# Patient Record
Sex: MALE | Race: WHITE | NOT HISPANIC OR LATINO | Employment: OTHER | URBAN - METROPOLITAN AREA
[De-identification: names, ages, dates, MRNs, and addresses within clinical notes are randomized per-mention and may not be internally consistent; named-entity substitution may affect disease eponyms.]

---

## 2017-01-15 RX ORDER — SPIRONOLACTONE 25 MG/1
25 TABLET ORAL DAILY
Qty: 30 TABLET | Refills: 11 | Status: SHIPPED | OUTPATIENT
Start: 2017-01-15 | End: 2018-03-13 | Stop reason: HOSPADM

## 2017-01-15 RX ORDER — OMEPRAZOLE 40 MG/1
40 CAPSULE, DELAYED RELEASE ORAL 2 TIMES DAILY
Qty: 60 CAPSULE | Refills: 11 | Status: SHIPPED | OUTPATIENT
Start: 2017-01-15

## 2017-02-07 DIAGNOSIS — M25.551 RIGHT HIP PAIN: Primary | ICD-10-CM

## 2017-02-07 NOTE — PROGRESS NOTES
Patient called the office today noting a fall yesterday. He is complaining of right hip pain. Patient to have right hip x-ray at [facility].

## 2017-02-08 ENCOUNTER — APPOINTMENT (OUTPATIENT)
Dept: CT IMAGING | Facility: HOSPITAL | Age: 77
End: 2017-02-08

## 2017-02-08 ENCOUNTER — APPOINTMENT (OUTPATIENT)
Dept: GENERAL RADIOLOGY | Facility: HOSPITAL | Age: 77
End: 2017-02-08

## 2017-02-08 ENCOUNTER — HOSPITAL ENCOUNTER (OUTPATIENT)
Facility: HOSPITAL | Age: 77
Setting detail: OBSERVATION
Discharge: HOME | End: 2017-02-10
Attending: EMERGENCY MEDICINE | Admitting: FAMILY MEDICINE
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
## 2017-02-08 DIAGNOSIS — Z86.79 HISTORY OF HYPERTENSION: ICD-10-CM

## 2017-02-08 DIAGNOSIS — Z86.79 HISTORY OF CORONARY ARTERY DISEASE: ICD-10-CM

## 2017-02-08 DIAGNOSIS — Z78.9 IMPAIRED MOBILITY AND ADLS: ICD-10-CM

## 2017-02-08 DIAGNOSIS — R06.09 DYSPNEA ON EXERTION: ICD-10-CM

## 2017-02-08 DIAGNOSIS — Z74.09 IMPAIRED MOBILITY AND ADLS: ICD-10-CM
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
## 2017-02-08 DIAGNOSIS — Z95.1 HISTORY OF CORONARY ARTERY BYPASS SURGERY: ICD-10-CM

## 2017-02-08 DIAGNOSIS — R11.2 NON-INTRACTABLE VOMITING WITH NAUSEA, UNSPECIFIED VOMITING TYPE: ICD-10-CM

## 2017-02-08 PROBLEM — N18.30 CKD (CHRONIC KIDNEY DISEASE), STAGE III (HCC): Status: ACTIVE | Noted: 2017-02-08

## 2017-02-08 PROBLEM — D72.829 LEUKOCYTOSIS: Status: ACTIVE | Noted: 2017-02-08

## 2017-02-08 LAB
ALBUMIN SERPL-MCNC: 3.9 G/DL (ref 3.2–4.8)
ALBUMIN/GLOB SERPL: 1.6 G/DL (ref 1.5–2.5)
ALP SERPL-CCNC: 73 U/L (ref 25–100)
ALT SERPL W P-5'-P-CCNC: 15 U/L (ref 7–40)
ANION GAP SERPL CALCULATED.3IONS-SCNC: 3 MMOL/L (ref 3–11)
AST SERPL-CCNC: 23 U/L (ref 0–33)
BASOPHILS # BLD AUTO: 0.02 10*3/MM3 (ref 0–0.2)
BASOPHILS NFR BLD AUTO: 0.2 % (ref 0–1)
BILIRUB SERPL-MCNC: 0.6 MG/DL (ref 0.3–1.2)
BILIRUB UR QL STRIP: NEGATIVE
BNP SERPL-MCNC: 331 PG/ML (ref 0–100)
BUN BLD-MCNC: 19 MG/DL (ref 9–23)
BUN/CREAT SERPL: 13.6 (ref 7–25)
CALCIUM SPEC-SCNC: 9.6 MG/DL (ref 8.7–10.4)
CHLORIDE SERPL-SCNC: 103 MMOL/L (ref 99–109)
CLARITY UR: CLEAR
CO2 SERPL-SCNC: 27 MMOL/L (ref 20–31)
COLOR UR: YELLOW
CREAT BLD-MCNC: 1.4 MG/DL (ref 0.6–1.3)
D DIMER PPP FEU-MCNC: 1.6 MG/L (FEU) (ref 0–0.5)
DEPRECATED RDW RBC AUTO: 51.5 FL (ref 37–54)
EOSINOPHIL # BLD AUTO: 0.03 10*3/MM3 (ref 0.1–0.3)
EOSINOPHIL NFR BLD AUTO: 0.2 % (ref 0–3)
ERYTHROCYTE [DISTWIDTH] IN BLOOD BY AUTOMATED COUNT: 15.4 % (ref 11.3–14.5)
GFR SERPL CREATININE-BSD FRML MDRD: 49 ML/MIN/1.73
GLOBULIN UR ELPH-MCNC: 2.5 GM/DL
GLUCOSE BLD-MCNC: 110 MG/DL (ref 70–100)
GLUCOSE UR STRIP-MCNC: NEGATIVE MG/DL
HCT VFR BLD AUTO: 38.6 % (ref 38.9–50.9)
HGB BLD-MCNC: 12.5 G/DL (ref 13.1–17.5)
HGB UR QL STRIP.AUTO: NEGATIVE
HOLD SPECIMEN: NORMAL
HOLD SPECIMEN: NORMAL
IMM GRANULOCYTES # BLD: 0.04 10*3/MM3 (ref 0–0.03)
IMM GRANULOCYTES NFR BLD: 0.3 % (ref 0–0.6)
KETONES UR QL STRIP: NEGATIVE
LEUKOCYTE ESTERASE UR QL STRIP.AUTO: NEGATIVE
LYMPHOCYTES # BLD AUTO: 1.58 10*3/MM3 (ref 0.6–4.8)
LYMPHOCYTES NFR BLD AUTO: 12.5 % (ref 24–44)
MAGNESIUM SERPL-MCNC: 2 MG/DL (ref 1.3–2.7)
MCH RBC QN AUTO: 29.6 PG (ref 27–31)
MCHC RBC AUTO-ENTMCNC: 32.4 G/DL (ref 32–36)
MCV RBC AUTO: 91.3 FL (ref 80–99)
MONOCYTES # BLD AUTO: 1.62 10*3/MM3 (ref 0–1)
MONOCYTES NFR BLD AUTO: 12.8 % (ref 0–12)
NEUTROPHILS # BLD AUTO: 9.35 10*3/MM3 (ref 1.5–8.3)
NEUTROPHILS NFR BLD AUTO: 74 % (ref 41–71)
NITRITE UR QL STRIP: NEGATIVE
PH UR STRIP.AUTO: <=5 [PH] (ref 5–8)
PLATELET # BLD AUTO: 111 10*3/MM3 (ref 150–450)
PMV BLD AUTO: 11.6 FL (ref 6–12)
POTASSIUM BLD-SCNC: 5 MMOL/L (ref 3.5–5.5)
PROT SERPL-MCNC: 6.4 G/DL (ref 5.7–8.2)
PROT UR QL STRIP: NEGATIVE
RBC # BLD AUTO: 4.23 10*6/MM3 (ref 4.2–5.76)
SODIUM BLD-SCNC: 133 MMOL/L (ref 132–146)
SP GR UR STRIP: 1.01 (ref 1–1.03)
TROPONIN I SERPL-MCNC: 0.02 NG/ML (ref 0–0.07)
TROPONIN I SERPL-MCNC: 0.03 NG/ML (ref 0–0.07)
UROBILINOGEN UR QL STRIP: NORMAL
WBC NRBC COR # BLD: 12.64 10*3/MM3 (ref 3.5–10.8)
WHOLE BLOOD HOLD SPECIMEN: NORMAL
WHOLE BLOOD HOLD SPECIMEN: NORMAL

## 2017-02-08 PROCEDURE — G0378 HOSPITAL OBSERVATION PER HR: HCPCS

## 2017-02-08 PROCEDURE — 84484 ASSAY OF TROPONIN QUANT: CPT

## 2017-02-08 PROCEDURE — 99220 PR INITIAL OBSERVATION CARE/DAY 70 MINUTES: CPT | Performed by: INTERNAL MEDICINE

## 2017-02-08 PROCEDURE — 36415 COLL VENOUS BLD VENIPUNCTURE: CPT

## 2017-02-08 PROCEDURE — 96361 HYDRATE IV INFUSION ADD-ON: CPT

## 2017-02-08 PROCEDURE — 71010 HC CHEST PA OR AP: CPT

## 2017-02-08 PROCEDURE — 80053 COMPREHEN METABOLIC PANEL: CPT | Performed by: EMERGENCY MEDICINE

## 2017-02-08 PROCEDURE — 83880 ASSAY OF NATRIURETIC PEPTIDE: CPT | Performed by: EMERGENCY MEDICINE

## 2017-02-08 PROCEDURE — 81003 URINALYSIS AUTO W/O SCOPE: CPT | Performed by: EMERGENCY MEDICINE

## 2017-02-08 PROCEDURE — 96360 HYDRATION IV INFUSION INIT: CPT

## 2017-02-08 PROCEDURE — 93005 ELECTROCARDIOGRAM TRACING: CPT

## 2017-02-08 PROCEDURE — 83735 ASSAY OF MAGNESIUM: CPT | Performed by: EMERGENCY MEDICINE

## 2017-02-08 PROCEDURE — 63710000001 PREDNISONE PER 5 MG: Performed by: INTERNAL MEDICINE

## 2017-02-08 PROCEDURE — 99285 EMERGENCY DEPT VISIT HI MDM: CPT

## 2017-02-08 PROCEDURE — 93005 ELECTROCARDIOGRAM TRACING: CPT | Performed by: EMERGENCY MEDICINE

## 2017-02-08 PROCEDURE — 85025 COMPLETE CBC W/AUTO DIFF WBC: CPT | Performed by: EMERGENCY MEDICINE

## 2017-02-08 PROCEDURE — 85379 FIBRIN DEGRADATION QUANT: CPT | Performed by: INTERNAL MEDICINE

## 2017-02-08 PROCEDURE — 72192 CT PELVIS W/O DYE: CPT

## 2017-02-08 PROCEDURE — 70450 CT HEAD/BRAIN W/O DYE: CPT

## 2017-02-08 PROCEDURE — 71275 CT ANGIOGRAPHY CHEST: CPT

## 2017-02-08 PROCEDURE — 0 IOPAMIDOL PER 1 ML: Performed by: INTERNAL MEDICINE

## 2017-02-08 PROCEDURE — 72125 CT NECK SPINE W/O DYE: CPT

## 2017-02-08 RX ORDER — LORAZEPAM 1 MG/1
1 TABLET ORAL EVERY 8 HOURS PRN
Status: DISCONTINUED | OUTPATIENT
Start: 2017-02-08 | End: 2017-02-10 | Stop reason: HOSPADM

## 2017-02-08 RX ORDER — PREDNISONE 20 MG/1
40 TABLET ORAL
Status: DISCONTINUED | OUTPATIENT
Start: 2017-02-08 | End: 2017-02-09

## 2017-02-08 RX ORDER — ONDANSETRON 2 MG/ML
4 INJECTION INTRAMUSCULAR; INTRAVENOUS EVERY 6 HOURS PRN
Status: DISCONTINUED | OUTPATIENT
Start: 2017-02-08 | End: 2017-02-10 | Stop reason: HOSPADM

## 2017-02-08 RX ORDER — ASPIRIN 81 MG/1
81 TABLET, CHEWABLE ORAL DAILY
Status: DISCONTINUED | OUTPATIENT
Start: 2017-02-08 | End: 2017-02-10 | Stop reason: HOSPADM

## 2017-02-08 RX ORDER — PANTOPRAZOLE SODIUM 40 MG/1
40 TABLET, DELAYED RELEASE ORAL
Status: DISCONTINUED | OUTPATIENT
Start: 2017-02-09 | End: 2017-02-10 | Stop reason: HOSPADM

## 2017-02-08 RX ORDER — SENNA AND DOCUSATE SODIUM 50; 8.6 MG/1; MG/1
2 TABLET, FILM COATED ORAL 2 TIMES DAILY PRN
Status: DISCONTINUED | OUTPATIENT
Start: 2017-02-08 | End: 2017-02-10 | Stop reason: HOSPADM

## 2017-02-08 RX ORDER — ACETAMINOPHEN 500 MG
500 TABLET ORAL EVERY 6 HOURS PRN
Status: DISCONTINUED | OUTPATIENT
Start: 2017-02-08 | End: 2017-02-10 | Stop reason: HOSPADM

## 2017-02-08 RX ORDER — SODIUM CHLORIDE 9 MG/ML
100 INJECTION, SOLUTION INTRAVENOUS CONTINUOUS
Status: DISCONTINUED | OUTPATIENT
Start: 2017-02-08 | End: 2017-02-09

## 2017-02-08 RX ORDER — SOTALOL HYDROCHLORIDE 80 MG/1
120 TABLET ORAL EVERY 12 HOURS SCHEDULED
Status: DISCONTINUED | OUTPATIENT
Start: 2017-02-08 | End: 2017-02-10 | Stop reason: HOSPADM

## 2017-02-08 RX ORDER — POTASSIUM CHLORIDE 1.5 G/1.77G
40 POWDER, FOR SOLUTION ORAL AS NEEDED
Status: DISCONTINUED | OUTPATIENT
Start: 2017-02-08 | End: 2017-02-10 | Stop reason: HOSPADM

## 2017-02-08 RX ORDER — TRAMADOL HYDROCHLORIDE 50 MG/1
50 TABLET ORAL EVERY 12 HOURS PRN
Status: DISCONTINUED | OUTPATIENT
Start: 2017-02-08 | End: 2017-02-10 | Stop reason: HOSPADM

## 2017-02-08 RX ORDER — SODIUM CHLORIDE 0.9 % (FLUSH) 0.9 %
10 SYRINGE (ML) INJECTION AS NEEDED
Status: DISCONTINUED | OUTPATIENT
Start: 2017-02-08 | End: 2017-02-10 | Stop reason: HOSPADM

## 2017-02-08 RX ORDER — POTASSIUM CHLORIDE 750 MG/1
40 CAPSULE, EXTENDED RELEASE ORAL AS NEEDED
Status: DISCONTINUED | OUTPATIENT
Start: 2017-02-08 | End: 2017-02-10 | Stop reason: HOSPADM

## 2017-02-08 RX ORDER — MECLIZINE HCL 12.5 MG/1
12.5 TABLET ORAL EVERY 8 HOURS PRN
Status: DISCONTINUED | OUTPATIENT
Start: 2017-02-08 | End: 2017-02-10 | Stop reason: HOSPADM

## 2017-02-08 RX ORDER — AMITRIPTYLINE HYDROCHLORIDE 10 MG/1
10 TABLET, FILM COATED ORAL NIGHTLY PRN
Status: DISCONTINUED | OUTPATIENT
Start: 2017-02-08 | End: 2017-02-10 | Stop reason: HOSPADM

## 2017-02-08 RX ORDER — SODIUM CHLORIDE 0.9 % (FLUSH) 0.9 %
1-10 SYRINGE (ML) INJECTION AS NEEDED
Status: DISCONTINUED | OUTPATIENT
Start: 2017-02-08 | End: 2017-02-10 | Stop reason: HOSPADM

## 2017-02-08 RX ORDER — GABAPENTIN 300 MG/1
600 CAPSULE ORAL EVERY 12 HOURS SCHEDULED
Status: DISCONTINUED | OUTPATIENT
Start: 2017-02-08 | End: 2017-02-10 | Stop reason: HOSPADM

## 2017-02-08 RX ADMIN — ASPIRIN 81 MG 81 MG: 81 TABLET ORAL at 21:50

## 2017-02-08 RX ADMIN — IOPAMIDOL 50 ML: 755 INJECTION, SOLUTION INTRAVENOUS at 22:12

## 2017-02-08 RX ADMIN — PREDNISONE 40 MG: 20 TABLET ORAL at 21:50

## 2017-02-08 RX ADMIN — SODIUM CHLORIDE 100 ML/HR: 9 INJECTION, SOLUTION INTRAVENOUS at 21:51

## 2017-02-08 NOTE — ED PROVIDER NOTES
Subjective   HPI Comments: Alexander Adan is a 76 y.o. Male who presents to the ED following a fall 4 days ago. He reports he is unable to recall the mechanism of his fall, except that he felt dizzy prior to the incident. Since the fall he has been experiencing bilateral hip pain radiating into his lower extremities, left shoulder pain, left thumb pain, dyspnea on exertion, and vomiting of food content. He denies any other complaints at this time. The patient has a history of stents, CABG, CHF, pacemaker.       Patient is a 76 y.o. male presenting with fall.   History provided by:  Patient, EMS personnel and relative  Fall   Mechanism of injury: fall    Injury location:  Shoulder/arm, leg and finger  Shoulder/arm injury location:  L shoulder  Finger injury location:  L thumb  Leg injury location:  L hip, R hip, L leg and R leg  Incident location:  Home  Time since incident:  4 days  Arrived directly from scene: no    Fall:     Fall occurred:  Unable to specify    Impact surface:  Unable to specify    Point of impact:  Unable to specify    Entrapped after fall: no    Protective equipment: none    Suspicion of alcohol use: no    Suspicion of drug use: no    Tetanus status:  Unknown  Prior to arrival data:     Bystander interventions:  None    Patient ambulatory at scene: yes      Blood loss:  None    Responsiveness at scene:  Alert    Orientation at scene:  Person, place, situation and time    Amnesic to event: yes    Associated symptoms: difficulty breathing (with exertion ) and vomiting    Associated symptoms: no abdominal pain, no back pain, no chest pain, no headaches and no neck pain  Loss of consciousness: unknown.    Risk factors: CABG, CHF and pacemaker        Review of Systems   Constitutional: Negative for chills and fever.   HENT: Negative for congestion, rhinorrhea and sore throat.    Respiratory: Positive for shortness of breath (dyspnea on exertion). Negative for cough.    Cardiovascular: Negative for  chest pain.   Gastrointestinal: Positive for vomiting. Negative for abdominal pain and diarrhea.   Musculoskeletal: Negative for back pain and neck pain.        Left shoulder pain. Left thumb pain.  Bilateral hip pain radiating into his lower extremities.    Skin: Negative for wound.   Neurological: Positive for dizziness. Negative for weakness and headaches. Loss of consciousness: unknown.   All other systems reviewed and are negative.      Past Medical History   Diagnosis Date   • Anemia    • Anxiety    • Chronic kidney disease      STAGE II (mild)   • Hyperlipidemia    • Hypertension    • Stroke      LACUNAR. 2013   • Subdural hematoma      Status post hemorrhagic evacuation with [place] holes 4/3/2015 per Dr. Gilson Brooke.       Allergies   Allergen Reactions   • Altace [Ramipril]    • Cephalexin      unknown   • Penicillins Hives   • Procaine    • Rivaroxaban      Hx of head bleed   • Simvastatin        Past Surgical History   Procedure Laterality Date   • Coronary artery bypass graft     • Thromboendarterectomy       CAROTID, LEFT   • Cholecystectomy     • Cardiac pacemaker placement       PERMANENT   • Carotid stent Left    • [place] hole for subdural hematoma         Family History   Problem Relation Age of Onset   • Cancer Mother    • Diabetes Mother    • Heart failure Mother    • Hypertension Mother    • Stroke Mother    • Diabetes Father    • Heart failure Father    • Diabetes Sister    • Heart failure Sister    • Cancer Brother    • Diabetes Brother    • Heart disease Other    • Hypertension Other    • Other Other             Social History     Social History   • Marital status:      Spouse name: N/A   • Number of children: N/A   • Years of education: N/A     Occupational History   • retired      Social History Main Topics   • Smoking status: Current Every Day Smoker   • Smokeless tobacco: Never Used   • Alcohol use No   • Drug use: No   • Sexual activity: Defer     Other Topics Concern    • None     Social History Narrative    Pt consumes caffeine, pt lives in his own home with wife.          Objective   Physical Exam   Constitutional: He appears well-developed and well-nourished. No distress.   HENT:   Head: Normocephalic and atraumatic.   Eyes: Conjunctivae are normal. Pupils are equal, round, and reactive to light.   Subconjunctival hemorrage medial aspect of left eye.   Neck: Normal range of motion. Neck supple.   Cardiovascular: Normal rate, regular rhythm, normal heart sounds and intact distal pulses.    Pulmonary/Chest: Effort normal and breath sounds normal. No respiratory distress.   Abdominal: Soft. Bowel sounds are normal. There is no tenderness.   Musculoskeletal: Normal range of motion.   Tenderness on left lateral thigh. No bony tenderness or deformity.   Neurological: He is alert.   Baseline mental status with dementia.   Skin: Skin is warm and dry.   Psychiatric: He has a normal mood and affect. His behavior is normal.   Nursing note and vitals reviewed.      Procedures         ED Course  ED Course   Value Comment By Time   BNP: (!) 331.0 (Reviewed) James Diallo MD 02/08 1731   Creatinine: (!) 1.40 (Reviewed) James Diallo MD 02/08 1731    No emergent findings on her evaluation here in the emergency department.  Case was discussed with Dr. Judge who agrees with the plan for admission.  This point there is no clear etiology for the patient's symptoms or reason for his fall.  I am concerned the patient's dyspnea on exertion.  The patient cannot take a contrasted scan of his chest this time secondary to his chronic kidney disease.  We will admit the patient for further evaluation. James Diallo MD 02/08 1249     Recent Results (from the past 24 hour(s))   Comprehensive Metabolic Panel    Collection Time: 02/08/17  3:28 PM   Result Value Ref Range    Glucose 110 (H) 70 - 100 mg/dL    BUN 19 9 - 23 mg/dL    Creatinine 1.40 (H) 0.60 - 1.30 mg/dL    Sodium 133 132  - 146 mmol/L    Potassium 5.0 3.5 - 5.5 mmol/L    Chloride 103 99 - 109 mmol/L    CO2 27.0 20.0 - 31.0 mmol/L    Calcium 9.6 8.7 - 10.4 mg/dL    Total Protein 6.4 5.7 - 8.2 g/dL    Albumin 3.90 3.20 - 4.80 g/dL    ALT (SGPT) 15 7 - 40 U/L    AST (SGOT) 23 0 - 33 U/L    Alkaline Phosphatase 73 25 - 100 U/L    Total Bilirubin 0.6 0.3 - 1.2 mg/dL    eGFR Non African Amer 49 (L) >60 mL/min/1.73    Globulin 2.5 gm/dL    A/G Ratio 1.6 1.5 - 2.5 g/dL    BUN/Creatinine Ratio 13.6 7.0 - 25.0    Anion Gap 3.0 3.0 - 11.0 mmol/L   Magnesium    Collection Time: 02/08/17  3:28 PM   Result Value Ref Range    Magnesium 2.0 1.3 - 2.7 mg/dL   Light Blue Top    Collection Time: 02/08/17  3:28 PM   Result Value Ref Range    Extra Tube hold for add-on    Green Top (Gel)    Collection Time: 02/08/17  3:28 PM   Result Value Ref Range    Extra Tube Hold for add-ons.    Lavender Top    Collection Time: 02/08/17  3:28 PM   Result Value Ref Range    Extra Tube hold for add-on    Gold Top - SST    Collection Time: 02/08/17  3:28 PM   Result Value Ref Range    Extra Tube Hold for add-ons.    CBC Auto Differential    Collection Time: 02/08/17  3:28 PM   Result Value Ref Range    WBC 12.64 (H) 3.50 - 10.80 10*3/mm3    RBC 4.23 4.20 - 5.76 10*6/mm3    Hemoglobin 12.5 (L) 13.1 - 17.5 g/dL    Hematocrit 38.6 (L) 38.9 - 50.9 %    MCV 91.3 80.0 - 99.0 fL    MCH 29.6 27.0 - 31.0 pg    MCHC 32.4 32.0 - 36.0 g/dL    RDW 15.4 (H) 11.3 - 14.5 %    RDW-SD 51.5 37.0 - 54.0 fl    MPV 11.6 6.0 - 12.0 fL    Platelets 111 (L) 150 - 450 10*3/mm3    Neutrophil % 74.0 (H) 41.0 - 71.0 %    Lymphocyte % 12.5 (L) 24.0 - 44.0 %    Monocyte % 12.8 (H) 0.0 - 12.0 %    Eosinophil % 0.2 0.0 - 3.0 %    Basophil % 0.2 0.0 - 1.0 %    Immature Grans % 0.3 0.0 - 0.6 %    Neutrophils, Absolute 9.35 (H) 1.50 - 8.30 10*3/mm3    Lymphocytes, Absolute 1.58 0.60 - 4.80 10*3/mm3    Monocytes, Absolute 1.62 (H) 0.00 - 1.00 10*3/mm3    Eosinophils, Absolute 0.03 (L) 0.10 - 0.30 10*3/mm3     Basophils, Absolute 0.02 0.00 - 0.20 10*3/mm3    Immature Grans, Absolute 0.04 (H) 0.00 - 0.03 10*3/mm3   BNP    Collection Time: 02/08/17  3:28 PM   Result Value Ref Range    .0 (H) 0.0 - 100.0 pg/mL   D-dimer, Quantitative    Collection Time: 02/08/17  3:28 PM   Result Value Ref Range    D-Dimer, Quantitative 1.60 (H) 0.00 - 0.50 mg/L (FEU)   POC Troponin, Rapid    Collection Time: 02/08/17  3:33 PM   Result Value Ref Range    Troponin I 0.02 0.00 - 0.07 ng/mL   POC Troponin, Rapid    Collection Time: 02/08/17  5:46 PM   Result Value Ref Range    Troponin I 0.03 0.00 - 0.07 ng/mL   Urinalysis With / Culture If Indicated    Collection Time: 02/08/17  6:19 PM   Result Value Ref Range    Color, UA Yellow Yellow, Straw    Appearance, UA Clear Clear    pH, UA <=5.0 5.0 - 8.0    Specific Gravity, UA 1.012 1.001 - 1.030    Glucose, UA Negative Negative    Ketones, UA Negative Negative    Bilirubin, UA Negative Negative    Blood, UA Negative Negative    Protein, UA Negative Negative    Leuk Esterase, UA Negative Negative    Nitrite, UA Negative Negative    Urobilinogen, UA 1.0 E.U./dL 0.2 - 1.0 E.U./dL     Note: In addition to lab results from this visit, the labs listed above may include labs taken at another facility or during a different encounter within the last 24 hours. Please correlate lab times with ED admission and discharge times for further clarification of the services performed during this visit.    CT Pelvis Without Contrast   Preliminary Result   No evidence of acute trauma of the pelvis with hips, or soft   tissue pelvis. No acute abnormality is identified.       DICTATED:     02/08/2017   EDITED:         02/08/2017          XR Chest 1 View   ED Interpretation   No acute findings on 1 view portable chest.  No focal   infiltrates, effusions, or evidence of CHF.  Cardiopulmonary   silhouette is unremarkable.      CT Cervical Spine Without Contrast   Preliminary Result   Cervical spine degenerative  disc disease with mild   progression since 2013. No evidence of acute cervical spine trauma.       DICTATED:     02/08/2017   EDITED:         02/08/2017          CT Head Without Contrast   Preliminary Result   Stable head CT scan with previous craniotomy. No evidence of   acute trauma or other acute intracranial disease.       DICTATED:     02/08/2017   EDITED:         02/08/2017          CT Angiogram Chest With & Without Contrast    (Results Pending)     Vitals:    02/08/17 1904 02/08/17 1930 02/08/17 2057 02/08/17 2100   BP: (!) 104/37 112/42 104/48 113/52   BP Location: Left arm      Patient Position: Lying      Pulse: 64 64     Resp: 16      Temp:       TempSrc:       SpO2: 96% 95%  93%   Weight:       Height:         Medications   sodium chloride 0.9 % flush 10 mL (not administered)   acetaminophen (TYLENOL) tablet 500 mg (not administered)   albuterol (PROVENTIL) nebulizer solution 0.5% 2.5 mg/0.5mL (not administered)   amitriptyline (ELAVIL) tablet 10 mg (not administered)   aspirin chewable tablet 81 mg (81 mg Oral Given 2/8/17 2150)   gabapentin (NEURONTIN) capsule 600 mg (not administered)   LORazepam (ATIVAN) tablet 1 mg (not administered)   meclizine (ANTIVERT) tablet 12.5 mg (not administered)   pantoprazole (PROTONIX) EC tablet 40 mg (not administered)   sotalol (BETAPACE) tablet 120 mg (not administered)   traMADol (ULTRAM) tablet 50 mg (not administered)   sodium chloride 0.9 % flush 1-10 mL (not administered)   enoxaparin (LOVENOX) syringe 40 mg (not administered)   sodium chloride 0.9 % infusion (100 mL/hr Intravenous New Bag 2/8/17 2151)   sennosides-docusate sodium (SENOKOT-S) 8.6-50 MG tablet 2 tablet (not administered)   ondansetron (ZOFRAN) injection 4 mg (not administered)   potassium chloride (MICRO-K) CR capsule 40 mEq (not administered)   potassium chloride (KLOR-CON) packet 40 mEq (not administered)   predniSONE (DELTASONE) tablet 40 mg (40 mg Oral Given 2/8/17 2150)   iopamidol  (ISOVUE-370) 76 % injection 100 mL (50 mL Intravenous Given 2/8/17 1582)     ECG/EMG Results (last 24 hours)     Procedure Component Value Units Date/Time    ECG 12 Lead [[id number]] Collected:  02/08/17 1531     Updated:  02/08/17 1742    Narrative:       Test Reason : Weak/Dizzy/AMS protocol  Blood Pressure : **/** mmHG  Vent. Rate : 066 BPM     Atrial Rate : 066 BPM     P-R Int : 122 ms          QRS Dur : 098 ms      QT Int : 456 ms       P-R-T Axes : 000 -28 -05 degrees     QTc Int : 478 ms    Electronic atrial pacemaker  Inferior infarct (cited on or before 07-JUN-2016)  Abnormal ECG  When compared with ECG of 02-NOV-2016 12:09,  Nonspecific T wave abnormality, worse in Inferior leads  Confirmed by VLAD HERNANDEZ MD ([id number]) on 2/8/2017 5:41:49 PM    Referred By: MD ER           Confirmed By:VLAD HERNANDEZ MD    ECG 12 Lead [[id number]] Collected:  02/08/17 1739     Updated:  02/08/17 1742                          MDM  Number of Diagnoses or Management Options  Dyspnea on exertion:   Fall at home, initial encounter:   History of coronary artery bypass surgery:   History of coronary artery disease:   History of hypertension:   Non-intractable vomiting with nausea, unspecified vomiting type:      Amount and/or Complexity of Data Reviewed  Clinical lab tests: reviewed  Tests in the radiology section of CPT®: reviewed  Decide to obtain previous medical records or to obtain history from someone other than the patient: yes  Obtain history from someone other than the patient: yes  Review and summarize past medical records: yes  Discuss the patient with other providers: yes  Independent visualization of images, tracings, or specimens: yes        Final diagnoses:   Fall at home, initial encounter   Dyspnea on exertion   History of coronary artery disease   History of hypertension   History of coronary artery bypass surgery   Non-intractable vomiting with nausea, unspecified vomiting type       Documentation assistance provided  by juan m Balderrama.  Information recorded by the steffanyibe was done at my direction and has been verified and validated by me.     Linda Balderrama  02/08/17 1654       Linda Balderrama  02/08/17 1806       Linda Balderrama  02/08/17 2015       James Diallo MD  02/08/17 6418

## 2017-02-08 NOTE — ED NOTES
"When pt head was lowered for ortho vs, pt states \"set me up, everything is getting blurry and it's making me dizzy\" head elevated to approx 45 degrees     Luz Marina Alba RN  02/08/17 7689    "

## 2017-02-09 ENCOUNTER — APPOINTMENT (OUTPATIENT)
Dept: CARDIOLOGY | Facility: HOSPITAL | Age: 77
End: 2017-02-09
Attending: INTERNAL MEDICINE

## 2017-02-09 PROBLEM — E86.0 DEHYDRATION: Status: ACTIVE | Noted: 2017-02-09

## 2017-02-09 PROBLEM — R55 VASOVAGAL SYNCOPE: Status: ACTIVE | Noted: 2017-02-09

## 2017-02-09 PROBLEM — J40 BRONCHITIS: Status: ACTIVE | Noted: 2017-02-09

## 2017-02-09 PROBLEM — I95.9 HYPOTENSION: Status: ACTIVE | Noted: 2017-02-09

## 2017-02-09 LAB
ANION GAP SERPL CALCULATED.3IONS-SCNC: 2 MMOL/L (ref 3–11)
BH CV ECHO MEAS - AO MAX PG (FULL): 2.2 MMHG
BH CV ECHO MEAS - AO MAX PG: 3.9 MMHG
BH CV ECHO MEAS - AO MEAN PG (FULL): 1.6 MMHG
BH CV ECHO MEAS - AO MEAN PG: 2.5 MMHG
BH CV ECHO MEAS - AO ROOT AREA (BSA CORRECTED): 1.8
BH CV ECHO MEAS - AO ROOT AREA: 8.1 CM^2
BH CV ECHO MEAS - AO ROOT DIAM: 3.2 CM
BH CV ECHO MEAS - AO V2 MAX: 98.2 CM/SEC
BH CV ECHO MEAS - AO V2 MEAN: 75.3 CM/SEC
BH CV ECHO MEAS - AO V2 VTI: 27.3 CM
BH CV ECHO MEAS - AVA(I,A): 2.7 CM^2
BH CV ECHO MEAS - AVA(I,D): 2.7 CM^2
BH CV ECHO MEAS - AVA(V,A): 2.8 CM^2
BH CV ECHO MEAS - AVA(V,D): 2.8 CM^2
BH CV ECHO MEAS - BSA(HAYCOCK): 1.8 M^2
BH CV ECHO MEAS - BSA: 1.8 M^2
BH CV ECHO MEAS - BZI_BMI: 26.9 KILOGRAMS/M^2
BH CV ECHO MEAS - BZI_METRIC_HEIGHT: 162.6 CM
BH CV ECHO MEAS - BZI_METRIC_WEIGHT: 71.2 KG
BH CV ECHO MEAS - CONTRAST EF 4CH: 44.7 ML/M^2
BH CV ECHO MEAS - EDV(CUBED): 164.7 ML
BH CV ECHO MEAS - EDV(MOD-SP4): 123 ML
BH CV ECHO MEAS - EDV(TEICH): 146.3 ML
BH CV ECHO MEAS - EF(CUBED): 69.1 %
BH CV ECHO MEAS - EF(TEICH): 60.1 %
BH CV ECHO MEAS - ESV(CUBED): 50.9 ML
BH CV ECHO MEAS - ESV(MOD-SP4): 68 ML
BH CV ECHO MEAS - ESV(TEICH): 58.4 ML
BH CV ECHO MEAS - FS: 32.4 %
BH CV ECHO MEAS - IVS/LVPW: 1.1
BH CV ECHO MEAS - IVSD: 1.4 CM
BH CV ECHO MEAS - LA DIMENSION: 5.4 CM
BH CV ECHO MEAS - LA/AO: 1.7
BH CV ECHO MEAS - LV DIASTOLIC VOL/BSA (35-75): 69.7 ML/M^2
BH CV ECHO MEAS - LV MASS(C)D: 320.8 GRAMS
BH CV ECHO MEAS - LV MASS(C)DI: 181.8 GRAMS/M^2
BH CV ECHO MEAS - LV MAX PG: 1.6 MMHG
BH CV ECHO MEAS - LV MEAN PG: 0.84 MMHG
BH CV ECHO MEAS - LV SYSTOLIC VOL/BSA (12-30): 38.5 ML/M^2
BH CV ECHO MEAS - LV V1 MAX: 63.7 CM/SEC
BH CV ECHO MEAS - LV V1 MEAN: 42.9 CM/SEC
BH CV ECHO MEAS - LV V1 VTI: 17.4 CM
BH CV ECHO MEAS - LVIDD: 5.5 CM
BH CV ECHO MEAS - LVIDS: 3.7 CM
BH CV ECHO MEAS - LVLD AP4: 8.2 CM
BH CV ECHO MEAS - LVLS AP4: 7.4 CM
BH CV ECHO MEAS - LVOT AREA (M): 4.2 CM^2
BH CV ECHO MEAS - LVOT AREA: 4.2 CM^2
BH CV ECHO MEAS - LVOT DIAM: 2.3 CM
BH CV ECHO MEAS - LVPWD: 1.3 CM
BH CV ECHO MEAS - MV A MAX VEL: 67.1 CM/SEC
BH CV ECHO MEAS - MV E MAX VEL: 106.6 CM/SEC
BH CV ECHO MEAS - MV E/A: 1.6
BH CV ECHO MEAS - PA ACC SLOPE: 717.9 CM/SEC^2
BH CV ECHO MEAS - PA ACC TIME: 0.12 SEC
BH CV ECHO MEAS - PA MAX PG: 2.9 MMHG
BH CV ECHO MEAS - PA PR(ACCEL): 26.7 MMHG
BH CV ECHO MEAS - PA V2 MAX: 85.5 CM/SEC
BH CV ECHO MEAS - PI END-D VEL: 150.7 CM/SEC
BH CV ECHO MEAS - RAP SYSTOLE: 3 MMHG
BH CV ECHO MEAS - RVSP: 33.2 MMHG
BH CV ECHO MEAS - SI(AO): 125.6 ML/M^2
BH CV ECHO MEAS - SI(CUBED): 64.5 ML/M^2
BH CV ECHO MEAS - SI(LVOT): 41.8 ML/M^2
BH CV ECHO MEAS - SI(MOD-SP4): 31.2 ML/M^2
BH CV ECHO MEAS - SI(TEICH): 49.8 ML/M^2
BH CV ECHO MEAS - SV(AO): 221.7 ML
BH CV ECHO MEAS - SV(CUBED): 113.8 ML
BH CV ECHO MEAS - SV(LVOT): 73.8 ML
BH CV ECHO MEAS - SV(MOD-SP4): 55 ML
BH CV ECHO MEAS - SV(TEICH): 87.9 ML
BH CV ECHO MEAS - TR MAX VEL: 274.3 CM/SEC
BUN BLD-MCNC: 18 MG/DL (ref 9–23)
BUN/CREAT SERPL: 16.4 (ref 7–25)
CALCIUM SPEC-SCNC: 9.1 MG/DL (ref 8.7–10.4)
CHLORIDE SERPL-SCNC: 108 MMOL/L (ref 99–109)
CO2 SERPL-SCNC: 24 MMOL/L (ref 20–31)
CREAT BLD-MCNC: 1.1 MG/DL (ref 0.6–1.3)
DEPRECATED RDW RBC AUTO: 49.9 FL (ref 37–54)
ERYTHROCYTE [DISTWIDTH] IN BLOOD BY AUTOMATED COUNT: 15.2 % (ref 11.3–14.5)
GFR SERPL CREATININE-BSD FRML MDRD: 65 ML/MIN/1.73
GLUCOSE BLD-MCNC: 128 MG/DL (ref 70–100)
HCT VFR BLD AUTO: 34 % (ref 38.9–50.9)
HGB BLD-MCNC: 11.2 G/DL (ref 13.1–17.5)
LV EF 2D ECHO EST: 50 %
MCH RBC QN AUTO: 29.6 PG (ref 27–31)
MCHC RBC AUTO-ENTMCNC: 32.9 G/DL (ref 32–36)
MCV RBC AUTO: 89.9 FL (ref 80–99)
PLATELET # BLD AUTO: 88 10*3/MM3 (ref 150–450)
PMV BLD AUTO: 11.7 FL (ref 6–12)
POTASSIUM BLD-SCNC: 4.3 MMOL/L (ref 3.5–5.5)
RBC # BLD AUTO: 3.78 10*6/MM3 (ref 4.2–5.76)
SODIUM BLD-SCNC: 134 MMOL/L (ref 132–146)
WBC NRBC COR # BLD: 8.55 10*3/MM3 (ref 3.5–10.8)

## 2017-02-09 PROCEDURE — G0378 HOSPITAL OBSERVATION PER HR: HCPCS

## 2017-02-09 PROCEDURE — 25010000002 ONDANSETRON PER 1 MG: Performed by: INTERNAL MEDICINE

## 2017-02-09 PROCEDURE — 99226 PR SBSQ OBSERVATION CARE/DAY 35 MINUTES: CPT | Performed by: FAMILY MEDICINE

## 2017-02-09 PROCEDURE — 85027 COMPLETE CBC AUTOMATED: CPT | Performed by: INTERNAL MEDICINE

## 2017-02-09 PROCEDURE — 63710000001 PREDNISONE PER 5 MG: Performed by: INTERNAL MEDICINE

## 2017-02-09 PROCEDURE — 96374 THER/PROPH/DIAG INJ IV PUSH: CPT

## 2017-02-09 PROCEDURE — 96372 THER/PROPH/DIAG INJ SC/IM: CPT

## 2017-02-09 PROCEDURE — 80048 BASIC METABOLIC PNL TOTAL CA: CPT | Performed by: INTERNAL MEDICINE

## 2017-02-09 PROCEDURE — 93306 TTE W/DOPPLER COMPLETE: CPT

## 2017-02-09 PROCEDURE — 25010000002 ENOXAPARIN PER 10 MG: Performed by: INTERNAL MEDICINE

## 2017-02-09 PROCEDURE — 96361 HYDRATE IV INFUSION ADD-ON: CPT

## 2017-02-09 PROCEDURE — 93306 TTE W/DOPPLER COMPLETE: CPT | Performed by: INTERNAL MEDICINE

## 2017-02-09 RX ORDER — DOXYCYCLINE HYCLATE 100 MG/1
100 CAPSULE ORAL EVERY 12 HOURS SCHEDULED
Status: DISCONTINUED | OUTPATIENT
Start: 2017-02-09 | End: 2017-02-10 | Stop reason: HOSPADM

## 2017-02-09 RX ORDER — HYDROCODONE BITARTRATE AND ACETAMINOPHEN 5; 325 MG/1; MG/1
1 TABLET ORAL EVERY 6 HOURS PRN
Status: DISCONTINUED | OUTPATIENT
Start: 2017-02-09 | End: 2017-02-10 | Stop reason: HOSPADM

## 2017-02-09 RX ADMIN — SOTALOL HYDROCHLORIDE 120 MG: 80 TABLET ORAL at 09:29

## 2017-02-09 RX ADMIN — GABAPENTIN 600 MG: 300 CAPSULE ORAL at 21:06

## 2017-02-09 RX ADMIN — DOXYCYCLINE HYCLATE 100 MG: 100 CAPSULE, GELATIN COATED ORAL at 21:06

## 2017-02-09 RX ADMIN — SODIUM CHLORIDE 100 ML/HR: 9 INJECTION, SOLUTION INTRAVENOUS at 09:28

## 2017-02-09 RX ADMIN — GABAPENTIN 600 MG: 300 CAPSULE ORAL at 00:33

## 2017-02-09 RX ADMIN — TRAMADOL HYDROCHLORIDE 50 MG: 50 TABLET, COATED ORAL at 13:31

## 2017-02-09 RX ADMIN — DOXYCYCLINE HYCLATE 100 MG: 100 CAPSULE, GELATIN COATED ORAL at 15:29

## 2017-02-09 RX ADMIN — SOTALOL HYDROCHLORIDE 120 MG: 80 TABLET ORAL at 00:33

## 2017-02-09 RX ADMIN — PREDNISONE 40 MG: 20 TABLET ORAL at 09:28

## 2017-02-09 RX ADMIN — ONDANSETRON 4 MG: 2 INJECTION INTRAMUSCULAR; INTRAVENOUS at 16:32

## 2017-02-09 RX ADMIN — ASPIRIN 81 MG 81 MG: 81 TABLET ORAL at 09:28

## 2017-02-09 RX ADMIN — PANTOPRAZOLE SODIUM 40 MG: 40 TABLET, DELAYED RELEASE ORAL at 05:24

## 2017-02-09 RX ADMIN — SOTALOL HYDROCHLORIDE 120 MG: 80 TABLET ORAL at 21:05

## 2017-02-09 RX ADMIN — ENOXAPARIN SODIUM 40 MG: 40 INJECTION SUBCUTANEOUS at 09:27

## 2017-02-09 RX ADMIN — GABAPENTIN 600 MG: 300 CAPSULE ORAL at 09:28

## 2017-02-09 NOTE — PROGRESS NOTES
"Adult Nutrition  Assessment/PES    Patient Name:  Alexander Adan  YOB: 1940  MRN: [id number]  Admit Date:  2/8/2017    Assessment Date:  2/9/2017        Reason for Assessment       02/09/17 1152    Reason for Assessment    Reason For Assessment/Visit identified at risk by screening criteria    Identified At Risk By Screening Criteria dysphagia or difficulty swallowing    Time Spent (min) 20    Diagnosis Diagnosis    Cardiac CHF;Hypercholesterolemia;HTN;PAF;PPM;CABG    Hematological Anemia   History of    Neurological CVA   History of CVA ('13), subdural hematoma ('15)    Renal CKD   Stage III    Substance Use Tobacco    Other diagnosis Admitting diagnosis of fall              Nutrition/Diet History       02/09/17 1159    Nutrition/Diet History    Reported/Observed By Patient;Family    Appetite Good    Food Habit/Preferences Uses Supplements    Other Pt reports eating about 50% or more of breakfast tray. Uses ensure supplements at home, would like ensure in hospital. Pt reports tolerating regular diet, is not having any issues swallowing.            Anthropometrics       02/09/17 1200    Anthropometrics    Height 162.6 cm (64\")    Weight 71.2 kg (156 lb 15.5 oz)    Ideal Body Weight (IBW)    Ideal Body Weight (IBW), Male (kg) 59.72    % Ideal Body Weight 119.48    Body Mass Index (BMI)    BMI (kg/m2) 27            Labs/Tests/Procedures/Meds       02/09/17 1200    Labs/Tests/Procedures/Meds    Labs/Tests Review Reviewed                Nutrition Prescription Ordered       02/09/17 1200    Nutrition Prescription PO    Current PO Diet Regular    Common Modifiers Consistent Carbohydrate;Cardiac            Evaluation of Received Nutrient/Fluid Intake       02/09/17 1200    PO Evaluation    Number of Days PO Intake Evaluated Insufficient Data   no recorded intake              Problem/Interventions:        Problem 1       02/09/17 1201    Nutrition Diagnoses Problem 1    Problem 1 Inadequate " Intake/Infusion    Inadequate Intake Type Oral    Etiology (related to) Medical Diagnosis   clinical condition    Signs/Symptoms (evidenced by) Report of Mnimal PO Intake   Patient reported 50% intake of breakfast                     Intervention Goal       02/09/17 1203    Intervention Goal    General Nutrition support treatment    PO Increase intake            Nutrition Intervention       02/09/17 1203    Nutrition Intervention    RD/Tech Action Advise alternate selection;Menu provided;Encourage intake;Supplement provided;Recommend/ordered;Follow Tx progress;Care plan reviewd    Recommended/Ordered Supplement            Nutrition Prescription       02/09/17 1203    Nutrition Prescription PO    PO Prescription Begin/change supplement    Supplement Ensure HP   Vanilla    Supplement Frequency Daily            Education/Evaluation       02/09/17 1203    Monitor/Evaluation    Monitor Per protocol;PO intake;Supplement intake        Comments:      Electronically signed by:  Elisa Malone  02/09/17 12:08 PM

## 2017-02-09 NOTE — H&P
[facility] MEDICINE HISTORY AND PHYSICAL    PCP: Harrison Smart MD    Chief Complaint: fall    Subjective     History of Present Illness   76-year-old man presenting from home after unwitnessed fall.  The patient says he doesn't recall what led up to his fall though she does note that he felt slightly dizzy prior to the incident.  He denies chest pain, palpitations, hemoptysis.  What brought the patient to the ED was persistent hip pain, left shoulder pain, and left hand pain.  He also notes that he has been dyspneic on exertion since onset of syncope.  Patient currently feels better in the ED after receiving some pain medication.    Review of Systems   Otherwise complete ROS is negative except as mentioned in the HPI.    Past Medical History:   Past Medical History   Diagnosis Date   • Anemia    • Anxiety    • Chronic kidney disease      STAGE II (mild)   • Hyperlipidemia    • Hypertension    • Stroke      LACUNAR. SEPT 2013   • Subdural hematoma      Status post hemorrhagic evacuation with [place] holes 4/3/2015 per Dr. Gilson Brooke.       Past Surgical History:  Past Surgical History   Procedure Laterality Date   • Coronary artery bypass graft     • Thromboendarterectomy       CAROTID, LEFT   • Cholecystectomy     • Cardiac pacemaker placement       PERMANENT   • Carotid stent Left    • [place] hole for subdural hematoma         Family History: family history includes Cancer in his brother and mother; Diabetes in his brother, father, mother, and sister; Heart disease in his other; Heart failure in his father, mother, and sister; Hypertension in his mother and other; Other in his other; Stroke in his mother.     Social History:  reports that he has been smoking.  He has never used smokeless tobacco. He reports that he does not drink alcohol or use illicit drugs.    Medications:    (Not in a hospital admission)  Allergies   Allergen Reactions   • Altace [Ramipril]    • Cephalexin      unknown   • Penicillins  "Hives   • Procaine    • Rivaroxaban      Hx of head bleed   • Simvastatin        Objective    Physical Exam:   Vital Signs:   Visit Vitals   • BP (!) 104/37 (BP Location: Left arm, Patient Position: Lying)   • Pulse 64   • Temp 98.6 °F (37 °C) (Oral)   • Resp 16   • Ht 64\" (162.6 cm)   • Wt 148 lb (67.1 kg)   • SpO2 96%   • BMI 25.4 kg/m2     Physical Exam  Gen-no acute distress  HEENT-atraumatic, normocephalic, PERRLA, EOMI, moist mucous membranes present  CV-RRR, S1 S2 normal, no m/r/g  Resp-normal WOB, diffuse wheezes throughout  Abd-soft, NT, ND, +BS; no rebound or guarding  Ext-no edema or clubbing  Skin-warm, dry, no rashes or lesions noted  Neuro-A&Ox3, CN II-XII grossly intact; equal strength in upper and lower extremities; no focal deficits  Psych-appropriate mood and behavior    Results Reviewed:  I have personally reviewed current lab, radiology, and data and agree.    Results from last 7 days  Lab Units 02/08/17  1528   WBC 10*3/mm3 12.64*   HEMOGLOBIN g/dL 12.5*   PLATELETS 10*3/mm3 111*       Results from last 7 days  Lab Units 02/08/17  1528   SODIUM mmol/L 133   POTASSIUM mmol/L 5.0   TOTAL CO2 mmol/L 27.0   CREATININE mg/dL 1.40*   GLUCOSE mg/dL 110*   CALCIUM mg/dL 9.6     CT head personally reviewed without acute changes. Agree with interpretation.    CXR personally reviewed with right lower lobe opacity which appears to be chronic. Interpretation pending.      Assessment/Plan   Assessment & Plan  Principal Problem:    Fall at home  Active Problems:    Atrial fibrillation    Tobacco use    CKD (chronic kidney disease), stage III    Leukocytosis    Unwitnessed fall  --Unclear if this is vasovagal syncope as patient did have prodrome prior fall. Patient has hx of orthostasis and vasovagal syncope.  --Check echo  --Doesn't appear to need cardiology consult at this time, but device interrogation in am might be useful.  --If all above workup negative patient may benefit from trial of midodrine or " florinef.   --PT/OT    HAIR, likely COPD exacerbation.  --No obvious cause on exam or imaging.  --D-dimer elevated, due to sudden onset of symptoms will check CTA chest.  --Start prednisone 40mg daily x 5 days  --Echo as above.    Leukocytosis  --Likely reactive due to fall. No s/s of infection.  --Monitor.    CKD III  --Appears at baseline. Monitor.    I discussed the patients findings and my recommendations with: Patient and daughter.    Zulay Goel II, DO   02/08/17   7:10 PM

## 2017-02-09 NOTE — PROGRESS NOTES
Discharge Planning Assessment  [facility]     Patient Name: Alexander Adan  MRN: [id number]  Today's Date: 2/9/2017    Admit Date: 2/8/2017          Discharge Needs Assessment       02/09/17 1501    Living Environment    Lives With spouse    Living Arrangements house    Provides Primary Care For no one    Quality Of Family Relationships supportive    Able to Return to Prior Living Arrangements yes    Living Arrangement Comments CM spoke with pt in room with permission in regards to discharge planning. Pt resides in [facility] with spouse, In[place]. Pt's goal is to return home with spouse when medically ready for discharge.     Discharge Needs Assessment    Readmission Within The Last 30 Days no previous admission in last 30 days    Outpatient/Agency/Support Group Needs homecare agency (specify level of care);other (see comments)   [facility] in [facility]( pt has uses in the past and request them again if  needed.  [facility]    Anticipated Changes Related to Illness other (see comments)   HH for PT    Equipment Currently Used at Home cane, straight;walker, rolling;oxygen    Equipment Needed After Discharge cane, straight;walker, rolling;oxygen    Discharge Facility/Level Of Care Needs home with home health    Transportation Available car    Discharge Disposition still a patient    Discharge Contact Information if Applicable Inés Adan(spouse):  [phone]    Discharge Planning Comments Pt has Human Medicare Replacement insurance and denies recent changes in insurance. Pt reports copays are affordable and uses  [facility] in [place] off [facility]. Pt goal is to return home with spouse and request  [facility] if needed as he has used them in the past.  Spouse will provide transportation home when medically ready for discharge.            Discharge Plan       02/09/17 1509    Case Management/Social Work Plan    Plan discharge plan    Patient/Family In Agreement With Plan yes     Additional Comments Order to arrange  for PT when medically ready for discharge. Referral made to [facility] in [facility] per pt request. CM spoke with  Oliva at [facility]([phone]) and referral made. Clinical information including order, demographics, and H&P faxed to [facility] at  [phone]. CM will notify [facility] when pt medically ready for discharge and will fax discharge summary. Pt is aware of discharge plan and is agreeable.  CM will cont to follow        Discharge Placement     No information found                Demographic Summary       02/09/17 1500    Primary Care Physician Information    Name Berry            Functional Status       02/09/17 1501    Functional Status Prior    Ambulation 3-->assistive equipment and person    Transferring 0-->independent    Toileting 0-->independent    Bathing 0-->independent    Dressing 0-->independent    Eating 0-->independent    Communication 0-->understands/communicates without difficulty    Swallowing 0-->swallows foods/liquids without difficulty            Psychosocial     None            Abuse/Neglect     None            Legal     None            Substance Abuse     None            Patient Forms     None          Radha Lawler, RN

## 2017-02-09 NOTE — PLAN OF CARE
Problem: Fall Risk (Adult)  Goal: Identify Related Risk Factors and Signs and Symptoms  Outcome: Ongoing (interventions implemented as appropriate)    02/09/17 0507   Fall Risk   Fall Risk: Related Risk Factors age-related changes;fatigue/slow reaction;gait/mobility problems;history of falls;environment unfamiliar   Fall Risk: Signs and Symptoms presence of risk factors       Goal: Absence of Falls  Outcome: Ongoing (interventions implemented as appropriate)    02/09/17 1853   Fall Risk (Adult)   Absence of Falls making progress toward outcome         Problem: Patient Care Overview (Adult)  Goal: Plan of Care Review  Outcome: Ongoing (interventions implemented as appropriate)    02/09/17 1853   Coping/Psychosocial Response Interventions   Plan Of Care Reviewed With patient;spouse   Patient Care Overview   Progress no change   Outcome Evaluation   Outcome Summary/Follow up Plan Pt c/o around 1300 and received PRN. c/o nausea around 1630 and received PRN. Echo was performed. VSS.        Goal: Adult Individualization and Mutuality  Outcome: Ongoing (interventions implemented as appropriate)    02/09/17 0507   Individualization   Patient Specific Preferences Pt would like to keep his pants on    Patient Specific Goals Pain control   Patient Specific Interventions Cane at bedside, splint Left thumb        Goal: Discharge Needs Assessment  Outcome: Ongoing (interventions implemented as appropriate)    02/09/17 1501   Current Health   Outpatient/Agency/Support Group Needs homecare agency (specify level of care);other (see comments)  ([facility] in [place]( pt has uses in the past and request them again if needed. [facility])   Anticipated Changes Related to Illness other (see comments)  (HH for PT)   Discharge Needs Assessment   Readmission Within The Last 30 Days no previous admission in last 30 days   Equipment Needed After Discharge cane, straight;walker, rolling;oxygen   Discharge Facility/Level Of Care  Needs home with home health   Discharge Disposition still a patient   Living Environment   Transportation Available car   Self-Care   Equipment Currently Used at Home cane, straight;walker, rolling;oxygen

## 2017-02-09 NOTE — DISCHARGE PLACEMENT REQUEST
"Alexander Adan (76 y.o. Male)     To [facility]   From [facility]() [phone]          [address] [address]  Phone: [phone]  Fax:         Patient:     Alexander Adan MRN: [id number]   [address] [place] [id number] : 1940  SSN:    Phone: [phone] Sex: M      INSURANCE PAYOR PLAN GROUP # SUBSCRIBER ID   Primary: [employer] MEDICARE REPL [id number]   Admitting Diagnosis: Fall at home, initial encounter [W19.XXXA, Y92.099]  Order Date: 2017               Inpatient Consult to Case Management     (Order ID: [id number])   Diagnosis:       Priority: Routine Expected Date:   Expiration Date:        Interval:  Count:    Reason for Consult? please set up for HH PT at d/c,     Specimen Type:   Specimen Source:   Specimen Taken Date:   Specimen Taken Time:         Electronically Signed by: Usha Owens MD 2017 12:47 PM                     Electronically signed by: Usha Owens MD (NPI: [id number])             Date of Birth Social Security Number Address Home Phone MRN    1940  [address] [place] [id number] [phone] [id number]    Buddhism Marital Status          Catholic        Admission Date Admission Type Admitting Provider Attending Provider Department, Room/Bed    17 Emergency Usha Owens MD Hall, Holly, MD [address], S[address]/1    Discharge Date Discharge Disposition Discharge Destination                      Attending Provider: Usha Owens MD     Allergies:  Altace [Ramipril], Cephalexin, Penicillins, Procaine, Rivaroxaban, Simvastatin    Isolation:  None   Infection:  None   Code Status:  FULL    Ht:  64\" (162.6 cm)   Wt:  156 lb 15.5 oz (71.2 kg)    Admission Cmt:  None   Principal Problem:  Fall at home [W19.XXXA,Y92.099]                 Active Insurance as of 2017     Primary Coverage     Payor Plan Insurance Group " Employer/Plan Group    HUMANA MEDICARE REPL HU[employer]NA MEDICARE REPL [id number]     Payor Plan Address Payor Plan Phone Number Effective From Effective To    [address] [address] [phone] 1/1/2014     [place] [address]       Subscriber Name Subscriber Birth Date Member ID       DEBRA SAMUEL 1940 [id number]                 Emergency Contacts      (Rel.) Home Phone Work Phone Mobile Phone    Inés Samuel (Spouse) [phone] -- --               History & Physical      Zulay M Manasa SHERWOOD, DO at 2/8/2017  7:05 PM                [facility] MEDICINE HISTORY AND PHYSICAL    PCP: Harrison Smart MD    Chief Complaint: fall    Subjective     History of Present Illness   76-year-old man presenting from home after unwitnessed fall.  The patient says he doesn't recall what led up to his fall though she does note that he felt slightly dizzy prior to the incident.  He denies chest pain, palpitations, hemoptysis.  What brought the patient to the ED was persistent hip pain, left shoulder pain, and left hand pain.  He also notes that he has been dyspneic on exertion since onset of syncope.  Patient currently feels better in the ED after receiving some pain medication.    Review of Systems   Otherwise complete ROS is negative except as mentioned in the HPI.    Past Medical History:   Past Medical History   Diagnosis Date   • Anemia    • Anxiety    • Chronic kidney disease      STAGE II (mild)   • Hyperlipidemia    • Hypertension    • Stroke      LACUNAR. SEPT 2013   • Subdural hematoma      Status post hemorrhagic evacuation with [place] holes 4/3/2015 per Dr. Gilson Brooke.       Past Surgical History:  Past Surgical History   Procedure Laterality Date   • Coronary artery bypass graft     • Thromboendarterectomy       CAROTID, LEFT   • Cholecystectomy     • Cardiac pacemaker placement       PERMANENT   • Carotid stent Left    • [place] hole for subdural hematoma         Family History: family history includes  "Cancer in his brother and mother; Diabetes in his brother, father, mother, and sister; Heart disease in his other; Heart failure in his father, mother, and sister; Hypertension in his mother and other; Other in his other; Stroke in his mother.     Social History:  reports that he has been smoking.  He has never used smokeless tobacco. He reports that he does not drink alcohol or use illicit drugs.    Medications:    (Not in a hospital admission)  Allergies   Allergen Reactions   • Altace [Ramipril]    • Cephalexin      unknown   • Penicillins Hives   • Procaine    • Rivaroxaban      Hx of head bleed   • Simvastatin        Objective    Physical Exam:   Vital Signs:   Visit Vitals   • BP (!) 104/37 (BP Location: Left arm, Patient Position: Lying)   • Pulse 64   • Temp 98.6 °F (37 °C) (Oral)   • Resp 16   • Ht 64\" (162.6 cm)   • Wt 148 lb (67.1 kg)   • SpO2 96%   • BMI 25.4 kg/m2     Physical Exam  Gen-no acute distress  HEENT-atraumatic, normocephalic, PERRLA, EOMI, moist mucous membranes present  CV-RRR, S1 S2 normal, no m/r/g  Resp-normal WOB, diffuse wheezes throughout  Abd-soft, NT, ND, +BS; no rebound or guarding  Ext-no edema or clubbing  Skin-warm, dry, no rashes or lesions noted  Neuro-A&Ox3, CN II-XII grossly intact; equal strength in upper and lower extremities; no focal deficits  Psych-appropriate mood and behavior    Results Reviewed:  I have personally reviewed current lab, radiology, and data and agree.    Results from last 7 days  Lab Units 02/08/17  1528   WBC 10*3/mm3 12.64*   HEMOGLOBIN g/dL 12.5*   PLATELETS 10*3/mm3 111*       Results from last 7 days  Lab Units 02/08/17  1528   SODIUM mmol/L 133   POTASSIUM mmol/L 5.0   TOTAL CO2 mmol/L 27.0   CREATININE mg/dL 1.40*   GLUCOSE mg/dL 110*   CALCIUM mg/dL 9.6     CT head personally reviewed without acute changes. Agree with interpretation.    CXR personally reviewed with right lower lobe opacity which appears to be chronic. Interpretation pending.    "   Assessment/Plan   Assessment & Plan  Principal Problem:    Fall at home  Active Problems:    Atrial fibrillation    Tobacco use    CKD (chronic kidney disease), stage III    Leukocytosis    Unwitnessed fall  --Unclear if this is vasovagal syncope as patient did have prodrome prior fall. Patient has hx of orthostasis and vasovagal syncope.  --Check echo  --Doesn't appear to need cardiology consult at this time, but device interrogation in am might be useful.  --If all above workup negative patient may benefit from trial of midodrine or florinef.   --PT/OT    HAIR, likely COPD exacerbation.  --No obvious cause on exam or imaging.  --D-dimer elevated, due to sudden onset of symptoms will check CTA chest.  --Start prednisone 40mg daily x 5 days  --Echo as above.    Leukocytosis  --Likely reactive due to fall. No s/s of infection.  --Monitor.    CKD III  --Appears at baseline. Monitor.    I discussed the patients findings and my recommendations with: Patient and daughter.    Zulay Goel II, DO   02/08/17   7:10 PM               Electronically signed by Zulay Goel II, DO at 2/8/2017  9:52 PM        Physician Progress Notes (last 24 hours) (Notes from 2/8/2017  2:58 PM through 2/9/2017  2:58 PM)     No notes of this type exist for this encounter.

## 2017-02-09 NOTE — PLAN OF CARE
Problem: Fall Risk (Adult)  Goal: Identify Related Risk Factors and Signs and Symptoms  Outcome: Ongoing (interventions implemented as appropriate)    02/09/17 0507   Fall Risk   Fall Risk: Related Risk Factors age-related changes;fatigue/slow reaction;gait/mobility problems;history of falls;environment unfamiliar   Fall Risk: Signs and Symptoms presence of risk factors       Goal: Absence of Falls  Outcome: Ongoing (interventions implemented as appropriate)    02/09/17 0507   Fall Risk (Adult)   Absence of Falls making progress toward outcome         Problem: Patient Care Overview (Adult)  Goal: Plan of Care Review  Outcome: Ongoing (interventions implemented as appropriate)    02/09/17 0507   Coping/Psychosocial Response Interventions   Plan Of Care Reviewed With patient   Patient Care Overview   Progress no change   Outcome Evaluation   Outcome Summary/Follow up Plan Pt admitted d/t falls. Pt has not c/o pain since he has been up to floor. Pt slept well through the night. Splited L thumb. VSS.        Goal: Adult Individualization and Mutuality  Outcome: Ongoing (interventions implemented as appropriate)    02/09/17 0507   Individualization   Patient Specific Preferences Pt would like to keep his pants on    Patient Specific Goals Pain control   Patient Specific Interventions Cane at bedside, splint Left thumb    Mutuality/Individual Preferences   What Anxieties, Fears or Concerns Do You Have About Your Health or Care? None at this time   What Questions Do You Have About Your Health or Care? None at this time   What Information Would Help Us Give You More Personalized Care? None at this time

## 2017-02-10 VITALS
HEART RATE: 61 BPM | TEMPERATURE: 97.7 F | OXYGEN SATURATION: 94 % | WEIGHT: 156.97 LBS | SYSTOLIC BLOOD PRESSURE: 100 MMHG | RESPIRATION RATE: 20 BRPM | DIASTOLIC BLOOD PRESSURE: 62 MMHG | BODY MASS INDEX: 26.8 KG/M2 | HEIGHT: 64 IN

## 2017-02-10 PROCEDURE — G8989 SELF CARE D/C STATUS: HCPCS

## 2017-02-10 PROCEDURE — G8987 SELF CARE CURRENT STATUS: HCPCS

## 2017-02-10 PROCEDURE — 99217 PR OBSERVATION CARE DISCHARGE MANAGEMENT: CPT | Performed by: NURSE PRACTITIONER

## 2017-02-10 PROCEDURE — 96372 THER/PROPH/DIAG INJ SC/IM: CPT

## 2017-02-10 PROCEDURE — G0378 HOSPITAL OBSERVATION PER HR: HCPCS

## 2017-02-10 PROCEDURE — 97165 OT EVAL LOW COMPLEX 30 MIN: CPT

## 2017-02-10 PROCEDURE — 63710000001 PREDNISONE PER 5 MG: Performed by: FAMILY MEDICINE

## 2017-02-10 PROCEDURE — G8988 SELF CARE GOAL STATUS: HCPCS

## 2017-02-10 PROCEDURE — 25010000002 ENOXAPARIN PER 10 MG: Performed by: INTERNAL MEDICINE

## 2017-02-10 RX ORDER — GABAPENTIN 300 MG/1
600 CAPSULE ORAL EVERY 12 HOURS SCHEDULED
Start: 2017-02-10 | End: 2017-03-08 | Stop reason: DRUGHIGH

## 2017-02-10 RX ORDER — DOXYCYCLINE HYCLATE 100 MG/1
100 CAPSULE ORAL EVERY 12 HOURS SCHEDULED
Qty: 11 CAPSULE | Refills: 0 | Status: SHIPPED | OUTPATIENT
Start: 2017-02-10 | End: 2017-02-16

## 2017-02-10 RX ORDER — AMITRIPTYLINE HYDROCHLORIDE 10 MG/1
10 TABLET, FILM COATED ORAL NIGHTLY PRN
Start: 2017-02-10 | End: 2018-03-13 | Stop reason: HOSPADM

## 2017-02-10 RX ORDER — PREDNISONE 10 MG/1
30 TABLET ORAL
Qty: 7 TABLET | Refills: 0 | Status: SHIPPED | OUTPATIENT
Start: 2017-02-10 | End: 2017-03-08

## 2017-02-10 RX ADMIN — ASPIRIN 81 MG 81 MG: 81 TABLET ORAL at 08:33

## 2017-02-10 RX ADMIN — ENOXAPARIN SODIUM 40 MG: 40 INJECTION SUBCUTANEOUS at 08:32

## 2017-02-10 RX ADMIN — GABAPENTIN 600 MG: 300 CAPSULE ORAL at 08:33

## 2017-02-10 RX ADMIN — SOTALOL HYDROCHLORIDE 120 MG: 80 TABLET ORAL at 08:32

## 2017-02-10 RX ADMIN — HYDROCODONE BITARTRATE AND ACETAMINOPHEN 1 TABLET: 5; 325 TABLET ORAL at 04:30

## 2017-02-10 RX ADMIN — PANTOPRAZOLE SODIUM 40 MG: 40 TABLET, DELAYED RELEASE ORAL at 05:19

## 2017-02-10 RX ADMIN — PREDNISONE 30 MG: 10 TABLET ORAL at 08:33

## 2017-02-10 RX ADMIN — DOXYCYCLINE HYCLATE 100 MG: 100 CAPSULE, GELATIN COATED ORAL at 08:33

## 2017-02-10 RX ADMIN — HYDROCODONE BITARTRATE AND ACETAMINOPHEN 1 TABLET: 5; 325 TABLET ORAL at 09:14

## 2017-02-10 NOTE — DISCHARGE PLACEMENT REQUEST
"Alexander Samuel (76 y.o. Male)     To [facility]    From [facility]()  : [phone]    Date of Birth Social Security Number Address Home Phone MRN    [id number]   [place] [facility] [id number] [phone] [id number]    Scientologist Marital Status          Scientologist        Admission Date Admission Type Admitting Provider Attending Provider Department, Room/Bed    2/8/17 Emergency Usha Owens MD Hall, Holly, MD [facility] 5G, S571/1    Discharge Date Discharge Disposition Discharge Destination         Home or Self Care             Attending Provider: Usha Owens MD     Allergies:  Altace [Ramipril], Cephalexin, Penicillins, Procaine, Rivaroxaban, Simvastatin    Isolation:  None   Infection:  None   Code Status:  FULL    Ht:  64\" (162.6 cm)   Wt:  156 lb 15.5 oz (71.2 kg)    Admission Cmt:  None   Principal Problem:  Fall at home [W19.XXXA,Y92.099]                 Active Insurance as of 2/8/2017     Primary Coverage     Payor Plan Insurance Group Employer/Plan Group    HUMANA MEDICARE REPL HUMANA MEDICARE REPL [id number]     Payor Plan Address Payor Plan Phone Number Effective From Effective To    [address] [address] [phone] 1/1/2014     [place] [address]       Subscriber Name Subscriber Birth Date Member ID       ALEXANDER SAMUEL 1940 [id number]                 Emergency Contacts      (Rel.) Home Phone Work Phone Mobile Phone    Inés Samuel (Spouse) [phone] -- --               Discharge Summary      DENI Akers at 2/10/2017  8:25 AM              [facility] Medicine Services  DISCHARGE SUMMARY       Date of Admission: 2/8/2017  Date of Discharge:  2/10/2017  Primary Care Physician: Harrison Smart MD    Discharge Diagnoses:  Active Hospital Problems (** Indicates Principal Problem)    Diagnosis Date Noted   • **Fall at home [W19.XXXA, Y92.099] 02/08/2017   • Bronchitis [J40] 02/09/2017   • Vasovagal syncope " "[R55] 02/09/2017   • Dehydration [E86.0] 02/09/2017   • Hypotension due to dehydration [I95.9] 02/09/2017   • CKD (chronic kidney disease), stage III [N18.3] 02/08/2017   • Leukocytosis [D72.829] 02/08/2017   • Tobacco use [Z72.0] 08/04/2016   • Cognitive impairment, mild, so stated [G31.84] 08/04/2016   • Atrial fibrillation [I48.91] 06/20/2016   • Chronic obstructive pulmonary disease [J44.9] 06/20/2016   • Chronic diastolic heart failure [I50.32] 06/20/2016   • Chronic coronary artery disease [I25.10] 06/20/2016      Resolved Hospital Problems    Diagnosis Date Noted Date Resolved   No resolved problems to display.       Presenting Problem/History of Present Illness  Fall at home, initial encounter [W19.XXXA, Y92.099]     Chief Complaint on Day of Discharge:   Hospital follow up  History of Present Illness on Day of Discharge:   No dizziness  \"I feel better than I felt yesterday\"  Tolerating po    Hospital Course  Patient is a 76 y.o. male presented to ED after a fall 4d prior after what sounds like a syncopal episode with reports of dizziness prior to fall.  Presented for eval due to complaints of continued hip/shoulder pain and dyspnea since syncopal episode 4d ago.  XR imaging was negative, but he was felt to be having a COPD exacerbation and he was hypotensive.  He was hydrated and  started on doxy for bronchitis as well as a steroid taper.  His BPs improved and he began to feel much better.  He is felt to be stable and ready for dc on 2.10.17.  His diuretics were initially held on admission, aldactone will be restarted at dc but pt should follow up with PCP in 1 week for BP recheck and potentially restart bumex at that time if BP stable.           Procedures Performed  None    Consults:   Consults     No orders found from 1/10/2017 to 2/9/2017.          Pertinent Test Results:  Component      Latest Ref Rng 2/8/2017 2/9/2017   WBC      3.50 - 10.80 10*3/mm3 12.64 (H) 8.55   RBC      4.20 - 5.76 10*6/mm3 4.23 " 3.78 (L)   Hemoglobin      13.1 - 17.5 g/dL 12.5 (L) 11.2 (L)   Hematocrit      38.9 - 50.9 % 38.6 (L) 34.0 (L)   MCV      80.0 - 99.0 fL 91.3 89.9   MCH      27.0 - 31.0 pg 29.6 29.6   MCHC      32.0 - 36.0 g/dL 32.4 32.9   RDW      11.3 - 14.5 % 15.4 (H) 15.2 (H)   RDW-SD      37.0 - 54.0 fl 51.5 49.9   MPV      6.0 - 12.0 fL 11.6 11.7   Platelets      150 - 450 10*3/mm3 111 (L) 88 (L)   Neutrophil %      41.0 - 71.0 % 74.0 (H)    Lymphocyte %      24.0 - 44.0 % 12.5 (L)    Monocyte %      0.0 - 12.0 % 12.8 (H)    Eosinophil %      0.0 - 3.0 % 0.2    Basophil %      0.0 - 1.0 % 0.2    Immature Grans %      0.0 - 0.6 % 0.3    Neutrophils, Absolute      1.50 - 8.30 10*3/mm3 9.35 (H)    Lymphocytes, Absolute      0.60 - 4.80 10*3/mm3 1.58    Monocytes, Absolute      0.00 - 1.00 10*3/mm3 1.62 (H)    Eosinophils, Absolute      0.10 - 0.30 10*3/mm3 0.03 (L)    Basophils, Absolute      0.00 - 0.20 10*3/mm3 0.02    Immature Grans, Absolute      0.00 - 0.03 10*3/mm3 0.04 (H)    Glucose      70 - 100 mg/dL 110 (H) 128 (H)   BUN      9 - 23 mg/dL 19 18   Creatinine      0.60 - 1.30 mg/dL 1.40 (H) 1.10   Sodium      132 - 146 mmol/L 133 134   Potassium      3.5 - 5.5 mmol/L 5.0 4.3   Chloride      99 - 109 mmol/L 103 108   CO2      20.0 - 31.0 mmol/L 27.0 24.0   Calcium      8.7 - 10.4 mg/dL 9.6 9.1   Total Protein      5.7 - 8.2 g/dL 6.4    Albumin      3.20 - 4.80 g/dL 3.90    ALT (SGPT)      7 - 40 U/L 15    AST (SGOT)      0 - 33 U/L 23    Alkaline Phosphatase      25 - 100 U/L 73    Total Bilirubin      0.3 - 1.2 mg/dL 0.6    eGFR Non African Amer      >60 mL/min/1.73 49 (L) 65   Globulin      gm/dL 2.5    A/G Ratio      1.5 - 2.5 g/dL 1.6    BUN/Creatinine Ratio      7.0 - 25.0 13.6 16.4   Anion Gap      3.0 - 11.0 mmol/L 3.0 2.0 (L)     Component      Latest Ref Rng 2/8/2017   Color, UA      Yellow, Straw Yellow   Appearance, UA      Clear Clear   pH, UA      5.0 - 8.0 <=5.0   Specific Gravity, UA      1.001 - 1.030  "1.012   Glucose, UA      Negative Negative   Ketones, UA      Negative Negative   Bilirubin, UA      Negative Negative   Blood, UA      Negative Negative   Protein, UA      Negative Negative   Leuk Esterase, UA      Negative Negative   Nitrite, UA      Negative Negative   Urobilinogen, UA      0.2 - 1.0 E.U./dL 1.0 E.U./dL   Magnesium      1.3 - 2.7 mg/dL 2.0   BNP      0.0 - 100.0 pg/mL 331.0 (H)   D-dimer, Quant      0.00 - 0.50 mg/L (FEU) 1.60 (H)   CT Cspine:  Cervical spine degenerative disc disease with mild  progression since 2013. No evidence of acute cervical spine trauma.  CT head:  Stable head CT scan with previous craniotomy. No evidence of  acute trauma or other acute intracranial disease.  CT pelvis:  No evidence of acute trauma of the pelvis with hips, or soft  tissue pelvis. No other acute abnormality is identified.  CTA chest:  1. No pulmonary embolism.   2. Mild bilateral upper and lower lobe bronchial wall thickening, compatible   with reactive or disease or bronchitis.    3. Incidental/non-acute findings are described above.    Condition on Discharge:  stable    Physical Exam on Discharge:  Visit Vitals   • /62 (Patient Position: Lying)   • Pulse 61   • Temp 97.7 °F (36.5 °C) (Oral)   • Resp 20   • Ht 64\" (162.6 cm)   • Wt 156 lb 15.5 oz (71.2 kg)   • SpO2 94%   • BMI 26.94 kg/m2     Physical Exam   Constitutional: He is oriented to person, place, and time. He appears well-developed and well-nourished.   HENT:   Head: Normocephalic.   Eyes:   Left conjunctival hemorrhage   Neck: Neck supple.   Cardiovascular: Normal rate.  Exam reveals no gallop and no friction rub.    No murmur heard.  Pulmonary/Chest: Effort normal. No respiratory distress. He has wheezes.   Scattered expiratory wheezing   Abdominal: Soft. Bowel sounds are normal. He exhibits no distension.   Musculoskeletal: He exhibits no edema.   Neurological: He is alert and oriented to person, place, and time.   Equal strength upper " and lower extremities, 5/5   Skin: Skin is warm and dry.   Psychiatric: He has a normal mood and affect.   Vitals reviewed.        Discharge Disposition  Home or Self Care    Discharge Medications   Alexander Adan CARLENE   Home Medication Instructions BRIAN:[id number]    Printed on:02/10/17 0901   Medication Information                      acetaminophen (TYLENOL) 650 MG 8 hr tablet  Take 650 mg by mouth Every 4 (Four) Hours As Needed for mild pain (1-3).             albuterol (PROAIR HFA) 108 (90 BASE) MCG/ACT inhaler  Inhale 1-2 puffs Every 4 (Four) Hours As Needed.             albuterol (PROVENTIL) (2.5 MG/3ML) 0.083% nebulizer solution  Take 1 dose by nebulization See Admin Instructions.             amitriptyline (ELAVIL) 10 MG tablet  Take 1 tablet by mouth At Night As Needed for sleep. FOR LEGS/SLEEP             aspirin (ASPIRIN LOW DOSE) 81 MG tablet  Take 1 tablet by mouth daily.             carbonyl iron (FEOSOL) 45 MG tablet tablet  Take 325 tablets by mouth Daily.             dicyclomine (BENTYL) 20 MG tablet  TAKE ONE TABLET BY MOUTH EVERY 6 HOURS AS NEEDED ABDOMINAL  PAIN             doxycycline (VIBRAMYCIN) 100 MG capsule  Take 1 capsule by mouth Every 12 (Twelve) Hours for 6 days. Indications: Upper Respiratory Tract Infection             gabapentin (NEURONTIN) 300 MG capsule  Take 2 capsules by mouth Every 12 (Twelve) Hours.             ipratropium-albuterol (DUO-NEB) 0.5-2.5 mg/mL nebulizer  Take 1 dose by nebulization every 4 (four) hours as needed. FOR DYSPNEA/WHEEZING             LORazepam (ATIVAN) 2 MG tablet  Take 1 mg by mouth Every 8 (Eight) Hours As Needed.             meclizine (ANTIVERT) 25 MG tablet  Take 12.5 mg by mouth Every 8 (Eight) Hours As Needed.             omeprazole (priLOSEC) 40 MG capsule  Take 1 capsule by mouth 2 (Two) Times a Day.             predniSONE (DELTASONE) 10 MG tablet  Take 3 tablets by mouth Daily With Breakfast. Starting 2.11.17, 3 tabs daily x1d, then 2 tabs  daily x2d then stop             Sotalol HCl, AF, 120 MG tablet  Take 120 mg by mouth Every 12 (Twelve) Hours.             spironolactone (ALDACTONE) 25 MG tablet  Take 1 tablet by mouth Daily.             sucralfate (CARAFATE) 1 G tablet  Take 1 g by mouth 2 (Two) Times a Day.             traMADol (ULTRAM) 50 MG tablet  Take 1 tablet by mouth every 12 (twelve) hours as needed.                 Discharge Diet:   Diet Instructions     Diet: Consistent Carbohydrate, Cardiac; Thin Liquids, No Restrictions       Discharge Diet:   Consistent Carbohydrate  Cardiac      Fluid Consistency:  Thin Liquids, No Restrictions                 Discharge Care Plan / Instructions:    Activity at Discharge:   Activity Instructions     Activity as Tolerated                     Follow-up Appointments  No future appointments.  Referrals and Follow-ups to Schedule     Follow-Up    As directed    PCP 1 week                 Test Results Pending at Discharge       DENI Akers 02/10/17 9:01 AM    Time: Discharge 35 min    Please note that portions of this note may have been completed with a voice recognition program. Efforts were made to edit the dictations, but occasionally words are mistranscribed.       Electronically signed by DENI Akers at 2/10/2017  9:20 AM

## 2017-02-10 NOTE — DISCHARGE SUMMARY
"    [facility] Medicine Services  DISCHARGE SUMMARY       Date of Admission: 2/8/2017  Date of Discharge:  2/10/2017  Primary Care Physician: Harrison Smart MD    Discharge Diagnoses:  Active Hospital Problems (** Indicates Principal Problem)    Diagnosis Date Noted   • **Fall at home [W19.XXXA, Y92.099] 02/08/2017   • Bronchitis [J40] 02/09/2017   • Vasovagal syncope [R55] 02/09/2017   • Dehydration [E86.0] 02/09/2017   • Hypotension due to dehydration [I95.9] 02/09/2017   • CKD (chronic kidney disease), stage III [N18.3] 02/08/2017   • Leukocytosis [D72.829] 02/08/2017   • Tobacco use [Z72.0] 08/04/2016   • Cognitive impairment, mild, so stated [G31.84] 08/04/2016   • Atrial fibrillation [I48.91] 06/20/2016   • Chronic obstructive pulmonary disease [J44.9] 06/20/2016   • Chronic diastolic heart failure [I50.32] 06/20/2016   • Chronic coronary artery disease [I25.10] 06/20/2016      Resolved Hospital Problems    Diagnosis Date Noted Date Resolved   No resolved problems to display.       Presenting Problem/History of Present Illness  Fall at home, initial encounter [W19.XXXA, Y92.099]     Chief Complaint on Day of Discharge:   Hospital follow up  History of Present Illness on Day of Discharge:   No dizziness  \"I feel better than I felt yesterday\"  Tolerating po    Hospital Course  Patient is a 76 y.o. male presented to ED after a fall 4d prior after what sounds like a syncopal episode with reports of dizziness prior to fall.  Presented for eval due to complaints of continued hip/shoulder pain and dyspnea since syncopal episode 4d ago.  XR imaging was negative, but he was felt to be having a COPD exacerbation and he was hypotensive.  He was hydrated and  started on doxy for bronchitis as well as a steroid taper.  His BPs improved and he began to feel much better.  He is felt to be stable and ready for dc on 2.10.17.  His diuretics were initially held on admission, aldactone will be " restarted at dc but pt should follow up with PCP in 1 week for BP recheck and potentially restart bumex at that time if BP stable.           Procedures Performed  None    Consults:   Consults     No orders found from 1/10/2017 to 2/9/2017.          Pertinent Test Results:  Component      Latest Ref Rng 2/8/2017 2/9/2017   WBC      3.50 - 10.80 10*3/mm3 12.64 (H) 8.55   RBC      4.20 - 5.76 10*6/mm3 4.23 3.78 (L)   Hemoglobin      13.1 - 17.5 g/dL 12.5 (L) 11.2 (L)   Hematocrit      38.9 - 50.9 % 38.6 (L) 34.0 (L)   MCV      80.0 - 99.0 fL 91.3 89.9   MCH      27.0 - 31.0 pg 29.6 29.6   MCHC      32.0 - 36.0 g/dL 32.4 32.9   RDW      11.3 - 14.5 % 15.4 (H) 15.2 (H)   RDW-SD      37.0 - 54.0 fl 51.5 49.9   MPV      6.0 - 12.0 fL 11.6 11.7   Platelets      150 - 450 10*3/mm3 111 (L) 88 (L)   Neutrophil %      41.0 - 71.0 % 74.0 (H)    Lymphocyte %      24.0 - 44.0 % 12.5 (L)    Monocyte %      0.0 - 12.0 % 12.8 (H)    Eosinophil %      0.0 - 3.0 % 0.2    Basophil %      0.0 - 1.0 % 0.2    Immature Grans %      0.0 - 0.6 % 0.3    Neutrophils, Absolute      1.50 - 8.30 10*3/mm3 9.35 (H)    Lymphocytes, Absolute      0.60 - 4.80 10*3/mm3 1.58    Monocytes, Absolute      0.00 - 1.00 10*3/mm3 1.62 (H)    Eosinophils, Absolute      0.10 - 0.30 10*3/mm3 0.03 (L)    Basophils, Absolute      0.00 - 0.20 10*3/mm3 0.02    Immature Grans, Absolute      0.00 - 0.03 10*3/mm3 0.04 (H)    Glucose      70 - 100 mg/dL 110 (H) 128 (H)   BUN      9 - 23 mg/dL 19 18   Creatinine      0.60 - 1.30 mg/dL 1.40 (H) 1.10   Sodium      132 - 146 mmol/L 133 134   Potassium      3.5 - 5.5 mmol/L 5.0 4.3   Chloride      99 - 109 mmol/L 103 108   CO2      20.0 - 31.0 mmol/L 27.0 24.0   Calcium      8.7 - 10.4 mg/dL 9.6 9.1   Total Protein      5.7 - 8.2 g/dL 6.4    Albumin      3.20 - 4.80 g/dL 3.90    ALT (SGPT)      7 - 40 U/L 15    AST (SGOT)      0 - 33 U/L 23    Alkaline Phosphatase      25 - 100 U/L 73    Total Bilirubin      0.3 - 1.2 mg/dL  "0.6    eGFR Non African Amer      >60 mL/min/1.73 49 (L) 65   Globulin      gm/dL 2.5    A/G Ratio      1.5 - 2.5 g/dL 1.6    BUN/Creatinine Ratio      7.0 - 25.0 13.6 16.4   Anion Gap      3.0 - 11.0 mmol/L 3.0 2.0 (L)     Component      Latest Ref Rng 2/8/2017   Color, UA      Yellow, Straw Yellow   Appearance, UA      Clear Clear   pH, UA      5.0 - 8.0 <=5.0   Specific Gravity, UA      1.001 - 1.030 1.012   Glucose, UA      Negative Negative   Ketones, UA      Negative Negative   Bilirubin, UA      Negative Negative   Blood, UA      Negative Negative   Protein, UA      Negative Negative   Leuk Esterase, UA      Negative Negative   Nitrite, UA      Negative Negative   Urobilinogen, UA      0.2 - 1.0 E.U./dL 1.0 E.U./dL   Magnesium      1.3 - 2.7 mg/dL 2.0   BNP      0.0 - 100.0 pg/mL 331.0 (H)   D-dimer, Quant      0.00 - 0.50 mg/L (FEU) 1.60 (H)   CT Cspine:  Cervical spine degenerative disc disease with mild  progression since 2013. No evidence of acute cervical spine trauma.  CT head:  Stable head CT scan with previous craniotomy. No evidence of  acute trauma or other acute intracranial disease.  CT pelvis:  No evidence of acute trauma of the pelvis with hips, or soft  tissue pelvis. No other acute abnormality is identified.  CTA chest:  1. No pulmonary embolism.   2. Mild bilateral upper and lower lobe bronchial wall thickening, compatible   with reactive or disease or bronchitis.    3. Incidental/non-acute findings are described above.    Condition on Discharge:  stable    Physical Exam on Discharge:  Visit Vitals   • /62 (Patient Position: Lying)   • Pulse 61   • Temp 97.7 °F (36.5 °C) (Oral)   • Resp 20   • Ht 64\" (162.6 cm)   • Wt 156 lb 15.5 oz (71.2 kg)   • SpO2 94%   • BMI 26.94 kg/m2     Physical Exam   Constitutional: He is oriented to person, place, and time. He appears well-developed and well-nourished.   HENT:   Head: Normocephalic.   Eyes:   Left conjunctival hemorrhage   Neck: Neck supple. "   Cardiovascular: Normal rate.  Exam reveals no gallop and no friction rub.    No murmur heard.  Pulmonary/Chest: Effort normal. No respiratory distress. He has wheezes.   Scattered expiratory wheezing   Abdominal: Soft. Bowel sounds are normal. He exhibits no distension.   Musculoskeletal: He exhibits no edema.   Neurological: He is alert and oriented to person, place, and time.   Equal strength upper and lower extremities, 5/5   Skin: Skin is warm and dry.   Psychiatric: He has a normal mood and affect.   Vitals reviewed.        Discharge Disposition  Home or Self Care    Discharge Medications   Alexander Adan   Home Medication Instructions BRIAN:[id number]    Printed on:02/10/17 0901   Medication Information                      acetaminophen (TYLENOL) 650 MG 8 hr tablet  Take 650 mg by mouth Every 4 (Four) Hours As Needed for mild pain (1-3).             albuterol (PROAIR HFA) 108 (90 BASE) MCG/ACT inhaler  Inhale 1-2 puffs Every 4 (Four) Hours As Needed.             albuterol (PROVENTIL) (2.5 MG/3ML) 0.083% nebulizer solution  Take 1 dose by nebulization See Admin Instructions.             amitriptyline (ELAVIL) 10 MG tablet  Take 1 tablet by mouth At Night As Needed for sleep. FOR LEGS/SLEEP             aspirin (ASPIRIN LOW DOSE) 81 MG tablet  Take 1 tablet by mouth daily.             carbonyl iron (FEOSOL) 45 MG tablet tablet  Take 325 tablets by mouth Daily.             dicyclomine (BENTYL) 20 MG tablet  TAKE ONE TABLET BY MOUTH EVERY 6 HOURS AS NEEDED ABDOMINAL  PAIN             doxycycline (VIBRAMYCIN) 100 MG capsule  Take 1 capsule by mouth Every 12 (Twelve) Hours for 6 days. Indications: Upper Respiratory Tract Infection             gabapentin (NEURONTIN) 300 MG capsule  Take 2 capsules by mouth Every 12 (Twelve) Hours.             ipratropium-albuterol (DUO-NEB) 0.5-2.5 mg/mL nebulizer  Take 1 dose by nebulization every 4 (four) hours as needed. FOR DYSPNEA/WHEEZING             LORazepam (ATIVAN) 2  MG tablet  Take 1 mg by mouth Every 8 (Eight) Hours As Needed.             meclizine (ANTIVERT) 25 MG tablet  Take 12.5 mg by mouth Every 8 (Eight) Hours As Needed.             omeprazole (priLOSEC) 40 MG capsule  Take 1 capsule by mouth 2 (Two) Times a Day.             predniSONE (DELTASONE) 10 MG tablet  Take 3 tablets by mouth Daily With Breakfast. Starting 2.11.17, 3 tabs daily x1d, then 2 tabs daily x2d then stop             Sotalol HCl, AF, 120 MG tablet  Take 120 mg by mouth Every 12 (Twelve) Hours.             spironolactone (ALDACTONE) 25 MG tablet  Take 1 tablet by mouth Daily.             sucralfate (CARAFATE) 1 G tablet  Take 1 g by mouth 2 (Two) Times a Day.             traMADol (ULTRAM) 50 MG tablet  Take 1 tablet by mouth every 12 (twelve) hours as needed.                 Discharge Diet:   Diet Instructions     Diet: Consistent Carbohydrate, Cardiac; Thin Liquids, No Restrictions       Discharge Diet:   Consistent Carbohydrate  Cardiac      Fluid Consistency:  Thin Liquids, No Restrictions                 Discharge Care Plan / Instructions:    Activity at Discharge:   Activity Instructions     Activity as Tolerated                     Follow-up Appointments  No future appointments.  Referrals and Follow-ups to Schedule     Follow-Up    As directed    PCP 1 week                 Test Results Pending at Discharge       DENI Akers 02/10/17 9:01 AM    Time: Discharge 35 min    Please note that portions of this note may have been completed with a voice recognition program. Efforts were made to edit the dictations, but occasionally words are mistranscribed.

## 2017-02-10 NOTE — PROGRESS NOTES
Continued Stay Note  [facility]     Patient Name: Alexander Adan  MRN: [id number]  Today's Date: 2/10/2017    Admit Date: 2/8/2017          Discharge Plan       02/10/17 1234    Case Management/Social Work Plan    Plan discharge plan    Patient/Family In Agreement With Plan yes    Additional Comments Pt medically ready for discharge. Discharge summary faxed to [facility] at  [phone].  Spoke with Lila at [facility] at ([phone]) and aware pt medically ready for discharge today. [facility] will be in contact with pt and see pt over weekend. Pt is aware [facility] will contact him for HH visit. Family will provide transportation home.               Discharge Codes     None        Expected Discharge Date and Time     Expected Discharge Date Expected Discharge Time    Feb 10, 2017             Radha Lawler RN

## 2017-02-10 NOTE — THERAPY DISCHARGE NOTE
Acute Care - Occupational Therapy Initial Eval/Discharge  [facility]     Patient Name: Alexander Adan  : 1940  MRN: [id number]  Today's Date: 2/10/2017  Onset of Illness/Injury or Date of Surgery Date: 17  Date of Referral to OT: 17  Referring Physician: Manasa SHERWOOD DO      Admit Date: 2017       ICD-10-CM ICD-9-CM   1. Fall at home, initial encounter W19.XXXA E888.9    Y92.099 E849.0   2. Dyspnea on exertion R06.09 786.09   3. History of coronary artery disease Z86.79 V12.59   4. History of hypertension Z86.79 V12.59   5. History of coronary artery bypass surgery Z95.1 V45.81   6. Non-intractable vomiting with nausea, unspecified vomiting type R11.2 787.01   7. Impaired mobility and ADLs Z74.09 799.89     Patient Active Problem List   Diagnosis   • Atrial fibrillation   • Chronic coronary artery disease   • Chronic diastolic heart failure   • Chronic obstructive pulmonary disease   • Cognitive impairment, mild, so stated   • Tobacco use   • Fall at home   • CKD (chronic kidney disease), stage III   • Leukocytosis   • Bronchitis   • Vasovagal syncope   • Dehydration   • Hypotension due to dehydration     Past Medical History   Diagnosis Date   • Anemia    • Anxiety    • Chronic kidney disease      STAGE II (mild)   • Hyperlipidemia    • Hypertension    • Stroke      LACUNAR. 2013   • Subdural hematoma      Status post hemorrhagic evacuation with [place] holes 4/3/2015 per Dr. Gilson Brooke.     Past Surgical History   Procedure Laterality Date   • Coronary artery bypass graft     • Thromboendarterectomy       CAROTID, LEFT   • Cholecystectomy     • Cardiac pacemaker placement       PERMANENT   • Carotid stent Left    • [place] hole for subdural hematoma            OT ASSESSMENT FLOWSHEET (last 72 hours)      OT Evaluation       02/10/17 0931 02/10/17 0748 17 1501 17 2319       Rehab Evaluation    Document Type  evaluation;discharge summary  -MICHELLE       Subjective Information   no complaints;agree to therapy  -MICHELLE       Patient Effort, Rehab Treatment  excellent  -MICHELLE       Patient Effort, Rehab Treatment Comment  pt very pleasant  -MICHELLE       Symptoms Noted During/After Treatment  increased pain  -MICHELLE       Symptoms Noted Comment  pt c/o pain in knee with ambulation  -MICHELLE       General Information    Patient Profile Review  yes  -MICHELLE       Onset of Illness/Injury or Date of Surgery Date  02/08/17  -MICHELLE       Referring Physician  Manasa SHERWOOD, DO  -MICHELLE       General Observations  pt supine in bed w/ wife at bedside  -MICHELLE       Pertinent History Of Current Problem  Pt is a pleasent 76 YOM who presented to the ED after an unwitnessed fall. Pt does not recall what led up to the fall but notes experiencing some diziness prior. Pt presented w/ persisten hip pain, left shoulder pain, and left hand pain. He states he has been dyspneic on exertion since onset of syncope.  -MICHELLE       Precautions/Limitations  fall precautions  -MICHELLE       Prior Level of Function  all household mobility;community mobility;independent:;ADL's;min assist:;home management;cooking;driving;shopping  -MICHELLE       Equipment Currently Used at Home  cane, straight;shower chair  -MICHELLE cane, straight;walker, rolling;oxygen  -CS cane, straight  -RA     Plans/Goals Discussed With  patient;agreed upon  -MICHELLE       Risks Reviewed  patient:;LOB;dizziness;increased discomfort;change in vital signs  -MICHELLE       Benefits Reviewed  patient:;improve function;increase independence;increase strength;increase balance;increase knowledge;decrease risk of DVT;decrease pain  -MICHELLE       Barriers to Rehab  none identified  -MICHELLE       Living Environment    Lives With  spouse  -MICHELLE spouse  -CS spouse  -RA     Living Arrangements  house   pt states it is a modular home  -MICHELLE house  -CS house  -RA     Home Accessibility  tub/shower is not walk in;stairs to enter home  -MICHELLE  stairs to enter home;stairs (2 railings present);tub/shower is not walk in  -RA     Number of Stairs to  Enter Home  6  -MICHELLE       Stair Railings at Home  outside, present at both sides  -MICHELLE  outside, present at both sides  -RA     Type of Financial/Environmental Concern  none  -MICHELLE  none  -RA     Transportation Available  car  -MICHELLE car  -CS family or friend will provide  -RA     Clinical Impression    Date of Referral to OT  02/08/17  -MICHELLE       OT Diagnosis  Decreased independece with ADLS and mobility   -MICHELLE       Impairments Found (describe specific impairments)  aerobic capacity/endurance  -MICHELLE       Patient/Family Goals Statement  Pt would like to return to [facility]  -       Criteria for Skilled Therapeutic Interventions Met  no;no problems identified which require skilled intervention  -MICHELLE       Rehab Potential  good, to achieve stated therapy goals  -MICHELLE       Therapy Frequency  daily  -MICHELLE       Anticipated Discharge Disposition home with assist;home with home health  -MICHELLE home with home health;home with assist  -MICHELLE       Functional Level Prior    Ambulation   3-->assistive equipment and person  -CS 3-->assistive equipment and person  -RA     Transferring   0-->independent  -CS 0-->independent  -RA     Toileting   0-->independent  -CS 0-->independent  -RA     Bathing   0-->independent  -CS 0-->independent  -RA     Dressing   0-->independent  -CS 2-->assistive person  -RA     Eating   0-->independent  -CS 0-->independent  -RA     Communication   0-->understands/communicates without difficulty  -CS 0-->understands/communicates without difficulty  -RA     Swallowing   0-->swallows foods/liquids without difficulty  -CS 2-->difficulty swallowing liquids/foods  -RA     Prior Functional Level Comment    transferred self at home   transferred and toiletedc self at home  -RA     Vital Signs    Pre Systolic BP Rehab  100  -MICHELLE       Pre Treatment Diastolic BP  62  -MICHELLE       Intra Systolic BP Rehab  98  -MICHELLE       Intra Treatment Diastolic BP  70  -MICHELLE       Post Systolic BP Rehab  107  -MICHELLE       Post Treatment Diastolic BP  70  -MICHELLE        Pretreatment Heart Rate (beats/min)  65  -MICHELLE       Intratreatment Heart Rate (beats/min)  79  -MICHELLE       Posttreatment Heart Rate (beats/min)  84  -MICHELLE       Pre Patient Position  Supine  -MICHELLE       Intra Patient Position  Standing  -MICHELLE       Post Patient Position  Supine  -MICHELLE       Pain Assessment    Pain Assessment  0-10  -MICHELLE       Pain Score  5   pt not in any distress  -MICHELLE       Post Pain Score  0   pt reported that with movement the pain was much better  -MICHELLE       Pain Type  Acute pain  -MICHELLE       Pain Location  Knee  -MICHELLE       Pain Intervention(s)  Repositioned;Ambulation/increased activity  -MICHELLE       Response to Interventions  Pt reported that pain was much better after movement   -MICHELLE       Cognitive Assessment/Intervention    Current Cognitive/Communication Assessment  functional  -MICHELLE       Orientation Status  oriented x 4  -MICHELLE       Follows Commands/Answers Questions  100% of the time;able to follow single-step instructions  -MICHELLE       Personal Safety  mild impairment;decreased awareness, need for safety  -       Personal Safety Interventions  fall prevention program maintained;gait belt;nonskid shoes/slippers when out of bed  -MICHELLE       ROM (Range of Motion)    General ROM  no range of motion deficits identified  -MICHELLE       MMT (Manual Muscle Testing)    General MMT Assessment  no strength deficits identified  -MICHELLE       Bed Mobility, Assessment/Treatment    Bed Mobility, Scoot/Bridge, [place]  supervision required  -MICHELLE       Bed Mob, Supine to Sit, [place]  supervision required  -MICHELLE       Bed Mob, Sit to Supine, [place]  supervision required  -MICHELLE       Transfer Assessment/Treatment    Transfers, Sit-Stand [place]  supervision required  -MICHELLE       Transfers, Stand-Sit [place]  supervision required  -MICHELLE       Toilet Transfer, [place]  supervision required  -MICHELLE       Toilet Transfer, Assistive Device  --   used grab bar simulating sink us at home  -MICHELLE       Transfer, Safety Issues   --   vital signs may drop  -MICHELLE       Functional Mobility    Functional Mobility- Ind. Level  supervision required  -MICHELLE       Functional Mobility-Distance (Feet)  120  -MICHELLE       Functional Mobility- Comment  monitord closely during ambulation pt reports hx of dizziness before falls.   Pt.'s gait less steady at end due increase pain in standing.  -MICHELLE       Lower Body Bathing Assessment/Training    LB Bathing Assess/Train, Comment  pt reports prior to fall he was completing LB bathing independetly   -MICHELLE       Lower Body Dressing Assessment/Training    LB Dressing Assess/Train, Clothing Type  doffing:;slipper socks  -MICHELLE       LB Dressing Assess/Train, Position  edge of bed  -MICHELLE       LB Dressing Assess/Train, [place]  supervision required  -MICHELLE       LB Dressing Assess/Train, Comment  Simulated indep/S for other dressing  -MICHELLE       Motor Skills/Interventions    Additional Documentation  Balance Skills Training (Group)  -MICHELLE       Balance Skills Training    Sitting-Level of Assistance  Independent  -MICHELLE       Standing-Level of Assistance  Close supervision  -MICHELLE       Positioning and Restraints    Pre-Treatment Position  in bed  -MICHELLE       Post Treatment Position  bed  -MICHELLE       In Bed  supine;call light within reach;encouraged to call for assist;with family/caregiver  -MICHELLE         User Key  (r) = Recorded By, (t) = Taken By, (c) = Cosigned By    Initials Name Effective Dates    MICHELLE Kandy Villalta, OT 06/22/15 -     CS Radha Lawler, RN 03/14/16 -     ISABEL Massey, RN 11/29/16 -           Occupational Therapy Education     Title: PT OT SLP Therapies (Done)     Topic: Occupational Therapy (Done)     Point: ADL training (Done)    Description: Instruct learner(s) on proper safety adaptation and remediation techniques during self care or transfers.   Instruct in proper use of assistive devices.    Learning Progress Summary    Learner Readiness Method Response Comment Documented by Status   Patient Acceptance E VU Pt  educated on safe transfers and safety precautions with use of rollator and cane.  02/10/17 0844 Done               Point: Precautions (Done)    Description: Instruct learner(s) on prescribed precautions during self-care and functional transfers.    Learning Progress Summary    Learner Readiness Method Response Comment Documented by Status   Patient Acceptance E VU Pt educated on safe transfers and safety precautions with use of rollator and cane.  02/10/17 0844 Done                      User Key     Initials Effective Dates Name Provider Type Discipline     06/22/15 -  Kandy Villalta OT Occupational Therapist OT                OT Recommendation and Plan  Anticipated Discharge Disposition: home with assist, home with home health  Therapy Frequency: daily  Plan of Care Review  Plan Of Care Reviewed With: patient  Progress: improving  Outcome Summary/Follow up Plan: Pt. participated in OT evaluation.  Pt. given Supervision for safety, but was able to demonstrate safe transfers and abilty to dress self.  Educated on fall prevention in home and pacing self.  No further skilled OT services needed.  Recommend HHPT due to gait decreases as fatigues.           OT Goals       02/10/17 0927          Patient Education OT LTG    Patient Education OT LTG, Date Established 02/10/17  -      Patient Education OT LTG, Time to Achieve 1 day  -      Patient Education OT LTG, Education Type home safety;energy conservation  -      Patient Education OT LTG, Education Understanding verbalizes understanding  -      Patient Education OT LTG, Additional Goal home safety for fall prevention, pacing self to help prevent falls.  -      Patient Education OT LTG Outcome goal met  -        User Key  (r) = Recorded By, (t) = Taken By, (c) = Cosigned By    Initials Name Provider Type    MICHELLE Villalta OT Occupational Therapist                Outcome Measures       02/10/17 0748          How much help from another is currently  needed...    Putting on and taking off regular lower body clothing? 4  -MICHELLE      Bathing (including washing, rinsing, and drying) 3  -MICHELLE      Toileting (which includes using toilet bed pan or urinal) 4  -MICHELLE      Putting on and taking off regular upper body clothing 4  -MICHELLE      Taking care of personal grooming (such as brushing teeth) 4  -MICHELLE      Eating meals 4  -MICHELLE      Score 23  -MICHELLE      Functional Assessment    Outcome Measure Options AM-PAC 6 Clicks Daily Activity (OT)  -MICHELLE        User Key  (r) = Recorded By, (t) = Taken By, (c) = Cosigned By    Initials Name Provider Type    MICHELLE Villalta OT Occupational Therapist          Time Calculation:         Time Calculation- OT       02/10/17 0932          Time Calculation- OT    OT Start Time 0748  -MICHELLE      OT Received On 02/10/17  -MICHELLE        User Key  (r) = Recorded By, (t) = Taken By, (c) = Cosigned By    Initials Name Provider Type    MICHELLE Villalta OT Occupational Therapist          Therapy Charges for Today     Code Description Service Date Service Provider Modifiers Qty    81327025321 HC OT SELFCARE CURRENT 2/10/2017 Kandy Villalta OT GO, CI 1    30680554585 HC OT SELFCARE PROJECTED 2/10/2017 Kandy Villalta OT GO, CI 1    43609289054 HC OT SELFCARE DISCHARGE 2/10/2017 Kandy Villalta OT GO, CI 1    10041998105 HC OT EVAL LOW COMPLEXITY 4 2/10/2017 Kandy Villalta OT GO 1    [id number]  OT THER SUPP EA 15 MIN 2/10/2017 Kandy Villalta OT GO 1          OT G-codes  OT Professional Judgement Used?: Yes  Score: 23  Functional Limitation: Self care  Self Care Current Status (): At least 1 percent but less than 20 percent impaired, limited or restricted  Self Care Goal Status (): At least 1 percent but less than 20 percent impaired, limited or restricted  Self Care Discharge Status (): At least 1 percent but less than 20 percent impaired, limited or restricted    OT Discharge Summary  Anticipated Discharge Disposition: home with assist, home  with home health  Reason for Discharge: All goals achieved  Outcomes Achieved: Able to achieve all goals within established timeline  Discharge Destination: Home with assist, Home with home health    Kandy Villalta, OT  2/10/2017

## 2017-02-10 NOTE — PLAN OF CARE
Problem: Patient Care Overview (Adult)  Goal: Plan of Care Review  Outcome: Ongoing (interventions implemented as appropriate)    02/10/17 0927   Coping/Psychosocial Response Interventions   Plan Of Care Reviewed With patient   Patient Care Overview   Progress improving   Outcome Evaluation   Outcome Summary/Follow up Plan Pt. participated in OT evaluation. Pt. given Supervision for safety, but was able to demonstrate safe transfers and abilty to dress self. Educated on fall prevention in home and pacing self. No further skilled OT services needed. Recommend HHPT due to gait decreases as fatigues.         Problem: Inpatient Occupational Therapy  Goal: Patient Education Goal LTG- OT  Outcome: Outcome(s) achieved Date Met:  02/10/17    02/10/17 0927   Patient Education OT LTG   Patient Education OT LTG, Date Established 02/10/17   Patient Education OT LTG, Time to Achieve 1 day   Patient Education OT LTG, Education Type home safety;energy conservation   Patient Education OT LTG, Education Understanding verbalizes understanding   Patient Education OT LTG, Additional Goal home safety for fall prevention, pacing self to help prevent falls.   Patient Education OT LTG Outcome goal met

## 2017-02-10 NOTE — PLAN OF CARE
Problem: Fall Risk (Adult)  Goal: Identify Related Risk Factors and Signs and Symptoms  Outcome: Ongoing (interventions implemented as appropriate)    02/09/17 6414   Fall Risk   Fall Risk: Related Risk Factors age-related changes;fatigue/slow reaction;gait/mobility problems;history of falls;environment unfamiliar   Fall Risk: Signs and Symptoms presence of risk factors       Goal: Absence of Falls  Outcome: Ongoing (interventions implemented as appropriate)

## 2017-02-10 NOTE — PLAN OF CARE
Problem: Fall Risk (Adult)  Goal: Absence of Falls  Outcome: Ongoing (interventions implemented as appropriate)    02/10/17 1042   Fall Risk (Adult)   Absence of Falls achieves outcome

## 2017-02-10 NOTE — PROGRESS NOTES
"    [facility] Medicine Services  INPATIENT PROGRESS NOTE    Date of Admission: 2/8/2017  Length of Stay: 0  Primary Care Physician: Harrison Smart MD    Subjective     Chief Complaint:   S/p fall    HPI:  Today pt feels \"great\", he really wants to go home.  BP's much improved after IVF's, pt admits has not been eating or drinking much past few days , \"I was just puny feeling\".  Per wife has been more \"winded at times\" and does endorse recent harsh cough consistent with likely bronchitis.     Review Of Systems:   Review of Systems   Constitutional: Positive for fatigue.   Respiratory: Positive for cough. Negative for shortness of breath and wheezing.    Cardiovascular: Negative for chest pain and palpitations.   All other systems reviewed and are negative.        Objective      Temp:  [98.2 °F (36.8 °C)-98.9 °F (37.2 °C)] 98.8 °F (37.1 °C)  Heart Rate:  [61-68] 65  Resp:  [16-18] 18  BP: ()/(48-62) 99/54  Physical Exam  Temp:  [98.2 °F (36.8 °C)-98.9 °F (37.2 °C)] 98.8 °F (37.1 °C)  Heart Rate:  [61-68] 65  Resp:  [16-18] 18  BP: ()/(48-62) 99/54  Constitutional: no acute distress, awake, alert  HEENT: left conjunctival hemorrahge  Respiratory: Clear to auscultation bilaterally, nonlabored respirations, harsh cough at times  Cardiovascular: RRR, no murmurs, rubs, or gallops, palpable pedal pulses bilaterally  Gastrointestinal: Positive bowel sounds, soft, nontender, nondistended  Musculoskeletal: No bilateral ankle edema  Psychiatric: oriented x 3, appropriate affect, cooperative  Neurologic: Strength symmetric in all extremities       Results Review:    I have reviewed the labs, radiology results and diagnostic studies.      Results from last 7 days  Lab Units 02/09/17  0727   WBC 10*3/mm3 8.55   HEMOGLOBIN g/dL 11.2*   PLATELETS 10*3/mm3 88*       Results from last 7 days  Lab Units 02/09/17  0727   SODIUM mmol/L 134   POTASSIUM mmol/L 4.3   TOTAL CO2 mmol/L 24.0 "   CREATININE mg/dL 1.10   GLUCOSE mg/dL 128*       Radiology Data:  reviewed    I have reviewed the medications.    Assessment/Plan     Assessment/Problem List  Principal Problem:    Fall at home  Active Problems:    Atrial fibrillation    Chronic coronary artery disease    Chronic diastolic heart failure    Chronic obstructive pulmonary disease    Cognitive impairment, mild, so stated    Tobacco use    CKD (chronic kidney disease), stage III    Leukocytosis    Bronchitis    Vasovagal syncope    Dehydration    Hypotension due to dehydration           Plan    - BP's better, will d/c IVF's and encouraged pt to keep self hydrated.  If BP's still look good in am and pt feels as well as today can go home.  Likley event was dehydration related vasovagal hypotension.  - will treat bronchitis as has sx's and airwau changed suggestive of bronchitis seen on CTA.  Doxy started, treat through 2/15/17.  - will have  PT set by CM for d/c since has hx of vasovagal suncope, may need home safety assessment etc     DVT prophylaxis: Lovenox  Discharge Planning: I expect patient to be discharged home tomorrow.    Usha Owens MD   02/09/17   10:08 PM    Please note that portions of this note may have been completed with a voice recognition program. Efforts were made to edit the dictations, but occasionally words are mistranscribed.

## 2017-02-17 ENCOUNTER — HOSPITAL ENCOUNTER (OUTPATIENT)
Dept: GENERAL RADIOLOGY | Facility: HOSPITAL | Age: 77
Discharge: HOME | End: 2017-02-17
Admitting: NURSE PRACTITIONER

## 2017-02-17 ENCOUNTER — TRANSCRIBE ORDERS (OUTPATIENT)
Dept: GENERAL RADIOLOGY | Facility: HOSPITAL | Age: 77
End: 2017-02-17

## 2017-02-17 DIAGNOSIS — M25.561 RIGHT ANTERIOR KNEE PAIN: ICD-10-CM

## 2017-02-17 DIAGNOSIS — M25.511 RIGHT SHOULDER PAIN, UNSPECIFIED CHRONICITY: ICD-10-CM

## 2017-02-17 DIAGNOSIS — M25.511 RIGHT SHOULDER PAIN, UNSPECIFIED CHRONICITY: Primary | ICD-10-CM

## 2017-02-17 PROCEDURE — 73562 X-RAY EXAM OF KNEE 3: CPT

## 2017-02-17 PROCEDURE — 73030 X-RAY EXAM OF SHOULDER: CPT

## 2017-03-08 ENCOUNTER — OFFICE VISIT (OUTPATIENT)
Dept: CARDIOLOGY | Facility: HOSPITAL | Age: 77
End: 2017-03-08

## 2017-03-08 VITALS
OXYGEN SATURATION: 98 % | SYSTOLIC BLOOD PRESSURE: 137 MMHG | RESPIRATION RATE: 20 BRPM | HEIGHT: 64 IN | TEMPERATURE: 97.6 F | WEIGHT: 144 LBS | DIASTOLIC BLOOD PRESSURE: 63 MMHG | HEART RATE: 65 BPM | BODY MASS INDEX: 24.59 KG/M2

## 2017-03-08 DIAGNOSIS — I48.0 PAROXYSMAL ATRIAL FIBRILLATION (HCC): ICD-10-CM

## 2017-03-08 DIAGNOSIS — N18.30 CKD (CHRONIC KIDNEY DISEASE), STAGE III (HCC): ICD-10-CM

## 2017-03-08 DIAGNOSIS — I50.32 CHRONIC DIASTOLIC HEART FAILURE (HCC): Primary | ICD-10-CM

## 2017-03-08 DIAGNOSIS — J44.9 CHRONIC OBSTRUCTIVE PULMONARY DISEASE, UNSPECIFIED COPD TYPE (HCC): ICD-10-CM

## 2017-03-08 PROCEDURE — 99214 OFFICE O/P EST MOD 30 MIN: CPT | Performed by: NURSE PRACTITIONER

## 2017-03-08 RX ORDER — SULFAMETHOXAZOLE AND TRIMETHOPRIM 800; 160 MG/1; MG/1
1 TABLET ORAL 2 TIMES DAILY
COMMUNITY
Start: 2017-03-03 | End: 2017-03-24

## 2017-03-08 RX ORDER — PREDNISONE 10 MG/1
TABLET ORAL
COMMUNITY
Start: 2017-03-03 | End: 2017-03-24

## 2017-03-08 RX ORDER — GABAPENTIN 600 MG/1
600 TABLET ORAL 3 TIMES DAILY
Status: ON HOLD | COMMUNITY
End: 2018-03-13

## 2017-03-08 RX ORDER — BUMETANIDE 1 MG/1
1 TABLET ORAL 3 TIMES WEEKLY
COMMUNITY
Start: 2017-02-18 | End: 2018-03-13 | Stop reason: HOSPADM

## 2017-03-08 NOTE — PROGRESS NOTES
Encounter Date:03/08/2017      Patient ID: Alexander Adan is a 76 y.o. male.        Subjective:     Chief Complaint: Follow-up (s/p Hospitalization for a fall at home on 2/8/17)     History of Present Illness Patient presents to office today after 3 month hiatus for ongoing evaluation of his paroxysmal atrial fibrillation and chronic diastolic heart failure. Patient was hospitalized at [facility] 2/8/2017 to 2/10/2017 with vasovagal syncope, dehydration, bronchitis, COPD exacerbation and status post fall.  Patient notes he is doing better and is becoming stronger each day.  He notes that he participated in physical therapy at home for brief time but is continuing exercises and walking daily.  Patient notes no recent falls.He does note fatigue, cough and intermittent pedal edema.  He notes no pedal edema in the last few days.  He does deny chest pain, chest pressure, palpitations, tachycardia, presyncope, syncope, orthopnea, PND, abdominal fullness, early satiety, claudication.  Patient Active Problem List   Diagnosis   • Atrial fibrillation   • Chronic coronary artery disease   • Chronic diastolic heart failure   • Chronic obstructive pulmonary disease   • Cognitive impairment, mild, so stated   • Tobacco use   • Fall at home   • CKD (chronic kidney disease), stage III   • Leukocytosis   • Bronchitis   • Vasovagal syncope   • Dehydration   • Hypotension due to dehydration       Past Surgical History   Procedure Laterality Date   • Coronary artery bypass graft     • Thromboendarterectomy       CAROTID, LEFT   • Cholecystectomy     • Cardiac pacemaker placement       PERMANENT   • Carotid stent Left    • Flushing hole for subdural hematoma         Allergies   Allergen Reactions   • Altace [Ramipril]    • Cephalexin      unknown   • Penicillins Hives   • Procaine    • Rivaroxaban      Hx of head bleed   • Simvastatin          Current Outpatient Prescriptions:   •  acetaminophen (TYLENOL) 650 MG 8 hr  tablet, Take 650 mg by mouth Every 4 (Four) Hours As Needed for mild pain (1-3)., Disp: , Rfl:   •  albuterol (PROAIR HFA) 108 (90 BASE) MCG/ACT inhaler, Inhale 1-2 puffs Every 4 (Four) Hours As Needed., Disp: , Rfl:   •  albuterol (PROVENTIL) (2.5 MG/3ML) 0.083% nebulizer solution, Take 1 dose by nebulization See Admin Instructions., Disp: , Rfl:   •  amitriptyline (ELAVIL) 10 MG tablet, Take 1 tablet by mouth At Night As Needed for sleep. FOR LEGS/SLEEP, Disp: , Rfl:   •  aspirin (ASPIRIN LOW DOSE) 81 MG tablet, Take 1 tablet by mouth daily., Disp: , Rfl:   •  bumetanide (BUMEX) 1 MG tablet, 1mg on Sun, Tues, Thur and 0.5mg the rest of the week, Disp: , Rfl:   •  carbonyl iron (FEOSOL) 45 MG tablet tablet, Take 325 tablets by mouth Daily., Disp: , Rfl:   •  dicyclomine (BENTYL) 20 MG tablet, TAKE ONE TABLET BY MOUTH EVERY 6 HOURS AS NEEDED ABDOMINAL  PAIN, Disp: 120 tablet, Rfl: 5  •  gabapentin (NEURONTIN) 600 MG tablet, Take 600 mg by mouth 3 (Three) Times a Day., Disp: , Rfl:   •  ipratropium-albuterol (DUO-NEB) 0.5-2.5 mg/mL nebulizer, Take 1 dose by nebulization every 4 (four) hours as needed. FOR DYSPNEA/WHEEZING, Disp: , Rfl:   •  LORazepam (ATIVAN) 2 MG tablet, Take 1 mg by mouth Every 8 (Eight) Hours As Needed., Disp: , Rfl:   •  meclizine (ANTIVERT) 25 MG tablet, Take 12.5 mg by mouth Every 8 (Eight) Hours As Needed., Disp: , Rfl:   •  omeprazole (priLOSEC) 40 MG capsule, Take 1 capsule by mouth 2 (Two) Times a Day., Disp: 60 capsule, Rfl: 11  •  PredniSONE (DELTASONE) 10 MG (48) tablet pack, Taper as directed, Disp: , Rfl:   •  Sotalol HCl, AF, 120 MG tablet, Take 120 mg by mouth Every 12 (Twelve) Hours., Disp: , Rfl:   •  spironolactone (ALDACTONE) 25 MG tablet, Take 1 tablet by mouth Daily., Disp: 30 tablet, Rfl: 11  •  sucralfate (CARAFATE) 1 G tablet, Take 1 g by mouth 2 (Two) Times a Day., Disp: , Rfl:   •  sulfamethoxazole-trimethoprim (BACTRIM DS,SEPTRA DS) 800-160 MG per tablet, Take 1 tablet by  mouth 2 (Two) Times a Day., Disp: , Rfl:   •  traMADol (ULTRAM) 50 MG tablet, Take 1 tablet by mouth every 12 (twelve) hours as needed., Disp: , Rfl:     The following portions of the chart were reviewed and updated as appropriate: Allergies, current medications, past family history, social history, past medical history.     Review of Systems   Constitution: Positive for malaise/fatigue. Negative for chills, decreased appetite, diaphoresis, fever, weakness, night sweats, weight gain and weight loss.   HENT: Negative for congestion, headaches, hearing loss, hoarse voice and nosebleeds.    Eyes: Negative for blurred vision, visual disturbance and visual halos.   Cardiovascular: Positive for leg swelling. Negative for chest pain, claudication, cyanosis, dyspnea on exertion, irregular heartbeat, near-syncope, orthopnea, palpitations, paroxysmal nocturnal dyspnea and syncope.   Respiratory: Positive for cough, sputum production and wheezing. Negative for hemoptysis, shortness of breath, sleep disturbances due to breathing and snoring.    Hematologic/Lymphatic: Negative for bleeding problem. Bruises/bleeds easily.   Skin: Negative for dry skin, itching and rash.   Musculoskeletal: Positive for falls. Negative for arthritis, joint pain, joint swelling and myalgias.   Gastrointestinal: Negative for bloating, abdominal pain, constipation, diarrhea, flatus, heartburn, hematemesis, hematochezia, melena, nausea and vomiting.   Genitourinary: Negative for dysuria, frequency, hematuria, nocturia and urgency.   Neurological: Negative for excessive daytime sleepiness, dizziness, light-headedness and loss of balance.   Psychiatric/Behavioral: Positive for depression. The patient has insomnia. The patient is not nervous/anxious.            Objective:     Vitals:    03/08/17 1131 03/08/17 1133   BP: 111/57 137/63   BP Location: Right arm Left arm   Patient Position: Sitting Sitting   Cuff Size: Adult    Pulse: 65    Resp: 20    Temp:  "97.6 °F (36.4 °C)    TempSrc: Temporal Artery     SpO2: 98%    Weight: 144 lb (65.3 kg)    Height: 64\" (162.6 cm)          Physical Exam   Constitutional: He is oriented to person, place, and time. He appears well-developed and well-nourished. He is active and cooperative. No distress.   HENT:   Head: Normocephalic and atraumatic.   Mouth/Throat: Oropharynx is clear and moist.   Eyes: Conjunctivae and EOM are normal. Pupils are equal, round, and reactive to light.   Neck: Normal range of motion. Neck supple. No JVD present. No tracheal deviation present. No thyromegaly present.   Cardiovascular: Normal rate, regular rhythm, normal heart sounds and intact distal pulses.    Pulmonary/Chest: Effort normal and breath sounds normal.   Abdominal: Soft. Bowel sounds are normal. He exhibits no distension. There is no tenderness.   Musculoskeletal: Normal range of motion.   Ambulates with a cane   Neurological: He is alert and oriented to person, place, and time.   Skin: Skin is warm, dry and intact.   Psychiatric: He has a normal mood and affect. His behavior is normal.   Nursing note and vitals reviewed.           Assessment and Plan:         1. Chronic diastolic heart failure  euvolemic  Heart failure education today including signs and symptoms, the role of the heart failure center, daily weights, low sodium diet (less than 1500 mg per day), and daily physical activity. Reviewed HF Zones with patient and family.  Patient to continue current medications as previously ordered.     2. Paroxysmal atrial fibrillation  Maintaining NSR  No anticoagulation due to GI bleed/ Subdural hematoma    3. CKD (chronic kidney disease), stage III  Stable, will continue to monitor    4. Chronic obstructive pulmonary disease, unspecified COPD type  Without exacerbation    To continue oxygen and nebs as previously ordered    It has been a pleasure to participate in the care of this patient.  Patient was instructed to call the [facility] with any questions, concerns, or worsening symptoms.      * Please note that portions of this note were completed with a voice recognition program. Efforts were made to edit the dictation but occasionally words are transcribed.

## 2017-03-14 RX ORDER — DOXYCYCLINE HYCLATE 100 MG
100 TABLET ORAL 2 TIMES DAILY
Qty: 20 TABLET | Refills: 0 | Status: SHIPPED | OUTPATIENT
Start: 2017-03-14 | End: 2017-03-24

## 2017-03-14 RX ORDER — DEXTROMETHORPHAN HYDROBROMIDE AND PROMETHAZINE HYDROCHLORIDE 15; 6.25 MG/5ML; MG/5ML
5 SYRUP ORAL 4 TIMES DAILY PRN
Qty: 240 ML | Refills: 1 | Status: SHIPPED | OUTPATIENT
Start: 2017-03-14 | End: 2017-03-24

## 2017-03-24 ENCOUNTER — OFFICE VISIT (OUTPATIENT)
Dept: CARDIOLOGY | Facility: HOSPITAL | Age: 77
End: 2017-03-24

## 2017-03-24 VITALS
WEIGHT: 143 LBS | TEMPERATURE: 97 F | HEIGHT: 64 IN | OXYGEN SATURATION: 98 % | SYSTOLIC BLOOD PRESSURE: 111 MMHG | DIASTOLIC BLOOD PRESSURE: 64 MMHG | HEART RATE: 64 BPM | BODY MASS INDEX: 24.41 KG/M2 | RESPIRATION RATE: 16 BRPM

## 2017-03-24 DIAGNOSIS — I50.32 CHRONIC DIASTOLIC HEART FAILURE (HCC): Primary | ICD-10-CM

## 2017-03-24 DIAGNOSIS — I95.9 HYPOTENSION, UNSPECIFIED HYPOTENSION TYPE: ICD-10-CM

## 2017-03-24 DIAGNOSIS — R53.83 OTHER FATIGUE: ICD-10-CM

## 2017-03-24 DIAGNOSIS — N18.30 CHRONIC KIDNEY DISEASE, STAGE 3 (MODERATE): ICD-10-CM

## 2017-03-24 LAB
ANION GAP SERPL CALCULATED.3IONS-SCNC: 4 MMOL/L (ref 3–11)
BNP SERPL-MCNC: 217 PG/ML (ref 0–100)
BUN BLD-MCNC: 24 MG/DL (ref 9–23)
BUN/CREAT SERPL: 18.5 (ref 7–25)
CALCIUM SPEC-SCNC: 9.2 MG/DL (ref 8.7–10.4)
CHLORIDE SERPL-SCNC: 102 MMOL/L (ref 99–109)
CO2 SERPL-SCNC: 27 MMOL/L (ref 20–31)
CREAT BLD-MCNC: 1.3 MG/DL (ref 0.6–1.3)
DEPRECATED RDW RBC AUTO: 55.9 FL (ref 37–54)
ERYTHROCYTE [DISTWIDTH] IN BLOOD BY AUTOMATED COUNT: 16.7 % (ref 11.3–14.5)
GFR SERPL CREATININE-BSD FRML MDRD: 54 ML/MIN/1.73
GLUCOSE BLD-MCNC: 86 MG/DL (ref 70–100)
HCT VFR BLD AUTO: 43.5 % (ref 38.9–50.9)
HGB BLD-MCNC: 14.4 G/DL (ref 13.1–17.5)
MCH RBC QN AUTO: 30.2 PG (ref 27–31)
MCHC RBC AUTO-ENTMCNC: 33.1 G/DL (ref 32–36)
MCV RBC AUTO: 91.2 FL (ref 80–99)
PLATELET # BLD AUTO: 88 10*3/MM3 (ref 150–450)
PMV BLD AUTO: 10.4 FL (ref 6–12)
POTASSIUM BLD-SCNC: 4.8 MMOL/L (ref 3.5–5.5)
RBC # BLD AUTO: 4.77 10*6/MM3 (ref 4.2–5.76)
SODIUM BLD-SCNC: 133 MMOL/L (ref 132–146)
WBC NRBC COR # BLD: 11.4 10*3/MM3 (ref 3.5–10.8)

## 2017-03-24 PROCEDURE — 85027 COMPLETE CBC AUTOMATED: CPT | Performed by: NURSE PRACTITIONER

## 2017-03-24 PROCEDURE — 83880 ASSAY OF NATRIURETIC PEPTIDE: CPT | Performed by: NURSE PRACTITIONER

## 2017-03-24 PROCEDURE — 99214 OFFICE O/P EST MOD 30 MIN: CPT | Performed by: NURSE PRACTITIONER

## 2017-03-24 PROCEDURE — 80048 BASIC METABOLIC PNL TOTAL CA: CPT | Performed by: NURSE PRACTITIONER

## 2017-04-03 ENCOUNTER — APPOINTMENT (OUTPATIENT)
Dept: CT IMAGING | Facility: HOSPITAL | Age: 77
End: 2017-04-03

## 2017-04-03 ENCOUNTER — APPOINTMENT (OUTPATIENT)
Dept: GENERAL RADIOLOGY | Facility: HOSPITAL | Age: 77
End: 2017-04-03

## 2017-04-03 ENCOUNTER — HOSPITAL ENCOUNTER (EMERGENCY)
Facility: HOSPITAL | Age: 77
Discharge: HOME | End: 2017-04-03
Attending: EMERGENCY MEDICINE | Admitting: EMERGENCY MEDICINE

## 2017-04-03 VITALS
DIASTOLIC BLOOD PRESSURE: 50 MMHG | OXYGEN SATURATION: 94 % | HEIGHT: 69 IN | TEMPERATURE: 98.5 F | WEIGHT: 138 LBS | BODY MASS INDEX: 20.44 KG/M2 | RESPIRATION RATE: 18 BRPM | SYSTOLIC BLOOD PRESSURE: 102 MMHG | HEART RATE: 64 BPM

## 2017-04-03 DIAGNOSIS — S13.9XXA CERVICAL SPRAIN, INITIAL ENCOUNTER: ICD-10-CM

## 2017-04-03 DIAGNOSIS — S33.5XXA LUMBAR SPRAIN, INITIAL ENCOUNTER: ICD-10-CM

## 2017-04-03 DIAGNOSIS — R55 VASOVAGAL SYNCOPE: ICD-10-CM

## 2017-04-03 DIAGNOSIS — S20.219A CHEST WALL CONTUSION, UNSPECIFIED LATERALITY, INITIAL ENCOUNTER: ICD-10-CM

## 2017-04-03 DIAGNOSIS — S60.222A CONTUSION OF LEFT HAND, INITIAL ENCOUNTER: ICD-10-CM
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
## 2017-04-03 DIAGNOSIS — S50.312A ELBOW ABRASION, LEFT, INITIAL ENCOUNTER: ICD-10-CM

## 2017-04-03 DIAGNOSIS — S00.03XA CONTUSION OF SCALP, INITIAL ENCOUNTER: ICD-10-CM

## 2017-04-03 LAB
ALBUMIN SERPL-MCNC: 3.6 G/DL (ref 3.2–4.8)
ALBUMIN/GLOB SERPL: 1.4 G/DL (ref 1.5–2.5)
ALP SERPL-CCNC: 75 U/L (ref 25–100)
ALT SERPL W P-5'-P-CCNC: 11 U/L (ref 7–40)
ANION GAP SERPL CALCULATED.3IONS-SCNC: 8 MMOL/L (ref 3–11)
AST SERPL-CCNC: 19 U/L (ref 0–33)
BASOPHILS # BLD AUTO: 0.02 10*3/MM3 (ref 0–0.2)
BASOPHILS NFR BLD AUTO: 0.3 % (ref 0–1)
BILIRUB SERPL-MCNC: 0.2 MG/DL (ref 0.3–1.2)
BILIRUB UR QL STRIP: NEGATIVE
BUN BLD-MCNC: 23 MG/DL (ref 9–23)
BUN/CREAT SERPL: 14.4 (ref 7–25)
CALCIUM SPEC-SCNC: 9.5 MG/DL (ref 8.7–10.4)
CHLORIDE SERPL-SCNC: 98 MMOL/L (ref 99–109)
CLARITY UR: CLEAR
CO2 SERPL-SCNC: 29 MMOL/L (ref 20–31)
COLOR UR: YELLOW
CREAT BLD-MCNC: 1.6 MG/DL (ref 0.6–1.3)
DEPRECATED RDW RBC AUTO: 57.8 FL (ref 37–54)
EOSINOPHIL # BLD AUTO: 0.2 10*3/MM3 (ref 0.1–0.3)
EOSINOPHIL NFR BLD AUTO: 2.8 % (ref 0–3)
ERYTHROCYTE [DISTWIDTH] IN BLOOD BY AUTOMATED COUNT: 17 % (ref 11.3–14.5)
GFR SERPL CREATININE-BSD FRML MDRD: 42 ML/MIN/1.73
GLOBULIN UR ELPH-MCNC: 2.6 GM/DL
GLUCOSE BLD-MCNC: 89 MG/DL (ref 70–100)
GLUCOSE UR STRIP-MCNC: NEGATIVE MG/DL
HCT VFR BLD AUTO: 41.5 % (ref 38.9–50.9)
HGB BLD-MCNC: 13.3 G/DL (ref 13.1–17.5)
HGB UR QL STRIP.AUTO: NEGATIVE
IMM GRANULOCYTES # BLD: 0.14 10*3/MM3 (ref 0–0.03)
IMM GRANULOCYTES NFR BLD: 1.9 % (ref 0–0.6)
KETONES UR QL STRIP: NEGATIVE
LEUKOCYTE ESTERASE UR QL STRIP.AUTO: NEGATIVE
LYMPHOCYTES # BLD AUTO: 1.86 10*3/MM3 (ref 0.6–4.8)
LYMPHOCYTES NFR BLD AUTO: 25.6 % (ref 24–44)
MCH RBC QN AUTO: 29.9 PG (ref 27–31)
MCHC RBC AUTO-ENTMCNC: 32 G/DL (ref 32–36)
MCV RBC AUTO: 93.3 FL (ref 80–99)
MONOCYTES # BLD AUTO: 0.74 10*3/MM3 (ref 0–1)
MONOCYTES NFR BLD AUTO: 10.2 % (ref 0–12)
NEUTROPHILS # BLD AUTO: 4.31 10*3/MM3 (ref 1.5–8.3)
NEUTROPHILS NFR BLD AUTO: 59.2 % (ref 41–71)
NITRITE UR QL STRIP: NEGATIVE
PH UR STRIP.AUTO: 5.5 [PH] (ref 5–8)
PLATELET # BLD AUTO: 126 10*3/MM3 (ref 150–450)
PMV BLD AUTO: 11.3 FL (ref 6–12)
POTASSIUM BLD-SCNC: 4.4 MMOL/L (ref 3.5–5.5)
PROT SERPL-MCNC: 6.2 G/DL (ref 5.7–8.2)
PROT UR QL STRIP: NEGATIVE
RBC # BLD AUTO: 4.45 10*6/MM3 (ref 4.2–5.76)
SODIUM BLD-SCNC: 135 MMOL/L (ref 132–146)
SP GR UR STRIP: 1.01 (ref 1–1.03)
UROBILINOGEN UR QL STRIP: NORMAL
WBC NRBC COR # BLD: 7.27 10*3/MM3 (ref 3.5–10.8)

## 2017-04-03 PROCEDURE — 81003 URINALYSIS AUTO W/O SCOPE: CPT | Performed by: EMERGENCY MEDICINE

## 2017-04-03 PROCEDURE — 25010000002 HYDROMORPHONE PER 4 MG: Performed by: EMERGENCY MEDICINE

## 2017-04-03 PROCEDURE — 93005 ELECTROCARDIOGRAM TRACING: CPT | Performed by: EMERGENCY MEDICINE

## 2017-04-03 PROCEDURE — 73130 X-RAY EXAM OF HAND: CPT

## 2017-04-03 PROCEDURE — 96374 THER/PROPH/DIAG INJ IV PUSH: CPT

## 2017-04-03 PROCEDURE — 72125 CT NECK SPINE W/O DYE: CPT

## 2017-04-03 PROCEDURE — 72131 CT LUMBAR SPINE W/O DYE: CPT

## 2017-04-03 PROCEDURE — 96375 TX/PRO/DX INJ NEW DRUG ADDON: CPT

## 2017-04-03 PROCEDURE — 99284 EMERGENCY DEPT VISIT MOD MDM: CPT

## 2017-04-03 PROCEDURE — 80053 COMPREHEN METABOLIC PANEL: CPT | Performed by: EMERGENCY MEDICINE

## 2017-04-03 PROCEDURE — 85025 COMPLETE CBC W/AUTO DIFF WBC: CPT | Performed by: EMERGENCY MEDICINE

## 2017-04-03 PROCEDURE — 70450 CT HEAD/BRAIN W/O DYE: CPT

## 2017-04-03 PROCEDURE — 25010000002 ONDANSETRON PER 1 MG: Performed by: EMERGENCY MEDICINE

## 2017-04-03 PROCEDURE — 96361 HYDRATE IV INFUSION ADD-ON: CPT

## 2017-04-03 PROCEDURE — 71250 CT THORAX DX C-: CPT

## 2017-04-03 RX ORDER — HYDROCODONE BITARTRATE AND ACETAMINOPHEN 5; 325 MG/1; MG/1
1 TABLET ORAL EVERY 6 HOURS PRN
Qty: 10 TABLET | Refills: 0 | Status: ON HOLD | OUTPATIENT
Start: 2017-04-03 | End: 2018-03-13

## 2017-04-03 RX ORDER — SODIUM CHLORIDE 9 MG/ML
125 INJECTION, SOLUTION INTRAVENOUS CONTINUOUS
Status: DISCONTINUED | OUTPATIENT
Start: 2017-04-03 | End: 2017-04-03 | Stop reason: HOSPADM

## 2017-04-03 RX ORDER — ONDANSETRON 2 MG/ML
4 INJECTION INTRAMUSCULAR; INTRAVENOUS ONCE
Status: COMPLETED | OUTPATIENT
Start: 2017-04-03 | End: 2017-04-03

## 2017-04-03 RX ORDER — HYDROMORPHONE HYDROCHLORIDE 1 MG/ML
0.25 INJECTION, SOLUTION INTRAMUSCULAR; INTRAVENOUS; SUBCUTANEOUS ONCE
Status: COMPLETED | OUTPATIENT
Start: 2017-04-03 | End: 2017-04-03

## 2017-04-03 RX ADMIN — HYDROMORPHONE HYDROCHLORIDE 0.25 MG: 1 INJECTION, SOLUTION INTRAMUSCULAR; INTRAVENOUS; SUBCUTANEOUS at 16:45

## 2017-04-03 RX ADMIN — ONDANSETRON 4 MG: 2 INJECTION INTRAMUSCULAR; INTRAVENOUS at 16:46

## 2017-04-03 RX ADMIN — SODIUM CHLORIDE 125 ML/HR: 9 INJECTION, SOLUTION INTRAVENOUS at 18:24

## 2017-04-03 RX ADMIN — SODIUM CHLORIDE 1000 ML: 9 INJECTION, SOLUTION INTRAVENOUS at 16:45

## 2017-04-03 NOTE — DISCHARGE INSTRUCTIONS
Consider wearing compression stockings to help with fall prevention.    Consider getting a ramp to help with your steps.    Try a few Lortab for pain unrelieved by Tylenol instead of tramadol.        Information regarding Risks and Benefits When using Opioids and Other Controlled Substances to include Storage and Disposal of Medications    When considering the use of opioids and other controlled substances for the control of pain, anxiety, or for other medical purposes, you need to know of not only the benefits of these drugs but also of potential risks in using these drugs. These drugs, as well as more drugs, have beneficial uses; which is why their use is being considered in your   care, but they have risks involved in their use, too.    Opioids:  Opioids such as hydrocodone, oxycodone, hydromorphone, and codeine are pain relieving drugs, some more potent than others. They are most useful for moderate to more severe painful conditions. Risks include sedation, loss of coordination, decreased concentration, and decreased breathing with possibility of loss of consciousness or even death, especially if used in doses higher than prescribed. Improper usage can lead to addiction, tolerance, or overdose. In addition, many of these drugs are combined with acetaminophen (Tylenol) which can damage or destroy our liver when used excessively.  Alternatives to opioids are useful for mild to moderate pain and include ibuprofen (Motrin), naproxen (Aleve), aspirin, and acetaminophen (Tylenol). As with other drugs, these medications should be used according to directions on the label or from your doctor, as overuse can cause harm.    Benzodiazepines:  This group of drugs include: alprazolam (Xanax), diazepam (Valium), lorazepam (Ativan), and clonazepam (Klonopin). These drugs are used to control anxiety symptoms including anxiety and panic attacks. Risks using these drugs include: sedation, loss of coordination, decreased  ability to concentrate, effects on memory, and decreased breathing with possibility of loss of consciousness or even death. Improper and prolonged usage can lead to addiction. An alternative without addiction potential is hydroxyzine (Vistrail).    Other Controlled Substance:  This group includes Soma, Tramadol, stimulant drugs such as Ritalin, and others. Stimulant drugs are not medications that are prescribed by ER doctors. Soma and Tramadol have sedative and addictive affects similar to opioids with the same dangers mentioned with them.    Overdose:  If you or someone else are concerned that overdose has occurred, call 911 for transportation to the nearest hospital.    Storage and Disposal:  All medications need to be kept out of the reach of children or adults who cannot manage their own medicines. In addition, controlled substances can be targeted by criminals so extra precautions need to be taken to keep them in a safe, secure place. Any unused medications should be disposed of by flushing them down the toilet in the home setting or contact your local pharmacy.

## 2017-04-03 NOTE — ED PROVIDER NOTES
Subjective   HPI Comments: Mr. Alexander Adan is a 76 y.o. male who presents to the ED after a fall at 1500 today. Family notes that when trying to go up the stairs today, he lost his balance, passed out, and hit his head on the concrete and wooden stairs. Pt complains of head pain, shoulder pain, and neck pain, and an abrasion on his left hand. Family denies any recent illness, abdominal pain, N/V/D, urinary sx, or any other acute sx at this time.     Family deny any change in baseline weakness from prior CVA, or memory issues.      Patient is a 76 y.o. male presenting with fall.   History provided by:  Patient and relative  Fall   Mechanism of injury: fall    Injury location:  Head/neck  Head/neck injury location:  Head  Incident location:  [place]  Time since incident: Onset 1500 today.  Arrived directly from scene: yes    Fall:     Fall occurred:  Down stairs    Impact surface:  [place] (wood)    Point of impact:  Head    Entrapped after fall: no    Suspicion of alcohol use: no    Suspicion of drug use: no    Tetanus status:  Unknown  Current pain details:     Pain quality:  Unable to specify    Pain Severity:  Moderate    Pain timing:  Constant  Associated symptoms: headaches, loss of consciousness and neck pain    Associated symptoms: no abdominal pain, no back pain, no chest pain, no nausea and no vomiting        Review of Systems   Constitutional: Negative.    Cardiovascular: Negative for chest pain.   Gastrointestinal: Negative for abdominal pain, nausea and vomiting.   Genitourinary: Negative for dysuria.   Musculoskeletal: Positive for arthralgias and neck pain. Negative for back pain.   Skin: Positive for wound.   Neurological: Positive for loss of consciousness and headaches.   All other systems reviewed and are negative.      Past Medical History:   Diagnosis Date   • Anemia    • Anxiety    • Chronic kidney disease     STAGE II (mild)   • Hyperlipidemia    • Hypertension    • Stroke     LACUNAR. SEPT  2013   • Subdural hematoma     Status post hemorrhagic evacuation with india holes 4/3/2015 per Dr. Gilson Brooke.   4:09 PM  He is followed by the heart failure clinic and last seen on 3/24 for fatigue and weakness, and Bumex was held as he was not fluid overloaded. He was hydrated because they thought he was a little over diuresied. He was seen there on the 8th. Admitted here on 2/8-10/2017 for fall.   Hospital Progress note from 2/17:  Patient is a 76 y.o. male presented to ED after a fall 4d prior after what sounds like a syncopal episode with reports of dizziness prior to fall. Presented for eval due to complaints of continued hip/shoulder pain and dyspnea since syncopal episode 4d ago. XR imaging was negative, but he was felt to be having a COPD exacerbation and he was hypotensive. He was hydrated and started on doxy for bronchitis as well as a steroid taper. His BPs improved and he began to feel much better. He is felt to be stable and ready for dc on 2.10.17. His diuretics were initially held on admission, aldactone will be restarted at dc but pt should follow up with PCP in 1 week for BP recheck and potentially restart bumex at that time if BP stable.      He has had multiple syncopal episodes, and has not been able to drive since 2013 after a CVA and chronic left sided weakness. He has a subdural hematoma on April 2014 after a fall. He used a walker starting on November.     2/8/17 Echo interpretation  Left Ventricle  Left ventricular function is normal. Estimated EF appears to be in the range of 46 - 50%. Estimated EF = 50%. The left ventricular cavity is mildly dilated. Left ventricular wall thickness is consistent with mild concentric hypertrophy. Akinetic inferior base.   Right Ventricle  Normal cavity size, wall thickness, systolic function and septal motion noted. Electronic lead present in the ventricle.   Left Atrium  Left atrial cavity size is mildly dilated.   Right Atrium  Normal right atrial  size noted. An electronic lead is present in the right atrium.   Aortic Valve  The aortic valve is grossly normal in structure. The valve appears trileaflet. Trace aortic valve regurgitation is present.   Mitral Valve  The mitral valve is normal in structure. Moderate mitral valve regurgitation is present.   Tricuspid Valve  The tricuspid valve is normal. No tricuspid valve stenosis is present. Mild to moderate tricuspid valve regurgitation is present. Estimated right ventricular systolic pressure from tricuspid regurgitation is normal (<35 mmHg).   Pulmonic Valve  The pulmonic valve is structurally normal. There is mild pulmonic valve regurgitation present.   Greater Vessels  No dilation of the aortic root is present. No dilation of the sinuses of Valsalva is present. There is moderate (grade 4) plaque in the proximal aorta present.   Pericardium  The pericardium is normal. There is no evidence of pericardial effusion.     -EIR    Allergies   Allergen Reactions   • Altace [Ramipril]    • Cephalexin      unknown   • Penicillins Hives   • Procaine    • Rivaroxaban      Hx of head bleed   • Simvastatin        Past Surgical History:   Procedure Laterality Date   • SHERRILL HOLE FOR SUBDURAL HEMATOMA     • CARDIAC PACEMAKER PLACEMENT      PERMANENT   • CAROTID STENT Left    • CHOLECYSTECTOMY     • CORONARY ARTERY BYPASS GRAFT     • THROMBOENDARTERECTOMY      CAROTID, LEFT       Family History   Problem Relation Age of Onset   • Cancer Mother    • Diabetes Mother    • Heart failure Mother    • Hypertension Mother    • Stroke Mother    • Diabetes Father    • Heart failure Father    • Diabetes Sister    • Heart failure Sister    • Cancer Brother    • Diabetes Brother    • Heart disease Other    • Hypertension Other    • Other Other             Social History     Social History   • Marital status:      Spouse name: N/A   • Number of children: N/A   • Years of education: N/A     Occupational History   • retired       Social History Main Topics   • Smoking status: Current Every Day Smoker     Types: Cigars   • Smokeless tobacco: Never Used   • Alcohol use No   • Drug use: No   • Sexual activity: Defer     Other Topics Concern   • None     Social History Narrative    Pt consumes 3 servings of  caffeine, pt lives in his own home with wife.          Objective   Physical Exam   Constitutional: He is oriented to person, place, and time. He appears well-developed and well-nourished. No distress.   Lethargic when woken to answer questions.   HENT:   Head: Normocephalic and atraumatic.   Large bore hole palpable in the frontal parietal area. No active bleeding. No scalp trauma   Eyes: Conjunctivae are normal.   Neck: Neck supple.   Neck in c-collar, pain in lateral portion of left neck.   Cardiovascular: Normal rate, regular rhythm, normal heart sounds and intact distal pulses.    Pulmonary/Chest: Effort normal and breath sounds normal. No respiratory distress. He exhibits tenderness (Diffusely in anterior ribs).   Abdominal: Soft. There is tenderness (Tenderness in upper abdominal areas).   No masses   Musculoskeletal: Normal range of motion. He exhibits tenderness.   T,L spine tenderness to palpation.   RUE: Normal, Neurovascularly intact  LUE: abrasion left elbow, skin tear lateral aspect, mild ttp right hand, Neurovascularly intact, no point tenderness  LE: no point tenderness, no abrasion, neurovascularly intact.  Pelvis stable   Neurological: He is alert and oriented to person, place, and time. He has normal strength and normal reflexes. No sensory deficit.   Face symmetric, voice strong, tongue midline; Vision, hearing and speech all preserved.    Skin: Skin is warm and dry.   Psychiatric: He has a normal mood and affect. His behavior is normal.   Nursing note and vitals reviewed.      Procedures         ED Course  ED Course   Value Comment By Time   CT Head Without Contrast (Reviewed) Dylan Vernon MD 04/03 1620     Recent  Results (from the past 24 hour(s))   Comprehensive Metabolic Panel    Collection Time: 04/03/17  4:47 PM   Result Value Ref Range    Glucose 89 70 - 100 mg/dL    BUN 23 9 - 23 mg/dL    Creatinine 1.60 (H) 0.60 - 1.30 mg/dL    Sodium 135 132 - 146 mmol/L    Potassium 4.4 3.5 - 5.5 mmol/L    Chloride 98 (L) 99 - 109 mmol/L    CO2 29.0 20.0 - 31.0 mmol/L    Calcium 9.5 8.7 - 10.4 mg/dL    Total Protein 6.2 5.7 - 8.2 g/dL    Albumin 3.60 3.20 - 4.80 g/dL    ALT (SGPT) 11 7 - 40 U/L    AST (SGOT) 19 0 - 33 U/L    Alkaline Phosphatase 75 25 - 100 U/L    Total Bilirubin 0.2 (L) 0.3 - 1.2 mg/dL    eGFR Non African Amer 42 (L) >60 mL/min/1.73    Globulin 2.6 gm/dL    A/G Ratio 1.4 (L) 1.5 - 2.5 g/dL    BUN/Creatinine Ratio 14.4 7.0 - 25.0    Anion Gap 8.0 3.0 - 11.0 mmol/L   CBC Auto Differential    Collection Time: 04/03/17  4:47 PM   Result Value Ref Range    WBC 7.27 3.50 - 10.80 10*3/mm3    RBC 4.45 4.20 - 5.76 10*6/mm3    Hemoglobin 13.3 13.1 - 17.5 g/dL    Hematocrit 41.5 38.9 - 50.9 %    MCV 93.3 80.0 - 99.0 fL    MCH 29.9 27.0 - 31.0 pg    MCHC 32.0 32.0 - 36.0 g/dL    RDW 17.0 (H) 11.3 - 14.5 %    RDW-SD 57.8 (H) 37.0 - 54.0 fl    MPV 11.3 6.0 - 12.0 fL    Platelets 126 (L) 150 - 450 10*3/mm3    Neutrophil % 59.2 41.0 - 71.0 %    Lymphocyte % 25.6 24.0 - 44.0 %    Monocyte % 10.2 0.0 - 12.0 %    Eosinophil % 2.8 0.0 - 3.0 %    Basophil % 0.3 0.0 - 1.0 %    Immature Grans % 1.9 (H) 0.0 - 0.6 %    Neutrophils, Absolute 4.31 1.50 - 8.30 10*3/mm3    Lymphocytes, Absolute 1.86 0.60 - 4.80 10*3/mm3    Monocytes, Absolute 0.74 0.00 - 1.00 10*3/mm3    Eosinophils, Absolute 0.20 0.10 - 0.30 10*3/mm3    Basophils, Absolute 0.02 0.00 - 0.20 10*3/mm3    Immature Grans, Absolute 0.14 (H) 0.00 - 0.03 10*3/mm3   Urinalysis With / Culture If Indicated    Collection Time: 04/03/17  6:25 PM   Result Value Ref Range    Color, UA Yellow Yellow, Straw    Appearance, UA Clear Clear    pH, UA 5.5 5.0 - 8.0    Specific [place], UA 1.010  1.001 - 1.030    Glucose, UA Negative Negative    Ketones, UA Negative Negative    Bilirubin, UA Negative Negative    Blood, UA Negative Negative    Protein, UA Negative Negative    Leuk Esterase, UA Negative Negative    Nitrite, UA Negative Negative    Urobilinogen, UA 0.2 E.U./dL 0.2 - 1.0 E.U./dL     Note: In addition to lab results from this visit, the labs listed above may include labs taken at another facility or during a different encounter within the last 24 hours. Please correlate lab times with ED admission and discharge times for further clarification of the services performed during this visit.    CT Head Without Contrast   Preliminary Result   1. No acute intracranial process.   2. Mild global volume loss.   3. No acute cervical spine injury.   4. Old [place] holes in the left frontal and left parietal bones.       D:  04/03/2017   E:  04/03/2017              CT Cervical Spine Without Contrast   Preliminary Result   1. No acute intracranial process.   2. Mild global volume loss.   3. No acute cervical spine injury.   4. Old [place] holes in the left frontal and left parietal bones.       D:  04/03/2017   E:  04/03/2017              CT Chest Without Contrast    (Results Pending)   CT Lumbar Spine Without Contrast    (Results Pending)   XR Hand 3+ View Left    (Results Pending)     Vitals:    04/03/17 1618 04/03/17 1730 04/03/17 1800 04/03/17 1859   BP: 96/57 104/58 101/55 102/50   Pulse:    64   Resp:    18   Temp:    98.5 °F (36.9 °C)   TempSrc:    Oral   SpO2:    94%   Weight:       Height:         Medications   sodium chloride 0.9 % bolus 1,000 mL (0 mL Intravenous Stopped 4/3/17 1723)   HYDROmorphone (DILAUDID) injection 0.25 mg (0.25 mg Intravenous Given 4/3/17 1645)   ondansetron (ZOFRAN) injection 4 mg (4 mg Intravenous Given 4/3/17 1646)     ECG/EMG Results (last 24 hours)     Procedure Component Value Units Date/Time    ECG 12 Lead [[id number]] Collected:  04/03/17 1613     Updated:  04/03/17 1803                           MDM  Number of Diagnoses or Management Options  Cervical sprain, initial encounter:   Chest wall contusion, unspecified laterality, initial encounter:   Contusion of left hand, initial encounter:   Contusion of scalp, initial encounter:   Elbow abrasion, left, initial encounter:   Fall, initial encounter:   Lumbar sprain, initial encounter:   Vasovagal syncope:   Diagnosis management comments:       I reviewed all available studies at the bedside with the patient and his family.  They are reassuring.    Unfortunately this is one of many falls possible syncopal events that Mr. Adan has had over the years.  Unfortunately I don't think there is anything to do to help prevent these.  Did talk about putting a ramp in his house and wearing compression stockings.  Luckily his scans here and his labs are reassuring.  I'll have him follow up with Dr. Smart for recheck as he may need physical therapy.  I talked about management of his abrasions and contusions.  I wrote a prescription for a limited number of pain medication to have on hand.  He'll return to the ER if worsen anyway.  The patient is walking down the [place] holding onto his wife's arm without any difficulty.    All are agreeable with the plan       Amount and/or Complexity of Data Reviewed  Clinical lab tests: reviewed  Tests in the radiology section of CPT®: reviewed  Tests in the medicine section of CPT®: reviewed  Decide to obtain previous medical records or to obtain history from someone other than the patient: yes        Final diagnoses:   Fall, initial encounter   Vasovagal syncope   Contusion of scalp, initial encounter   Cervical sprain, initial encounter   Lumbar sprain, initial encounter   Chest wall contusion, unspecified laterality, initial encounter   Contusion of left hand, initial encounter   Elbow abrasion, left, initial encounter     EMR Dragon/Transcription disclaimer:  Much of this encounter note is an electronic  transcription/translation of spoken language to printed text. The electronic translation of spoken language may permit erroneous, or at times, nonsensical words or phrases to be inadvertently transcribed; Although I have reviewed the note for such errors, some may still exist.      Documentation assistance provided by juan m SOLANO.  Information recorded by the sophiae was done at my direction and has been verified and validated by me.     Skye Solano  04/03/17 8916       Skye Solano  04/03/17 5204       Dylan Vernon MD  04/04/17 0202

## 2017-04-12 ENCOUNTER — OFFICE VISIT (OUTPATIENT)
Dept: CARDIOLOGY | Facility: HOSPITAL | Age: 77
End: 2017-04-12

## 2017-04-12 VITALS
SYSTOLIC BLOOD PRESSURE: 106 MMHG | HEART RATE: 64 BPM | DIASTOLIC BLOOD PRESSURE: 56 MMHG | HEIGHT: 69 IN | TEMPERATURE: 98 F | RESPIRATION RATE: 20 BRPM | OXYGEN SATURATION: 99 % | WEIGHT: 142 LBS | BODY MASS INDEX: 21.03 KG/M2

## 2017-04-12 DIAGNOSIS — J44.9 CHRONIC OBSTRUCTIVE PULMONARY DISEASE, UNSPECIFIED COPD TYPE (HCC): ICD-10-CM

## 2017-04-12 DIAGNOSIS — I48.0 PAROXYSMAL ATRIAL FIBRILLATION (HCC): ICD-10-CM

## 2017-04-12 DIAGNOSIS — I50.32 CHRONIC DIASTOLIC HEART FAILURE (HCC): Primary | ICD-10-CM

## 2017-04-12 DIAGNOSIS — N18.30 CKD (CHRONIC KIDNEY DISEASE), STAGE III (HCC): ICD-10-CM

## 2017-04-12 PROCEDURE — 99214 OFFICE O/P EST MOD 30 MIN: CPT | Performed by: NURSE PRACTITIONER

## 2017-04-12 NOTE — PROGRESS NOTES
Encounter Date:04/12/2017      Patient ID: Alexander Adan is a 76 y.o. male.        Subjective:     Chief Complaint: Follow-up   diastolic heart failure  History of Present Illness Patient presents to the hf center for ongoing evaluation of his diastolic heart failure. Patient notes that he is doing well from a cardiac standpoint. Patient did sustain a fall and was evaluated at [facility] on 4/13/17. Patient reports all tests were within normal limits. Patient reports fatigue, dyspnea on exertion and intermittent dizziness and lightheadedness.   Patient denies chest pain, chest pressure, palpitations, tachycardia,  presyncope, syncope, orthopnea, PND, abdominal fullness, early satiety, claudication, cough or edema.  Patient Active Problem List   Diagnosis   • Atrial fibrillation   • Chronic coronary artery disease   • Chronic diastolic heart failure   • Chronic obstructive pulmonary disease   • Cognitive impairment, mild, so stated   • Tobacco use   • Fall at home   • CKD (chronic kidney disease), stage III   • Leukocytosis   • Bronchitis   • Vasovagal syncope   • Dehydration   • Hypotension due to dehydration       Past Surgical History:   Procedure Laterality Date   • SHERRILL HOLE FOR SUBDURAL HEMATOMA     • CARDIAC PACEMAKER PLACEMENT      PERMANENT   • CAROTID STENT Left    • CHOLECYSTECTOMY     • CORONARY ARTERY BYPASS GRAFT     • THROMBOENDARTERECTOMY      CAROTID, LEFT       Allergies   Allergen Reactions   • Altace [Ramipril]    • Cephalexin      unknown   • Penicillins Hives   • Procaine    • Rivaroxaban      Hx of head bleed   • Simvastatin          Current Outpatient Prescriptions:   •  acetaminophen (TYLENOL) 650 MG 8 hr tablet, Take 650 mg by mouth Every 4 (Four) Hours As Needed for mild pain (1-3)., Disp: , Rfl:   •  albuterol (PROAIR HFA) 108 (90 BASE) MCG/ACT inhaler, Inhale 1-2 puffs Every 4 (Four) Hours As Needed for Wheezing or Shortness of Air., Disp: 1 inhaler, Rfl: 11  •  albuterol (PROVENTIL) (2.5  MG/3ML) 0.083% nebulizer solution, Take 1 dose by nebulization See Admin Instructions., Disp: , Rfl:   •  amitriptyline (ELAVIL) 10 MG tablet, Take 1 tablet by mouth At Night As Needed for sleep. FOR LEGS/SLEEP, Disp: , Rfl:   •  aspirin (ASPIRIN LOW DOSE) 81 MG tablet, Take 1 tablet by mouth daily., Disp: , Rfl:   •  bumetanide (BUMEX) 1 MG tablet, 1mg on Sun, Tues, Thur and 0.5mg the rest of the week, Disp: , Rfl:   •  carbonyl iron (FEOSOL) 45 MG tablet tablet, Take 325 tablets by mouth Daily., Disp: , Rfl:   •  dicyclomine (BENTYL) 20 MG tablet, TAKE ONE TABLET BY MOUTH EVERY 6 HOURS AS NEEDED ABDOMINAL  PAIN, Disp: 120 tablet, Rfl: 5  •  gabapentin (NEURONTIN) 600 MG tablet, Take 600 mg by mouth 3 (Three) Times a Day., Disp: , Rfl:   •  HYDROcodone-acetaminophen (NORCO) 5-325 MG per tablet, Take 1 tablet by mouth Every 6 (Six) Hours As Needed for Moderate Pain (4-6)., Disp: 10 tablet, Rfl: 0  •  ipratropium-albuterol (DUO-NEB) 0.5-2.5 mg/mL nebulizer, Take 1 dose by nebulization every 4 (four) hours as needed. FOR DYSPNEA/WHEEZING, Disp: , Rfl:   •  LORazepam (ATIVAN) 2 MG tablet, Take 1 mg by mouth Every 8 (Eight) Hours As Needed., Disp: , Rfl:   •  meclizine (ANTIVERT) 25 MG tablet, Take 12.5 mg by mouth Every 8 (Eight) Hours As Needed., Disp: , Rfl:   •  omeprazole (priLOSEC) 40 MG capsule, Take 1 capsule by mouth 2 (Two) Times a Day., Disp: 60 capsule, Rfl: 11  •  Sotalol HCl, AF, 120 MG tablet, Take 120 mg by mouth Every 12 (Twelve) Hours., Disp: , Rfl:   •  spironolactone (ALDACTONE) 25 MG tablet, Take 1 tablet by mouth Daily., Disp: 30 tablet, Rfl: 11  •  sucralfate (CARAFATE) 1 G tablet, Take 1 g by mouth 2 (Two) Times a Day., Disp: , Rfl:   •  traMADol (ULTRAM) 50 MG tablet, Take 1 tablet by mouth every 12 (twelve) hours as needed., Disp: , Rfl:     The following portions of the chart were reviewed and updated as appropriate: Allergies, current medications, past family history, social history, past  "medical history.     Review of Systems   Constitution: Positive for malaise/fatigue. Negative for chills, decreased appetite, diaphoresis, fever, night sweats, weight gain and weight loss.   HENT: Negative for congestion, headaches, hearing loss, hoarse voice and nosebleeds.    Eyes: Negative for blurred vision, visual disturbance and visual halos.   Cardiovascular: Positive for dyspnea on exertion. Negative for chest pain, claudication, cyanosis, irregular heartbeat, leg swelling, near-syncope, orthopnea, palpitations, paroxysmal nocturnal dyspnea and syncope.   Respiratory: Negative for cough, hemoptysis, shortness of breath, sleep disturbances due to breathing, snoring, sputum production and wheezing.    Hematologic/Lymphatic: Negative for bleeding problem. Bruises/bleeds easily.   Skin: Negative for dry skin, itching and rash.   Musculoskeletal: Positive for arthritis, falls (ER Visit on 4/3/17), joint pain and joint swelling. Negative for myalgias.   Gastrointestinal: Positive for constipation. Negative for bloating, abdominal pain, diarrhea, flatus, heartburn, hematemesis, hematochezia, melena, nausea and vomiting.   Genitourinary: Positive for nocturia. Negative for dysuria, frequency, hematuria and urgency.   Neurological: Positive for dizziness, light-headedness and loss of balance. Negative for excessive daytime sleepiness.   Psychiatric/Behavioral: Negative for depression. The patient does not have insomnia and is not nervous/anxious.            Objective:     Vitals:    04/12/17 1557   BP: 106/56   BP Location: Right arm   Patient Position: Sitting   Cuff Size: Adult   Pulse: 64   Resp: 20   Temp: 98 °F (36.7 °C)   TempSrc: Temporal Artery    SpO2: 99%   Weight: 142 lb (64.4 kg)   Height: 69\" (175.3 cm)         Physical Exam   Constitutional: He is oriented to person, place, and time. He appears well-developed and well-nourished. He is active and cooperative. No distress.   HENT:   Head: Normocephalic " and atraumatic.   Mouth/Throat: Oropharynx is clear and moist.   Eyes: Conjunctivae and EOM are normal. Pupils are equal, round, and reactive to light.   Neck: Normal range of motion. Neck supple. No JVD present. No tracheal deviation present. No thyromegaly present.   Cardiovascular: Normal rate, regular rhythm, normal heart sounds and intact distal pulses.    Pulmonary/Chest: Effort normal and breath sounds normal.   Abdominal: Soft. Bowel sounds are normal. He exhibits no distension. There is no tenderness.   Musculoskeletal: Normal range of motion.   Neurological: He is alert and oriented to person, place, and time.   Skin: Skin is warm, dry and intact.   Psychiatric: He has a normal mood and affect. His behavior is normal.   Nursing note and vitals reviewed.      Lab and Diagnostic Review:      Results for orders placed or performed during the hospital encounter of 04/03/17   Comprehensive Metabolic Panel   Result Value Ref Range    Glucose 89 70 - 100 mg/dL    BUN 23 9 - 23 mg/dL    Creatinine 1.60 (H) 0.60 - 1.30 mg/dL    Sodium 135 132 - 146 mmol/L    Potassium 4.4 3.5 - 5.5 mmol/L    Chloride 98 (L) 99 - 109 mmol/L    CO2 29.0 20.0 - 31.0 mmol/L    Calcium 9.5 8.7 - 10.4 mg/dL    Total Protein 6.2 5.7 - 8.2 g/dL    Albumin 3.60 3.20 - 4.80 g/dL    ALT (SGPT) 11 7 - 40 U/L    AST (SGOT) 19 0 - 33 U/L    Alkaline Phosphatase 75 25 - 100 U/L    Total Bilirubin 0.2 (L) 0.3 - 1.2 mg/dL    eGFR Non African Amer 42 (L) >60 mL/min/1.73    Globulin 2.6 gm/dL    A/G Ratio 1.4 (L) 1.5 - 2.5 g/dL    BUN/Creatinine Ratio 14.4 7.0 - 25.0    Anion Gap 8.0 3.0 - 11.0 mmol/L   Urinalysis With / Culture If Indicated   Result Value Ref Range    Color, UA Yellow Yellow, Straw    Appearance, UA Clear Clear    pH, UA 5.5 5.0 - 8.0    Specific Gravity, UA 1.010 1.001 - 1.030    Glucose, UA Negative Negative    Ketones, UA Negative Negative    Bilirubin, UA Negative Negative    Blood, UA Negative Negative    Protein, UA Negative  Negative    Leuk Esterase, UA Negative Negative    Nitrite, UA Negative Negative    Urobilinogen, UA 0.2 E.U./dL 0.2 - 1.0 E.U./dL   CBC Auto Differential   Result Value Ref Range    WBC 7.27 3.50 - 10.80 10*3/mm3    RBC 4.45 4.20 - 5.76 10*6/mm3    Hemoglobin 13.3 13.1 - 17.5 g/dL    Hematocrit 41.5 38.9 - 50.9 %    MCV 93.3 80.0 - 99.0 fL    MCH 29.9 27.0 - 31.0 pg    MCHC 32.0 32.0 - 36.0 g/dL    RDW 17.0 (H) 11.3 - 14.5 %    RDW-SD 57.8 (H) 37.0 - 54.0 fl    MPV 11.3 6.0 - 12.0 fL    Platelets 126 (L) 150 - 450 10*3/mm3    Neutrophil % 59.2 41.0 - 71.0 %    Lymphocyte % 25.6 24.0 - 44.0 %    Monocyte % 10.2 0.0 - 12.0 %    Eosinophil % 2.8 0.0 - 3.0 %    Basophil % 0.3 0.0 - 1.0 %    Immature Grans % 1.9 (H) 0.0 - 0.6 %    Neutrophils, Absolute 4.31 1.50 - 8.30 10*3/mm3    Lymphocytes, Absolute 1.86 0.60 - 4.80 10*3/mm3    Monocytes, Absolute 0.74 0.00 - 1.00 10*3/mm3    Eosinophils, Absolute 0.20 0.10 - 0.30 10*3/mm3    Basophils, Absolute 0.02 0.00 - 0.20 10*3/mm3    Immature Grans, Absolute 0.14 (H) 0.00 - 0.03 10*3/mm3         Assessment and Plan:         1. Chronic diastolic heart failure  euvolemic  Heart failure education today including signs and symptoms, the role of the heart failure center, daily weights, low sodium diet (less than 1500 mg per day), and daily physical activity. Reviewed HF Zones with patient and family.  Patient to continue current medications as previously ordered.     2. Chronic obstructive pulmonary disease, unspecified COPD type  Without exacerbation  Oxygen q hs    3. CKD (chronic kidney disease), stage III  Creatinine slightly elevated at 1.6 on 4/3/17  Will continue to monitor    4. Paroxysmal atrial fibrillation  Maintaining NSr   Asa only due to GI Bleed and sudural hematoma while on coumadin and xarelto    It has been a pleasure to participate in the care of this patient.  Patient was instructed to call the [facility] with any questions, concerns, or worsening  symptoms.      * Please note that portions of this note were completed with a voice recognition program. Efforts were made to edit the dictation but occasionally words are transcribed.

## 2017-04-14 RX ORDER — ALBUTEROL SULFATE 90 UG/1
1-2 AEROSOL, METERED RESPIRATORY (INHALATION) EVERY 4 HOURS PRN
Qty: 1 INHALER | Refills: 11 | Status: SHIPPED | OUTPATIENT
Start: 2017-04-14

## 2017-04-14 RX ORDER — ALBUTEROL SULFATE 90 UG/1
1-2 AEROSOL, METERED RESPIRATORY (INHALATION) EVERY 4 HOURS PRN
Qty: 1 INHALER | Refills: 11 | Status: SHIPPED | OUTPATIENT
Start: 2017-04-14 | End: 2017-04-14 | Stop reason: CLARIF

## 2017-08-01 ENCOUNTER — OFFICE VISIT (OUTPATIENT)
Dept: CARDIOLOGY | Facility: HOSPITAL | Age: 77
End: 2017-08-01

## 2017-08-01 VITALS
RESPIRATION RATE: 20 BRPM | HEIGHT: 65 IN | WEIGHT: 151 LBS | DIASTOLIC BLOOD PRESSURE: 54 MMHG | OXYGEN SATURATION: 96 % | HEART RATE: 79 BPM | BODY MASS INDEX: 25.16 KG/M2 | SYSTOLIC BLOOD PRESSURE: 99 MMHG | TEMPERATURE: 97.7 F

## 2017-08-01 DIAGNOSIS — I50.33 ACUTE ON CHRONIC DIASTOLIC HEART FAILURE (HCC): Primary | ICD-10-CM

## 2017-08-01 DIAGNOSIS — R53.83 FATIGUE, UNSPECIFIED TYPE: ICD-10-CM

## 2017-08-01 DIAGNOSIS — R35.0 URINE FREQUENCY: ICD-10-CM

## 2017-08-01 LAB
ANION GAP SERPL CALCULATED.3IONS-SCNC: 3 MMOL/L (ref 3–11)
BACTERIA UR QL AUTO: ABNORMAL /HPF
BILIRUB UR QL STRIP: ABNORMAL
BNP SERPL-MCNC: 387 PG/ML (ref 0–100)
BUN BLD-MCNC: 11 MG/DL (ref 9–23)
BUN/CREAT SERPL: 10 (ref 7–25)
CALCIUM SPEC-SCNC: 9 MG/DL (ref 8.7–10.4)
CHLORIDE SERPL-SCNC: 109 MMOL/L (ref 99–109)
CLARITY UR: CLEAR
CO2 SERPL-SCNC: 24 MMOL/L (ref 20–31)
COLOR UR: ABNORMAL
CREAT BLD-MCNC: 1.1 MG/DL (ref 0.6–1.3)
DEPRECATED RDW RBC AUTO: 48.2 FL (ref 37–54)
ERYTHROCYTE [DISTWIDTH] IN BLOOD BY AUTOMATED COUNT: 14 % (ref 11.3–14.5)
GFR SERPL CREATININE-BSD FRML MDRD: 65 ML/MIN/1.73
GLUCOSE BLD-MCNC: 118 MG/DL (ref 70–100)
GLUCOSE UR STRIP-MCNC: NEGATIVE MG/DL
HCT VFR BLD AUTO: 37.9 % (ref 38.9–50.9)
HGB BLD-MCNC: 12.3 G/DL (ref 13.1–17.5)
HGB UR QL STRIP.AUTO: NEGATIVE
HYALINE CASTS UR QL AUTO: ABNORMAL /LPF
KETONES UR QL STRIP: ABNORMAL
LEUKOCYTE ESTERASE UR QL STRIP.AUTO: ABNORMAL
MAGNESIUM SERPL-MCNC: 1.8 MG/DL (ref 1.3–2.7)
MCH RBC QN AUTO: 30.4 PG (ref 27–31)
MCHC RBC AUTO-ENTMCNC: 32.5 G/DL (ref 32–36)
MCV RBC AUTO: 93.8 FL (ref 80–99)
NITRITE UR QL STRIP: NEGATIVE
PH UR STRIP.AUTO: 6 [PH] (ref 5–8)
PLATELET # BLD AUTO: 99 10*3/MM3 (ref 150–450)
PMV BLD AUTO: 11.3 FL (ref 6–12)
POTASSIUM BLD-SCNC: 3.9 MMOL/L (ref 3.5–5.5)
PROT UR QL STRIP: ABNORMAL
RBC # BLD AUTO: 4.04 10*6/MM3 (ref 4.2–5.76)
RBC # UR: ABNORMAL /HPF
REF LAB TEST METHOD: ABNORMAL
SODIUM BLD-SCNC: 136 MMOL/L (ref 132–146)
SP GR UR STRIP: 1.02 (ref 1–1.03)
SQUAMOUS #/AREA URNS HPF: ABNORMAL /HPF
UROBILINOGEN UR QL STRIP: ABNORMAL
WBC NRBC COR # BLD: 8.05 10*3/MM3 (ref 3.5–10.8)
WBC UR QL AUTO: ABNORMAL /HPF

## 2017-08-01 PROCEDURE — 83735 ASSAY OF MAGNESIUM: CPT | Performed by: NURSE PRACTITIONER

## 2017-08-01 PROCEDURE — 81001 URINALYSIS AUTO W/SCOPE: CPT | Performed by: NURSE PRACTITIONER

## 2017-08-01 PROCEDURE — 85027 COMPLETE CBC AUTOMATED: CPT | Performed by: NURSE PRACTITIONER

## 2017-08-01 PROCEDURE — 99214 OFFICE O/P EST MOD 30 MIN: CPT | Performed by: NURSE PRACTITIONER

## 2017-08-01 PROCEDURE — 80048 BASIC METABOLIC PNL TOTAL CA: CPT | Performed by: NURSE PRACTITIONER

## 2017-08-01 PROCEDURE — 83880 ASSAY OF NATRIURETIC PEPTIDE: CPT | Performed by: NURSE PRACTITIONER

## 2017-08-01 NOTE — PROGRESS NOTES
Encounter Date:08/01/2017      Patient ID: Alexander Adan is a 77 y.o. male.        Subjective:     Chief Complaint: Follow-up (edema, fatigue)     History of Present Illness patient presents to the heart and valve center today for ongoing evaluation of his chronic diastolic heart failure and PAF. Patient notes increased fatigue,dyspnea and abdominal fullness. Patient notes that he recently stopped his bumex due to increased urination. He notes since stopping his bumex, he has been experiencing more dyspnea and abdominal fullness as well as pedal edema. He also reports dysuria, chills, increased frequency of urination that has been present for the last few days. He notes that he had been feeling well until 4 days ago when he began experiencing the above mentioned symptoms. He denies any ICD shocks. Patient denies chest pain, chest pressure, palpitations, tachycardia, presyncope, syncope, orthopnea, PND, early satiety, claudication, cough or edema.    Patient Active Problem List   Diagnosis   • Atrial fibrillation   • Chronic coronary artery disease   • Chronic diastolic heart failure   • Chronic obstructive pulmonary disease   • Cognitive impairment, mild, so stated   • Tobacco use   • Fall at home   • CKD (chronic kidney disease), stage III   • Leukocytosis   • Bronchitis   • Vasovagal syncope   • Dehydration   • Hypotension due to dehydration       Past Surgical History:   Procedure Laterality Date   • SHERRILL HOLE FOR SUBDURAL HEMATOMA     • CARDIAC PACEMAKER PLACEMENT      PERMANENT   • CAROTID STENT Left    • CHOLECYSTECTOMY     • CORONARY ARTERY BYPASS GRAFT     • THROMBOENDARTERECTOMY      CAROTID, LEFT       Allergies   Allergen Reactions   • Altace [Ramipril]    • Cephalexin      unknown   • Penicillins Hives   • Procaine    • Rivaroxaban      Hx of head bleed   • Simvastatin          Current Outpatient Prescriptions:   •  acetaminophen (TYLENOL) 650 MG 8 hr tablet, Take 650 mg by mouth Every 4 (Four) Hours  As Needed for mild pain (1-3)., Disp: , Rfl:   •  albuterol (PROVENTIL HFA;VENTOLIN HFA) 108 (90 BASE) MCG/ACT inhaler, Inhale 1-2 puffs Every 4 (Four) Hours As Needed for Wheezing., Disp: 1 inhaler, Rfl: 11  •  albuterol (PROVENTIL) (2.5 MG/3ML) 0.083% nebulizer solution, Take 1 dose by nebulization See Admin Instructions., Disp: , Rfl:   •  amitriptyline (ELAVIL) 10 MG tablet, Take 1 tablet by mouth At Night As Needed for sleep. FOR LEGS/SLEEP, Disp: , Rfl:   •  aspirin (ASPIRIN LOW DOSE) 81 MG tablet, Take 1 tablet by mouth daily., Disp: , Rfl:   •  bumetanide (BUMEX) 1 MG tablet, 1mg on Sun, Tues, Thur and 0.5mg the rest of the week, Disp: , Rfl:   •  carbonyl iron (FEOSOL) 45 MG tablet tablet, Take 325 tablets by mouth Daily., Disp: , Rfl:   •  dicyclomine (BENTYL) 20 MG tablet, TAKE ONE TABLET BY MOUTH EVERY 6 HOURS AS NEEDED ABDOMINAL  PAIN, Disp: 120 tablet, Rfl: 5  •  gabapentin (NEURONTIN) 600 MG tablet, Take 600 mg by mouth 3 (Three) Times a Day., Disp: , Rfl:   •  HYDROcodone-acetaminophen (NORCO) 5-325 MG per tablet, Take 1 tablet by mouth Every 6 (Six) Hours As Needed for Moderate Pain (4-6)., Disp: 10 tablet, Rfl: 0  •  ipratropium-albuterol (DUO-NEB) 0.5-2.5 mg/mL nebulizer, Take 1 dose by nebulization every 4 (four) hours as needed. FOR DYSPNEA/WHEEZING, Disp: , Rfl:   •  LORazepam (ATIVAN) 2 MG tablet, Take 1 mg by mouth Every 8 (Eight) Hours As Needed., Disp: , Rfl:   •  meclizine (ANTIVERT) 25 MG tablet, Take 12.5 mg by mouth Every 8 (Eight) Hours As Needed., Disp: , Rfl:   •  omeprazole (priLOSEC) 40 MG capsule, Take 1 capsule by mouth 2 (Two) Times a Day., Disp: 60 capsule, Rfl: 11  •  Sotalol HCl, AF, 120 MG tablet, Take 120 mg by mouth Every 12 (Twelve) Hours., Disp: , Rfl:   •  spironolactone (ALDACTONE) 25 MG tablet, Take 1 tablet by mouth Daily., Disp: 30 tablet, Rfl: 11  •  sucralfate (CARAFATE) 1 G tablet, Take 1 g by mouth 2 (Two) Times a Day., Disp: , Rfl:   •  traMADol (ULTRAM) 50 MG  tablet, Take 1 tablet by mouth every 12 (twelve) hours as needed., Disp: , Rfl:     The following portions of the chart were reviewed and updated as appropriate: Allergies, current medications, past family history, social history, past medical history.     Review of Systems   Constitution: Positive for malaise/fatigue. Negative for chills, decreased appetite, diaphoresis, fever, weakness, night sweats, weight gain and weight loss.   HENT: Positive for congestion (post nasal drip). Negative for headaches, hearing loss, hoarse voice and nosebleeds.    Eyes: Negative for blurred vision, visual disturbance and visual halos.   Cardiovascular: Positive for dyspnea on exertion and leg swelling. Negative for chest pain, claudication, cyanosis, irregular heartbeat, near-syncope, orthopnea, palpitations, paroxysmal nocturnal dyspnea and syncope.   Respiratory: Positive for cough and shortness of breath. Negative for hemoptysis, sleep disturbances due to breathing, snoring, sputum production and wheezing.    Endocrine: Positive for cold intolerance and polyuria.   Hematologic/Lymphatic: Negative for bleeding problem. Does not bruise/bleed easily.   Skin: Negative for dry skin, itching and rash.   Musculoskeletal: Negative for arthritis, joint pain, joint swelling and myalgias.   Gastrointestinal: Positive for bloating. Negative for abdominal pain, constipation, diarrhea, flatus, heartburn, hematemesis, hematochezia, melena, nausea and vomiting.   Genitourinary: Positive for dysuria and frequency. Negative for hematuria, nocturia and urgency.   Neurological: Positive for loss of balance. Negative for excessive daytime sleepiness, dizziness and light-headedness.   Psychiatric/Behavioral: Negative for depression. The patient is nervous/anxious. The patient does not have insomnia.            Objective:     Vitals:    08/01/17 1316 08/01/17 1322 08/01/17 1323   BP: 93/55 101/51 99/54   BP Location: Right arm Left arm Left arm  "  Patient Position: Sitting Sitting Standing   Pulse: 65 64 79   Resp: 20     Temp: 97.7 °F (36.5 °C)     TempSrc: Temporal Artery      SpO2: 96%     Weight: 151 lb (68.5 kg)     Height: 65\" (165.1 cm)           Physical Exam   Constitutional: He is oriented to person, place, and time. He appears well-developed and well-nourished. He is active and cooperative. No distress.   HENT:   Head: Normocephalic and atraumatic.   Mouth/Throat: Oropharynx is clear and moist.   Eyes: Conjunctivae and EOM are normal. Pupils are equal, round, and reactive to light.   Neck: Normal range of motion. Neck supple. No JVD present. No tracheal deviation present. No thyromegaly present.   Cardiovascular: Normal rate, regular rhythm, normal heart sounds and intact distal pulses.    Pulmonary/Chest: Effort normal. He has rales.   Abdominal: Soft. Bowel sounds are normal. He exhibits no distension. There is no tenderness.   Musculoskeletal: Normal range of motion. He exhibits edema (1+ edema noted bilaterally).   Neurological: He is alert and oriented to person, place, and time.   Skin: Skin is warm, dry and intact.   Psychiatric: He has a normal mood and affect. His behavior is normal.   Nursing note and vitals reviewed.      Lab and Diagnostic Review:      Results for orders placed or performed in visit on 08/01/17   CBC (No Diff)   Result Value Ref Range    WBC 8.05 3.50 - 10.80 10*3/mm3    RBC 4.04 (L) 4.20 - 5.76 10*6/mm3    Hemoglobin 12.3 (L) 13.1 - 17.5 g/dL    Hematocrit 37.9 (L) 38.9 - 50.9 %    MCV 93.8 80.0 - 99.0 fL    MCH 30.4 27.0 - 31.0 pg    MCHC 32.5 32.0 - 36.0 g/dL    RDW 14.0 11.3 - 14.5 %    RDW-SD 48.2 37.0 - 54.0 fl    MPV 11.3 6.0 - 12.0 fL    Platelets 99 (L) 150 - 450 10*3/mm3   Basic Metabolic Panel   Result Value Ref Range    Glucose 118 (H) 70 - 100 mg/dL    BUN 11 9 - 23 mg/dL    Creatinine 1.10 0.60 - 1.30 mg/dL    Sodium 136 132 - 146 mmol/L    Potassium 3.9 3.5 - 5.5 mmol/L    Chloride 109 99 - 109 mmol/L "    CO2 24.0 20.0 - 31.0 mmol/L    Calcium 9.0 8.7 - 10.4 mg/dL    eGFR Non African Amer 65 >60 mL/min/1.73    BUN/Creatinine Ratio 10.0 7.0 - 25.0    Anion Gap 3.0 3.0 - 11.0 mmol/L   BNP   Result Value Ref Range    .0 (H) 0.0 - 100.0 pg/mL   Magnesium   Result Value Ref Range    Magnesium 1.8 1.3 - 2.7 mg/dL   Urinalysis With / Microscopic If Indicated   Result Value Ref Range    Color, UA Dark Yellow (A) Yellow, Straw    Appearance, UA Clear Clear    pH, UA 6.0 5.0 - 8.0    Specific Gravity, UA 1.023 1.001 - 1.030    Glucose, UA Negative Negative    Ketones, UA Trace (A) Negative    Bilirubin, UA Small (1+) (A) Negative    Blood, UA Negative Negative    Protein, UA Trace (A) Negative    Leuk Esterase, UA Small (1+) (A) Negative    Nitrite, UA Negative Negative    Urobilinogen, UA 0.2 E.U./dL 0.2 - 1.0 E.U./dL   Urinalysis, Microscopic Only   Result Value Ref Range    RBC, UA 0-2 None Seen, 0-2 /HPF    WBC, UA 21-30 (A) None Seen /HPF    Bacteria, UA None Seen None Seen, Trace /HPF    Squamous Epithelial Cells, UA 0-2 None Seen, 0-2 /HPF    Hyaline Casts, UA 0-6 0 - 6 /LPF    Methodology Automated Microscopy      Quick look [facility] ICD: showed mode switch of 5.9% with one episode of VT on July 12, 2017 ( monitor only)  Assessment and Plan:         1. Acute on chronic diastolic heart failure  Patient to resume bumex 1 mg qd as directed  - Basic Metabolic Panel  - BNP  Heart failure education today including signs and symptoms, the role of the heart failure center, daily weights, low sodium diet (less than 1500 mg per day), and daily physical activity. Reviewed HF Zones with patient and family.  Patient to continue current medications as previously ordered.   2. Fatigue, unspecified type    - CBC (No Diff)  - Magnesium    3. Urine frequency  Patient to begin bactrim ds 1 po bid for 7 days  - Urinalysis With / Microscopic If Indicated      It has been a pleasure to participate in the care of this patient.   Patient was instructed to call the [facility] with any questions, concerns, or worsening symptoms.    * Please note that portions of this note were completed with a voice recognition program. Efforts were made to edit the dictation but occasionally words are transcribed.

## 2017-08-02 RX ORDER — SULFAMETHOXAZOLE AND TRIMETHOPRIM 800; 160 MG/1; MG/1
1 TABLET ORAL 2 TIMES DAILY
Qty: 14 TABLET | Refills: 0 | Status: SHIPPED | OUTPATIENT
Start: 2017-08-02 | End: 2017-08-09

## 2017-08-05 ENCOUNTER — APPOINTMENT (OUTPATIENT)
Dept: GENERAL RADIOLOGY | Facility: HOSPITAL | Age: 77
End: 2017-08-05

## 2017-08-05 ENCOUNTER — HOSPITAL ENCOUNTER (EMERGENCY)
Facility: HOSPITAL | Age: 77
Discharge: HOME | End: 2017-08-06
Attending: EMERGENCY MEDICINE | Admitting: EMERGENCY MEDICINE

## 2017-08-05 ENCOUNTER — APPOINTMENT (OUTPATIENT)
Dept: CT IMAGING | Facility: HOSPITAL | Age: 77
End: 2017-08-05

## 2017-08-05 DIAGNOSIS — S22.32XA CLOSED FRACTURE OF ONE RIB OF LEFT SIDE, INITIAL ENCOUNTER: Primary | ICD-10-CM

## 2017-08-05 LAB
ALBUMIN SERPL-MCNC: 4 G/DL (ref 3.2–4.8)
ALBUMIN/GLOB SERPL: 1.4 G/DL (ref 1.5–2.5)
ALP SERPL-CCNC: 86 U/L (ref 25–100)
ALT SERPL W P-5'-P-CCNC: 12 U/L (ref 7–40)
ANION GAP SERPL CALCULATED.3IONS-SCNC: 5 MMOL/L (ref 3–11)
AST SERPL-CCNC: 18 U/L (ref 0–33)
BASOPHILS # BLD AUTO: 0.05 10*3/MM3 (ref 0–0.2)
BASOPHILS NFR BLD AUTO: 0.6 % (ref 0–1)
BILIRUB SERPL-MCNC: 0.2 MG/DL (ref 0.3–1.2)
BUN BLD-MCNC: 22 MG/DL (ref 9–23)
BUN/CREAT SERPL: 12.9 (ref 7–25)
CALCIUM SPEC-SCNC: 9.3 MG/DL (ref 8.7–10.4)
CHLORIDE SERPL-SCNC: 106 MMOL/L (ref 99–109)
CO2 SERPL-SCNC: 27 MMOL/L (ref 20–31)
CREAT BLD-MCNC: 1.7 MG/DL (ref 0.6–1.3)
D-LACTATE SERPL-SCNC: 0.6 MMOL/L (ref 0.5–2)
DEPRECATED RDW RBC AUTO: 52.1 FL (ref 37–54)
EOSINOPHIL # BLD AUTO: 0.49 10*3/MM3 (ref 0–0.3)
EOSINOPHIL NFR BLD AUTO: 5.9 % (ref 0–3)
ERYTHROCYTE [DISTWIDTH] IN BLOOD BY AUTOMATED COUNT: 14.5 % (ref 11.3–14.5)
GFR SERPL CREATININE-BSD FRML MDRD: 39 ML/MIN/1.73
GLOBULIN UR ELPH-MCNC: 2.9 GM/DL
GLUCOSE BLD-MCNC: 92 MG/DL (ref 70–100)
HCT VFR BLD AUTO: 38.5 % (ref 38.9–50.9)
HGB BLD-MCNC: 12 G/DL (ref 13.1–17.5)
IMM GRANULOCYTES # BLD: 0.05 10*3/MM3 (ref 0–0.03)
IMM GRANULOCYTES NFR BLD: 0.6 % (ref 0–0.6)
LYMPHOCYTES # BLD AUTO: 1.81 10*3/MM3 (ref 0.6–4.8)
LYMPHOCYTES NFR BLD AUTO: 21.8 % (ref 24–44)
MAGNESIUM SERPL-MCNC: 2 MG/DL (ref 1.3–2.7)
MCH RBC QN AUTO: 30.2 PG (ref 27–31)
MCHC RBC AUTO-ENTMCNC: 31.2 G/DL (ref 32–36)
MCV RBC AUTO: 97 FL (ref 80–99)
MONOCYTES # BLD AUTO: 0.96 10*3/MM3 (ref 0–1)
MONOCYTES NFR BLD AUTO: 11.6 % (ref 0–12)
NEUTROPHILS # BLD AUTO: 4.94 10*3/MM3 (ref 1.5–8.3)
NEUTROPHILS NFR BLD AUTO: 59.5 % (ref 41–71)
PLATELET # BLD AUTO: 138 10*3/MM3 (ref 150–450)
PMV BLD AUTO: 11.2 FL (ref 6–12)
POTASSIUM BLD-SCNC: 4.6 MMOL/L (ref 3.5–5.5)
PROT SERPL-MCNC: 6.9 G/DL (ref 5.7–8.2)
RBC # BLD AUTO: 3.97 10*6/MM3 (ref 4.2–5.76)
SODIUM BLD-SCNC: 138 MMOL/L (ref 132–146)
TROPONIN I SERPL-MCNC: 0.01 NG/ML (ref 0–0.07)
WBC NRBC COR # BLD: 8.3 10*3/MM3 (ref 3.5–10.8)

## 2017-08-05 PROCEDURE — 84484 ASSAY OF TROPONIN QUANT: CPT

## 2017-08-05 PROCEDURE — 74150 CT ABDOMEN W/O CONTRAST: CPT

## 2017-08-05 PROCEDURE — 85025 COMPLETE CBC W/AUTO DIFF WBC: CPT | Performed by: NURSE PRACTITIONER

## 2017-08-05 PROCEDURE — 80053 COMPREHEN METABOLIC PANEL: CPT | Performed by: NURSE PRACTITIONER

## 2017-08-05 PROCEDURE — 83605 ASSAY OF LACTIC ACID: CPT | Performed by: NURSE PRACTITIONER

## 2017-08-05 PROCEDURE — 87040 BLOOD CULTURE FOR BACTERIA: CPT | Performed by: NURSE PRACTITIONER

## 2017-08-05 PROCEDURE — 71250 CT THORAX DX C-: CPT

## 2017-08-05 PROCEDURE — 96360 HYDRATION IV INFUSION INIT: CPT

## 2017-08-05 PROCEDURE — 71101 X-RAY EXAM UNILAT RIBS/CHEST: CPT

## 2017-08-05 PROCEDURE — 93005 ELECTROCARDIOGRAM TRACING: CPT | Performed by: NURSE PRACTITIONER

## 2017-08-05 PROCEDURE — 99284 EMERGENCY DEPT VISIT MOD MDM: CPT

## 2017-08-05 PROCEDURE — 83735 ASSAY OF MAGNESIUM: CPT | Performed by: NURSE PRACTITIONER

## 2017-08-05 RX ADMIN — SODIUM CHLORIDE 1000 ML: 9 INJECTION, SOLUTION INTRAVENOUS at 23:01

## 2017-08-06 ENCOUNTER — APPOINTMENT (OUTPATIENT)
Dept: CT IMAGING | Facility: HOSPITAL | Age: 77
End: 2017-08-06

## 2017-08-06 VITALS
BODY MASS INDEX: 25.16 KG/M2 | DIASTOLIC BLOOD PRESSURE: 51 MMHG | SYSTOLIC BLOOD PRESSURE: 146 MMHG | WEIGHT: 151 LBS | TEMPERATURE: 98.1 F | OXYGEN SATURATION: 94 % | HEART RATE: 74 BPM | RESPIRATION RATE: 22 BRPM | HEIGHT: 65 IN

## 2017-08-06 LAB
BILIRUB UR QL STRIP: NEGATIVE
CLARITY UR: CLEAR
COLOR UR: YELLOW
GLUCOSE UR STRIP-MCNC: NEGATIVE MG/DL
HGB UR QL STRIP.AUTO: NEGATIVE
KETONES UR QL STRIP: NEGATIVE
LEUKOCYTE ESTERASE UR QL STRIP.AUTO: NEGATIVE
NITRITE UR QL STRIP: NEGATIVE
PH UR STRIP.AUTO: 6 [PH] (ref 5–8)
PROT UR QL STRIP: NEGATIVE
SP GR UR STRIP: 1.02 (ref 1–1.03)
UROBILINOGEN UR QL STRIP: NORMAL

## 2017-08-06 PROCEDURE — 74176 CT ABD & PELVIS W/O CONTRAST: CPT

## 2017-08-06 PROCEDURE — 81003 URINALYSIS AUTO W/O SCOPE: CPT | Performed by: NURSE PRACTITIONER

## 2017-08-06 RX ORDER — OXYCODONE HYDROCHLORIDE AND ACETAMINOPHEN 5; 325 MG/1; MG/1
1 TABLET ORAL EVERY 8 HOURS PRN
Qty: 14 TABLET | Refills: 0 | Status: SHIPPED | OUTPATIENT
Start: 2017-08-06 | End: 2018-02-12 | Stop reason: HOSPADM

## 2017-08-06 NOTE — ED PROVIDER NOTES
Subjective   HPI Comments: Alexander Adan is a 77 y.o.male who presents to the ED with complaints of multiple falls. He reports that he fell approximately five times yesterday after becoming off balance. He reports that the falling started while shaving. He did not hit his head or experience any loss of consciousness during the falls. However, he has developed left rib cage pain which has worsened while eating and with deep breathing. He notes that he is currently being treated for a urinary tract infection with antibiotics. This evaluation was prompted by fevers, frequent urination, and burning urination which developed two days ago but has since improved. He also c/o chronic cough with wheezing that remains unchanged today and chronic BLE swelling but denies pelvic pain, head or neck pain, weakness, syncope, other injuries, or any other complaints at this time. He reports his systolic blood pressure remains low at baseline, typically fluctuating around 80-85. He has past medical history of COPD, congestive heart failure, and atrial fibrillation. He notes he has not taken his breathing treatment today. He is not on any blood thinners. He does take Aspirin daily.       Patient is a 77 y.o. male presenting with fall.   History provided by:  Patient and spouse  Fall   Mechanism of injury: fall    Mechanism of injury comment:  Multiple falls yesterday.  Injury location:  Torso  Torso injury location: Left rib cage   Incident location: Multiple falls.  Arrived directly from scene: no    Fall:     Fall occurred: Fell multiple times with one being in bathroom.    Point of impact: Left side.    Entrapped after fall: no    Suspicion of alcohol use: no    Suspicion of drug use: no    Prior to arrival data:     Bystander interventions:  None    Blood loss:  None    Responsiveness at scene:  Alert    Orientation at scene:  Person, place, situation and time    Loss of consciousness: no      Amnesic to event: no    Associated  symptoms: abdominal pain (left rib cage region) and chest pain (left rib cage region)    Associated symptoms: no back pain, no difficulty breathing, no headaches, no loss of consciousness and no neck pain    Risk factors: CHF and COPD    Risk factors: no anticoagulation therapy        Review of Systems   Respiratory: Positive for cough (chronic, unchanged ) and wheezing (chronic, unchanged).    Cardiovascular: Positive for chest pain (left rib cage region) and leg swelling (chronic, unchanged ).   Gastrointestinal: Positive for abdominal pain (left rib cage region).   Musculoskeletal: Negative for back pain and neck pain.        No pelvic pain.   Neurological: Positive for dizziness (sensation of being off balance.). Negative for loss of consciousness, syncope, weakness and headaches.   All other systems reviewed and are negative.      Past Medical History:   Diagnosis Date   • Anemia    • Anxiety    • Chronic kidney disease     STAGE II (mild)   • Hyperlipidemia    • Hypertension    • Stroke     LACUNAR. SEPT 2013   • Subdural hematoma     Status post hemorrhagic evacuation with [place] holes 4/3/2015 per Dr. Gilson Brooke.       Allergies   Allergen Reactions   • Altace [Ramipril]    • Cephalexin      unknown   • Penicillins Hives   • Procaine    • Rivaroxaban      Hx of head bleed   • Simvastatin        Past Surgical History:   Procedure Laterality Date   • SHERRILL HOLE FOR SUBDURAL HEMATOMA     • CARDIAC PACEMAKER PLACEMENT      PERMANENT   • CAROTID STENT Left    • CHOLECYSTECTOMY     • CORONARY ARTERY BYPASS GRAFT     • THROMBOENDARTERECTOMY      CAROTID, LEFT       Family History   Problem Relation Age of Onset   • Cancer Mother    • Diabetes Mother    • Heart failure Mother    • Hypertension Mother    • Stroke Mother    • Diabetes Father    • Heart failure Father    • Diabetes Sister    • Heart failure Sister    • Cancer Brother    • Diabetes Brother    • Heart disease Other    • Hypertension Other    • Other  Other             Social History     Social History   • Marital status:      Spouse name: N/A   • Number of children: N/A   • Years of education: N/A     Occupational History   • retired      Social History Main Topics   • Smoking status: Current Every Day Smoker     Types: Cigars   • Smokeless tobacco: Never Used      Comment: 1 pack for 2 weeks   • Alcohol use No   • Drug use: No   • Sexual activity: Defer     Other Topics Concern   • None     Social History Narrative    Pt consumes 2 cups coffee, 4 20oz Mountain Dew sodas, and sometimes sweet tea. Pt lives in his own home with wife.          Objective   Physical Exam   Constitutional: He is oriented to person, place, and time. He appears well-developed and well-nourished. No distress.   HENT:   Head: Normocephalic and atraumatic.   Nose: Nose normal.   Eyes: Conjunctivae are normal. No scleral icterus.   Neck: Normal range of motion. Neck supple.   Cardiovascular: Normal rate, regular rhythm, normal heart sounds and intact distal pulses.    No murmur heard.  Pulmonary/Chest: Effort normal and breath sounds normal. No respiratory distress. He exhibits tenderness (Left chest ).   Abdominal: Soft. Bowel sounds are normal. He exhibits no mass. There is tenderness (Tenderness in the LUQ). There is rebound. There is no guarding.   Musculoskeletal: Normal range of motion. He exhibits no edema.   No other injury noted on exam. Pelvis is stable. All joints have full ROM without any limitations.   Neurological: He is alert and oriented to person, place, and time.   Skin: Skin is warm and dry.   Psychiatric: He has a normal mood and affect. His behavior is normal.   Nursing note and vitals reviewed.      Procedures         ED Course  ED Course     Recent Results (from the past 24 hour(s))   Comprehensive Metabolic Panel    Collection Time: 17 11:03 PM   Result Value Ref Range    Glucose 92 70 - 100 mg/dL    BUN 22 9 - 23 mg/dL    Creatinine 1.70 (H)  0.60 - 1.30 mg/dL    Sodium 138 132 - 146 mmol/L    Potassium 4.6 3.5 - 5.5 mmol/L    Chloride 106 99 - 109 mmol/L    CO2 27.0 20.0 - 31.0 mmol/L    Calcium 9.3 8.7 - 10.4 mg/dL    Total Protein 6.9 5.7 - 8.2 g/dL    Albumin 4.00 3.20 - 4.80 g/dL    ALT (SGPT) 12 7 - 40 U/L    AST (SGOT) 18 0 - 33 U/L    Alkaline Phosphatase 86 25 - 100 U/L    Total Bilirubin 0.2 (L) 0.3 - 1.2 mg/dL    eGFR Non African Amer 39 (L) >60 mL/min/1.73    Globulin 2.9 gm/dL    A/G Ratio 1.4 (L) 1.5 - 2.5 g/dL    BUN/Creatinine Ratio 12.9 7.0 - 25.0    Anion Gap 5.0 3.0 - 11.0 mmol/L   Magnesium    Collection Time: 08/05/17 11:03 PM   Result Value Ref Range    Magnesium 2.0 1.3 - 2.7 mg/dL   Lactic Acid, Plasma    Collection Time: 08/05/17 11:03 PM   Result Value Ref Range    Lactate 0.6 0.5 - 2.0 mmol/L   CBC Auto Differential    Collection Time: 08/05/17 11:03 PM   Result Value Ref Range    WBC 8.30 3.50 - 10.80 10*3/mm3    RBC 3.97 (L) 4.20 - 5.76 10*6/mm3    Hemoglobin 12.0 (L) 13.1 - 17.5 g/dL    Hematocrit 38.5 (L) 38.9 - 50.9 %    MCV 97.0 80.0 - 99.0 fL    MCH 30.2 27.0 - 31.0 pg    MCHC 31.2 (L) 32.0 - 36.0 g/dL    RDW 14.5 11.3 - 14.5 %    RDW-SD 52.1 37.0 - 54.0 fl    MPV 11.2 6.0 - 12.0 fL    Platelets 138 (L) 150 - 450 10*3/mm3    Neutrophil % 59.5 41.0 - 71.0 %    Lymphocyte % 21.8 (L) 24.0 - 44.0 %    Monocyte % 11.6 0.0 - 12.0 %    Eosinophil % 5.9 (H) 0.0 - 3.0 %    Basophil % 0.6 0.0 - 1.0 %    Immature Grans % 0.6 0.0 - 0.6 %    Neutrophils, Absolute 4.94 1.50 - 8.30 10*3/mm3    Lymphocytes, Absolute 1.81 0.60 - 4.80 10*3/mm3    Monocytes, Absolute 0.96 0.00 - 1.00 10*3/mm3    Eosinophils, Absolute 0.49 (H) 0.00 - 0.30 10*3/mm3    Basophils, Absolute 0.05 0.00 - 0.20 10*3/mm3    Immature Grans, Absolute 0.05 (H) 0.00 - 0.03 10*3/mm3   POC Troponin, Rapid    Collection Time: 08/05/17 11:19 PM   Result Value Ref Range    Troponin I 0.01 0.00 - 0.07 ng/mL   Urinalysis With / Culture If Indicated    Collection Time: 08/06/17  12:06 AM   Result Value Ref Range    Color, UA Yellow Yellow, Straw    Appearance, UA Clear Clear    pH, UA 6.0 5.0 - 8.0    Specific Gravity, UA 1.018 1.001 - 1.030    Glucose, UA Negative Negative    Ketones, UA Negative Negative    Bilirubin, UA Negative Negative    Blood, UA Negative Negative    Protein, UA Negative Negative    Leuk Esterase, UA Negative Negative    Nitrite, UA Negative Negative    Urobilinogen, UA 1.0 E.U./dL 0.2 - 1.0 E.U./dL     Note: In addition to lab results from this visit, the labs listed above may include labs taken at another facility or during a different encounter within the last 24 hours. Please correlate lab times with ED admission and discharge times for further clarification of the services performed during this visit.    CT Abdomen Pelvis Without Contrast   Final Result   Abnormal     No acute findings.      THIS DOCUMENT HAS BEEN ELECTRONICALLY SIGNED BY TISH WHEELER MD      CT Chest Without Contrast   Final Result   Abnormal   1.  Nondisplaced fracture of the left seventh rib.   2.  Development of mild centrilobular nodularity in the middle and lower lobes    with small ground glass opacities medially in the left lower lobe suggestive of    infectious/inflammatory process.      THIS DOCUMENT HAS BEEN ELECTRONICALLY SIGNED BY TISH WHEELER MD      XR Ribs Left With PA Chest   Final Result   Abnormal     Possible nondisplaced fracture of the left seventh rib anteriorly.      THIS DOCUMENT HAS BEEN ELECTRONICALLY SIGNED BY TISH WHEELER MD        Vitals:    08/06/17 0041 08/06/17 0044 08/06/17 0046 08/06/17 0100   BP: 134/50 135/64 132/58 146/51   BP Location:       Patient Position: Lying Sitting Standing    Pulse: 71 75 74 74   Resp:       Temp:       TempSrc:       SpO2:    94%   Weight:       Height:         Medications   sodium chloride 0.9 % bolus 1,000 mL (0 mL Intravenous Stopped 8/6/17 0117)     ECG/EMG Results (last 24 hours)     Procedure Component Value Units  Date/Time    ECG 12 Lead [[id number]] Collected:  08/05/17 2305     Updated:  08/05/17 2305                        MDM    Final diagnoses:   Closed fracture of one rib of left side, initial encounter       Documentation assistance provided by juan m Balderrama.  Information recorded by the scribe was done at my direction and has been verified and validated by me.     Linda Balderrama  08/05/17 2254       Linda Balderrama  08/06/17 0005       DENI Taylor  08/06/17 0248

## 2017-08-08 ENCOUNTER — HOSPITAL ENCOUNTER (EMERGENCY)
Facility: HOSPITAL | Age: 77
End: 2017-08-08

## 2017-08-08 VITALS
HEIGHT: 68 IN | OXYGEN SATURATION: 98 % | TEMPERATURE: 98.1 F | WEIGHT: 151 LBS | BODY MASS INDEX: 22.88 KG/M2 | RESPIRATION RATE: 20 BRPM | SYSTOLIC BLOOD PRESSURE: 107 MMHG | DIASTOLIC BLOOD PRESSURE: 57 MMHG | HEART RATE: 95 BPM

## 2017-08-11 LAB
BACTERIA SPEC AEROBE CULT: NORMAL
BACTERIA SPEC AEROBE CULT: NORMAL

## 2017-09-29 ENCOUNTER — OFFICE VISIT (OUTPATIENT)
Dept: CARDIOLOGY | Facility: HOSPITAL | Age: 77
End: 2017-09-29

## 2017-09-29 VITALS
OXYGEN SATURATION: 97 % | WEIGHT: 135 LBS | HEART RATE: 65 BPM | BODY MASS INDEX: 21.69 KG/M2 | SYSTOLIC BLOOD PRESSURE: 109 MMHG | DIASTOLIC BLOOD PRESSURE: 49 MMHG | HEIGHT: 66 IN | RESPIRATION RATE: 19 BRPM | TEMPERATURE: 97 F

## 2017-09-29 DIAGNOSIS — I48.0 PAROXYSMAL ATRIAL FIBRILLATION (HCC): ICD-10-CM

## 2017-09-29 DIAGNOSIS — I25.10 CHRONIC CORONARY ARTERY DISEASE: ICD-10-CM

## 2017-09-29 DIAGNOSIS — I50.32 CHRONIC DIASTOLIC HEART FAILURE (HCC): Primary | ICD-10-CM

## 2017-09-29 DIAGNOSIS — N18.30 CKD (CHRONIC KIDNEY DISEASE), STAGE III (HCC): ICD-10-CM

## 2017-09-29 PROCEDURE — 99214 OFFICE O/P EST MOD 30 MIN: CPT | Performed by: NURSE PRACTITIONER

## 2017-10-10 DIAGNOSIS — R42 DIZZINESS: Primary | ICD-10-CM

## 2017-10-10 DIAGNOSIS — R53.1 WEAKNESS GENERALIZED: ICD-10-CM

## 2017-10-10 DIAGNOSIS — R53.83 FATIGUE, UNSPECIFIED TYPE: ICD-10-CM

## 2017-10-10 NOTE — PROGRESS NOTES
Patient's wife called the office noting increased weakness, fatigue and dizziness. She notes that he sustained 3 falls yesterday. Patient to have labs drawn at [facility] in the next few days. Patient's wife verbalized understanding.

## 2017-10-16 ENCOUNTER — APPOINTMENT (OUTPATIENT)
Dept: LAB | Facility: HOSPITAL | Age: 77
End: 2017-10-16

## 2017-10-16 ENCOUNTER — LAB REQUISITION (OUTPATIENT)
Dept: LAB | Facility: HOSPITAL | Age: 77
End: 2017-10-16

## 2017-10-16 DIAGNOSIS — R42 DIZZINESS AND GIDDINESS: ICD-10-CM

## 2017-10-16 DIAGNOSIS — Z00.00 ROUTINE GENERAL MEDICAL EXAMINATION AT A HEALTH CARE FACILITY: ICD-10-CM

## 2017-10-16 DIAGNOSIS — R53.1 WEAKNESS: ICD-10-CM

## 2017-10-16 DIAGNOSIS — R53.83 OTHER FATIGUE: ICD-10-CM

## 2017-10-16 LAB
ALBUMIN SERPL-MCNC: 4.1 G/DL (ref 3.2–4.8)
ALBUMIN/GLOB SERPL: 1.7 G/DL (ref 1.5–2.5)
ALP SERPL-CCNC: 95 U/L (ref 25–100)
ALT SERPL W P-5'-P-CCNC: 9 U/L (ref 7–40)
ANION GAP SERPL CALCULATED.3IONS-SCNC: 0 MMOL/L (ref 3–11)
AST SERPL-CCNC: 13 U/L (ref 0–33)
BILIRUB SERPL-MCNC: 0.2 MG/DL (ref 0.3–1.2)
BUN BLD-MCNC: 12 MG/DL (ref 9–23)
BUN/CREAT SERPL: 8.6 (ref 7–25)
CALCIUM SPEC-SCNC: 9.5 MG/DL (ref 8.7–10.4)
CHLORIDE SERPL-SCNC: 104 MMOL/L (ref 99–109)
CO2 SERPL-SCNC: 32 MMOL/L (ref 20–31)
CREAT BLD-MCNC: 1.4 MG/DL (ref 0.6–1.3)
DEPRECATED FTI SERPL-MCNC: 2.6 TBI
DEPRECATED RDW RBC AUTO: 49.9 FL (ref 37–54)
ERYTHROCYTE [DISTWIDTH] IN BLOOD BY AUTOMATED COUNT: 14.6 % (ref 11.3–14.5)
GFR SERPL CREATININE-BSD FRML MDRD: 49 ML/MIN/1.73
GLOBULIN UR ELPH-MCNC: 2.4 GM/DL
GLUCOSE BLD-MCNC: 77 MG/DL (ref 70–100)
HCT VFR BLD AUTO: 43.2 % (ref 38.9–50.9)
HGB BLD-MCNC: 13.9 G/DL (ref 13.1–17.5)
MAGNESIUM SERPL-MCNC: 1.7 MG/DL (ref 1.3–2.7)
MCH RBC QN AUTO: 29.4 PG (ref 27–31)
MCHC RBC AUTO-ENTMCNC: 32.2 G/DL (ref 32–36)
MCV RBC AUTO: 91.3 FL (ref 80–99)
PLATELET # BLD AUTO: 136 10*3/MM3 (ref 150–450)
PMV BLD AUTO: 10.4 FL (ref 6–12)
POTASSIUM BLD-SCNC: 5.1 MMOL/L (ref 3.5–5.5)
PROT SERPL-MCNC: 6.5 G/DL (ref 5.7–8.2)
RBC # BLD AUTO: 4.73 10*6/MM3 (ref 4.2–5.76)
SODIUM BLD-SCNC: 136 MMOL/L (ref 132–146)
T3RU NFR SERPL: 40.5 % (ref 23–37)
T4 SERPL-MCNC: 6.5 MCG/DL (ref 4.7–11.4)
TSH SERPL DL<=0.05 MIU/L-ACNC: 3.4 MIU/ML (ref 0.35–5.35)
WBC NRBC COR # BLD: 8.55 10*3/MM3 (ref 3.5–10.8)

## 2017-10-16 PROCEDURE — 84479 ASSAY OF THYROID (T3 OR T4): CPT | Performed by: NURSE PRACTITIONER

## 2017-10-16 PROCEDURE — 84443 ASSAY THYROID STIM HORMONE: CPT | Performed by: NURSE PRACTITIONER

## 2017-10-16 PROCEDURE — 85027 COMPLETE CBC AUTOMATED: CPT | Performed by: NURSE PRACTITIONER

## 2017-10-16 PROCEDURE — 84436 ASSAY OF TOTAL THYROXINE: CPT | Performed by: NURSE PRACTITIONER

## 2017-10-16 PROCEDURE — 83735 ASSAY OF MAGNESIUM: CPT | Performed by: NURSE PRACTITIONER

## 2017-10-16 PROCEDURE — 80053 COMPREHEN METABOLIC PANEL: CPT | Performed by: NURSE PRACTITIONER

## 2017-10-25 LAB
ALBUMIN SERPL-MCNC: 4.1 G/DL (ref 3.2–4.8)
ALBUMIN/GLOB SERPL: 1.7 G/DL
ALP SERPL-CCNC: 95 U/L (ref 25–100)
AST SERPL-CCNC: 13 U/L (ref 0–33)
BILIRUB SERPL-MCNC: 0.2 MG/DL (ref 0.3–1.2)
BUN SERPL-MCNC: 12 MG/DL (ref 9–23)
BUN/CREAT SERPL: 8.6 (ref 7–25)
CALCIUM SERPL-MCNC: 9.5 MG/DL (ref 8.7–10.4)
CHLORIDE SERPL-SCNC: 104 MMOL/L (ref 99–109)
CREAT SERPL-MCNC: 1.4 MG/DL (ref 0.6–1.3)
ERYTHROCYTE [DISTWIDTH] IN BLOOD BY AUTOMATED COUNT: 14.6 % (ref 11.3–14.5)
FT4I SERPL CALC-MCNC: 2.6 (ref 1.4–5.2)
GFR SERPLBLD CREATININE-BSD FMLA CKD-EPI: 49 ML/MIN/1.732
GLOBULIN SER CALC-MCNC: 2.4 G/DL
GLUCOSE SERPL-MCNC: 77 MG/DL (ref 70–100)
HCT VFR BLD AUTO: 43.2 % (ref 38.9–50.9)
HGB BLD-MCNC: 13.9 G/DL (ref 13.1–17.5)
MAGNESIUM SERPL-MCNC: 1.7 MG/DL (ref 1.3–2.7)
MCH RBC QN AUTO: 29.4 PG (ref 27–31)
MCHC RBC AUTO-ENTMCNC: 32.2 G/DL (ref 32–36)
MCV RBC AUTO: 91.3 FL (ref 80–99)
POTASSIUM SERPL-SCNC: 5.1 MMOL/L (ref 3.5–5.5)
PROT SERPL-MCNC: 6.5 G/DL (ref 5.7–8.2)
RBC # BLD AUTO: 4.73 10E6/MM3 (ref 4.2–5.76)
SODIUM SERPL-SCNC: 136 MMOL/L (ref 132–146)
T3RU NFR SERPL: 40.5 % (ref 23–37)
T4 SERPL-MCNC: 6.5 MCG/DL (ref 4.7–11.4)
TSH SERPL DL<=0.005 MIU/L-ACNC: 3.4 MIU/ML (ref 0.35–5.35)
WBC # BLD AUTO: 8.55 10E3/MM3 (ref 3.5–10.8)

## 2017-12-08 ENCOUNTER — APPOINTMENT (OUTPATIENT)
Dept: GENERAL RADIOLOGY | Facility: HOSPITAL | Age: 77
End: 2017-12-08

## 2017-12-08 ENCOUNTER — APPOINTMENT (OUTPATIENT)
Dept: CT IMAGING | Facility: HOSPITAL | Age: 77
End: 2017-12-08

## 2017-12-08 ENCOUNTER — HOSPITAL ENCOUNTER (EMERGENCY)
Facility: HOSPITAL | Age: 77
Discharge: HOME | End: 2017-12-09
Attending: EMERGENCY MEDICINE | Admitting: EMERGENCY MEDICINE

## 2017-12-08 DIAGNOSIS — J44.9 CHRONIC OBSTRUCTIVE PULMONARY DISEASE, UNSPECIFIED COPD TYPE (HCC): ICD-10-CM

## 2017-12-08 DIAGNOSIS — S00.81XA ABRASION OF FOREHEAD, INITIAL ENCOUNTER: ICD-10-CM

## 2017-12-08 DIAGNOSIS — S09.90XA CLOSED HEAD INJURY, INITIAL ENCOUNTER: Primary | ICD-10-CM

## 2017-12-08 DIAGNOSIS — I95.9 HYPOTENSION, UNSPECIFIED HYPOTENSION TYPE: ICD-10-CM

## 2017-12-08 DIAGNOSIS — I95.1 ORTHOSTATIC HYPOTENSION: ICD-10-CM

## 2017-12-08 DIAGNOSIS — Z72.0 TOBACCO ABUSE: ICD-10-CM

## 2017-12-08 DIAGNOSIS — S01.81XA LACERATION OF FOREHEAD, INITIAL ENCOUNTER: ICD-10-CM

## 2017-12-08 DIAGNOSIS — E86.0 DEHYDRATION: ICD-10-CM

## 2017-12-08 LAB
ALBUMIN SERPL-MCNC: 4.4 G/DL (ref 3.2–4.8)
ALBUMIN/GLOB SERPL: 1.4 G/DL (ref 1.5–2.5)
ALP SERPL-CCNC: 107 U/L (ref 25–100)
ALT SERPL W P-5'-P-CCNC: 10 U/L (ref 7–40)
ANION GAP SERPL CALCULATED.3IONS-SCNC: 4 MMOL/L (ref 3–11)
AST SERPL-CCNC: 27 U/L (ref 0–33)
BASOPHILS # BLD AUTO: 0.02 10*3/MM3 (ref 0–0.2)
BASOPHILS NFR BLD AUTO: 0.3 % (ref 0–1)
BILIRUB SERPL-MCNC: 0.3 MG/DL (ref 0.3–1.2)
BILIRUB UR QL STRIP: NEGATIVE
BUN BLD-MCNC: 12 MG/DL (ref 9–23)
BUN/CREAT SERPL: 10 (ref 7–25)
CALCIUM SPEC-SCNC: 9.3 MG/DL (ref 8.7–10.4)
CHLORIDE SERPL-SCNC: 104 MMOL/L (ref 99–109)
CLARITY UR: CLEAR
CO2 SERPL-SCNC: 26 MMOL/L (ref 20–31)
COLOR UR: YELLOW
CREAT BLD-MCNC: 1.2 MG/DL (ref 0.6–1.3)
DEPRECATED RDW RBC AUTO: 50.3 FL (ref 37–54)
EOSINOPHIL # BLD AUTO: 0.19 10*3/MM3 (ref 0–0.3)
EOSINOPHIL NFR BLD AUTO: 3.3 % (ref 0–3)
ERYTHROCYTE [DISTWIDTH] IN BLOOD BY AUTOMATED COUNT: 15.3 % (ref 11.3–14.5)
GFR SERPL CREATININE-BSD FRML MDRD: 59 ML/MIN/1.73
GLOBULIN UR ELPH-MCNC: 3.1 GM/DL
GLUCOSE BLD-MCNC: 84 MG/DL (ref 70–100)
GLUCOSE BLDC GLUCOMTR-MCNC: 95 MG/DL (ref 70–130)
GLUCOSE UR STRIP-MCNC: NEGATIVE MG/DL
HCT VFR BLD AUTO: 42.2 % (ref 38.9–50.9)
HGB BLD-MCNC: 14.1 G/DL (ref 13.1–17.5)
HGB UR QL STRIP.AUTO: NEGATIVE
HOLD SPECIMEN: NORMAL
HOLD SPECIMEN: NORMAL
IMM GRANULOCYTES # BLD: 0.07 10*3/MM3 (ref 0–0.03)
IMM GRANULOCYTES NFR BLD: 1.2 % (ref 0–0.6)
KETONES UR QL STRIP: NEGATIVE
LEUKOCYTE ESTERASE UR QL STRIP.AUTO: NEGATIVE
LYMPHOCYTES # BLD AUTO: 1.21 10*3/MM3 (ref 0.6–4.8)
LYMPHOCYTES NFR BLD AUTO: 20.7 % (ref 24–44)
MAGNESIUM SERPL-MCNC: 1.9 MG/DL (ref 1.3–2.7)
MCH RBC QN AUTO: 30.1 PG (ref 27–31)
MCHC RBC AUTO-ENTMCNC: 33.4 G/DL (ref 32–36)
MCV RBC AUTO: 90 FL (ref 80–99)
MONOCYTES # BLD AUTO: 0.82 10*3/MM3 (ref 0–1)
MONOCYTES NFR BLD AUTO: 14 % (ref 0–12)
NEUTROPHILS # BLD AUTO: 3.53 10*3/MM3 (ref 1.5–8.3)
NEUTROPHILS NFR BLD AUTO: 60.5 % (ref 41–71)
NITRITE UR QL STRIP: NEGATIVE
PH UR STRIP.AUTO: 6 [PH] (ref 5–8)
PLATELET # BLD AUTO: 110 10*3/MM3 (ref 150–450)
PMV BLD AUTO: 10.6 FL (ref 6–12)
POTASSIUM BLD-SCNC: 5.9 MMOL/L (ref 3.5–5.5)
PROT SERPL-MCNC: 7.5 G/DL (ref 5.7–8.2)
PROT UR QL STRIP: NEGATIVE
RBC # BLD AUTO: 4.69 10*6/MM3 (ref 4.2–5.76)
SODIUM BLD-SCNC: 134 MMOL/L (ref 132–146)
SP GR UR STRIP: 1.01 (ref 1–1.03)
TROPONIN I SERPL-MCNC: 0.03 NG/ML
TROPONIN I SERPL-MCNC: 0.03 NG/ML
UROBILINOGEN UR QL STRIP: NORMAL
WBC NRBC COR # BLD: 5.84 10*3/MM3 (ref 3.5–10.8)
WHOLE BLOOD HOLD SPECIMEN: NORMAL
WHOLE BLOOD HOLD SPECIMEN: NORMAL

## 2017-12-08 PROCEDURE — 70450 CT HEAD/BRAIN W/O DYE: CPT

## 2017-12-08 PROCEDURE — 80053 COMPREHEN METABOLIC PANEL: CPT | Performed by: EMERGENCY MEDICINE

## 2017-12-08 PROCEDURE — 82962 GLUCOSE BLOOD TEST: CPT

## 2017-12-08 PROCEDURE — 71010 HC CHEST PA OR AP: CPT

## 2017-12-08 PROCEDURE — 84484 ASSAY OF TROPONIN QUANT: CPT | Performed by: EMERGENCY MEDICINE

## 2017-12-08 PROCEDURE — 94640 AIRWAY INHALATION TREATMENT: CPT

## 2017-12-08 PROCEDURE — 99284 EMERGENCY DEPT VISIT MOD MDM: CPT

## 2017-12-08 PROCEDURE — 81003 URINALYSIS AUTO W/O SCOPE: CPT | Performed by: EMERGENCY MEDICINE

## 2017-12-08 PROCEDURE — 93005 ELECTROCARDIOGRAM TRACING: CPT

## 2017-12-08 PROCEDURE — 94799 UNLISTED PULMONARY SVC/PX: CPT

## 2017-12-08 PROCEDURE — 96360 HYDRATION IV INFUSION INIT: CPT

## 2017-12-08 PROCEDURE — 73630 X-RAY EXAM OF FOOT: CPT

## 2017-12-08 PROCEDURE — 96361 HYDRATE IV INFUSION ADD-ON: CPT

## 2017-12-08 PROCEDURE — 85025 COMPLETE CBC W/AUTO DIFF WBC: CPT | Performed by: EMERGENCY MEDICINE

## 2017-12-08 PROCEDURE — 83735 ASSAY OF MAGNESIUM: CPT | Performed by: EMERGENCY MEDICINE

## 2017-12-08 PROCEDURE — 93005 ELECTROCARDIOGRAM TRACING: CPT | Performed by: EMERGENCY MEDICINE

## 2017-12-08 RX ORDER — SODIUM CHLORIDE 9 MG/ML
125 INJECTION, SOLUTION INTRAVENOUS CONTINUOUS
Status: DISCONTINUED | OUTPATIENT
Start: 2017-12-08 | End: 2017-12-09 | Stop reason: HOSPADM

## 2017-12-08 RX ORDER — LIDOCAINE HYDROCHLORIDE AND EPINEPHRINE 10; 10 MG/ML; UG/ML
INJECTION, SOLUTION INFILTRATION; PERINEURAL
Status: COMPLETED
Start: 2017-12-08 | End: 2017-12-08

## 2017-12-08 RX ORDER — LIDOCAINE HYDROCHLORIDE AND EPINEPHRINE 10; 10 MG/ML; UG/ML
10 INJECTION, SOLUTION INFILTRATION; PERINEURAL ONCE
Status: COMPLETED | OUTPATIENT
Start: 2017-12-08 | End: 2017-12-08

## 2017-12-08 RX ORDER — IPRATROPIUM BROMIDE AND ALBUTEROL SULFATE 2.5; .5 MG/3ML; MG/3ML
3 SOLUTION RESPIRATORY (INHALATION) ONCE
Status: COMPLETED | OUTPATIENT
Start: 2017-12-08 | End: 2017-12-08

## 2017-12-08 RX ORDER — SODIUM CHLORIDE 0.9 % (FLUSH) 0.9 %
10 SYRINGE (ML) INJECTION AS NEEDED
Status: DISCONTINUED | OUTPATIENT
Start: 2017-12-08 | End: 2017-12-09 | Stop reason: HOSPADM

## 2017-12-08 RX ADMIN — IPRATROPIUM BROMIDE AND ALBUTEROL SULFATE 3 ML: .5; 3 SOLUTION RESPIRATORY (INHALATION) at 19:55

## 2017-12-08 RX ADMIN — LIDOCAINE HYDROCHLORIDE,EPINEPHRINE BITARTRATE 20 ML: 10; .01 INJECTION, SOLUTION INFILTRATION; PERINEURAL at 23:52

## 2017-12-08 RX ADMIN — SODIUM CHLORIDE 1000 ML: 9 INJECTION, SOLUTION INTRAVENOUS at 19:44

## 2017-12-08 RX ADMIN — SODIUM CHLORIDE 125 ML/HR: 9 INJECTION, SOLUTION INTRAVENOUS at 21:12

## 2017-12-08 RX ADMIN — LIDOCAINE HYDROCHLORIDE,EPINEPHRINE BITARTRATE 10 ML: 10; .01 INJECTION, SOLUTION INFILTRATION; PERINEURAL at 23:30

## 2017-12-09 VITALS
WEIGHT: 140 LBS | DIASTOLIC BLOOD PRESSURE: 72 MMHG | OXYGEN SATURATION: 95 % | RESPIRATION RATE: 18 BRPM | HEART RATE: 66 BPM | HEIGHT: 65 IN | BODY MASS INDEX: 23.32 KG/M2 | TEMPERATURE: 98.4 F | SYSTOLIC BLOOD PRESSURE: 121 MMHG

## 2017-12-09 NOTE — DISCHARGE INSTRUCTIONS
Suture removal in 7 days.  He may return here, or your family physician may do that in the office.    Rest and avoid trauma to your laceration.    You were low depleted in the emergency department today.  Make sure that you stay very well-hydrated.  Several days, as this certainly may have contributed to your fall and symptoms today    Immediate return if you have any neurologic symptoms or signs of infection    Hold your Bumex and spironolactone tomorrow, then continued as usual.  Hold your diuretics if you're dizzy with standing.    Follow-up with your primary care physician in the office this coming week.  Discuss your diuretics with Dr. Smart on follow-up this week

## 2017-12-09 NOTE — ED PROVIDER NOTES
Subjective   HPI Comments: Mr. Alexander Adan is a 77 year old male who presents to the ED for evaluation of a fall. He states he was walking into [employer] and he fell on the concrete. He does not know whether he tripped over the curb or had a syncopal event, however he noted that he was talking and ambulating after the fall with no problem. The fall was not witnessed. He is a current smoker with a history of COPD. He denies nausea, vomiting, diarrhea, or urinary changes. He also complains of a dry cough. There are no other known complaints at this time.    Patient is a 77 y.o. male presenting with fall.   History provided by:  Patient  Fall   Mechanism of injury: fall    Injury location:  Head/neck  Head/neck injury location:  Head  Incident location:  Street  Time since incident: PTA.  Arrived directly from scene: yes    Fall:     Fall occurred:  Walking    Impact surface:  [place]    Point of impact:  Head    Entrapped after fall: no    Protective equipment: none    Tetanus status:  Up to date  Prior to arrival data:     Bystander interventions:  None    Patient ambulatory at scene: yes    Associated symptoms: headaches    Associated symptoms: no nausea and no vomiting    Risk factors: CABG, COPD and kidney disease        Review of Systems   Respiratory: Positive for cough.    Gastrointestinal: Negative for nausea and vomiting.   Neurological: Positive for headaches.   All other systems reviewed and are negative.      Past Medical History:   Diagnosis Date   • Anemia    • Anxiety    • Chronic kidney disease     STAGE II (mild)   • Hyperlipidemia    • Hypertension    • Stroke     LACUNAR. SEPT 2013   • Subdural hematoma     Status post hemorrhagic evacuation with [place] holes 4/3/2015 per Dr. Gilson Brooke.       Allergies   Allergen Reactions   • Altace [Ramipril]    • Cephalexin      unknown   • Penicillins Hives   • Procaine    • Rivaroxaban      Hx of head bleed   • Simvastatin        Past Surgical History:    Procedure Laterality Date   • SHERRILL HOLE FOR SUBDURAL HEMATOMA     • CARDIAC PACEMAKER PLACEMENT      PERMANENT   • CAROTID STENT Left    • CHOLECYSTECTOMY     • CORONARY ARTERY BYPASS GRAFT     • PACEMAKER IMPLANTATION     • THROMBOENDARTERECTOMY      CAROTID, LEFT       Family History   Problem Relation Age of Onset   • Cancer Mother    • Diabetes Mother    • Heart failure Mother    • Hypertension Mother    • Stroke Mother    • Diabetes Father    • Heart failure Father    • Diabetes Sister    • Heart failure Sister    • Cancer Brother    • Diabetes Brother    • Heart disease Other    • Hypertension Other    • Other Other             Social History     Social History   • Marital status:      Spouse name: N/A   • Number of children: N/A   • Years of education: N/A     Occupational History   • retired      Social History Main Topics   • Smoking status: Current Every Day Smoker     Types: Cigars   • Smokeless tobacco: Never Used      Comment: 1 pack for 2 weeks   • Alcohol use No   • Drug use: No   • Sexual activity: Defer     Other Topics Concern   • None     Social History Narrative    Pt consumes 0-1 cups coffee, 2 20oz Mountain Dew sodas, and sometimes sweet tea. Pt lives in his own home with wife.          Objective   Physical Exam   Constitutional: He is oriented to person, place, and time. He appears well-developed and well-nourished. No distress.   HENT:   Head: Normocephalic and atraumatic.   Nose: Nose normal.   Abrasion and a small laceration on the left forehead. Laceration measures 12 cm above left lateral eyebrow. No bony tenderness.   Eyes: Conjunctivae are normal. No scleral icterus.   Neck: Normal range of motion. Neck supple.   Cardiovascular: Normal rate, regular rhythm and normal heart sounds.    No murmur heard.  Pulmonary/Chest: Effort normal. No respiratory distress. He has decreased breath sounds. He has wheezes.   Abdominal: Soft. Bowel sounds are normal. There is no  tenderness.   Musculoskeletal: Normal range of motion. He exhibits no edema.   Neurological: He is alert and oriented to person, place, and time.   Skin: Skin is warm and dry.   Psychiatric: He has a normal mood and affect. His behavior is normal.   Nursing note and vitals reviewed.      Laceration Repair  Date/Time: 12/8/2017 11:41 PM  Performed by: JOSHUA CARSON  Authorized by: JOSHUA CARSON     Consent:     Consent obtained:  Verbal    Consent given by:  Patient  Anesthesia (see MAR for exact dosages):     Anesthesia method:  Local infiltration    Local anesthetic:  Lidocaine 1% WITH epi  Laceration details:     Location:  Face    Face location:  L eyebrow    Length (cm):  1.2  Repair type:     Repair type:  Simple  Pre-procedure details:     Preparation:  Patient was prepped and draped in usual sterile fashion  Exploration:     Hemostasis achieved with:  Epinephrine and direct pressure    Wound exploration: entire depth of wound probed and visualized      Wound extent: no foreign bodies/material noted, no muscle damage noted, no nerve damage noted, no underlying fracture noted and no vascular damage noted      Contaminated: no    Treatment:     Area cleansed with:  Betadine and saline    Amount of cleaning:  Standard    Irrigation solution:  Sterile saline    Irrigation method:  Syringe    Visualized foreign bodies/material removed: no    Skin repair:     Repair method:  Sutures and tissue adhesive    Suture size:  5-0    Suture material:  Nylon    Suture technique:  Simple interrupted    Number of sutures:  3 (Skin a 6 placed on the lateral aspect of the avulsed tissue in addition to sutures)  Post-procedure details:     Patient tolerance of procedure:  Tolerated well, no immediate complications  Comments:      Good wound closure.             ED Course  ED Course   Comment By Time   I discussed findings at length with patient and family.  He certainly had a fall and contusion of the head.  He is overtly  orthostatic.  He has no underlying illness or disease other than the cough which has been present for weeks and is not acute or acutely worsening.He is unsure whether he had loss of consciousness.  He does not admit minor head injury protocol.  We'll proceed with a CT.  We'll treat the patient with IV fluids and rechecked his orthostatics.I discussed findings with patient and family.  All agree Rey Ibrahim MD 12/08 1941   Wound is re-reevaluated after cleansing.  There is a area of tissue avulsion superior to the lateral aspect of the laceration.  I discussed Dermabond to the area for closure, but after reevaluation this would provide inadequate closure, and I will therefore suture the wound.  I discussed this with the patient and family.  Evaluation is unremarkable to date including CT of the head and chest x-ray.  Patient remains asymptomatic now Rey Ibrahim MD 12/08 2499   Patient is serially reevaluated throughout the ED course with last reevaluation now.  He is remained asymptomatic.  He is unsure whether had loss of consciousness to does not feel that he did.  He is unsure whether he tripped or not.  He's had pain at his left heel since a previous contusion.  There is a linear break in the skin without any signs of infection.  He has tenderness but his x-ray is unremarkableI discussed findings at length with the patient.  We'll give him head injury precautions.  I do not think this is actually a syncopal event.Discussed follow-up with his primary care physician and indications for immediate returnVery pleasant and reliable patient and family verbalizes understanding agreement with plan of care. Rey Ibrahim MD 12/08 4613   Patient was overtly orthostatic.  He's had a history of orthostatic hypotension in the past.  He's hydrated with a liter of IV fluids.  Recheck and make sure that he is no longer orthostatic now Rey Ibrahim MD 12/08 9080   Patient is asymptomatic now after a liter of IV  "hydration.  He still drops is diastolic 10.6 and his systolic 23 points.  Retreated with another 500 mL of IV fluids prior to discharge.  His heart rate however does not markedly elevated.I've asked patient to stop his diuretics tomorrow completely.  Masters him to restart them in 2 days, but follow-up on Monday with his primary care physician for reevaluation of his fluid status and instructions on diuretics.  I discussed indications again for return to the ED, and I discussed orthostatic hypotension and volume depletion with patient and family verbalized understanding agreement with the plan.  He feels much better and ready to go home now. Rey Ibrahim MD 12/09 0003   I discussed the hazards of orthostatic hypotension again with patient and family.  Patient reports that he is \"always\" dropped his blood pressure with standing.  He's had episodes of syncope before, one time was it is getting out of his truck after driving.  I discussed the danger both to him and others, and feel that if he continues have significant orthostatic hypotension put himself at risk for significant injury.  After long discussion of this and the importance of having this addressed the patient will follow-up in his primary care physician's office at the beginning of the week.  Patient and family agree with the plan of care.  He remains asymptomatic Rey Ibrahim MD 12/09 0024                     MDM    Final diagnoses:   Closed head injury, initial encounter   Laceration of forehead, initial encounter   Abrasion of forehead, initial encounter   Dehydration   Hypotension, unspecified hypotension type   Orthostatic hypotension   Tobacco abuse   Chronic obstructive pulmonary disease, unspecified COPD type       Documentation assistance provided by juan m Martínez.  Information recorded by the juan m was done at my direction and has been verified and validated by me.     Kip Martínez  12/08/17 1941       eRy Ibrahim, " MD  12/08/17 2353       Rey Ibrahim MD  12/09/17 0005       Rey Ibrahim MD  12/09/17 0024

## 2017-12-14 RX ORDER — PREDNISONE 5 MG/1
1 TABLET ORAL DAILY
Qty: 1 EACH | Refills: 0 | Status: SHIPPED | OUTPATIENT
Start: 2017-12-14 | End: 2018-02-12 | Stop reason: HOSPADM

## 2017-12-14 RX ORDER — DOXYCYCLINE HYCLATE 100 MG
100 TABLET ORAL 2 TIMES DAILY
Qty: 20 TABLET | Refills: 0 | Status: SHIPPED | OUTPATIENT
Start: 2017-12-14 | End: 2018-02-12 | Stop reason: HOSPADM

## 2017-12-14 NOTE — PROGRESS NOTES
Patient called the office noting an acute exacerbation of COPD with wheezing, dyspnea and productive cough. Patient to begin doxycycline, medrol dose pack. Patient to be seen in office or by PCP if no improvement in symptoms.

## 2018-02-07 ENCOUNTER — OFFICE VISIT (OUTPATIENT)
Dept: CARDIOLOGY | Facility: HOSPITAL | Age: 78
End: 2018-02-07

## 2018-02-07 ENCOUNTER — HOSPITAL ENCOUNTER (OUTPATIENT)
Dept: CARDIOLOGY | Facility: HOSPITAL | Age: 78
Discharge: HOME | End: 2018-02-07

## 2018-02-07 ENCOUNTER — HOSPITAL ENCOUNTER (OUTPATIENT)
Dept: CARDIOLOGY | Facility: HOSPITAL | Age: 78
Discharge: HOME | End: 2018-02-07
Admitting: NURSE PRACTITIONER

## 2018-02-07 VITALS
HEART RATE: 77 BPM | OXYGEN SATURATION: 96 % | HEIGHT: 65 IN | RESPIRATION RATE: 18 BRPM | WEIGHT: 137 LBS | DIASTOLIC BLOOD PRESSURE: 58 MMHG | BODY MASS INDEX: 22.82 KG/M2 | SYSTOLIC BLOOD PRESSURE: 124 MMHG | TEMPERATURE: 98.9 F

## 2018-02-07 VITALS — BODY MASS INDEX: 22.82 KG/M2 | HEIGHT: 65 IN | WEIGHT: 137 LBS

## 2018-02-07 DIAGNOSIS — I48.0 PAF (PAROXYSMAL ATRIAL FIBRILLATION) (HCC): ICD-10-CM

## 2018-02-07 DIAGNOSIS — I25.10 CHRONIC CORONARY ARTERY DISEASE: ICD-10-CM

## 2018-02-07 DIAGNOSIS — I50.32 CHRONIC DIASTOLIC CONGESTIVE HEART FAILURE (HCC): ICD-10-CM

## 2018-02-07 DIAGNOSIS — I70.209: ICD-10-CM

## 2018-02-07 DIAGNOSIS — R06.02 SHORTNESS OF BREATH: ICD-10-CM

## 2018-02-07 DIAGNOSIS — I50.32 CHRONIC DIASTOLIC CONGESTIVE HEART FAILURE (HCC): Primary | ICD-10-CM

## 2018-02-07 LAB
BH CV ECHO MEAS - AO ROOT AREA (BSA CORRECTED): 1.8
BH CV ECHO MEAS - AO ROOT AREA: 7.4 CM^2
BH CV ECHO MEAS - AO ROOT DIAM: 3.1 CM
BH CV ECHO MEAS - BSA(HAYCOCK): 1.7 M^2
BH CV ECHO MEAS - BSA: 1.7 M^2
BH CV ECHO MEAS - BZI_BMI: 22.8 KILOGRAMS/M^2
BH CV ECHO MEAS - BZI_METRIC_HEIGHT: 165.1 CM
BH CV ECHO MEAS - BZI_METRIC_WEIGHT: 62.1 KG
BH CV ECHO MEAS - CONTRAST EF (2CH): 49.1 ML/M^2
BH CV ECHO MEAS - CONTRAST EF 4CH: 51.4 ML/M^2
BH CV ECHO MEAS - EDV(CUBED): 118.3 ML
BH CV ECHO MEAS - EDV(MOD-SP2): 57 ML
BH CV ECHO MEAS - EDV(MOD-SP4): 72 ML
BH CV ECHO MEAS - EDV(TEICH): 113.3 ML
BH CV ECHO MEAS - EF(CUBED): 59.3 %
BH CV ECHO MEAS - EF(MOD-SP2): 49.1 %
BH CV ECHO MEAS - EF(MOD-SP4): 51.4 %
BH CV ECHO MEAS - EF(TEICH): 50.7 %
BH CV ECHO MEAS - ESV(CUBED): 48.2 ML
BH CV ECHO MEAS - ESV(MOD-SP2): 29 ML
BH CV ECHO MEAS - ESV(MOD-SP4): 35 ML
BH CV ECHO MEAS - ESV(TEICH): 55.9 ML
BH CV ECHO MEAS - FS: 25.9 %
BH CV ECHO MEAS - IVS/LVPW: 1
BH CV ECHO MEAS - IVSD: 1.3 CM
BH CV ECHO MEAS - LA DIMENSION: 4.5 CM
BH CV ECHO MEAS - LA/AO: 1.5
BH CV ECHO MEAS - LAT PEAK E' VEL: 7.3 CM/SEC
BH CV ECHO MEAS - LV DIASTOLIC VOL/BSA (35-75): 42.7 ML/M^2
BH CV ECHO MEAS - LV MASS(C)D: 253.6 GRAMS
BH CV ECHO MEAS - LV MASS(C)DI: 150.6 GRAMS/M^2
BH CV ECHO MEAS - LV SYSTOLIC VOL/BSA (12-30): 20.8 ML/M^2
BH CV ECHO MEAS - LVIDD: 4.9 CM
BH CV ECHO MEAS - LVIDS: 3.6 CM
BH CV ECHO MEAS - LVLD AP2: 6.9 CM
BH CV ECHO MEAS - LVLD AP4: 7.2 CM
BH CV ECHO MEAS - LVLS AP2: 6 CM
BH CV ECHO MEAS - LVLS AP4: 6.6 CM
BH CV ECHO MEAS - LVPWD: 1.3 CM
BH CV ECHO MEAS - MED PEAK E' VEL: 4.5 CM/SEC
BH CV ECHO MEAS - MV A MAX VEL: 83.4 CM/SEC
BH CV ECHO MEAS - MV DEC TIME: 0.38 SEC
BH CV ECHO MEAS - MV E MAX VEL: 48.4 CM/SEC
BH CV ECHO MEAS - MV E/A: 0.58
BH CV ECHO MEAS - PA ACC SLOPE: 829.9 CM/SEC^2
BH CV ECHO MEAS - PA ACC TIME: 0.11 SEC
BH CV ECHO MEAS - PA PR(ACCEL): 29.9 MMHG
BH CV ECHO MEAS - RAP SYSTOLE: 5 MMHG
BH CV ECHO MEAS - RVSP: 28 MMHG
BH CV ECHO MEAS - SI(CUBED): 41.6 ML/M^2
BH CV ECHO MEAS - SI(MOD-SP2): 16.6 ML/M^2
BH CV ECHO MEAS - SI(MOD-SP4): 22 ML/M^2
BH CV ECHO MEAS - SI(TEICH): 34.1 ML/M^2
BH CV ECHO MEAS - SV(CUBED): 70.1 ML
BH CV ECHO MEAS - SV(MOD-SP2): 28 ML
BH CV ECHO MEAS - SV(MOD-SP4): 37 ML
BH CV ECHO MEAS - SV(TEICH): 57.4 ML
BH CV ECHO MEAS - TAPSE (>1.6): 0.7 CM2
BH CV ECHO MEAS - TR MAX VEL: 239.7 CM/SEC
BH CV VAS BP RIGHT ARM: NORMAL MMHG
BH CV XLRA - RV BASE: 2.8 CM
BH CV XLRA - RV LENGTH: 5.6 CM
BH CV XLRA - RV MID: 2.4 CM
BH CV XLRA - TDI S': 8.2 CM/SEC
E/E' RATIO: 5.9
LEFT ATRIUM VOLUME INDEX: 25 ML/M2
LV EF 2D ECHO EST: 60 %
MAXIMAL PREDICTED HEART RATE: 142 BPM
STRESS TARGET HR: 121 BPM

## 2018-02-07 PROCEDURE — 99214 OFFICE O/P EST MOD 30 MIN: CPT | Performed by: NURSE PRACTITIONER

## 2018-02-07 PROCEDURE — 93306 TTE W/DOPPLER COMPLETE: CPT

## 2018-02-07 PROCEDURE — 93306 TTE W/DOPPLER COMPLETE: CPT | Performed by: INTERNAL MEDICINE

## 2018-02-07 RX ORDER — MECLIZINE HCL 12.5 MG/1
12.5 TABLET ORAL EVERY 8 HOURS PRN
COMMUNITY
Start: 2017-12-31

## 2018-02-07 RX ORDER — CILOSTAZOL 50 MG/1
50 TABLET ORAL EVERY 12 HOURS SCHEDULED
COMMUNITY
Start: 2018-01-29

## 2018-02-07 NOTE — PROGRESS NOTES
"Encounter Date:02/07/2018      Patient ID: Alexander Adan is a 77 y.o. male.        Subjective:     Chief Complaint: Follow-up   DHF, PAF  History of Present Illness patient [facility] today for ongoing evaluation of his chronic diastolic heart failure and PAF. Patient was last seen in the office 9/17 and has cancelled or no-showed his recent appointments. He is a former patient of Dr Escalera and has not followed up routinely with Cards as well. He presents today requesting cardiac clearance due to upcoming surgery with Dr Callejas. Dr Callejas plans to attempt revascularization of LLE secondary to occlusion of left common and external iliac arteries.  RCIA and REIA have significant stenosis per duplex.   Patient notes fatigue, generalized weakness and weight loss. He notes that \"all he wants to do is sleep all the time\". He notes dyspnea on exertion. He also notes frequent falls.     Patient Active Problem List   Diagnosis   • Atrial fibrillation   • Chronic coronary artery disease   • Chronic diastolic heart failure   • Chronic obstructive pulmonary disease   • Cognitive impairment, mild, so stated   • Tobacco use   • Fall at home   • CKD (chronic kidney disease), stage III   • Leukocytosis   • Bronchitis   • Vasovagal syncope   • Dehydration   • Hypotension due to dehydration       Past Surgical History:   Procedure Laterality Date   • SHERRILL HOLE FOR SUBDURAL HEMATOMA     • CARDIAC PACEMAKER PLACEMENT      PERMANENT   • CAROTID STENT Left    • CHOLECYSTECTOMY     • CORONARY ARTERY BYPASS GRAFT     • PACEMAKER IMPLANTATION     • THROMBOENDARTERECTOMY      CAROTID, LEFT       Allergies   Allergen Reactions   • Altace [Ramipril]    • Cephalexin      unknown   • Penicillins Hives   • Procaine    • Rivaroxaban      Hx of head bleed   • Simvastatin          Current Outpatient Prescriptions:   •  acetaminophen (TYLENOL) 650 MG 8 hr tablet, Take 650 mg by mouth Every 4 (Four) Hours As Needed " for mild pain (1-3)., Disp: , Rfl:   •  albuterol (PROVENTIL HFA;VENTOLIN HFA) 108 (90 BASE) MCG/ACT inhaler, Inhale 1-2 puffs Every 4 (Four) Hours As Needed for Wheezing., Disp: 1 inhaler, Rfl: 11  •  albuterol (PROVENTIL) (2.5 MG/3ML) 0.083% nebulizer solution, Take 1 dose by nebulization See Admin Instructions., Disp: , Rfl:   •  amitriptyline (ELAVIL) 10 MG tablet, Take 1 tablet by mouth At Night As Needed for sleep. FOR LEGS/SLEEP, Disp: , Rfl:   •  aspirin (ASPIRIN LOW DOSE) 81 MG tablet, Take 1 tablet by mouth daily., Disp: , Rfl:   •  bumetanide (BUMEX) 1 MG tablet, 1mg on Sun, Tues, Thur and 0.5mg the rest of the week, Disp: , Rfl:   •  carbonyl iron (FEOSOL) 45 MG tablet tablet, Take 325 tablets by mouth Daily., Disp: , Rfl:   •  cilostazol (PLETAL) 50 MG tablet, Take 50 mg by mouth Every 12 (Twelve) Hours., Disp: , Rfl:   •  dicyclomine (BENTYL) 20 MG tablet, TAKE ONE TABLET BY MOUTH EVERY 6 HOURS AS NEEDED ABDOMINAL  PAIN, Disp: 120 tablet, Rfl: 5  •  gabapentin (NEURONTIN) 600 MG tablet, Take 600 mg by mouth 3 (Three) Times a Day., Disp: , Rfl:   •  HYDROcodone-acetaminophen (NORCO) 5-325 MG per tablet, Take 1 tablet by mouth Every 6 (Six) Hours As Needed for Moderate Pain (4-6)., Disp: 10 tablet, Rfl: 0  •  ipratropium-albuterol (DUO-NEB) 0.5-2.5 mg/mL nebulizer, Take 1 dose by nebulization every 4 (four) hours as needed. FOR DYSPNEA/WHEEZING, Disp: , Rfl:   •  LORazepam (ATIVAN) 2 MG tablet, Take 1 mg by mouth Every 8 (Eight) Hours As Needed., Disp: , Rfl:   •  meclizine (ANTIVERT) 12.5 MG tablet, Take 12.5 mg by mouth Every 8 (Eight) Hours As Needed for dizziness., Disp: , Rfl:   •  omeprazole (priLOSEC) 40 MG capsule, Take 1 capsule by mouth 2 (Two) Times a Day., Disp: 60 capsule, Rfl: 11  •  oxyCODONE-acetaminophen (PERCOCET) 5-325 MG per tablet, Take 1 tablet by mouth Every 8 (Eight) Hours As Needed for Moderate Pain (4-6)., Disp: 14 tablet, Rfl: 0  •  Sotalol HCl, AF, 120 MG tablet, Take 120 mg  by mouth Every 12 (Twelve) Hours., Disp: , Rfl:   •  spironolactone (ALDACTONE) 25 MG tablet, Take 1 tablet by mouth Daily., Disp: 30 tablet, Rfl: 11  •  sucralfate (CARAFATE) 1 G tablet, Take 1 g by mouth 2 (Two) Times a Day., Disp: , Rfl:   •  doxycycline (VIBRAMYICN) 100 MG tablet, Take 1 tablet by mouth 2 (Two) Times a Day., Disp: 20 tablet, Rfl: 0  •  PredniSONE 5 MG (48) tablet therapy pack dosepak, Take 1 tablet by mouth Daily. Take as directed on package instructions., Disp: 1 each, Rfl: 0  •  traMADol (ULTRAM) 50 MG tablet, Take 1 tablet by mouth every 12 (twelve) hours as needed., Disp: , Rfl:     The following portions of the chart were reviewed and updated as appropriate: Allergies, current medications, past family history, social history, past medical history.     Review of Systems   Constitution: Positive for weakness, malaise/fatigue and weight loss. Negative for chills, decreased appetite, diaphoresis, fever, night sweats and weight gain.   HENT: Negative for congestion, hearing loss, hoarse voice and nosebleeds.         Postnasal drip     Eyes: Negative for blurred vision, visual disturbance and visual halos.   Cardiovascular: Positive for claudication and dyspnea on exertion. Negative for chest pain, cyanosis, irregular heartbeat, leg swelling, near-syncope, orthopnea, palpitations, paroxysmal nocturnal dyspnea and syncope.   Respiratory: Positive for cough and sputum production. Negative for hemoptysis, shortness of breath, sleep disturbances due to breathing, snoring and wheezing.    Endocrine: Positive for cold intolerance.   Hematologic/Lymphatic: Negative for bleeding problem. Bruises/bleeds easily.   Skin: Positive for color change. Negative for dry skin, itching and rash.   Musculoskeletal: Positive for falls. Negative for arthritis, joint pain, joint swelling and myalgias.   Gastrointestinal: Positive for heartburn. Negative for bloating, abdominal pain, constipation, diarrhea, flatus,  "hematemesis, hematochezia, melena, nausea and vomiting.   Genitourinary: Negative for dysuria, frequency, hematuria, nocturia and urgency.   Neurological: Positive for excessive daytime sleepiness and loss of balance. Negative for dizziness, headaches and light-headedness.   Psychiatric/Behavioral: Negative for depression. The patient has insomnia. The patient is not nervous/anxious.            Objective:     Vitals:    02/07/18 1259 02/07/18 1300   BP: 113/55 124/58   BP Location: Right arm Left arm   Patient Position: Sitting Sitting   Pulse: 77    Resp: 18    Temp: 98.9 °F (37.2 °C)    TempSrc: Temporal Artery     SpO2: 96%    Weight: 62.1 kg (137 lb)    Height: 165.1 cm (65\")          Physical Exam   Constitutional: He is oriented to person, place, and time. He appears well-developed and well-nourished. He is active and cooperative. No distress.   HENT:   Head: Normocephalic and atraumatic.   Mouth/Throat: Oropharynx is clear and moist.   Eyes: Conjunctivae and EOM are normal. Pupils are equal, round, and reactive to light.   Neck: Normal range of motion. Neck supple. No JVD present. No tracheal deviation present. No thyromegaly present.   Cardiovascular: Normal rate, regular rhythm and normal heart sounds.    Unable to palpate pulses in left foot  + doppler pulses  Left foot warm, without discoloration   Pulmonary/Chest: Effort normal and breath sounds normal.   Abdominal: Soft. Bowel sounds are normal. He exhibits no distension. There is no tenderness.   Musculoskeletal: Normal range of motion.   Neurological: He is alert and oriented to person, place, and time.   Skin: Skin is warm, dry and intact. There is pallor.   Psychiatric: He has a normal mood and affect. His behavior is normal.   Nursing note and vitals reviewed.      Lab and Diagnostic Review:   ·  Echo today; Left ventricular wall thickness is consistent with mild concentric hypertrophy.  · The following left ventricular wall segments are " hypokinetic: basal inferoseptal, mid inferoseptal and basal inferoseptal.  · Moderately reduced right ventricular systolic function noted.  · Left atrial cavity size is moderately dilated.  · Mild-to-moderate mitral valve regurgitation is present  · Mild tricuspid valve regurgitation is present.  · Left ventricular systolic function is normal. Estimated EF = 60%.  · there is akinesis of base of septum and base of inferior wall      Device interrogation today : shows AP 75%,  9.8%, AMS 1338, mode switch 15%, afib burden 3.9%  No VT/VF episodes    Assessment and Plan:         1. Chronic diastolic congestive heart failure  euvolemic  - ECG 12 Lead; Future  - Adult Transthoracic Echo Complete W/ Cont if Necessary Per Protocol; Future  - Ambulatory Referral to Cardiology    2. PAF (paroxysmal atrial fibrillation)  Device check shows afib burden on 3.9%  Has hx of GI bleed on Xarelto and s/p subdural hematoma after sustaining a fall while on Coumadin   - Adult Transthoracic Echo Complete W/ Cont if Necessary Per Protocol; Future  - Ambulatory Referral to Cardiology  CHADS-VASc Risk Assessment            6     1 Hypertension    2 Age >/= 75    2 PRIOR STROKE/TIA/THROMBO    1         Vascular disease    Criteria that do not apply:    CHF    DM    Age 65-74    Sex: Female        3. Shortness of breath    - Adult Transthoracic Echo Complete W/ Cont if Necessary Per Protocol; Future    4. Chronic coronary artery disease  Without angina  5. 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    It has been a pleasure to participate in the care of this patient.  Patient was instructed to call the [facility] with any questions,  concerns, or worsening symptoms.      * Please note that portions of this note were completed with a voice recognition program. Efforts were made to edit the dictation but occasionally words are transcribed.

## 2018-02-12 ENCOUNTER — OFFICE VISIT (OUTPATIENT)
Dept: CARDIOLOGY | Facility: CLINIC | Age: 78
End: 2018-02-12

## 2018-02-12 VITALS
BODY MASS INDEX: 21.16 KG/M2 | DIASTOLIC BLOOD PRESSURE: 48 MMHG | HEIGHT: 65 IN | SYSTOLIC BLOOD PRESSURE: 100 MMHG | WEIGHT: 127 LBS

## 2018-02-12 DIAGNOSIS — I50.32 CHRONIC DIASTOLIC HEART FAILURE (HCC): ICD-10-CM

## 2018-02-12 DIAGNOSIS — R06.09 DYSPNEA ON EXERTION: ICD-10-CM

## 2018-02-12 DIAGNOSIS — I25.10 CHRONIC CORONARY ARTERY DISEASE: ICD-10-CM

## 2018-02-12 DIAGNOSIS — I48.0 PAROXYSMAL ATRIAL FIBRILLATION (HCC): Primary | ICD-10-CM

## 2018-02-12 DIAGNOSIS — I73.9 PVD (PERIPHERAL VASCULAR DISEASE) (HCC): ICD-10-CM

## 2018-02-12 DIAGNOSIS — R55 VASOVAGAL SYNCOPE: ICD-10-CM

## 2018-02-12 PROCEDURE — 99214 OFFICE O/P EST MOD 30 MIN: CPT | Performed by: INTERNAL MEDICINE

## 2018-02-12 RX ORDER — CHOLECALCIFEROL (VITAMIN D3) 125 MCG
5 CAPSULE ORAL NIGHTLY
COMMUNITY

## 2018-02-12 NOTE — PROGRESS NOTES
[facility]  Office visit  Alexander Adan  1940  [phone]    VISIT DATE:  02/12/2018    PCP: Harrison Smart MD  [address] [address]    CC:  Chief Complaint   Patient presents with   • Congestive Heart Failure     diastolic   • Dizziness       PROBLEM LIST:  1. Mixed heart failure:  a. New York Heart Association Class II.  b. Ejection fraction 40% per echocardiogram, March 2013, with diastolic dysfunction.  c. Status post dual-chamber ICD for  primary prevention/heart failure [place] fortified [id number].  d. Echo February 2017: EF 60%, moderate RV dysfunction, mild LVH, akinesis of the left ventricular septum mild/moderate left atrial enlargement  2. Chronic  obstructive pulmonary disease:  a. Patient is currently on home  O2 and uses nebulizers p.r.n.   b. Patient experiences frequent exacerbations of chronic obstructive pulmonary disease.  3.  Coronary artery disease:  a. Coronary artery bypass grafting in  2004.  4. Paroxysmal atrial fibrillation:  a. Patient is currently maintained on antiarrhythmics and Coumadin.  5. Peripheral vascular disease  a. occlusion of left common and external iliac arteries.  RCIA and REIA have significant stenosis   6. Hypertension.  7. Hyperlipidemia:  a. December 2012 - Total cholesterol 133, triglycerides 71, HDL 28, .  8. Gastroesophageal reflux disease.  9. Anxiety.  10. Restless legs syndrome.  11. Peripheral neuropathy.  12. Lacunar stroke on 09/19/2013.  13. Remote syncope with  normal Holter monitor, winter 2014.    ASSESSMENT:   Diagnosis Plan   1. Paroxysmal atrial fibrillation     2. Chronic coronary artery disease     3. Chronic diastolic heart failure     4. Vasovagal syncope     5. PVD (peripheral vascular disease)     6. Dyspnea on exertion  Stress Test With Myocardial Perfusion One Day       PLAN:  Peripheral vascular disease: Occluded left common and external iliacs with nonhealing foot  "ulcer.  Continue current medical therapy, being evaluated for surgical revascularization.    Paroxysmal atrial fibrillation: Continue sotalol, continue antiplatelet therapy for stroke prophylaxis.  History of subdural hematoma on anticoagulation.    Hypertension: Currently well-controlled.    Chronic cor pulmonale: Currently euvolemic and compensated.    Preoperative cardiac evaluation: Limited functional capacity.  High risk for underlying multivessel severe coronary disease.  Recommending Lexiscan myocardial perfusion imaging prior to surgical lower extremity arterial revascularization.    Subjective  77-year-old gentleman with symptomatically peripheral vascular disease with nonhealing bilateral heel ulcers, left greater than right.  Duplex imaging consistent with occluded left external and common iliac.  Currently reports limited shortness of breath with walking from room to room his house.  On home oxygen.  Currently with smoking less than a quarter pack per day.  Denies chest discomfort.  Intermittent palpitations.  Blood pressures running less than 130/80 mmHg.  He is compliant with medical therapy.  Overall appears to be a very frail Jomed.  Was brought in in a wheelchair today.    PHYSICAL EXAMINATION:  Vitals:    02/12/18 0944   BP: 100/48   BP Location: Left arm   Patient Position: Sitting   Weight: 57.6 kg (127 lb)   Height: 165.1 cm (65\")     General Appearance:    Alert, cooperative, no distress, appears stated age   Head:    Normocephalic, without obvious abnormality, atraumatic   Eyes:    conjunctiva/corneas clear   Nose:   Nares normal, septum midline, mucosa normal, no drainage   Throat:   Lips, teeth and gums normal   Neck:   Supple, symmetrical, trachea midline, no carotid    bruit or JVD   Lungs:     Diminished throughout, respirations unlabored   Chest Wall:    No tenderness or deformity    Heart:    Regular rate and rhythm, S1 and S2 normal, 2/6 holosystolic murmur left lower sternal border, " rub   or gallop, normal carotid impulse bilaterally without bruit.   Abdomen:     Soft, non-tender   Extremities:   Pedal pulses are difficult to appreciate    Pulses:   2+ and symmetric all extremities           Diagnostic Data:  Procedures  No results found for: CHLPL, TRIG, HDL, LDLDIRECT  Lab Results   Component Value Date    GLUCOSE 84 12/08/2017    BUN 12 12/08/2017    CREATININE 1.20 12/08/2017     12/08/2017    K 5.9 (H) 12/08/2017     12/08/2017    CO2 26.0 12/08/2017     Lab Results   Component Value Date    HGBA1C 5.9 08/03/2015     Lab Results   Component Value Date    WBC 5.84 12/08/2017    HGB 14.1 12/08/2017    HCT 42.2 12/08/2017     (L) 12/08/2017       Allergies  Allergies   Allergen Reactions   • Altace [Ramipril]    • Cephalexin      unknown   • Penicillins Hives   • Procaine    • Rivaroxaban      Hx of head bleed   • Simvastatin        Current Medications    Current Outpatient Prescriptions:   •  acetaminophen (TYLENOL) 650 MG 8 hr tablet, Take 650 mg by mouth Every 4 (Four) Hours As Needed for mild pain (1-3)., Disp: , Rfl:   •  albuterol (PROVENTIL HFA;VENTOLIN HFA) 108 (90 BASE) MCG/ACT inhaler, Inhale 1-2 puffs Every 4 (Four) Hours As Needed for Wheezing., Disp: 1 inhaler, Rfl: 11  •  albuterol (PROVENTIL) (2.5 MG/3ML) 0.083% nebulizer solution, Take 1 dose by nebulization See Admin Instructions., Disp: , Rfl:   •  amitriptyline (ELAVIL) 10 MG tablet, Take 1 tablet by mouth At Night As Needed for sleep. FOR LEGS/SLEEP, Disp: , Rfl:   •  aspirin (ASPIRIN LOW DOSE) 81 MG tablet, Take 1 tablet by mouth daily., Disp: , Rfl:   •  bumetanide (BUMEX) 1 MG tablet, 1mg on Sun, Tues, Thur and 0.5mg the rest of the week, Disp: , Rfl:   •  cilostazol (PLETAL) 50 MG tablet, Take 50 mg by mouth Every 12 (Twelve) Hours., Disp: , Rfl:   •  dicyclomine (BENTYL) 20 MG tablet, TAKE ONE TABLET BY MOUTH EVERY 6 HOURS AS NEEDED ABDOMINAL  PAIN, Disp: 120 tablet, Rfl: 5  •  gabapentin  (NEURONTIN) 600 MG tablet, Take 600 mg by mouth 3 (Three) Times a Day., Disp: , Rfl:   •  HYDROcodone-acetaminophen (NORCO) 5-325 MG per tablet, Take 1 tablet by mouth Every 6 (Six) Hours As Needed for Moderate Pain (4-6)., Disp: 10 tablet, Rfl: 0  •  LORazepam (ATIVAN) 2 MG tablet, Take 1 mg by mouth Every 8 (Eight) Hours As Needed., Disp: , Rfl:   •  meclizine (ANTIVERT) 12.5 MG tablet, Take 12.5 mg by mouth Every 8 (Eight) Hours As Needed for dizziness., Disp: , Rfl:   •  melatonin 5 MG tablet tablet, Take 5 mg by mouth Every Night. 3 tablets at night, Disp: , Rfl:   •  omeprazole (priLOSEC) 40 MG capsule, Take 1 capsule by mouth 2 (Two) Times a Day., Disp: 60 capsule, Rfl: 11  •  Sotalol HCl, AF, 120 MG tablet, Take 120 mg by mouth Every 12 (Twelve) Hours., Disp: , Rfl:   •  spironolactone (ALDACTONE) 25 MG tablet, Take 1 tablet by mouth Daily., Disp: 30 tablet, Rfl: 11  •  sucralfate (CARAFATE) 1 G tablet, Take 1 g by mouth 2 (Two) Times a Day., Disp: , Rfl:           ROS  Review of Systems   Cardiovascular: Positive for dyspnea on exertion and irregular heartbeat.   Respiratory: Positive for cough and shortness of breath.    Neurological: Positive for dizziness.         SOCIAL HX  Social History     Social History   • Marital status:      Spouse name: N/A   • Number of children: N/A   • Years of education: N/A     Occupational History   • retired      Social History Main Topics   • Smoking status: Current Every Day Smoker     Types: Cigars   • Smokeless tobacco: Never Used      Comment: 1 pack for 2 weeks   • Alcohol use No   • Drug use: No   • Sexual activity: Defer     Other Topics Concern   • Not on file     Social History Narrative    Pt consumes 0-1 cups coffee, 2 20oz Mountain Dew sodas, and sometimes sweet tea. Pt lives in his own home with wife.        FAMILY HX  Family History   Problem Relation Age of Onset   • Cancer Mother    • Diabetes Mother    • Heart failure Mother    • Hypertension  Mother    • Stroke Mother    • Diabetes Father    • Heart failure Father    • Diabetes Sister    • Heart failure Sister    • Cancer Brother    • Diabetes Brother    • Heart disease Other    • Hypertension Other    • Other Other                   Huey Noble III, MD, [facility]

## 2018-02-16 ENCOUNTER — APPOINTMENT (OUTPATIENT)
Dept: CT IMAGING | Facility: HOSPITAL | Age: 78
End: 2018-02-16

## 2018-02-16 ENCOUNTER — HOSPITAL ENCOUNTER (EMERGENCY)
Facility: HOSPITAL | Age: 78
Discharge: HOME | End: 2018-02-16
Attending: EMERGENCY MEDICINE | Admitting: EMERGENCY MEDICINE

## 2018-02-16 ENCOUNTER — APPOINTMENT (OUTPATIENT)
Dept: GENERAL RADIOLOGY | Facility: HOSPITAL | Age: 78
End: 2018-02-16

## 2018-02-16 VITALS
WEIGHT: 137 LBS | HEIGHT: 65 IN | DIASTOLIC BLOOD PRESSURE: 66 MMHG | OXYGEN SATURATION: 99 % | HEART RATE: 81 BPM | TEMPERATURE: 97.8 F | SYSTOLIC BLOOD PRESSURE: 111 MMHG | RESPIRATION RATE: 20 BRPM | BODY MASS INDEX: 22.82 KG/M2

## 2018-02-16 DIAGNOSIS — S63.501A SPRAIN OF RIGHT WRIST, INITIAL ENCOUNTER: ICD-10-CM

## 2018-02-16 DIAGNOSIS — S01.01XA LACERATION OF SCALP, INITIAL ENCOUNTER: ICD-10-CM
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
## 2018-02-16 LAB — GLUCOSE BLDC GLUCOMTR-MCNC: 87 MG/DL (ref 70–130)

## 2018-02-16 PROCEDURE — 70450 CT HEAD/BRAIN W/O DYE: CPT

## 2018-02-16 PROCEDURE — 73110 X-RAY EXAM OF WRIST: CPT

## 2018-02-16 PROCEDURE — 82962 GLUCOSE BLOOD TEST: CPT

## 2018-02-16 PROCEDURE — 99284 EMERGENCY DEPT VISIT MOD MDM: CPT

## 2018-02-16 PROCEDURE — 71045 X-RAY EXAM CHEST 1 VIEW: CPT

## 2018-02-16 RX ORDER — LIDOCAINE HYDROCHLORIDE AND EPINEPHRINE 10; 10 MG/ML; UG/ML
INJECTION, SOLUTION INFILTRATION; PERINEURAL
Status: DISCONTINUED
Start: 2018-02-16 | End: 2018-02-16 | Stop reason: HOSPADM

## 2018-02-16 NOTE — ED PROVIDER NOTES
Subjective   HPI Comments: Alexander Adan is a 77 y.o. male who presents to the ED s/p fall. The patient reports that he fell and hit his head yesterday morning and has been complaining of a headache that has been worsening since the incident. He also notes of a laceration at the top of his head. He has no other acute complaints at this time. The patient is currently on blood thinners and utilizes 2L of home O2 at baseline.    Patient is a 77 y.o. male presenting with fall.   History provided by:  Patient  Fall   Mechanism of injury: fall    Injury location:  Head/neck  Head/neck injury location:  Head  Incident location:  Home  Time since incident:  2 hours  Arrived directly from scene: yes    Fall:     Fall occurred:  Unable to specify    Impact surface:  Hard floor    Point of impact:  Head    Entrapped after fall: no    Protective equipment: none    Suspicion of alcohol use: no    Suspicion of drug use: no    Tetanus status:  Up to date  Prior to arrival data:     Bystander interventions:  None    Patient ambulatory at scene: no      Blood loss:  Minimal    Responsiveness at scene:  Alert    Orientation at scene:  Person, place, situation and time    Loss of consciousness: no      Amnesic to event: no      Airway interventions:  None    Breathing interventions:  None    IV access status:  None    IO access:  None    Fluids administered:  None    Cardiac interventions:  None    Medications administered:  None    Immobilization:  None    Airway condition since incident:  Stable    Breathing condition since incident:  Stable    Circulation condition since incident:  Stable    Mental status condition since incident:  Stable    Disability condition since incident:  Stable  Current pain details:     Pain quality:  Unable to specify    Pain Severity:  Unable to specify    Pain timing:  Constant  Associated symptoms: headaches    Associated symptoms: no abdominal pain, no back pain, no chest pain, no nausea, no neck  pain and no vomiting        Review of Systems   Constitutional: Negative for chills, fatigue and fever.   HENT: Negative for congestion, ear pain, postnasal drip, sinus pressure and sore throat.    Eyes: Negative for pain, redness and visual disturbance.   Respiratory: Negative for cough, chest tightness and shortness of breath.    Cardiovascular: Negative for chest pain, palpitations and leg swelling.   Gastrointestinal: Negative for abdominal pain, anal bleeding, blood in stool, diarrhea, nausea and vomiting.   Endocrine: Negative for polydipsia and polyuria.   Genitourinary: Negative for difficulty urinating, dysuria, frequency and urgency.   Musculoskeletal: Negative for arthralgias, back pain and neck pain.   Skin: Negative for pallor and rash.   Allergic/Immunologic: Negative for environmental allergies and immunocompromised state.   Neurological: Positive for headaches. Negative for dizziness and weakness.   Hematological: Negative for adenopathy.   Psychiatric/Behavioral: Negative for confusion, self-injury and suicidal ideas. The patient is not nervous/anxious.    All other systems reviewed and are negative.      Past Medical History:   Diagnosis Date   • Anemia    • Anxiety    • Chronic kidney disease     STAGE II (mild)   • Hyperlipidemia    • Hypertension    • Stroke     LACUNAR. SEPT 2013   • Subdural hematoma     Status post hemorrhagic evacuation with sherrill holes 4/3/2015 per Dr. Gilson Brooke.       Allergies   Allergen Reactions   • Simvastatin Hives   • Altace [Ramipril] Other (See Comments)     bleeding   • Penicillins Hives   • Rivaroxaban Other (See Comments)     Hx of head bleed   • Cephalexin Rash     unknown   • Procaine GI Intolerance       Past Surgical History:   Procedure Laterality Date   • SHERRILL HOLE FOR SUBDURAL HEMATOMA     • CARDIAC PACEMAKER PLACEMENT      PERMANENT   • CAROTID STENT Left    • CHOLECYSTECTOMY     • CORONARY ARTERY BYPASS GRAFT     • PACEMAKER IMPLANTATION     •  THROMBOENDARTERECTOMY      CAROTID, LEFT       Family History   Problem Relation Age of Onset   • Cancer Mother    • Diabetes Mother    • Heart failure Mother    • Hypertension Mother    • Stroke Mother    • Diabetes Father    • Heart failure Father    • Diabetes Sister    • Heart failure Sister    • Cancer Brother    • Diabetes Brother    • Heart disease Other    • Hypertension Other    • Other Other             Social History     Social History   • Marital status:      Spouse name: N/A   • Number of children: N/A   • Years of education: N/A     Occupational History   • retired      Social History Main Topics   • Smoking status: Current Every Day Smoker     Types: Cigars   • Smokeless tobacco: Never Used      Comment: 1 pack for 2 weeks   • Alcohol use No   • Drug use: No   • Sexual activity: Defer     Other Topics Concern   • None     Social History Narrative    Pt consumes 0-1 cups coffee, 2 20oz Mountain Dew sodas, and sometimes sweet tea. Pt lives in his own home with wife.          Objective   Physical Exam   Constitutional: He is oriented to person, place, and time. He appears well-developed and well-nourished.  Non-toxic appearance. No distress.   HENT:   Head: Normocephalic. Head is with laceration.       Right Ear: External ear normal.   Left Ear: External ear normal.   Nose: Nose normal.   Small laceration over the right apex of the scalp.   Eyes: Conjunctivae, EOM and lids are normal. Pupils are equal, round, and reactive to light. No scleral icterus.   Neck: Normal range of motion. Neck supple. No tracheal deviation present.   Cardiovascular: Normal rate, regular rhythm, normal heart sounds and intact distal pulses.  Exam reveals no gallop, no friction rub and no decreased pulses.    No murmur heard.  Pulmonary/Chest: Effort normal and breath sounds normal. No respiratory distress. He has no decreased breath sounds. He has no wheezes. He has no rhonchi. He has no rales.   Abdominal: Soft.  Normal appearance and bowel sounds are normal. There is no tenderness. There is no rebound and no guarding.   Musculoskeletal: Normal range of motion. He exhibits no edema or deformity.   Lymphadenopathy:     He has no cervical adenopathy.   Neurological: He is alert and oriented to person, place, and time. He has normal strength. No cranial nerve deficit or sensory deficit.   Skin: Skin is warm and dry. No rash noted. He is not diaphoretic.   Psychiatric: He has a normal mood and affect. His speech is normal and behavior is normal. Judgment and thought content normal. Cognition and memory are normal.   Nursing note and vitals reviewed.      Laceration Repair  Date/Time: 2/16/2018 5:18 AM  Performed by: ALYCE VILLAGRAN  Authorized by: ALYCE VILLAGRAN     Consent:     Consent obtained:  Verbal    Consent given by:  Patient    Risks discussed:  Infection, need for additional repair, poor cosmetic result, pain and retained foreign body    Alternatives discussed:  No treatment and observation  Anesthesia (see MAR for exact dosages):     Anesthesia method:  Local infiltration    Local anesthetic:  Lidocaine 1% WITH epi  Laceration details:     Location:  Scalp    Scalp location:  R parietal    Length (cm):  2    Depth (mm):  4  Repair type:     Repair type:  Simple  Pre-procedure details:     Preparation:  Patient was prepped and draped in usual sterile fashion and imaging obtained to evaluate for foreign bodies  Exploration:     Hemostasis achieved with:  Direct pressure    Wound exploration: entire depth of wound probed and visualized      Wound extent: no foreign bodies/material noted and no underlying fracture noted      Contaminated: no    Treatment:     Area cleansed with:  Betadine    Amount of cleaning:  Standard    Irrigation solution:  Sterile saline    Irrigation volume:  200    Irrigation method:  Syringe    Visualized foreign bodies/material removed: no    Skin repair:     Repair method:  Staples    " Number of staples:  2  Approximation:     Approximation:  Close    Vermilion border: well-aligned    Post-procedure details:     Dressing:  Open (no dressing)    Patient tolerance of procedure:  Tolerated well, no immediate complications             ED Course  ED Course     Recent Results (from the past 24 hour(s))   POC Glucose Once    Collection Time: 02/16/18  2:33 AM   Result Value Ref Range    Glucose 87 70 - 130 mg/dL     Note: In addition to lab results from this visit, the labs listed above may include labs taken at another facility or during a different encounter within the last 24 hours. Please correlate lab times with ED admission and discharge times for further clarification of the services performed during this visit.    XR Wrist 3+ View Right   Final Result      1.  Increased sclerosis of the lunate, possibly avascular necrosis secondary to    trauma.  Consider MRI for more definitive evaluation.      2.  Incidental/non-acute findings are described above.         THIS DOCUMENT HAS BEEN ELECTRONICALLY SIGNED BY AMY FIGUERAO MD      CT Head Without Contrast   Final Result      1. No acute findings.      2. Non-acute findings are described above.      THIS DOCUMENT HAS BEEN ELECTRONICALLY SIGNED BY AMY FIGUEROA MD      XR Chest 1 View   Final Result      1. No acute findings.      2. Non-acute findings are described above.      THIS DOCUMENT HAS BEEN ELECTRONICALLY SIGNED BY AMY FIGUEROA MD        Vitals:    02/16/18 0231   BP: 111/66   BP Location: Right arm   Patient Position: Sitting   Pulse: 79   Resp: 20   Temp: 97.8 °F (36.6 °C)   SpO2: (!) 87%   Weight: 62.1 kg (137 lb)   Height: 165.1 cm (65\")     Medications   lidocaine-EPINEPHrine (XYLOCAINE W/EPI) 1 %-1:[id number] injection  - ADS Override Pull (not administered)     ECG/EMG Results (last 24 hours)     ** No results found for the last 24 hours. **                        MDM  Number of Diagnoses or Management Options  Fall, initial encounter: new " and requires workup  Laceration of scalp, initial encounter: new and requires workup  Sprain of right wrist, initial encounter: new and requires workup  Diagnosis management comments: CT scan of the head does not show any acute abnormalities.    X-ray of the right wrist reports possible lunate necrosis.  However the patient's area of pain is over the ulnar side of the wrist, and there is no associated swelling, and no pain over the lunate.  The sclerotic findings are felt to be chronic.    A 2 cm laceration to the scalp was repaired with staples.    Patient will be advised to have staples removed in approximately 10 days.       Amount and/or Complexity of Data Reviewed  Tests in the radiology section of CPT®: ordered and reviewed  Obtain history from someone other than the patient: yes  Review and summarize past medical records: yes  Independent visualization of images, tracings, or specimens: yes    Patient Progress  Patient progress: stable      Final diagnoses:   Fall, initial encounter   Laceration of scalp, initial encounter   Sprain of right wrist, initial encounter       Documentation assistance provided by juan m Yeh.  Information recorded by the scribe was done at my direction and has been verified and validated by me.     Gurpreet Yeh  02/16/18 0242       Gurpreet Yeh  02/16/18 0326       Joshua Bowden MD  02/16/18 [id number]

## 2018-02-27 ENCOUNTER — HOSPITAL ENCOUNTER (OUTPATIENT)
Dept: CARDIOLOGY | Facility: HOSPITAL | Age: 78
End: 2018-02-27
Attending: INTERNAL MEDICINE

## 2018-02-27 ENCOUNTER — APPOINTMENT (OUTPATIENT)
Dept: CARDIOLOGY | Facility: HOSPITAL | Age: 78
End: 2018-02-27
Attending: INTERNAL MEDICINE

## 2018-03-02 ENCOUNTER — APPOINTMENT (OUTPATIENT)
Dept: CT IMAGING | Facility: HOSPITAL | Age: 78
End: 2018-03-02

## 2018-03-02 ENCOUNTER — APPOINTMENT (OUTPATIENT)
Dept: GENERAL RADIOLOGY | Facility: HOSPITAL | Age: 78
End: 2018-03-02

## 2018-03-02 ENCOUNTER — HOSPITAL ENCOUNTER (INPATIENT)
Facility: HOSPITAL | Age: 78
LOS: 11 days | Discharge: NURSING, REHAB OR LONG-TERM CARE | End: 2018-03-13
Attending: EMERGENCY MEDICINE | Admitting: FAMILY MEDICINE

## 2018-03-02 DIAGNOSIS — Z74.09 IMPAIRED MOBILITY AND ADLS: ICD-10-CM

## 2018-03-02 DIAGNOSIS — R53.1 GENERALIZED WEAKNESS: ICD-10-CM

## 2018-03-02 DIAGNOSIS — R13.12 OROPHARYNGEAL DYSPHAGIA: Primary | ICD-10-CM

## 2018-03-02 DIAGNOSIS — Z74.09 IMPAIRED FUNCTIONAL MOBILITY, BALANCE, GAIT, AND ENDURANCE: ICD-10-CM

## 2018-03-02 DIAGNOSIS — K11.21 PAROTITIS, ACUTE: ICD-10-CM

## 2018-03-02 DIAGNOSIS — J01.00 SUBACUTE MAXILLARY SINUSITIS: ICD-10-CM

## 2018-03-02 DIAGNOSIS — Z78.9 IMPAIRED MOBILITY AND ADLS: ICD-10-CM

## 2018-03-02 PROBLEM — R65.10 SIRS (SYSTEMIC INFLAMMATORY RESPONSE SYNDROME) (HCC): Status: ACTIVE | Noted: 2018-03-02

## 2018-03-02 LAB
ALBUMIN SERPL-MCNC: 3.6 G/DL (ref 3.2–4.8)
ALBUMIN/GLOB SERPL: 0.9 G/DL (ref 1.5–2.5)
ALP SERPL-CCNC: 143 U/L (ref 25–100)
ALT SERPL W P-5'-P-CCNC: 11 U/L (ref 7–40)
ANION GAP SERPL CALCULATED.3IONS-SCNC: 9 MMOL/L (ref 3–11)
AST SERPL-CCNC: 17 U/L (ref 0–33)
BACTERIA UR QL AUTO: NORMAL /HPF
BASOPHILS # BLD AUTO: 0.03 10*3/MM3 (ref 0–0.2)
BASOPHILS NFR BLD AUTO: 0.1 % (ref 0–1)
BILIRUB SERPL-MCNC: 0.9 MG/DL (ref 0.3–1.2)
BILIRUB UR QL STRIP: NEGATIVE
BUN BLD-MCNC: 30 MG/DL (ref 9–23)
BUN/CREAT SERPL: 25 (ref 7–25)
CALCIUM SPEC-SCNC: 10.2 MG/DL (ref 8.7–10.4)
CHLORIDE SERPL-SCNC: 113 MMOL/L (ref 99–109)
CLARITY UR: CLEAR
CO2 SERPL-SCNC: 22 MMOL/L (ref 20–31)
COLOR UR: YELLOW
CREAT BLD-MCNC: 1.2 MG/DL (ref 0.6–1.3)
D-LACTATE SERPL-SCNC: 1.8 MMOL/L (ref 0.5–2)
D-LACTATE SERPL-SCNC: 1.8 MMOL/L (ref 0.5–2)
D-LACTATE SERPL-SCNC: 2.8 MMOL/L (ref 0.5–2)
DEPRECATED RDW RBC AUTO: 58.9 FL (ref 37–54)
EOSINOPHIL # BLD AUTO: 0.01 10*3/MM3 (ref 0–0.3)
EOSINOPHIL NFR BLD AUTO: 0 % (ref 0–3)
ERYTHROCYTE [DISTWIDTH] IN BLOOD BY AUTOMATED COUNT: 17.1 % (ref 11.3–14.5)
GFR SERPL CREATININE-BSD FRML MDRD: 59 ML/MIN/1.73
GLOBULIN UR ELPH-MCNC: 4 GM/DL
GLUCOSE BLD-MCNC: 126 MG/DL (ref 70–100)
GLUCOSE UR STRIP-MCNC: NEGATIVE MG/DL
HCT VFR BLD AUTO: 42.5 % (ref 38.9–50.9)
HGB BLD-MCNC: 13.9 G/DL (ref 13.1–17.5)
HGB UR QL STRIP.AUTO: NEGATIVE
HOLD SPECIMEN: NORMAL
HYALINE CASTS UR QL AUTO: NORMAL /LPF
IMM GRANULOCYTES # BLD: 0.42 10*3/MM3 (ref 0–0.03)
IMM GRANULOCYTES NFR BLD: 1.5 % (ref 0–0.6)
KETONES UR QL STRIP: NEGATIVE
LEUKOCYTE ESTERASE UR QL STRIP.AUTO: NEGATIVE
LIPASE SERPL-CCNC: 47 U/L (ref 6–51)
LYMPHOCYTES # BLD AUTO: 1.96 10*3/MM3 (ref 0.6–4.8)
LYMPHOCYTES NFR BLD AUTO: 7 % (ref 24–44)
MCH RBC QN AUTO: 31 PG (ref 27–31)
MCHC RBC AUTO-ENTMCNC: 32.7 G/DL (ref 32–36)
MCV RBC AUTO: 94.9 FL (ref 80–99)
MONOCYTES # BLD AUTO: 1.33 10*3/MM3 (ref 0–1)
MONOCYTES NFR BLD AUTO: 4.7 % (ref 0–12)
NEUTROPHILS # BLD AUTO: 24.31 10*3/MM3 (ref 1.5–8.3)
NEUTROPHILS NFR BLD AUTO: 86.7 % (ref 41–71)
NITRITE UR QL STRIP: NEGATIVE
PH UR STRIP.AUTO: 5.5 [PH] (ref 5–8)
PLATELET # BLD AUTO: 272 10*3/MM3 (ref 150–450)
PMV BLD AUTO: 10.8 FL (ref 6–12)
POTASSIUM BLD-SCNC: 3.3 MMOL/L (ref 3.5–5.5)
PROT SERPL-MCNC: 7.6 G/DL (ref 5.7–8.2)
PROT UR QL STRIP: ABNORMAL
RBC # BLD AUTO: 4.48 10*6/MM3 (ref 4.2–5.76)
RBC # UR: NORMAL /HPF
REF LAB TEST METHOD: NORMAL
SODIUM BLD-SCNC: 144 MMOL/L (ref 132–146)
SP GR UR STRIP: 1.03 (ref 1–1.03)
SQUAMOUS #/AREA URNS HPF: NORMAL /HPF
TRANS CELLS #/AREA URNS HPF: NORMAL /HPF
TROPONIN I SERPL-MCNC: 0.08 NG/ML (ref 0–0.07)
UROBILINOGEN UR QL STRIP: ABNORMAL
WBC NRBC COR # BLD: 28.06 10*3/MM3 (ref 3.5–10.8)
WBC UR QL AUTO: NORMAL /HPF
WHOLE BLOOD HOLD SPECIMEN: NORMAL
WHOLE BLOOD HOLD SPECIMEN: NORMAL

## 2018-03-02 PROCEDURE — 25010000002 VANCOMYCIN: Performed by: EMERGENCY MEDICINE

## 2018-03-02 PROCEDURE — 80053 COMPREHEN METABOLIC PANEL: CPT | Performed by: EMERGENCY MEDICINE

## 2018-03-02 PROCEDURE — 84484 ASSAY OF TROPONIN QUANT: CPT

## 2018-03-02 PROCEDURE — 83690 ASSAY OF LIPASE: CPT | Performed by: EMERGENCY MEDICINE

## 2018-03-02 PROCEDURE — 74176 CT ABD & PELVIS W/O CONTRAST: CPT

## 2018-03-02 PROCEDURE — 25010000002 ONDANSETRON PER 1 MG: Performed by: EMERGENCY MEDICINE

## 2018-03-02 PROCEDURE — 83605 ASSAY OF LACTIC ACID: CPT | Performed by: INTERNAL MEDICINE

## 2018-03-02 PROCEDURE — 83605 ASSAY OF LACTIC ACID: CPT | Performed by: EMERGENCY MEDICINE

## 2018-03-02 PROCEDURE — 99223 1ST HOSP IP/OBS HIGH 75: CPT | Performed by: INTERNAL MEDICINE

## 2018-03-02 PROCEDURE — 70491 CT SOFT TISSUE NECK W/DYE: CPT

## 2018-03-02 PROCEDURE — 81001 URINALYSIS AUTO W/SCOPE: CPT | Performed by: EMERGENCY MEDICINE

## 2018-03-02 PROCEDURE — 70450 CT HEAD/BRAIN W/O DYE: CPT

## 2018-03-02 PROCEDURE — 25010000002 LORAZEPAM PER 2 MG: Performed by: EMERGENCY MEDICINE

## 2018-03-02 PROCEDURE — 0 IOPAMIDOL 61 % SOLUTION: Performed by: INTERNAL MEDICINE

## 2018-03-02 PROCEDURE — 99284 EMERGENCY DEPT VISIT MOD MDM: CPT

## 2018-03-02 PROCEDURE — 87040 BLOOD CULTURE FOR BACTERIA: CPT | Performed by: INTERNAL MEDICINE

## 2018-03-02 PROCEDURE — 85025 COMPLETE CBC W/AUTO DIFF WBC: CPT | Performed by: EMERGENCY MEDICINE

## 2018-03-02 PROCEDURE — 71045 X-RAY EXAM CHEST 1 VIEW: CPT

## 2018-03-02 PROCEDURE — 25010000002 HEPARIN (PORCINE) PER 1000 UNITS: Performed by: INTERNAL MEDICINE

## 2018-03-02 PROCEDURE — 87040 BLOOD CULTURE FOR BACTERIA: CPT | Performed by: EMERGENCY MEDICINE

## 2018-03-02 PROCEDURE — 93005 ELECTROCARDIOGRAM TRACING: CPT | Performed by: EMERGENCY MEDICINE

## 2018-03-02 RX ORDER — PANTOPRAZOLE SODIUM 40 MG/1
40 TABLET, DELAYED RELEASE ORAL
Status: DISCONTINUED | OUTPATIENT
Start: 2018-03-02 | End: 2018-03-13 | Stop reason: HOSPADM

## 2018-03-02 RX ORDER — AMITRIPTYLINE HYDROCHLORIDE 10 MG/1
10 TABLET, FILM COATED ORAL NIGHTLY PRN
Status: DISCONTINUED | OUTPATIENT
Start: 2018-03-02 | End: 2018-03-13

## 2018-03-02 RX ORDER — SODIUM CHLORIDE 0.9 % (FLUSH) 0.9 %
10 SYRINGE (ML) INJECTION AS NEEDED
Status: DISCONTINUED | OUTPATIENT
Start: 2018-03-02 | End: 2018-03-13 | Stop reason: HOSPADM

## 2018-03-02 RX ORDER — LORAZEPAM 1 MG/1
1 TABLET ORAL EVERY 8 HOURS PRN
Status: DISCONTINUED | OUTPATIENT
Start: 2018-03-02 | End: 2018-03-10

## 2018-03-02 RX ORDER — SOTALOL HYDROCHLORIDE 80 MG/1
120 TABLET ORAL EVERY 12 HOURS SCHEDULED
Status: DISCONTINUED | OUTPATIENT
Start: 2018-03-02 | End: 2018-03-03 | Stop reason: ALTCHOICE

## 2018-03-02 RX ORDER — BUMETANIDE 0.5 MG/1
0.5 TABLET ORAL
COMMUNITY
End: 2018-03-13 | Stop reason: HOSPADM

## 2018-03-02 RX ORDER — VANCOMYCIN HYDROCHLORIDE 1 G/200ML
15 INJECTION, SOLUTION INTRAVENOUS EVERY 24 HOURS
Status: DISCONTINUED | OUTPATIENT
Start: 2018-03-03 | End: 2018-03-04

## 2018-03-02 RX ORDER — SODIUM CHLORIDE 9 MG/ML
100 INJECTION, SOLUTION INTRAVENOUS CONTINUOUS
Status: DISCONTINUED | OUTPATIENT
Start: 2018-03-02 | End: 2018-03-03

## 2018-03-02 RX ORDER — METOPROLOL TARTRATE 5 MG/5ML
5 INJECTION INTRAVENOUS
Status: DISCONTINUED | OUTPATIENT
Start: 2018-03-02 | End: 2018-03-13 | Stop reason: HOSPADM

## 2018-03-02 RX ORDER — LORAZEPAM 2 MG/ML
0.5 INJECTION INTRAMUSCULAR ONCE
Status: COMPLETED | OUTPATIENT
Start: 2018-03-02 | End: 2018-03-02

## 2018-03-02 RX ORDER — SODIUM CHLORIDE 0.9 % (FLUSH) 0.9 %
1-10 SYRINGE (ML) INJECTION AS NEEDED
Status: DISCONTINUED | OUTPATIENT
Start: 2018-03-02 | End: 2018-03-13 | Stop reason: HOSPADM

## 2018-03-02 RX ORDER — ASPIRIN 81 MG/1
81 TABLET, CHEWABLE ORAL DAILY
Status: DISCONTINUED | OUTPATIENT
Start: 2018-03-02 | End: 2018-03-13 | Stop reason: HOSPADM

## 2018-03-02 RX ORDER — ONDANSETRON 2 MG/ML
4 INJECTION INTRAMUSCULAR; INTRAVENOUS ONCE
Status: COMPLETED | OUTPATIENT
Start: 2018-03-02 | End: 2018-03-02

## 2018-03-02 RX ORDER — SUCRALFATE 1 G/1
1 TABLET ORAL
Status: DISCONTINUED | OUTPATIENT
Start: 2018-03-02 | End: 2018-03-13 | Stop reason: HOSPADM

## 2018-03-02 RX ORDER — ACETAMINOPHEN 325 MG/1
650 TABLET ORAL EVERY 4 HOURS PRN
Status: DISCONTINUED | OUTPATIENT
Start: 2018-03-02 | End: 2018-03-13 | Stop reason: HOSPADM

## 2018-03-02 RX ORDER — BISACODYL 5 MG/1
5 TABLET, DELAYED RELEASE ORAL DAILY PRN
Status: DISCONTINUED | OUTPATIENT
Start: 2018-03-02 | End: 2018-03-13 | Stop reason: HOSPADM

## 2018-03-02 RX ORDER — HEPARIN SODIUM 5000 [USP'U]/ML
5000 INJECTION, SOLUTION INTRAVENOUS; SUBCUTANEOUS EVERY 8 HOURS SCHEDULED
Status: DISCONTINUED | OUTPATIENT
Start: 2018-03-02 | End: 2018-03-11

## 2018-03-02 RX ORDER — HYDROCODONE BITARTRATE AND ACETAMINOPHEN 5; 325 MG/1; MG/1
1 TABLET ORAL EVERY 6 HOURS PRN
Status: DISCONTINUED | OUTPATIENT
Start: 2018-03-02 | End: 2018-03-09

## 2018-03-02 RX ORDER — GABAPENTIN 300 MG/1
600 CAPSULE ORAL EVERY 8 HOURS SCHEDULED
Status: DISCONTINUED | OUTPATIENT
Start: 2018-03-02 | End: 2018-03-04 | Stop reason: SDUPTHER

## 2018-03-02 RX ORDER — CILOSTAZOL 50 MG/1
50 TABLET ORAL EVERY 12 HOURS SCHEDULED
Status: DISCONTINUED | OUTPATIENT
Start: 2018-03-02 | End: 2018-03-11

## 2018-03-02 RX ORDER — NICOTINE 21 MG/24HR
1 PATCH, TRANSDERMAL 24 HOURS TRANSDERMAL
Status: DISCONTINUED | OUTPATIENT
Start: 2018-03-02 | End: 2018-03-11

## 2018-03-02 RX ORDER — ALBUTEROL SULFATE 2.5 MG/3ML
2.5 SOLUTION RESPIRATORY (INHALATION) EVERY 6 HOURS PRN
Status: DISCONTINUED | OUTPATIENT
Start: 2018-03-02 | End: 2018-03-13 | Stop reason: HOSPADM

## 2018-03-02 RX ADMIN — SODIUM CHLORIDE 1000 ML: 9 INJECTION, SOLUTION INTRAVENOUS at 14:24

## 2018-03-02 RX ADMIN — WATER 2 G: 1 INJECTION INTRAMUSCULAR; INTRAVENOUS; SUBCUTANEOUS at 13:20

## 2018-03-02 RX ADMIN — METOPROLOL TARTRATE 5 MG: 5 INJECTION, SOLUTION INTRAVENOUS at 11:33

## 2018-03-02 RX ADMIN — WATER 2 G: 1 INJECTION INTRAMUSCULAR; INTRAVENOUS; SUBCUTANEOUS at 22:46

## 2018-03-02 RX ADMIN — IOPAMIDOL 75 ML: 612 INJECTION, SOLUTION INTRAVENOUS at 16:15

## 2018-03-02 RX ADMIN — ONDANSETRON 4 MG: 2 INJECTION INTRAMUSCULAR; INTRAVENOUS at 11:29

## 2018-03-02 RX ADMIN — HEPARIN SODIUM 5000 UNITS: 5000 INJECTION, SOLUTION INTRAVENOUS; SUBCUTANEOUS at 21:55

## 2018-03-02 RX ADMIN — LORAZEPAM 0.5 MG: 2 INJECTION INTRAMUSCULAR; INTRAVENOUS at 16:03

## 2018-03-02 RX ADMIN — LORAZEPAM 0.5 MG: 2 INJECTION INTRAMUSCULAR; INTRAVENOUS at 14:24

## 2018-03-02 RX ADMIN — VANCOMYCIN HYDROCHLORIDE 1250 MG: 10 INJECTION, POWDER, LYOPHILIZED, FOR SOLUTION INTRAVENOUS at 13:27

## 2018-03-02 RX ADMIN — SODIUM CHLORIDE 100 ML/HR: 9 INJECTION, SOLUTION INTRAVENOUS at 18:11

## 2018-03-02 RX ADMIN — SODIUM CHLORIDE 1000 ML: 9 INJECTION, SOLUTION INTRAVENOUS at 11:35

## 2018-03-02 NOTE — PLAN OF CARE
Problem: Arrhythmia/Dysrhythmia (Symptomatic) (Adult)  Goal: Signs and Symptoms of Listed Potential Problems Will be Absent or Manageable (Arrhythmia/Dysrhythmia)  Outcome: Ongoing (interventions implemented as appropriate)      Problem: Nutrition, Imbalanced: Inadequate Oral Intake (Adult)  Goal: Identify Related Risk Factors and Signs and Symptoms  Outcome: Ongoing (interventions implemented as appropriate)    Goal: Improved Oral Intake  Outcome: Ongoing (interventions implemented as appropriate)    Goal: Prevent Further Weight Loss  Outcome: Ongoing (interventions implemented as appropriate)      Problem: Infection, Risk/Actual (Adult)  Goal: Identify Related Risk Factors and Signs and Symptoms  Outcome: Ongoing (interventions implemented as appropriate)    Goal: Infection Prevention/Resolution  Outcome: Ongoing (interventions implemented as appropriate)

## 2018-03-02 NOTE — ED PROVIDER NOTES
"Subjective   HPI Comments: Alexander Adan is a 77 y.o.male who presents to the emergency department with multiple complaints. The patient's last appointment with his cardiologist, Dr. Noble, was over a week ago. He was acting accordingly at the time and was able to care for himself. The patient was supposed to have a follow up appointment with Dr. Noble this past week but apparently did not go because he was feeling poorly. Since then his wife reports that he has gone downhill. According to his wife, the patient has not been eating or drinking and has been refusing to take his medications for the last week or so. She tells us that she has been able to get him to take some of his regular medications sometimes, however, the lack of cooperation is unusual for him. The patient has lost a significant amount of weight and had to be put on a diaper today because he has been frequently incontinent of urine. He has also been disoriented and confused at times with increasing agitation. The patient does tells us that he has been feeling short of breath with a cough producing white colored sputum. He has also been complaining of pain in the right ear, a mild headache, and general \"pain all over\" per his wife. He denies fevers, chest pain, abdominal pain, nausea, or vomiting. His bowel movements have been normal. There are no other acute complaints at this time.          Patient is a 77 y.o. male presenting with general illness.   History provided by:  Patient and relative  Illness   Severity:  Unable to specify  Onset quality:  Unable to specify  Duration:  1 week  Timing:  Constant  Progression:  Worsening  Chronicity:  New  Context:  The patient presents with multiple complaints that have progressed since onset about a week ago.  Relieved by:  None tried  Worsened by:  Nothing  Ineffective treatments:  None tried  Associated symptoms: cough, ear pain, headaches, myalgias and shortness of breath    Associated symptoms: no " abdominal pain, no chest pain, no congestion, no diarrhea, no fever, no nausea and no vomiting        Review of Systems   Constitutional: Positive for appetite change and unexpected weight change. Negative for fever.   HENT: Positive for ear pain. Negative for congestion.    Respiratory: Positive for cough and shortness of breath.    Cardiovascular: Negative for chest pain.   Gastrointestinal: Negative for abdominal pain, constipation, diarrhea, nausea and vomiting.   Genitourinary: Positive for enuresis.   Musculoskeletal: Positive for myalgias.   Neurological: Positive for headaches.   Psychiatric/Behavioral: Positive for agitation and confusion.   All other systems reviewed and are negative.      Past Medical History:   Diagnosis Date   • Anemia    • Anxiety    • Chronic kidney disease     STAGE II (mild)   • Hyperlipidemia    • Hypertension    • Stroke     LACUNAR. SEPT 2013   • Subdural hematoma     Status post hemorrhagic evacuation with sherrill holes 4/3/2015 per Dr. Gilson rBooke.       Allergies   Allergen Reactions   • Simvastatin Hives   • Altace [Ramipril] Other (See Comments)     bleeding   • Penicillins Hives   • Rivaroxaban Other (See Comments)     Hx of head bleed   • Cephalexin Rash     unknown   • Procaine GI Intolerance   • Ropinirole Anxiety       Past Surgical History:   Procedure Laterality Date   • SHERRILL HOLE FOR SUBDURAL HEMATOMA     • CARDIAC PACEMAKER PLACEMENT      PERMANENT   • CAROTID STENT Left    • CHOLECYSTECTOMY     • CORONARY ARTERY BYPASS GRAFT     • PACEMAKER IMPLANTATION     • THROMBOENDARTERECTOMY      CAROTID, LEFT       Family History   Problem Relation Age of Onset   • Cancer Mother    • Diabetes Mother    • Heart failure Mother    • Hypertension Mother    • Stroke Mother    • Diabetes Father    • Heart failure Father    • Diabetes Sister    • Heart failure Sister    • Cancer Brother    • Diabetes Brother    • Heart disease Other    • Hypertension Other    • Other Other              Social History     Social History   • Marital status:      Occupational History   • retired      Social History Main Topics   • Smoking status: Current Every Day Smoker     Types: Cigars   • Smokeless tobacco: Never Used      Comment: 1 pack for 2 weeks   • Alcohol use No   • Drug use: No   • Sexual activity: Defer     Social History Narrative    Pt consumes 0-1 cups coffee, 2 20oz Mountain Dew sodas, and sometimes sweet tea. Pt lives in his own home with wife.          Objective   Physical Exam   Constitutional: He appears well-developed and well-nourished. No distress.   HENT:   Head: Normocephalic and atraumatic.   Right Ear: Tympanic membrane normal.   Left Ear: Tympanic membrane normal.   Nose: Nose normal.   Airway patent.   Eyes: Conjunctivae are normal. No scleral icterus.   Neck: Normal range of motion and phonation normal. Neck supple.   Cardiovascular: Normal heart sounds.  An irregularly irregular rhythm present. Tachycardia present.    Pulmonary/Chest: Effort normal and breath sounds normal. No respiratory distress. He has no wheezes. He has no rhonchi. He has no rales.   Abdominal: Bowel sounds are normal. He exhibits no distension and no mass. There is no tenderness. There is no rebound and no guarding.   He is holding his abdomen tensely.    Neurological: He is alert.   Skin: Skin is warm and dry.   His hands are cool and dusky.   Nursing note and vitals reviewed.      Procedures         ED Course  ED Course   Comment By Time   Dr. Woodruff is at the bedside reevaluating the patient. Marlene Snow  [id number]   Dr. Woodruff is at the bedside after a nurse reported swelling over the right side of his face. His right parotid gland is swollen which is likely causing his pain. Discussed findings with the patient and his wife. Will start the patient on antibiotics and get a CT scan of his face. Galileo Woodruff MD  [id number]   Dr. Woodruff spoke with Dr. Sullivan who will admit the patient.  Marlene Snow 03/02 1308   Concern that he is too restless for soft tissue neck CT.  He has had many hypotensive BPs.  Additional liter of NS ordered plus small dose of Ativan. Galileo Woodruff MD 03/02 1410     Recent Results (from the past 24 hour(s))   Comprehensive Metabolic Panel    Collection Time: 03/02/18 11:00 AM   Result Value Ref Range    Glucose 126 (H) 70 - 100 mg/dL    BUN 30 (H) 9 - 23 mg/dL    Creatinine 1.20 0.60 - 1.30 mg/dL    Sodium 144 132 - 146 mmol/L    Potassium 3.3 (L) 3.5 - 5.5 mmol/L    Chloride 113 (H) 99 - 109 mmol/L    CO2 22.0 20.0 - 31.0 mmol/L    Calcium 10.2 8.7 - 10.4 mg/dL    Total Protein 7.6 5.7 - 8.2 g/dL    Albumin 3.60 3.20 - 4.80 g/dL    ALT (SGPT) 11 7 - 40 U/L    AST (SGOT) 17 0 - 33 U/L    Alkaline Phosphatase 143 (H) 25 - 100 U/L    Total Bilirubin 0.9 0.3 - 1.2 mg/dL    eGFR Non African Amer 59 (L) >60 mL/min/1.73    Globulin 4.0 gm/dL    A/G Ratio 0.9 (L) 1.5 - 2.5 g/dL    BUN/Creatinine Ratio 25.0 7.0 - 25.0    Anion Gap 9.0 3.0 - 11.0 mmol/L   Lipase    Collection Time: 03/02/18 11:00 AM   Result Value Ref Range    Lipase 47 6 - 51 U/L   Lactic Acid, Plasma    Collection Time: 03/02/18 11:00 AM   Result Value Ref Range    Lactate 2.8 (C) 0.5 - 2.0 mmol/L   Light Blue Top    Collection Time: 03/02/18 11:00 AM   Result Value Ref Range    Extra Tube hold for add-on    Green Top (Gel)    Collection Time: 03/02/18 11:00 AM   Result Value Ref Range    Extra Tube Hold for add-ons.    Lavender Top    Collection Time: 03/02/18 11:00 AM   Result Value Ref Range    Extra Tube hold for add-on    Gold Top - SST    Collection Time: 03/02/18 11:00 AM   Result Value Ref Range    Extra Tube Hold for add-ons.    CBC Auto Differential    Collection Time: 03/02/18 11:00 AM   Result Value Ref Range    WBC 28.06 (H) 3.50 - 10.80 10*3/mm3    RBC 4.48 4.20 - 5.76 10*6/mm3    Hemoglobin 13.9 13.1 - 17.5 g/dL    Hematocrit 42.5 38.9 - 50.9 %    MCV 94.9 80.0 - 99.0 fL    MCH 31.0 27.0 -  31.0 pg    MCHC 32.7 32.0 - 36.0 g/dL    RDW 17.1 (H) 11.3 - 14.5 %    RDW-SD 58.9 (H) 37.0 - 54.0 fl    MPV 10.8 6.0 - 12.0 fL    Platelets 272 150 - 450 10*3/mm3    Neutrophil % 86.7 (H) 41.0 - 71.0 %    Lymphocyte % 7.0 (L) 24.0 - 44.0 %    Monocyte % 4.7 0.0 - 12.0 %    Eosinophil % 0.0 0.0 - 3.0 %    Basophil % 0.1 0.0 - 1.0 %    Immature Grans % 1.5 (H) 0.0 - 0.6 %    Neutrophils, Absolute 24.31 (H) 1.50 - 8.30 10*3/mm3    Lymphocytes, Absolute 1.96 0.60 - 4.80 10*3/mm3    Monocytes, Absolute 1.33 (H) 0.00 - 1.00 10*3/mm3    Eosinophils, Absolute 0.01 0.00 - 0.30 10*3/mm3    Basophils, Absolute 0.03 0.00 - 0.20 10*3/mm3    Immature Grans, Absolute 0.42 (H) 0.00 - 0.03 10*3/mm3   Lactic Acid, Reflex Timer (This will reflex a repeat order 3-3:15 hours after ordered.)    Collection Time: 03/02/18 11:00 AM   Result Value Ref Range    Extra Tube Hold for add-ons.    POC Troponin, Rapid    Collection Time: 03/02/18 11:09 AM   Result Value Ref Range    Troponin I 0.08 (H) 0.00 - 0.07 ng/mL   Urinalysis With / Culture If Indicated - Urine, Catheter    Collection Time: 03/02/18 12:24 PM   Result Value Ref Range    Color, UA Yellow Yellow, Straw    Appearance, UA Clear Clear    pH, UA 5.5 5.0 - 8.0    Specific Gravity, UA 1.026 1.001 - 1.030    Glucose, UA Negative Negative    Ketones, UA Negative Negative    Bilirubin, UA Negative Negative    Blood, UA Negative Negative    Protein,  mg/dL (2+) (A) Negative    Leuk Esterase, UA Negative Negative    Nitrite, UA Negative Negative    Urobilinogen, UA 1.0 E.U./dL 0.2 - 1.0 E.U./dL   Urinalysis, Microscopic Only - Urine, Clean Catch    Collection Time: 03/02/18 12:24 PM   Result Value Ref Range    RBC, UA 0-2 None Seen, 0-2 /HPF    WBC, UA 0-2 None Seen, 0-2 /HPF    Bacteria, UA None Seen None Seen, Trace /HPF    Squamous Epithelial Cells, UA 0-2 None Seen, 0-2 /HPF    Transitional Epithelial Cells, UA 0-2 0 - 2 /HPF    Hyaline Casts, UA 7-12 0 - 6 /LPF    Methodology  Manual Light Microscopy    Blood Culture - Blood,    Collection Time: 03/02/18 12:31 PM   Result Value Ref Range    Blood Culture No growth at less than 24 hours    Blood Culture - Blood,    Collection Time: 03/02/18 12:35 PM   Result Value Ref Range    Blood Culture No growth at less than 24 hours     Blood Culture Aerobic bottle only    Blood Culture - Blood,    Collection Time: 03/02/18  1:05 PM   Result Value Ref Range    Blood Culture No growth at less than 24 hours    Lactic Acid, Reflex    Collection Time: 03/02/18  5:32 PM   Result Value Ref Range    Lactate 1.8 0.5 - 2.0 mmol/L   Lactic Acid, Plasma    Collection Time: 03/02/18  5:32 PM   Result Value Ref Range    Lactate 1.8 0.5 - 2.0 mmol/L     Note: In addition to lab results from this visit, the labs listed above may include labs taken at another facility or during a different encounter within the last 24 hours. Please correlate lab times with ED admission and discharge times for further clarification of the services performed during this visit.    CT Soft Tissue Neck With Contrast   Final Result   Soft tissue mass involvement of the superficial and deep   lobes of the right parotid gland. 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 No adjacent osseous erosion. No bulky cervical   adenopathy otherwise identified. Opacification of the right maxillary   sinus again noted consistent with sinusitis.       DICTATED:     03/02/2018   EDITED    :     03/02/2018        This report was finalized on 3/2/2018 4:44 PM by Dr. Maurice Alas.          XR Chest 1 View   Final Result   No acute cardiopulmonary process.       D:  03/02/2018   E:  03/02/2018       This report was finalized on 3/2/2018 12:58 PM by Dr. Maurice Alas.          CT Head Without Contrast   Final Result   No acute intracranial abnormality.        D:  03/02/2018   E:  03/02/2018       This report was finalized on 3/2/2018 12:58 PM by Dr. Maurice Alas.          CT Abdomen Pelvis Without Contrast   Final Result   1. Limited evaluation due to significant patient motion artifact   limiting the subdiaphragmatic soft tissues, however, the limits of the   study there is scattered tree-in-bud opacifications involving the   visualized right middle lobe and right lower lobe along with nodularity   consistent of acute infectious or inflammatory etiology without lobar   consolidation identified. No significant pleural effusion.   2. No acute intraabdominal abnormality within the limited evaluation due   to significant motion specifically no disproportionate dilatation of   bowel to suggest mechanical obstructive process or loculated   intraabdominal fluid collection.       D:  03/02/2018   E:  03/02/2018       This report was finalized on 3/2/2018 12:58 PM by Dr. Maurice Alas.            Vitals:    03/03/18 0035 03/03/18 0145 03/03/18 0316 03/03/18 0435   BP: 103/49   103/59   BP Location: Right arm   Right arm   Patient Position: Lying   Lying   Pulse: 113 96 92 90   Resp: 18   16   Temp: 98.5 °F (36.9 °C)   98.2 °F (36.8 °C)   TempSrc: Oral   Oral   SpO2:       Weight:       Height:         Medications   sodium chloride 0.9 % flush 10 mL (not administered)   metoprolol tartrate (LOPRESSOR) injection 5 mg (5 mg Intravenous Given 3/2/18 1133)   albuterol (PROVENTIL) nebulizer solution 0.083% 2.5 mg/3mL (not administered)   amitriptyline (ELAVIL) tablet 10 mg (not administered)   aspirin chewable tablet 81 mg (81 mg Oral Not Given 3/2/18 1808)   cilostazol (PLETAL) tablet 50 mg (50 mg Oral Not Given 3/2/18 2248)   gabapentin (NEURONTIN) capsule 600 mg (600 mg Oral Not Given 3/2/18 2247)   HYDROcodone-acetaminophen (NORCO) 5-325 MG per tablet 1 tablet (not administered)   LORazepam (ATIVAN) tablet 1 mg (not administered)   pantoprazole (PROTONIX) EC tablet 40 mg (40 mg  Oral Not Given 3/2/18 1808)   sotalol (BETAPACE) tablet 120 mg (120 mg Oral Not Given 3/2/18 2248)   sucralfate (CARAFATE) tablet 1 g (1 g Oral Not Given 3/2/18 1808)   sodium chloride 0.9 % flush 1-10 mL (not administered)   sodium chloride 0.9 % infusion (100 mL/hr Intravenous New Bag 3/2/18 1811)   acetaminophen (TYLENOL) tablet 650 mg (not administered)   bisacodyl (DULCOLAX) EC tablet 5 mg (not administered)   heparin (porcine) 5000 UNIT/ML injection 5,000 Units (5,000 Units Subcutaneous Given 3/2/18 2155)   aztreonam (AZACTAM) in SWFI 2 grams/10ml IV PUSH syringe (2 g Intravenous Given 3/2/18 2246)   Pharmacy to dose vancomycin (not administered)   nicotine (NICODERM CQ) 14 MG/24HR patch 1 patch (1 patch Transdermal Not Given 3/2/18 1808)   vancomycin (VANCOCIN) in iso-osmotic dextrose IVPB 1 g (premix) 200 mL (not administered)   !vanc trough due prior to dose 3-4 1200. Hold vanc until labs are back. Rx is following. (not administered)   ondansetron (ZOFRAN) injection 4 mg (4 mg Intravenous Given 3/2/18 1129)   sodium chloride 0.9 % bolus 1,000 mL (0 mL Intravenous Stopped 3/2/18 1317)   aztreonam (AZACTAM) in SWFI 2 grams/10ml IV PUSH syringe (2 g Intravenous Given 3/2/18 1320)   vancomycin 1250 mg/250 mL 0.9% NS IVPB ([facility]) (1,250 mg Intravenous New Bag 3/2/18 1327)   sodium chloride 0.9 % bolus 1,000 mL (1,000 mL Intravenous New Bag 3/2/18 1424)   LORazepam (ATIVAN) injection 0.5 mg (0.5 mg Intravenous Given 3/2/18 1424)   LORazepam (ATIVAN) injection 0.5 mg (0.5 mg Intravenous Given 3/2/18 1603)   iopamidol (ISOVUE-300) 61 % injection 100 mL (75 mL Intravenous Given 3/2/18 1615)     ECG/EMG Results (last 24 hours)     Procedure Component Value Units Date/Time    ECG 12 Lead [[id number]] Collected:  03/02/18 0930     Updated:  03/02/18 0948    Narrative:       Test Reason : nausea  Blood Pressure : **/** mmHG  Vent. Rate : 138 BPM     Atrial Rate : 120 BPM     P-R Int : 000 ms          QRS Dur : 106 ms       QT Int : 358 ms       P-R-T Axes : 000 -58 110 degrees     QTc Int : 542 ms    Atrial fibrillation with rapid ventricular response  Left axis deviation  Nonspecific ST and T wave abnormality , probably digitalis effect  Abnormal ECG  When compared with ECG of 08-DEC-2017 21:59,  Atrial fibrillation has replaced Sinus rhythm  Vent. rate has increased BY  69 BPM  Criteria for Inferior infarct are no longer present  Confirmed by GALILEO WOODRUFF MD ([id number]) on 3/2/2018 9:48:13 AM    Referred By:  NEHEMIAH HYMAN           Confirmed By:GALILEO WOODRUFF MD                        [facility]    Final diagnoses:   Parotitis, acute   Subacute maxillary sinusitis   Generalized weakness       Documentation assistance provided by juan m Snow.  Information recorded by the scribe was done at my direction and has been verified and validated by me.     Marlene Snow  03/02/18 1018       Galileo Woodruff MD  03/03/18 0641

## 2018-03-02 NOTE — H&P
[facility] Medicine Services  HISTORY AND PHYSICAL    Patient Name: Alexander Adan  : 1940  MRN: [id number]  Primary Care Physician: Harrison Smart MD    Subjective   Subjective     Chief Complaint:  Abdominal pain, decreased PO intake    HPI:  Alexander Adan is a 77 y.o. male with PMH of PVD, Afib, CAD s/p CABG, dHF, HLD, HTN, CVA, anemia, tobacco abuse, and remote h/o SDH s/p india holes in 2015 by Dr. Brooke who presents with complaints of abdominal pain and anorexia for the last three days.  Patient is unable to give much history due to feeling poorly today, so much of the history is obtained from his wife who is at bedside.  Pt reportedly has had unintentional weight loss of 60lbs over the last few months, however, over the last three days his PO intake has decreased to almost nothing.  Pt denies any fevers but does endorse chills currently.  No change in BMs.  He also complains of vague abdominal pain that is not worsened nor improved with anything specifically.  Pt has been complaining of his ears hurting as well as the right side of his face/jaw.  Ultimately, he was found to have an enlarged parotid gland on the right side that was significantly tender to touch.  He is unsure of how long it has been swollen and, since he has a beard, his wife has not noticed this until today in the ER.  Pt also c/o painful swallowing.     Review of Systems   General- as above  HEENT- no blurry vision, no nasal congestion, no hearing loss, no oral ulcers, no dental caries, no bleeding gums  CVS- no chest pain, no palpitations  Pulm- no SOA, no cough, no orthopnea, no PND  GI- no diarrhea, no constipation, no BRBPR, no vomiting  MSK- no joint pain, no swelling, no erythema  - no dysuria, no hematuria  Neuro- no headaches, no focal motor deficits  Psych- no SI/ HI, no depression/anxiety    Otherwise 10-system ROS reviewed and is negative except as mentioned in the HPI.    Personal  History     Past Medical History:   Diagnosis Date   • Anemia    • Anxiety    • Chronic kidney disease     STAGE II (mild)   • Hyperlipidemia    • Hypertension    • Stroke     LACUNAR. SEPT 2013   • Subdural hematoma     Status post hemorrhagic evacuation with sherrill holes 4/3/2015 per Dr. Gilson Brooke.       Past Surgical History:   Procedure Laterality Date   • SHERRILL HOLE FOR SUBDURAL HEMATOMA     • CARDIAC PACEMAKER PLACEMENT      PERMANENT   • CAROTID STENT Left    • CHOLECYSTECTOMY     • CORONARY ARTERY BYPASS GRAFT     • PACEMAKER IMPLANTATION     • THROMBOENDARTERECTOMY      CAROTID, LEFT       Family History: family history includes Cancer in his brother and mother; Diabetes in his brother, father, mother, and sister; Heart disease in his other; Heart failure in his father, mother, and sister; Hypertension in his mother and other; Other in his other; Stroke in his mother.     Social History:  reports that he has been smoking Cigars.  He has never used smokeless tobacco. He reports that he does not drink alcohol or use illicit drugs.  Social History     Social History Narrative    Pt consumes 0-1 cups coffee, 2 20oz Mountain Dew sodas, and sometimes sweet tea. Pt lives in his own home with wife.        Medications:  No current facility-administered medications on file prior to encounter.      Current Outpatient Prescriptions on File Prior to Encounter   Medication Sig Dispense Refill   • acetaminophen (TYLENOL) 650 MG 8 hr tablet Take 650 mg by mouth Every 4 (Four) Hours As Needed for mild pain (1-3).     • albuterol (PROVENTIL HFA;VENTOLIN HFA) 108 (90 BASE) MCG/ACT inhaler Inhale 1-2 puffs Every 4 (Four) Hours As Needed for Wheezing. 1 inhaler 11   • albuterol (PROVENTIL) (2.5 MG/3ML) 0.083% nebulizer solution Take 1 dose by nebulization Every 6 (Six) Hours As Needed.     • amitriptyline (ELAVIL) 10 MG tablet Take 1 tablet by mouth At Night As Needed for sleep. FOR LEGS/SLEEP     • aspirin (ASPIRIN LOW DOSE)  81 MG tablet Take 81 mg by mouth Daily.     • bumetanide (BUMEX) 1 MG tablet Take 1 mg by mouth 3 (Three) Times a Week. Sundays, Tuesdays, Thursdays     • cilostazol (PLETAL) 50 MG tablet Take 50 mg by mouth Every 12 (Twelve) Hours.     • dicyclomine (BENTYL) 20 MG tablet TAKE ONE TABLET BY MOUTH EVERY 6 HOURS AS NEEDED ABDOMINAL  PAIN 120 tablet 5   • gabapentin (NEURONTIN) 600 MG tablet Take 600 mg by mouth 3 (Three) Times a Day.     • HYDROcodone-acetaminophen (NORCO) 5-325 MG per tablet Take 1 tablet by mouth Every 6 (Six) Hours As Needed for Moderate Pain (4-6). 10 tablet 0   • LORazepam (ATIVAN) 2 MG tablet Take 1 mg by mouth Every 8 (Eight) Hours As Needed.     • meclizine (ANTIVERT) 12.5 MG tablet Take 12.5 mg by mouth Every 8 (Eight) Hours As Needed for dizziness.     • melatonin 5 MG tablet tablet Take 5 mg by mouth Every Night. 3 tablets at night     • omeprazole (priLOSEC) 40 MG capsule Take 1 capsule by mouth 2 (Two) Times a Day. 60 capsule 11   • Sotalol HCl, AF, 120 MG tablet Take 120 mg by mouth Every 12 (Twelve) Hours.     • spironolactone (ALDACTONE) 25 MG tablet Take 1 tablet by mouth Daily. 30 tablet 11   • sucralfate (CARAFATE) 1 G tablet Take 1 g by mouth 2 (Two) Times a Day.         Allergies   Allergen Reactions   • Simvastatin Hives   • Altace [Ramipril] Other (See Comments)     bleeding   • Penicillins Hives   • Rivaroxaban Other (See Comments)     Hx of head bleed   • Cephalexin Rash     unknown   • Procaine GI Intolerance   • Ropinirole Anxiety       Objective   Objective     Vital Signs:   Temp:  [98.6 °F (37 °C)-100.2 °F (37.9 °C)] 100.2 °F (37.9 °C)  Heart Rate:  [] 101  Resp:  [20] 20  BP: ()/(47-99) 106/51        Physical Exam   Constitutional: mild distress due to overall feeling poorly, cachetic appearing WM  Eyes: PERRLA, sclerae anicteric, no conjunctival injection  HENT: NCAT, dry mucous membranes, right parotid gland edematous, erythematous and very tender to light  palpation.    Neck: Supple, no thyromegaly, no lymphadenopathy but tender to light touch right side just inferior to parotid, trachea midline  Respiratory: Clear to auscultation bilaterally, nonlabored respirations   Cardiovascular: RRR, no murmurs, rubs, or gallops, weak but palpable pedal pulses bilaterally  Gastrointestinal: Positive bowel sounds, soft, nontender, nondistended  Musculoskeletal: No bilateral ankle edema, no clubbing or cyanosis to extremities, ulcers present on posterior heels bilaterally with eschar present ~1 cm in diameter  Psychiatric: Appropriate affect, cooperative  Neurologic: Oriented x 3, strength symmetric in all extremities, Cranial Nerves grossly intact to confrontation, speech clear  Skin: No rashes  Results Reviewed:  I have personally reviewed current lab, radiology, and data and agree.      Results from last 7 days  Lab Units 03/02/18  1100   WBC 10*3/mm3 28.06*   HEMOGLOBIN g/dL 13.9   HEMATOCRIT % 42.5   PLATELETS 10*3/mm3 272       Results from last 7 days  Lab Units 03/02/18  1100   SODIUM mmol/L 144   POTASSIUM mmol/L 3.3*   CHLORIDE mmol/L 113*   CO2 mmol/L 22.0   BUN mg/dL 30*   CREATININE mg/dL 1.20   GLUCOSE mg/dL 126*   CALCIUM mg/dL 10.2   ALT (SGPT) U/L 11   AST (SGOT) U/L 17     Estimated Creatinine Clearance: 47.2 mL/min (by C-G formula based on Cr of 1.2).  Brief Urine Lab Results  (Last result in the past 365 days)      Color   Clarity   Blood   Leuk Est   Nitrite   Protein   CREAT   Urine HCG        03/02/18 1224 Yellow Clear Negative Negative Negative 100 mg/dL (2+)(A)             No results found for: BNP  No results found for: PHART  Imaging Results (last 24 hours)     Procedure Component Value Units Date/Time    CT Head Without Contrast [[id number]] Collected:  03/02/18 1231     Updated:  03/02/18 1300    Narrative:       EXAMINATION: CT HEAD WO CONTRAST-      INDICATION: Encephalopathy.      TECHNIQUE: Axial CT of the head without intravenous  contrast  administration.     The radiation dose reduction device was turned on for each scan per the  ALARA (As Low as Reasonably Achievable) protocol.     COMPARISON: 02/16/2018.     FINDINGS: Midline structures are symmetric without evidence of mass,  mass effect or midline shift. No intraaxial hemorrhage or extraaxial  fluid collection. Ventricles and sulci are prominent consistent with  components of atrophy. Mild to moderate attenuation changes within the  periventricular and deep white matter consistent with chronic small  vessel ischemic disease. Globes and orbits are partially visualized and  grossly unremarkable. Paranasal sinuses and mastoid air cells  demonstrate total opacification of the right maxillary sinus as well as  opacification in the right ethmoid air cells and sphenoid sinus.  Calvarium demonstrates prior india holes left frontal and left temporal  regions otherwise grossly intact.       Impression:       No acute intracranial abnormality.     D:  03/02/2018  E:  03/02/2018     This report was finalized on 3/2/2018 12:58 PM by Dr. Maurice Alas.       CT Abdomen Pelvis Without Contrast [[id number]] Collected:  03/02/18 1254     Updated:  03/02/18 1300    Narrative:       EXAMINATION: CT ABDOMEN AND PELVIS WO CONTRAST-      INDICATION: Abdomen pain, guarding, urinary incontinence.      TECHNIQUE: CT abdomen and pelvis without intravenous contrast  administration.     The radiation dose reduction device was turned on for each scan per the  ALARA (As Low as Reasonably Achievable) protocol.     COMPARISON: 08/05/2017.     FINDINGS: Motion degraded examination particularly within the  subdiaphragmatic soft tissues.  Lung bases demonstrate patchy  tree-in-bud opacifications involving much of the right lower lobe and  visualized right middle lobe consistent with infectious or inflammatory  etiology, however, no focal lobar consolidation. Left hemithorax  partially visualized and grossly clear. Liver  without focal lesion.  Gallbladder is surgically absent. Pancreas and spleen within normal  limits. Adrenals without distinct nodule. Kidneys without hydronephrosis  or hydroureter. No obstructive uropathy or urolithiasis. Atherosclerotic  nonaneurysmal abdominal aorta. No bulky retroperitoneal adenopathy. GI  tract evaluation without focal thickening or disproportionate dilatation  of bowel. No free fluid or intraabdominal free air. Pelvic viscera  unremarkable without bulky pelvic adenopathy or significant free fluid.  Degenerative changes of the spine and pelvis without aggressive osseous  abnormality identified within the limited evaluation due to motion. No  soft tissue body wall findings of concern.       Impression:       1. Limited evaluation due to significant patient motion artifact  limiting the subdiaphragmatic soft tissues, however, the limits of the  study there is scattered tree-in-bud opacifications involving the  visualized right middle lobe and right lower lobe along with nodularity  consistent of acute infectious or inflammatory etiology without lobar  consolidation identified. No significant pleural effusion.  2. No acute intraabdominal abnormality within the limited evaluation due  to significant motion specifically no disproportionate dilatation of  bowel to suggest mechanical obstructive process or loculated  intraabdominal fluid collection.     D:  03/02/2018  E:  03/02/2018     This report was finalized on 3/2/2018 12:58 PM by Dr. Maurice Alas.       XR Chest 1 View [[id number]] Collected:  03/02/18 1131     Updated:  03/02/18 1301    Narrative:       EXAMINATION: XR CHEST 1 VW- 03/02/2018     INDICATION: weakness, altered mental status      COMPARISON: Chest x-ray 02/16/2018.     FINDINGS: Cardiac size within normal limits. Post CABG and post median  sternotomy changes. No focal opacification or consolidation. No  pneumothorax or pleural effusion. Left chest wall pacing device with  leads  intact. Degenerative changes of the thoracic spine.           Impression:       No acute cardiopulmonary process.     D:  03/02/2018  E:  03/02/2018     This report was finalized on 3/2/2018 12:58 PM by Dr. Maurice Alas.           Results for orders placed during the hospital encounter of 02/07/18   Adult Transthoracic Echo Complete W/ Cont if Necessary Per Protocol    Narrative · Left ventricular wall thickness is consistent with mild concentric   hypertrophy.  · The following left ventricular wall segments are hypokinetic: basal   inferoseptal, mid inferoseptal and basal inferoseptal.  · Moderately reduced right ventricular systolic function noted.  · Left atrial cavity size is moderately dilated.  · Mild-to-moderate mitral valve regurgitation is present  · Mild tricuspid valve regurgitation is present.  · Left ventricular systolic function is normal. Estimated EF = 60%.  · there is akinesis of base of septum and base of inferior wall          Assessment/Plan   Assessment / Plan     Hospital Problem List     * (Principal)SIRS (systemic inflammatory response syndrome)    Leukocytosis    Atrial fibrillation    Overview Signed 6/20/2016  3:33 PM by DENI Gates     Description: chads vasc 7         Chronic obstructive pulmonary disease    Chronic coronary artery disease    Overview Signed 6/20/2016  3:33 PM by DENI Gates     Description: cabg 2004, data deficient         Chronic diastolic heart failure    Overview Signed 6/20/2016  3:33 PM by DENI Gates     Description: NYHA II-III  Per echo: EF 50% September 2013         CKD (chronic kidney disease), stage III    PVD (peripheral vascular disease)          Mr. Adan is a 76 yo WM w/ PMH of CAD s/p CABG, dHF, HTN, Afib not anticoagulated due to h/o SDH in the past (2015), COPD who presents with vague abdominal pain and FTT over the last few months but worsening over the last few days.  Evaluation in the ED found a significantly elevated  WBC and possible right sided parotitis.  We were asked to admit for further investigation.    Assessment & Plan:  --SIRS likely sepsis- patient tachycardic with leukocytosis and possible source (right parotid gland).  Blood cultures x 2 sent in ED, UA and CXR unremarkable.  CT of parotid gland PENDING. Started broad spectrum IV ABX (Aztreonam due to PCN allergy and Vanc).  IVFs- ordered for 24 hours. Repeat Lactic Acid in six hours (mildly elevated)   --Leukocytosis-due to above.  Monitor.    --Afib-not anticoagulated due to h/o SDH in 2015.  Continue rate controlling meds  --COPD- continue home inhalers/nebs.  --CAD- continue home meds with exception of antihypertensives in setting of sepsis  --stricts Is/Os, daily weights, holding diuretics in setting of sepsis and intravascular volume depletion.  --Cr stable      DVT prophylaxis: HAYDEE, TEDs/SCDs    CODE STATUS:  DNR/DNI    Admission Status:  I believe this patient meets INPATIENT status due to the need for care which can only be reasonably provided in an hospital setting such as aggressive/expedited ancillary services and/or consultation services, the necessity for IV medications, close physician monitoring and/or the possible need for procedures.  In such, I feel patient’s risk for adverse outcomes and need for care warrant INPATIENT evaluation and predict the patient’s care encounter to likely last beyond 2 midnights.    Electronically signed by Chrissy Sullivan MD, 03/02/18, 3:19 PM.

## 2018-03-02 NOTE — CONSULTS
Pharmacy Consult-Vancomycin Dosing  Alexander Adan is a  77 y.o. male receiving vancomycin therapy.     Indication: sepsis  Consulting Provider: BRITTANEY Sullivan MD  ID Consult: no    Goal Trough: 15-20    Current Antimicrobial Therapy  Anti-Infectives       Ordered     Dose/Rate Route Frequency Start Stop      03/02/18 1711  vancomycin (VANCOCIN) in iso-osmotic dextrose IVPB 1 g (premix) 200 mL     Ordering Provider:  Luz Owens [facility]    15 mg/kg × 65.8 kg  over 60 Minutes Intravenous Every 24 Hours 03/03/18 1200 03/13/18 1159    03/02/18 1657  aztreonam (AZACTAM) in SWFI 2 grams/10ml IV PUSH syringe     Ordering Provider:  Chrissy Sullivan MD    2 g  over 5 Minutes Intravenous Every 8 Hours 03/02/18 2200 03/09/18 2159    03/02/18 1657  Pharmacy to dose vancomycin     Comments:  Alexander Adan  6B, [id number]  By Chrissy Sullivan MD   Ordering Provider:  Chrissy Sullivan MD     Does not apply Continuous PRN 03/02/18 1657 03/09/18 1656    03/02/18 1138  aztreonam (AZACTAM) in SWFI 2 grams/10ml IV PUSH syringe     Ordering Provider:  Galileo Woodruff MD    2 g  over 5 Minutes Intravenous Once 03/02/18 1240 03/02/18 1325    03/02/18 1138  vancomycin 1250 mg/250 mL 0.9% NS IVPB ([facility])     Ordering Provider:  Galileo Woodruff MD    20 mg/kg × 65.8 kg Intravenous Once 03/02/18 1140 03/02/18 1327        Allergies  Allergies as of 03/02/2018 - Erik as Reviewed 03/02/2018   Allergen Reaction Noted   • Simvastatin Hives 06/13/2016   • Altace [ramipril] Other (See Comments) 11/02/2016   • Penicillins Hives 06/13/2016   • Rivaroxaban Other (See Comments) 06/13/2016   • Cephalexin Rash 06/13/2016   • Procaine GI Intolerance 06/13/2016   • Ropinirole Anxiety 03/02/2018     Labs    Results from last 7 days     Lab Units 03/02/18  1100   BUN mg/dL 30*   CREATININE mg/dL 1.20     Results from last 7 days     Lab Units 03/02/18  1100   WBC 10*3/mm3 28.06*   Evaluation of Dosing     Last Dose Received in the ED = 20 mg/kg (1250 mg) x  "1 at 13:27    Ht - 172.7 cm (68\")  Wt - 65.8 kg (145 lb)    Estimated Creatinine Clearance: 47.2 mL/min (by C-G formula based on Cr of 1.2).    Microbiology and Radiology  Microbiology Results (last 10 days)       Procedure Component Value - Date/Time      Blood Culture - Blood, [[id number]] Collected:  03/02/18 1235    Lab Status:  Preliminary result Specimen:  Blood from Arm, Left Updated:  03/02/18 1357     Blood Culture Aerobic bottle only          Assessment/Plan:  Vancomycin 15 mg/kg (1000 mg) q24h.  Vanc trough due 3-4-18 prior to noon dose.  Pharmacy will continue to follow.    Luz Owens RPH  3/2/2018  17:17    "

## 2018-03-03 LAB
ANION GAP SERPL CALCULATED.3IONS-SCNC: 7 MMOL/L (ref 3–11)
BUN BLD-MCNC: 20 MG/DL (ref 9–23)
BUN/CREAT SERPL: 22.2 (ref 7–25)
CALCIUM SPEC-SCNC: 8.4 MG/DL (ref 8.7–10.4)
CHLORIDE SERPL-SCNC: 121 MMOL/L (ref 99–109)
CO2 SERPL-SCNC: 20 MMOL/L (ref 20–31)
CREAT BLD-MCNC: 0.9 MG/DL (ref 0.6–1.3)
DEPRECATED RDW RBC AUTO: 61.9 FL (ref 37–54)
ERYTHROCYTE [DISTWIDTH] IN BLOOD BY AUTOMATED COUNT: 17.3 % (ref 11.3–14.5)
GFR SERPL CREATININE-BSD FRML MDRD: 82 ML/MIN/1.73
GLUCOSE BLD-MCNC: 95 MG/DL (ref 70–100)
HCT VFR BLD AUTO: 34.8 % (ref 38.9–50.9)
HGB BLD-MCNC: 10.9 G/DL (ref 13.1–17.5)
MCH RBC QN AUTO: 30.4 PG (ref 27–31)
MCHC RBC AUTO-ENTMCNC: 31.3 G/DL (ref 32–36)
MCV RBC AUTO: 96.9 FL (ref 80–99)
PLATELET # BLD AUTO: 202 10*3/MM3 (ref 150–450)
PMV BLD AUTO: 10.6 FL (ref 6–12)
POTASSIUM BLD-SCNC: 3.3 MMOL/L (ref 3.5–5.5)
RBC # BLD AUTO: 3.59 10*6/MM3 (ref 4.2–5.76)
SODIUM BLD-SCNC: 148 MMOL/L (ref 132–146)
WBC NRBC COR # BLD: 19.34 10*3/MM3 (ref 3.5–10.8)

## 2018-03-03 PROCEDURE — 94799 UNLISTED PULMONARY SVC/PX: CPT

## 2018-03-03 PROCEDURE — 92610 EVALUATE SWALLOWING FUNCTION: CPT

## 2018-03-03 PROCEDURE — 25010000002 VANCOMYCIN PER 500 MG

## 2018-03-03 PROCEDURE — 25010000002 HEPARIN (PORCINE) PER 1000 UNITS: Performed by: INTERNAL MEDICINE

## 2018-03-03 PROCEDURE — 80048 BASIC METABOLIC PNL TOTAL CA: CPT | Performed by: INTERNAL MEDICINE

## 2018-03-03 PROCEDURE — 25810000003 DEXTROSE 5 % WITH KCL 20 MEQ 20-5 MEQ/L-% SOLUTION: Performed by: INTERNAL MEDICINE

## 2018-03-03 PROCEDURE — 85027 COMPLETE CBC AUTOMATED: CPT | Performed by: INTERNAL MEDICINE

## 2018-03-03 PROCEDURE — 25010000002 MORPHINE SULFATE (PF) 2 MG/ML SOLUTION: Performed by: INTERNAL MEDICINE

## 2018-03-03 PROCEDURE — 99232 SBSQ HOSP IP/OBS MODERATE 35: CPT | Performed by: INTERNAL MEDICINE

## 2018-03-03 PROCEDURE — 94640 AIRWAY INHALATION TREATMENT: CPT

## 2018-03-03 RX ORDER — IPRATROPIUM BROMIDE AND ALBUTEROL SULFATE 2.5; .5 MG/3ML; MG/3ML
3 SOLUTION RESPIRATORY (INHALATION)
Status: DISCONTINUED | OUTPATIENT
Start: 2018-03-03 | End: 2018-03-13 | Stop reason: HOSPADM

## 2018-03-03 RX ORDER — METOPROLOL TARTRATE 5 MG/5ML
2.5 INJECTION INTRAVENOUS EVERY 8 HOURS
Status: DISCONTINUED | OUTPATIENT
Start: 2018-03-03 | End: 2018-03-03

## 2018-03-03 RX ORDER — POTASSIUM CHLORIDE, DEXTROSE MONOHYDRATE 150; 5 MG/100ML; G/100ML
100 INJECTION, SOLUTION INTRAVENOUS CONTINUOUS
Status: DISCONTINUED | OUTPATIENT
Start: 2018-03-03 | End: 2018-03-08

## 2018-03-03 RX ORDER — METOPROLOL TARTRATE 5 MG/5ML
2.5 INJECTION INTRAVENOUS EVERY 8 HOURS SCHEDULED
Status: DISCONTINUED | OUTPATIENT
Start: 2018-03-03 | End: 2018-03-04

## 2018-03-03 RX ORDER — MORPHINE SULFATE 2 MG/ML
2 INJECTION, SOLUTION INTRAMUSCULAR; INTRAVENOUS
Status: DISCONTINUED | OUTPATIENT
Start: 2018-03-03 | End: 2018-03-09

## 2018-03-03 RX ADMIN — METOPROLOL TARTRATE 2.5 MG: 5 INJECTION INTRAVENOUS at 19:50

## 2018-03-03 RX ADMIN — WATER 2 G: 1 INJECTION INTRAMUSCULAR; INTRAVENOUS; SUBCUTANEOUS at 14:11

## 2018-03-03 RX ADMIN — MORPHINE SULFATE 2 MG: 2 INJECTION, SOLUTION INTRAMUSCULAR; INTRAVENOUS at 16:19

## 2018-03-03 RX ADMIN — POTASSIUM CHLORIDE AND DEXTROSE MONOHYDRATE 100 ML/HR: 150; 5 INJECTION, SOLUTION INTRAVENOUS at 14:01

## 2018-03-03 RX ADMIN — WATER 2 G: 1 INJECTION INTRAMUSCULAR; INTRAVENOUS; SUBCUTANEOUS at 21:58

## 2018-03-03 RX ADMIN — IPRATROPIUM BROMIDE AND ALBUTEROL SULFATE 3 ML: 2.5; .5 SOLUTION RESPIRATORY (INHALATION) at 19:17

## 2018-03-03 RX ADMIN — NICOTINE 1 PATCH: 14 PATCH TRANSDERMAL at 08:40

## 2018-03-03 RX ADMIN — MORPHINE SULFATE 2 MG: 2 INJECTION, SOLUTION INTRAMUSCULAR; INTRAVENOUS at 10:43

## 2018-03-03 RX ADMIN — HEPARIN SODIUM 5000 UNITS: 5000 INJECTION, SOLUTION INTRAVENOUS; SUBCUTANEOUS at 06:42

## 2018-03-03 RX ADMIN — HEPARIN SODIUM 5000 UNITS: 5000 INJECTION, SOLUTION INTRAVENOUS; SUBCUTANEOUS at 19:51

## 2018-03-03 RX ADMIN — HEPARIN SODIUM 5000 UNITS: 5000 INJECTION, SOLUTION INTRAVENOUS; SUBCUTANEOUS at 14:11

## 2018-03-03 RX ADMIN — VANCOMYCIN HYDROCHLORIDE 1000 MG: 1 INJECTION, SOLUTION INTRAVENOUS at 12:53

## 2018-03-03 RX ADMIN — WATER 2 G: 1 INJECTION INTRAMUSCULAR; INTRAVENOUS; SUBCUTANEOUS at 08:39

## 2018-03-03 RX ADMIN — IPRATROPIUM BROMIDE AND ALBUTEROL SULFATE 3 ML: 2.5; .5 SOLUTION RESPIRATORY (INHALATION) at 16:21

## 2018-03-03 RX ADMIN — SODIUM CHLORIDE 100 ML/HR: 9 INJECTION, SOLUTION INTRAVENOUS at 08:04

## 2018-03-03 NOTE — PROGRESS NOTES
[facility] Medicine Services  PROGRESS NOTE    Patient Name: Alexander Adan  : 1940  MRN: [id number]    Date of Admission: 3/2/2018  Length of Stay: 1  Primary Care Physician: Harrison Smart MD    Subjective   Subjective     CC:Right facial swelling, weight loss    Subjective:  Patient is resting in bed moaning but he denies any pain or discomfort.  He is however quite confused.    Review of Systems  Unable to obtain        Objective   Objective     Vital Signs:   Temp:  [97.9 °F (36.6 °C)-98.5 °F (36.9 °C)] 97.9 °F (36.6 °C)  Heart Rate:  [] 131  Resp:  [16-18] 16  BP: ()/(49-93) 119/93        Physical Exam:  Constitutional: No acute distress, very cachectic and frail, chronically confused.  Eyes: PERRLA, sclerae anicteric, no conjunctival injection  HENT: NCAT, mucous membranes moist.  Right submandibular swelling most likely she prostate gland swelling.  Neck: Supple, no thyromegaly, no lymphadenopathy, trachea midline  Respiratory: Clear to auscultation bilaterally, nonlabored respirations   Cardiovascular: RRR, no murmurs, rubs, or gallops, palpable pedal pulses bilaterally  Gastrointestinal: Positive bowel sounds, soft, nontender, nondistended  Musculoskeletal: Very cachectic No bilateral ankle edema, no clubbing or cyanosis to extremities  Neurologic: Awake, alert, very confused.  Patient moves all the limbs.  Skin: No rashes    Results Reviewed:  I have personally reviewed current lab, radiology, and data and agree.      Results from last 7 days  Lab Units 18  0645 18  1100   WBC 10*3/mm3 19.34* 28.06*   HEMOGLOBIN g/dL 10.9* 13.9   HEMATOCRIT % 34.8* 42.5   PLATELETS 10*3/mm3 202 272       Results from last 7 days  Lab Units 18  0645 18  1100   SODIUM mmol/L 148* 144   POTASSIUM mmol/L 3.3* 3.3*   CHLORIDE mmol/L 121* 113*   CO2 mmol/L 20.0 22.0   BUN mg/dL 20 30*   CREATININE mg/dL 0.90 1.20   GLUCOSE mg/dL 95 126*   CALCIUM  mg/dL 8.4* 10.2   ALT (SGPT) U/L  --  11   AST (SGOT) U/L  --  17     Estimated Creatinine Clearance: 63 mL/min (by C-G formula based on Cr of 0.9).  No results found for: BNP  No results found for: PHART    Microbiology Results Abnormal     Procedure Component Value - Date/Time    Blood Culture - Blood, [[id number]]  (Normal) Collected:  03/02/18 1231    Lab Status:  Preliminary result Specimen:  Blood from Hand, Right Updated:  03/03/18 1746     Blood Culture No growth at 24 hours    Blood Culture - Blood, [[id number]] Collected:  03/02/18 1235    Lab Status:  Preliminary result Specimen:  Blood from Arm, Left Updated:  03/03/18 1401     Blood Culture No growth at 24 hours      Aerobic bottle only    Blood Culture - Blood, [[id number]]  (Normal) Collected:  03/02/18 1305    Lab Status:  Preliminary result Specimen:  Blood from Arm, Left Updated:  03/03/18 1401     Blood Culture No growth at 24 hours          Imaging Results (last 24 hours)     ** No results found for the last 24 hours. **        Results for orders placed during the hospital encounter of 02/07/18   Adult Transthoracic Echo Complete W/ Cont if Necessary Per Protocol    Narrative · Left ventricular wall thickness is consistent with mild concentric   hypertrophy.  · The following left ventricular wall segments are hypokinetic: basal   inferoseptal, mid inferoseptal and basal inferoseptal.  · Moderately reduced right ventricular systolic function noted.  · Left atrial cavity size is moderately dilated.  · Mild-to-moderate mitral valve regurgitation is present  · Mild tricuspid valve regurgitation is present.  · Left ventricular systolic function is normal. Estimated EF = 60%.  · there is akinesis of base of septum and base of inferior wall          I have reviewed the medications.    Assessment/Plan   Assessment / Plan     Hospital Problem List     * (Principal)SIRS (systemic inflammatory response syndrome)    Atrial fibrillation    Overview Signed  6/20/2016  3:33 PM by DENI Gates     Description: chads vasc 7         Chronic coronary artery disease    Overview Signed 6/20/2016  3:33 PM by DENI Gates     Description: cabg 2004, data deficient         Chronic diastolic heart failure    Overview Signed 6/20/2016  3:33 PM by DENI Gates     Description: NYHA II-III  Per echo: EF 50% September 2013         Chronic obstructive pulmonary disease    CKD (chronic kidney disease), stage III    Leukocytosis    PVD (peripheral vascular disease)             Brief Hospital Course to date:  Alexander Adan is a 77 y.o. male       Assessment & Plan:  *Right sided facial swelling most likely parotitis.    * Sepsis currently patient is also on IV antibiotic.    * Severe weight loss and cachexia.  Etiology is unknown.    PLAN:  - while the patient cannot take anything orally we will start him on IV metoprolol to control the heart rate.    -cardiology consult in am    -labs in am.    DVT Prophylaxis:      CODE STATUS: DNR    Disposition:TBD.      Electronically signed by Anderson Yu MD, 03/03/18, 6:19 PM.

## 2018-03-03 NOTE — NURSING NOTE
Pt with low Ryan, limited moblity and multiple skin issues Low Air Loss bed ordered from [facility] [phone]

## 2018-03-03 NOTE — PLAN OF CARE
Problem: Patient Care Overview (Adult)  Goal: Plan of Care Review  Outcome: Ongoing (interventions implemented as appropriate)   03/03/18 2900   Coping/Psychosocial Response Interventions   Plan Of Care Reviewed With patient;spouse   Patient Care Overview   Progress (initial eval)   Outcome Evaluation   Outcome Summary/Follow up Plan Attempted clinical swallow eval. Pt very confused and following minimal commands. Pt c/o tongue pain and refused to open mouth. Pt did accept ice chip x 1. No difficulty with ice chip noted. No overt s/s of aspiration with ice chip x 1. RN notified of tongue pain. RN medicated pt and contacted SLP to re-evaluate. After pt was medicated, pt denied any tongue pain, but continued to refuse to open mouth. Pt refused all PO trials despite max cues. Unable to evaluate swallow at this time due to pt refusal to participate. Will attempt to evaluate swallow tomorrow.

## 2018-03-03 NOTE — CONSULTS
"Adult Nutrition  Assessment/PES    Patient Name:  Alexander Adan  YOB: 1940  MRN: [id number]  Admit Date:  3/2/2018    Assessment Date:  3/3/2018    Comments:  Pt appears to have generalized wasting; bed weight requested; weight not obtained on adm w/ usual wt reported. Weight reported as low as 118 lbs in past 3 months. Pt currently NPO. RD to follow pt may require EN support if unable to tolerate po diet.          Reason for Assessment       03/03/18 1153    Reason for Assessment    Reason For Assessment/Visit admission assessment;identified at risk by screening criteria    Identified At Risk By Screening Criteria MST SCORE 2+;unintentional loss of 10 lbs or more in the past 2 mos    Time Spent (min) 30    Diagnosis Diagnosis    Cardiac Dyslipidemia;HTN;CAD;PAF;DHF;PPM   hx of CABG    Fluid Status Dehydration    Neurological --   hx of CVA. SDH s/p [place] hole    Pulmonary/Critical Care SIRS;COPD   bronchitis              Nutrition/Diet History       03/03/18 1156    Nutrition/Diet History    Reported/Observed By Family    Other Pt's wife reports pt has lost weight down to 118 lbs over the past three months, he won't eat ,drink or take pills. Pt acknowledges pain w/ eating and drinking but does not participate in interview.  Wife is uncertain of current weight. - not weighed on adm. She reports usually wt is 145-148 lbs and height is Not 5''8\" but 5'5\"            Anthropometrics       03/03/18 1145    Anthropometrics    Height Method Stated    Height 165.1 cm (65\")   Ht reported by pt's wife; c/w other EHR documentation    Ideal Body Weight (IBW)    Ideal Body Weight (IBW), Male (kg) 62.51            Labs/Tests/Procedures/Meds       03/03/18 1200    Labs/Tests/Procedures/Meds    Diagnostic Test/Procedure Review reviewed, pertinent   CT    Labs/Tests Review Reviewed     Results from last 7 days  Lab Units 03/03/18  0645 03/02/18  1100   SODIUM mmol/L 148* 144   POTASSIUM mmol/L 3.3* 3.3*   CHLORIDE " mmol/L 121* 113*   CO2 mmol/L 20.0 22.0   BUN mg/dL 20 30*   CREATININE mg/dL 0.90 1.20   CALCIUM mg/dL 8.4* 10.2   BILIRUBIN mg/dL  --  0.9   ALK PHOS U/L  --  143*   ALT (SGPT) U/L  --  11   AST (SGOT) U/L  --  17   GLUCOSE mg/dL 95 126*                Physical Findings       03/03/18 1200    Physical Findings/Assessment    Additional Documentation Physical Appearance (Group)    Physical Appearance    Overall Physical Appearance generalized wasting              Nutrition Prescription Ordered       03/03/18 1201    Nutrition Prescription PO    Current PO Diet NPO              Problem/Interventions:        Problem 1       03/03/18 1201    Nutrition Diagnoses Problem 1    Problem 1 Unintended Weight Loss    Etiology (related to) Factors Affecting Nutrition    Oral Chewing Difficulty;Swallowing Difficulty    Signs/Symptoms (evidenced by) Report of Mnimal PO Intake;Unintended Weight Change    Unintended Weight Change Loss    Number of Pounds Lost approx 30 lbs was down to  118 lbs per wife's report    Weight loss time period 3 months                    Intervention Goal       03/03/18 1203    Intervention Goal    General Meet nutritional needs for age/condition    PO Establish PO;Tolerate PO    Weight Appropriate weight gain            Nutrition Intervention       03/03/18 1203    Nutrition Intervention    RD/Tech Action Follow Tx progress;Care plan reviewd;Other (comment);Await begin PO   Nsg requested to obtain actual weight; reported on adm as usual wt              Education/Evaluation       03/03/18 1204    Monitor/Evaluation    Monitor Per protocol;I&O;Pertinent labs;Symptoms        Electronically signed by:  Daria Groves RD  03/03/18 12:05 PM

## 2018-03-03 NOTE — THERAPY EVALUATION
Acute Care - Speech Language Pathology   Swallow Initial Evaluation [facility]     Patient Name: Alexander Adan  : 1940  MRN: [id number]  Today's Date: 3/3/2018               Admit Date: 3/2/2018    Visit Dx:     ICD-10-CM ICD-9-CM   1. Parotitis, acute K11.21 527.2   2. Subacute maxillary sinusitis J01.00 461.0   3. Generalized weakness R53.1 780.79   4. Dysphagia, unspecified type R13.10 787.20     Patient Active Problem List   Diagnosis   • Atrial fibrillation   • Chronic coronary artery disease   • Chronic diastolic heart failure   • Chronic obstructive pulmonary disease   • Cognitive impairment, mild, so stated   • Tobacco use   • Fall at home   • CKD (chronic kidney disease), stage III   • Leukocytosis   • Bronchitis   • Vasovagal syncope   • Dehydration   • Hypotension due to dehydration   • PVD (peripheral vascular disease)   • SIRS (systemic inflammatory response syndrome)     Past Medical History:   Diagnosis Date   • Anemia    • Anxiety    • Chronic kidney disease     STAGE II (mild)   • Hyperlipidemia    • Hypertension    • Stroke     LACUNAR. 2013   • Subdural hematoma     Status post hemorrhagic evacuation with sherrill holes 4/3/2015 per Dr. Gilson Brooke.     Past Surgical History:   Procedure Laterality Date   • SHERRILL HOLE FOR SUBDURAL HEMATOMA     • CARDIAC PACEMAKER PLACEMENT      PERMANENT   • CAROTID STENT Left    • CHOLECYSTECTOMY     • CORONARY ARTERY BYPASS GRAFT     • PACEMAKER IMPLANTATION     • THROMBOENDARTERECTOMY      CAROTID, LEFT          SWALLOW EVALUATION (last 72 hours)      Swallow Evaluation       18 1200                Rehab Evaluation    Document Type evaluation  -LR        Subjective Information unable to respond  -LR        General Information    Patient Profile Review yes  -LR        Pertinent History Of Current Problem CT soft tissue neck- soft tissue mass of R parotid gland  -LR        Current Diet Limitations NPO  -LR        Prior Level of Function-  Communication functional in all spheres  -LR        Prior Level of Function- Swallowing no diet consistency restrictions   pt's wife reported difficulty w/ eating & drinking x 1 week  -LR        Plans/Goals Discussed With spouse/S.O.  -LR        Barriers to Rehab cognitive status  -LR        Clinical Impression    Patient's Goals For Discharge patient did not state  -LR        Family Goals For Discharge family did not state  -LR        SLP Swallowing Diagnosis --   could not assess  -LR        SLP Diet Recommendation --   could not assess  -LR        Recommended Diagnostics reassess via clinical swallow (non-instrumental exam)  -LR        Oral Motor Structure and Function    Oral Motor Anatomy and Physiology --   could not assess  -LR        Oral Lesions or Structural Abnormalities --   pt did have white oral lesion on anterior tongue  -LR        Dentition Assessment edentulous, does not have dentures  -LR        Secretion Management WNL/WFL  -LR        Mucosal Quality moist, healthy  -LR        Oral Motor Assessment Comment Pt only minimally opened mouth and was unable to follow oral motor commands. Pt did c/o tongue pain.   -LR        General Feeding/Swallowing Observations    Respiratory Support Currently in Use nasal cannula in use  -LR        Clinical Swallow Exam    Mode of Presentation fed by clinician;spoon  -LR        Summary of Clinical Exam Attempted clinical swallow eval. Pt very confused and following minimal commands. Pt c/o tongue pain and refused to open mouth. Pt did accept ice chip x 1. No difficulty with ice chip noted. No overt s/s of aspiration with ice chip x 1. RN notified of tongue pain. RN medicated pt and contacted SLP to re-evaluate. After pt was medicated, pt denied any tongue pain, but continued to refuse to open mouth. Pt refused all PO trials despite max cues. Unable to evaluate swallow at this time due to pt refusal to participate. Will attempt to evaluate swallow tomorrow.   -LR         Swallow Recommendations    Oral Care oral care with toothbrush and dentifrice BID and PRN   pt refused oral care at time of the eval  -LR          User Key  (r) = Recorded By, (t) = Taken By, (c) = Cosigned By    Initials Name Effective Dates    LR MS REJI MuhammadSLP 06/22/15 -         EDUCATION  The patient has been educated in the following areas:   Dysphagia (Swallowing Impairment).    SLP Recommendation and Plan  SLP Swallowing Diagnosis:  (could not assess)  SLP Diet Recommendation:  (could not assess)           Recommended Diagnostics: reassess via clinical swallow (non-instrumental exam)                         Plan of Care Review  Plan Of Care Reviewed With: patient, spouse  Progress:  (initial eval)  Outcome Summary/Follow up Plan: Attempted clinical swallow eval. Pt very confused and following minimal commands. Pt c/o tongue pain and refused to open mouth. Pt did accept ice chip x 1. No difficulty with ice chip noted. No overt s/s of aspiration with ice chip x 1. RN notified of tongue pain. RN medicated pt and contacted SLP to re-evaluate. After pt was medicated, pt denied any tongue pain, but continued to refuse to open mouth. Pt refused all PO trials despite max cues. Unable to evaluate swallow at this time due to pt refusal to participate. Will attempt to evaluate swallow tomorrow.              Time Calculation:         Time Calculation- SLP       03/03/18 1534 03/03/18 1341       Time Calculation- SLP    SLP Start Time 1200  -LR 1200  -LR     SLP Received On 03/03/18  -LR 03/03/18  -LR       User Key  (r) = Recorded By, (t) = Taken By, (c) = Cosigned By    Initials Name Provider Type    LR Charlotte Mccormick MS CCC-SLP Speech and Language Pathologist          Therapy Charges for Today     Code Description Service Date Service Provider Modifiers Qty    [id number]  ST EVAL ORAL PHARYNG SWALLOW 4 3/3/2018 Charlotte Mccormick MS CCC-SLP GN 1    [id number] HC ST EVAL ORAL PHARYNG SWALLOW 4 3/3/2018  Charlotte Mccormick, MS CCC-SLP GN 1               Charlotte Mccormick, MS CASANOVA-SLP  3/3/2018

## 2018-03-03 NOTE — PLAN OF CARE
Problem: Arrhythmia/Dysrhythmia (Symptomatic) (Adult)  Goal: Signs and Symptoms of Listed Potential Problems Will be Absent or Manageable (Arrhythmia/Dysrhythmia)  Outcome: Ongoing (interventions implemented as appropriate)  Patient continues in A-Fib.  Unable to take PO medications, Dr Yu aware.  Heart rate 100-115s, Dr. Yu wants to be notified if heart rate rises into the 140s and sustained.     03/03/18 1430   Arrhythmia/Dysrhythmia (Symptomatic)   Problems Assessed (Arrhythmia/Dysrhythmia) electrophysiological conduction defect;hypoxia/hypoxemia;chest pain (angina);situational response   Problems Present (Arrhythmia/Dysrhythmia) none       Problem: Nutrition, Imbalanced: Inadequate Oral Intake (Adult)  Goal: Identify Related Risk Factors and Signs and Symptoms  Outcome: Ongoing (interventions implemented as appropriate)  Patient NPO, would not participate with SLP r/t pain in face.  PRN Morphine given and patient still would not participate.  IV fluids infusing.     03/03/18 1430   Nutrition, Imbalanced: Inadequate Oral Intake   Signs and Symptoms (Nutrition Imbalance, Inadequate Oral Intake: Signs and Symptoms) other (see comments)     Goal: Improved Oral Intake  Outcome: Ongoing (interventions implemented as appropriate)  Patient currently is NPO.   03/03/18 1430   Nutrition, Imbalanced: Inadequate Oral Intake (Adult)   Improved Oral Intake other (see comments)     Goal: Prevent Further Weight Loss  Outcome: Ongoing (interventions implemented as appropriate)  Patient NPO   03/03/18 1430   Nutrition, Imbalanced: Inadequate Oral Intake (Adult)   Prevent Further Weight Loss other (see comments)       Problem: Infection, Risk/Actual (Adult)  Goal: Identify Related Risk Factors and Signs and Symptoms  Outcome: Ongoing (interventions implemented as appropriate)  Patient continues with IV fluids and IV antibiotics.   03/03/18 1430   Infection, Risk/Actual   Infection, Risk/Actual: Related Risk Factors age  extremes;exposure to microbes;malnutrition;medication effects;skin integrity impairment;tissue perfusion altered;treatment plan   Signs and Symptoms (Infection, Risk/Actual) heart rate increase;lab value changes;mental/behavioral changes;pain;weakness     Goal: Infection Prevention/Resolution  Outcome: Ongoing (interventions implemented as appropriate)  No change at this time.     03/03/18 1430   Infection, Risk/Actual (Adult)   Infection Prevention/Resolution other (see comments)       Problem: Skin Integrity Impairment, Risk/Actual (Adult)  Goal: Identify Related Risk Factors and Signs and Symptoms  Outcome: Ongoing (interventions implemented as appropriate)  Patient with skin tears, bruises, scabs, pressure sores and abrasions.  WOC are aware and an air mattress has been ordered.   03/03/18 1430   Skin Integrity Impairment, Risk/Actual   Skin Integrity Impairment, Risk/Actual: Related Risk Factors age extremes;cognitive impairment;environmental exposure;fluid/nutrition status;immobility;infection/disease process;mechanical factors;medication effects;metabolic imbalance;moisture;sensory impairment;tissue perfusion impaired   Signs and Symptoms (Skin Integrity Impairment) other (see comments)     Goal: Skin Integrity/Wound Healing  Outcome: Ongoing (interventions implemented as appropriate)  Patient is NPO and is resting on a waffle mattress, awaiting an air mattress.   03/03/18 1430   Skin Integrity Impairment, Risk/Actual (Adult)   Skin Integrity/Wound Healing other (see comments)       Problem: Fall Risk (Adult)  Goal: Identify Related Risk Factors and Signs and Symptoms  Outcome: Ongoing (interventions implemented as appropriate)  Bed alarm has been in use.   03/03/18 1430   Fall Risk   Fall Risk: Related Risk Factors age-related changes;bladder function altered;confusion/agitation;culprit medication(s);depression/anxiety;fatigue/slow reaction;gait/mobility problems;history of falls;impaired vision;inadequate  lighting;objects hard to reach;sensory deficits;sleep pattern alteration;environment unfamiliar   Fall Risk: Signs and Symptoms presence of risk factors     Goal: Absence of Falls  Outcome: Ongoing (interventions implemented as appropriate)

## 2018-03-03 NOTE — PLAN OF CARE
Problem: Nutrition, Imbalanced: Inadequate Oral Intake (Adult)  Goal: Identify Related Risk Factors and Signs and Symptoms  Outcome: Ongoing (interventions implemented as appropriate)   03/03/18 0446   Nutrition, Imbalanced: Inadequate Oral Intake   Nutrition Imbalanced: Less than Body Requirements: Related Risk Factors appetite decreased   Signs and Symptoms (Nutrition Imbalance, Inadequate Oral Intake: Signs and Symptoms) irritability/confusion;weakness/lethargy;wound healing poor       Problem: Skin Integrity Impairment, Risk/Actual (Adult)  Goal: Identify Related Risk Factors and Signs and Symptoms  Outcome: Ongoing (interventions implemented as appropriate)   03/03/18 0446   Skin Integrity Impairment, Risk/Actual   Skin Integrity Impairment, Risk/Actual: Related Risk Factors age extremes;fluid/nutrition status;infection/disease process   Signs and Symptoms (Skin Integrity Impairment) erythema nonblanchable;inflammation;eschar and/or slough       Problem: Fall Risk (Adult)  Goal: Absence of Falls  Outcome: Ongoing (interventions implemented as appropriate)   03/03/18 0446   Fall Risk (Adult)   Absence of Falls achieves outcome

## 2018-03-04 ENCOUNTER — APPOINTMENT (OUTPATIENT)
Dept: GENERAL RADIOLOGY | Facility: HOSPITAL | Age: 78
End: 2018-03-04

## 2018-03-04 LAB
ALBUMIN SERPL-MCNC: 2.7 G/DL (ref 3.2–4.8)
ALBUMIN/GLOB SERPL: 0.9 G/DL (ref 1.5–2.5)
ALP SERPL-CCNC: 85 U/L (ref 25–100)
ALT SERPL W P-5'-P-CCNC: 10 U/L (ref 7–40)
ANION GAP SERPL CALCULATED.3IONS-SCNC: 5 MMOL/L (ref 3–11)
AST SERPL-CCNC: 19 U/L (ref 0–33)
BASOPHILS # BLD AUTO: 0.04 10*3/MM3 (ref 0–0.2)
BASOPHILS NFR BLD AUTO: 0.3 % (ref 0–1)
BILIRUB SERPL-MCNC: 0.2 MG/DL (ref 0.3–1.2)
BNP SERPL-MCNC: 1965 PG/ML (ref 0–100)
BUN BLD-MCNC: 14 MG/DL (ref 9–23)
BUN/CREAT SERPL: 17.5 (ref 7–25)
CALCIUM SPEC-SCNC: 8.6 MG/DL (ref 8.7–10.4)
CEA SERPL-MCNC: <0.5 NG/ML (ref 0–2.5)
CHLORIDE SERPL-SCNC: 120 MMOL/L (ref 99–109)
CO2 SERPL-SCNC: 17 MMOL/L (ref 20–31)
CREAT BLD-MCNC: 0.8 MG/DL (ref 0.6–1.3)
D-LACTATE SERPL-SCNC: 2.3 MMOL/L (ref 0.5–2)
DEPRECATED RDW RBC AUTO: 62.6 FL (ref 37–54)
EOSINOPHIL # BLD AUTO: 0.04 10*3/MM3 (ref 0–0.3)
EOSINOPHIL NFR BLD AUTO: 0.3 % (ref 0–3)
ERYTHROCYTE [DISTWIDTH] IN BLOOD BY AUTOMATED COUNT: 17.5 % (ref 11.3–14.5)
FLUAV SUBTYP SPEC NAA+PROBE: NOT DETECTED
FLUBV RNA ISLT QL NAA+PROBE: NOT DETECTED
GFR SERPL CREATININE-BSD FRML MDRD: 93 ML/MIN/1.73
GLOBULIN UR ELPH-MCNC: 3.1 GM/DL
GLUCOSE BLD-MCNC: 112 MG/DL (ref 70–100)
HCT VFR BLD AUTO: 36.2 % (ref 38.9–50.9)
HGB BLD-MCNC: 11.4 G/DL (ref 13.1–17.5)
IMM GRANULOCYTES # BLD: 0.26 10*3/MM3 (ref 0–0.03)
IMM GRANULOCYTES NFR BLD: 1.7 % (ref 0–0.6)
LYMPHOCYTES # BLD AUTO: 1.52 10*3/MM3 (ref 0.6–4.8)
LYMPHOCYTES NFR BLD AUTO: 9.8 % (ref 24–44)
MAGNESIUM SERPL-MCNC: 1.9 MG/DL (ref 1.3–2.7)
MCH RBC QN AUTO: 30.8 PG (ref 27–31)
MCHC RBC AUTO-ENTMCNC: 31.5 G/DL (ref 32–36)
MCV RBC AUTO: 97.8 FL (ref 80–99)
MONOCYTES # BLD AUTO: 0.8 10*3/MM3 (ref 0–1)
MONOCYTES NFR BLD AUTO: 5.2 % (ref 0–12)
NEUTROPHILS # BLD AUTO: 12.78 10*3/MM3 (ref 1.5–8.3)
NEUTROPHILS NFR BLD AUTO: 82.7 % (ref 41–71)
PHOSPHATE SERPL-MCNC: 1.9 MG/DL (ref 2.4–5.1)
PLATELET # BLD AUTO: 215 10*3/MM3 (ref 150–450)
PMV BLD AUTO: 10.9 FL (ref 6–12)
POTASSIUM BLD-SCNC: 3.5 MMOL/L (ref 3.5–5.5)
POTASSIUM BLD-SCNC: 4.7 MMOL/L (ref 3.5–5.5)
PROT SERPL-MCNC: 5.8 G/DL (ref 5.7–8.2)
RBC # BLD AUTO: 3.7 10*6/MM3 (ref 4.2–5.76)
SODIUM BLD-SCNC: 142 MMOL/L (ref 132–146)
VANCOMYCIN TROUGH SERPL-MCNC: 15.7 MCG/ML (ref 10–20)
WBC NRBC COR # BLD: 15.44 10*3/MM3 (ref 3.5–10.8)

## 2018-03-04 PROCEDURE — 86735 MUMPS ANTIBODY: CPT | Performed by: INTERNAL MEDICINE

## 2018-03-04 PROCEDURE — 71045 X-RAY EXAM CHEST 1 VIEW: CPT

## 2018-03-04 PROCEDURE — 02HV33Z INSERTION OF INFUSION DEVICE INTO SUPERIOR VENA CAVA, PERCUTANEOUS APPROACH: ICD-10-PCS | Performed by: INTERNAL MEDICINE

## 2018-03-04 PROCEDURE — 84100 ASSAY OF PHOSPHORUS: CPT | Performed by: INTERNAL MEDICINE

## 2018-03-04 PROCEDURE — 99233 SBSQ HOSP IP/OBS HIGH 50: CPT | Performed by: INTERNAL MEDICINE

## 2018-03-04 PROCEDURE — 94640 AIRWAY INHALATION TREATMENT: CPT

## 2018-03-04 PROCEDURE — 85025 COMPLETE CBC W/AUTO DIFF WBC: CPT | Performed by: INTERNAL MEDICINE

## 2018-03-04 PROCEDURE — 25010000002 HEPARIN (PORCINE) PER 1000 UNITS: Performed by: INTERNAL MEDICINE

## 2018-03-04 PROCEDURE — 84132 ASSAY OF SERUM POTASSIUM: CPT | Performed by: INTERNAL MEDICINE

## 2018-03-04 PROCEDURE — 83605 ASSAY OF LACTIC ACID: CPT | Performed by: INTERNAL MEDICINE

## 2018-03-04 PROCEDURE — 84153 ASSAY OF PSA TOTAL: CPT | Performed by: INTERNAL MEDICINE

## 2018-03-04 PROCEDURE — 83735 ASSAY OF MAGNESIUM: CPT | Performed by: INTERNAL MEDICINE

## 2018-03-04 PROCEDURE — 92610 EVALUATE SWALLOWING FUNCTION: CPT

## 2018-03-04 PROCEDURE — 25010000002 VANCOMYCIN PER 500 MG

## 2018-03-04 PROCEDURE — 80202 ASSAY OF VANCOMYCIN: CPT

## 2018-03-04 PROCEDURE — 94799 UNLISTED PULMONARY SVC/PX: CPT

## 2018-03-04 PROCEDURE — C1894 INTRO/SHEATH, NON-LASER: HCPCS

## 2018-03-04 PROCEDURE — 83880 ASSAY OF NATRIURETIC PEPTIDE: CPT | Performed by: INTERNAL MEDICINE

## 2018-03-04 PROCEDURE — C1751 CATH, INF, PER/CENT/MIDLINE: HCPCS

## 2018-03-04 PROCEDURE — 82378 CARCINOEMBRYONIC ANTIGEN: CPT | Performed by: INTERNAL MEDICINE

## 2018-03-04 PROCEDURE — 80053 COMPREHEN METABOLIC PANEL: CPT | Performed by: INTERNAL MEDICINE

## 2018-03-04 PROCEDURE — 84154 ASSAY OF PSA FREE: CPT | Performed by: INTERNAL MEDICINE

## 2018-03-04 PROCEDURE — 25810000003 DEXTROSE 5 % WITH KCL 20 MEQ 20-5 MEQ/L-% SOLUTION: Performed by: INTERNAL MEDICINE

## 2018-03-04 PROCEDURE — 87502 INFLUENZA DNA AMP PROBE: CPT | Performed by: INTERNAL MEDICINE

## 2018-03-04 RX ORDER — POTASSIUM CHLORIDE 7.45 MG/ML
10 INJECTION INTRAVENOUS
Status: DISCONTINUED | OUTPATIENT
Start: 2018-03-04 | End: 2018-03-04 | Stop reason: SDUPTHER

## 2018-03-04 RX ORDER — CASTOR OIL AND BALSAM, PERU 788; 87 MG/G; MG/G
OINTMENT TOPICAL EVERY 12 HOURS SCHEDULED
Status: DISCONTINUED | OUTPATIENT
Start: 2018-03-04 | End: 2018-03-13 | Stop reason: HOSPADM

## 2018-03-04 RX ORDER — MAGNESIUM SULFATE HEPTAHYDRATE 40 MG/ML
4 INJECTION, SOLUTION INTRAVENOUS AS NEEDED
Status: DISCONTINUED | OUTPATIENT
Start: 2018-03-04 | End: 2018-03-13 | Stop reason: HOSPADM

## 2018-03-04 RX ORDER — VANCOMYCIN HYDROCHLORIDE 1 G/200ML
15 INJECTION, SOLUTION INTRAVENOUS EVERY 24 HOURS
Status: DISCONTINUED | OUTPATIENT
Start: 2018-03-04 | End: 2018-03-12 | Stop reason: SDUPTHER

## 2018-03-04 RX ORDER — GABAPENTIN 250 MG/5ML
600 SOLUTION ORAL EVERY 8 HOURS SCHEDULED
Status: DISCONTINUED | OUTPATIENT
Start: 2018-03-04 | End: 2018-03-06 | Stop reason: CLARIF

## 2018-03-04 RX ORDER — VANCOMYCIN HYDROCHLORIDE 1 G/200ML
15 INJECTION, SOLUTION INTRAVENOUS EVERY 24 HOURS
Status: DISCONTINUED | OUTPATIENT
Start: 2018-03-05 | End: 2018-03-04

## 2018-03-04 RX ORDER — MAGNESIUM SULFATE HEPTAHYDRATE 40 MG/ML
2 INJECTION, SOLUTION INTRAVENOUS AS NEEDED
Status: DISCONTINUED | OUTPATIENT
Start: 2018-03-04 | End: 2018-03-13 | Stop reason: HOSPADM

## 2018-03-04 RX ORDER — SODIUM CHLORIDE 0.9 % (FLUSH) 0.9 %
10 SYRINGE (ML) INJECTION AS NEEDED
Status: DISCONTINUED | OUTPATIENT
Start: 2018-03-04 | End: 2018-03-13 | Stop reason: HOSPADM

## 2018-03-04 RX ORDER — VANCOMYCIN HYDROCHLORIDE 1 G/200ML
15 INJECTION, SOLUTION INTRAVENOUS EVERY 24 HOURS
Status: DISCONTINUED | OUTPATIENT
Start: 2018-03-04 | End: 2018-03-04

## 2018-03-04 RX ORDER — POTASSIUM CHLORIDE 1.5 G/1.77G
40 POWDER, FOR SOLUTION ORAL AS NEEDED
Status: DISCONTINUED | OUTPATIENT
Start: 2018-03-04 | End: 2018-03-13 | Stop reason: HOSPADM

## 2018-03-04 RX ORDER — SOTALOL HYDROCHLORIDE 80 MG/1
120 TABLET ORAL EVERY 12 HOURS SCHEDULED
Status: DISCONTINUED | OUTPATIENT
Start: 2018-03-04 | End: 2018-03-09

## 2018-03-04 RX ORDER — POTASSIUM CHLORIDE 750 MG/1
40 CAPSULE, EXTENDED RELEASE ORAL AS NEEDED
Status: DISCONTINUED | OUTPATIENT
Start: 2018-03-04 | End: 2018-03-13 | Stop reason: HOSPADM

## 2018-03-04 RX ADMIN — CASTOR OIL AND BALSAM, PERU: 788; 87 OINTMENT TOPICAL at 22:30

## 2018-03-04 RX ADMIN — CILOSTAZOL 50 MG: 50 TABLET ORAL at 10:01

## 2018-03-04 RX ADMIN — WATER 2 G: 1 INJECTION INTRAMUSCULAR; INTRAVENOUS; SUBCUTANEOUS at 06:20

## 2018-03-04 RX ADMIN — POTASSIUM CHLORIDE 40 MEQ: 750 CAPSULE, EXTENDED RELEASE ORAL at 10:02

## 2018-03-04 RX ADMIN — Medication: at 11:00

## 2018-03-04 RX ADMIN — WATER 2 G: 1 INJECTION INTRAMUSCULAR; INTRAVENOUS; SUBCUTANEOUS at 17:00

## 2018-03-04 RX ADMIN — PANTOPRAZOLE SODIUM 40 MG: 40 TABLET, DELAYED RELEASE ORAL at 16:59

## 2018-03-04 RX ADMIN — ASPIRIN 81 MG 81 MG: 81 TABLET ORAL at 10:02

## 2018-03-04 RX ADMIN — SUCRALFATE 1 G: 1 TABLET ORAL at 16:59

## 2018-03-04 RX ADMIN — VANCOMYCIN HYDROCHLORIDE 1000 MG: 1 INJECTION, SOLUTION INTRAVENOUS at 17:10

## 2018-03-04 RX ADMIN — PANTOPRAZOLE SODIUM 40 MG: 40 TABLET, DELAYED RELEASE ORAL at 10:01

## 2018-03-04 RX ADMIN — HEPARIN SODIUM 5000 UNITS: 5000 INJECTION, SOLUTION INTRAVENOUS; SUBCUTANEOUS at 22:27

## 2018-03-04 RX ADMIN — GABAPENTIN 600 MG: 250 SOLUTION ORAL at 22:26

## 2018-03-04 RX ADMIN — IPRATROPIUM BROMIDE AND ALBUTEROL SULFATE 3 ML: 2.5; .5 SOLUTION RESPIRATORY (INHALATION) at 17:22

## 2018-03-04 RX ADMIN — ALBUTEROL SULFATE 2.5 MG: 2.5 SOLUTION RESPIRATORY (INHALATION) at 12:42

## 2018-03-04 RX ADMIN — SUCRALFATE 1 G: 1 TABLET ORAL at 10:01

## 2018-03-04 RX ADMIN — HEPARIN SODIUM 5000 UNITS: 5000 INJECTION, SOLUTION INTRAVENOUS; SUBCUTANEOUS at 06:20

## 2018-03-04 RX ADMIN — WATER 2 G: 1 INJECTION INTRAMUSCULAR; INTRAVENOUS; SUBCUTANEOUS at 17:05

## 2018-03-04 RX ADMIN — GABAPENTIN 600 MG: 300 CAPSULE ORAL at 14:21

## 2018-03-04 RX ADMIN — CASTOR OIL AND BALSAM, PERU: 788; 87 OINTMENT TOPICAL at 17:05

## 2018-03-04 RX ADMIN — SOTALOL HYDROCHLORIDE 120 MG: 80 TABLET ORAL at 15:13

## 2018-03-04 RX ADMIN — POTASSIUM CHLORIDE 40 MEQ: 750 CAPSULE, EXTENDED RELEASE ORAL at 14:21

## 2018-03-04 RX ADMIN — METOPROLOL TARTRATE 2.5 MG: 5 INJECTION INTRAVENOUS at 06:20

## 2018-03-04 RX ADMIN — POTASSIUM CHLORIDE AND DEXTROSE MONOHYDRATE 100 ML/HR: 150; 5 INJECTION, SOLUTION INTRAVENOUS at 20:45

## 2018-03-04 RX ADMIN — MAGNESIUM SULFATE IN WATER 4 G: 40 INJECTION, SOLUTION INTRAVENOUS at 17:04

## 2018-03-04 RX ADMIN — METRONIDAZOLE 500 MG: 500 INJECTION, SOLUTION INTRAVENOUS at 17:06

## 2018-03-04 RX ADMIN — POTASSIUM CHLORIDE AND DEXTROSE MONOHYDRATE 100 ML/HR: 150; 5 INJECTION, SOLUTION INTRAVENOUS at 00:23

## 2018-03-04 RX ADMIN — IPRATROPIUM BROMIDE AND ALBUTEROL SULFATE 3 ML: 2.5; .5 SOLUTION RESPIRATORY (INHALATION) at 20:26

## 2018-03-04 RX ADMIN — IPRATROPIUM BROMIDE AND ALBUTEROL SULFATE 3 ML: 2.5; .5 SOLUTION RESPIRATORY (INHALATION) at 12:44

## 2018-03-04 RX ADMIN — DOXYCYCLINE 100 MG: 100 INJECTION, POWDER, LYOPHILIZED, FOR SOLUTION INTRAVENOUS at 17:04

## 2018-03-04 RX ADMIN — NICOTINE 1 PATCH: 14 PATCH TRANSDERMAL at 08:22

## 2018-03-04 RX ADMIN — HEPARIN SODIUM 5000 UNITS: 5000 INJECTION, SOLUTION INTRAVENOUS; SUBCUTANEOUS at 14:21

## 2018-03-04 RX ADMIN — POTASSIUM PHOSPHATE, MONOBASIC AND POTASSIUM PHOSPHATE, DIBASIC 15 MMOL: 224; 236 INJECTION, SOLUTION INTRAVENOUS at 18:01

## 2018-03-04 RX ADMIN — IPRATROPIUM BROMIDE AND ALBUTEROL SULFATE 3 ML: 2.5; .5 SOLUTION RESPIRATORY (INHALATION) at 08:50

## 2018-03-04 RX ADMIN — CILOSTAZOL 50 MG: 50 TABLET ORAL at 22:20

## 2018-03-04 NOTE — PLAN OF CARE
Problem: Arrhythmia/Dysrhythmia (Symptomatic) (Adult)  Goal: Signs and Symptoms of Listed Potential Problems Will be Absent or Manageable (Arrhythmia/Dysrhythmia)  Outcome: Ongoing (interventions implemented as appropriate)   03/04/18 0449   Arrhythmia/Dysrhythmia (Symptomatic)   Problems Assessed (Arrhythmia/Dysrhythmia) electrophysiological conduction defect;hypoxia/hypoxemia;chest pain (angina);situational response   Problems Present (Arrhythmia/Dysrhythmia) none       Problem: Skin Integrity Impairment, Risk/Actual (Adult)  Goal: Identify Related Risk Factors and Signs and Symptoms   03/04/18 0449   Skin Integrity Impairment, Risk/Actual   Skin Integrity Impairment, Risk/Actual: Related Risk Factors age extremes;fluid/nutrition status;infection/disease process   Signs and Symptoms (Skin Integrity Impairment) other (see comments)  (several presurre ulcers and skin tears)

## 2018-03-04 NOTE — PLAN OF CARE
Problem: Arrhythmia/Dysrhythmia (Symptomatic) (Adult)  Goal: Signs and Symptoms of Listed Potential Problems Will be Absent or Manageable (Arrhythmia/Dysrhythmia)  Outcome: Ongoing (interventions implemented as appropriate)  Continues in A-Fib   03/04/18 1348   Arrhythmia/Dysrhythmia (Symptomatic)   Problems Present (Arrhythmia/Dysrhythmia) other (see comments)       Problem: Nutrition, Imbalanced: Inadequate Oral Intake (Adult)  Goal: Identify Related Risk Factors and Signs and Symptoms  Outcome: Ongoing (interventions implemented as appropriate)  Patient has been ok'd by speech to have a pureed diet with thin liquids.     03/04/18 1348   Nutrition, Imbalanced: Inadequate Oral Intake   Signs and Symptoms (Nutrition Imbalance, Inadequate Oral Intake: Signs and Symptoms) other (see comments)     Goal: Improved Oral Intake  Outcome: Ongoing (interventions implemented as appropriate)    Goal: Prevent Further Weight Loss  Outcome: Ongoing (interventions implemented as appropriate)      Problem: Infection, Risk/Actual (Adult)  Goal: Identify Related Risk Factors and Signs and Symptoms  Outcome: Ongoing (interventions implemented as appropriate)  PICC line placed today r/t loss of IV access.  ID saw the patient and ordered ABT.  Awaiting CXR to verify placement of the Picc Line.   03/04/18 1348   Infection, Risk/Actual   Signs and Symptoms (Infection, Risk/Actual) other (see comments)     Goal: Infection Prevention/Resolution  Outcome: Ongoing (interventions implemented as appropriate)      Problem: Skin Integrity Impairment, Risk/Actual (Adult)  Goal: Identify Related Risk Factors and Signs and Symptoms  Outcome: Ongoing (interventions implemented as appropriate)  Patient seen by WOC today, see new orders.  Goal: Skin Integrity/Wound Healing  Outcome: Ongoing (interventions implemented as appropriate)      Problem: Patient Care Overview (Adult)  Goal: Plan of Care Review  Outcome: Ongoing (interventions implemented as  appropriate)    Goal: Adult Individualization and Mutuality  Outcome: Ongoing (interventions implemented as appropriate)    Goal: Discharge Needs Assessment  Outcome: Ongoing (interventions implemented as appropriate)

## 2018-03-04 NOTE — PROGRESS NOTES
[facility] Medicine Services  PROGRESS NOTE    Patient Name: Alexander Adan  : 1940  MRN: [id number]    Date of Admission: 3/2/2018  Length of Stay: 2  Primary Care Physician: Harrison Smart MD    Subjective   Subjective     CC:Right facial swelling, weight loss    Subjective:  Resting in bed in no acute distress.  He has improved significantly since yesterday and today he is much more alert and oriented.  Denies any specific pain or shortness of breath.  No fever or chills.  No chest pain or palpitation or shortness of breath address.  No nausea, vomiting, diarrhea, abdominal pain.    Review of Systems  Unable to obtain        Objective   Objective     Vital Signs:   Temp:  [97.5 °F (36.4 °C)-98.3 °F (36.8 °C)] 97.5 °F (36.4 °C)  Heart Rate:  [103-138] 138  Resp:  [14-18] 18  BP: (108-115)/(67-92) 115/89        Physical Exam:  Constitutional: No acute distress, very cachectic and frail  Eyes: PERRLA, sclerae anicteric, no conjunctival injection  HENT: NCAT, mucous membranes moist.  Right submandibular swelling most likely she Parotid gland swelling.  Neck: Supple, no thyromegaly, no lymphadenopathy, trachea midline  Respiratory: Clear to auscultation bilaterally, nonlabored respirations   Cardiovascular: RRR, no murmurs, rubs, or gallops, palpable pedal pulses bilaterally  Gastrointestinal: Positive bowel sounds, soft, nontender, nondistended  Musculoskeletal: Very cachectic No bilateral ankle edema, no clubbing or cyanosis to extremities  Neurologic: Awake, alert, follows commands.  Patient moves all the limbs.  Skin: No rashes    Results Reviewed:  I have personally reviewed current lab, radiology, and data and agree.      Results from last 7 days  Lab Units 18  0519 18  0645 18  1100   WBC 10*3/mm3 15.44* 19.34* 28.06*   HEMOGLOBIN g/dL 11.4* 10.9* 13.9   HEMATOCRIT % 36.2* 34.8* 42.5   PLATELETS 10*3/mm3 215 202 272       Results from last 7 days  Lab  Units 03/04/18  0519 03/03/18  0645 03/02/18  1100   SODIUM mmol/L 142 148* 144   POTASSIUM mmol/L 3.5 3.3* 3.3*   CHLORIDE mmol/L 120* 121* 113*   CO2 mmol/L 17.0* 20.0 22.0   BUN mg/dL 14 20 30*   CREATININE mg/dL 0.80 0.90 1.20   GLUCOSE mg/dL 112* 95 126*   CALCIUM mg/dL 8.6* 8.4* 10.2   ALT (SGPT) U/L 10  --  11   AST (SGOT) U/L 19  --  17     Estimated Creatinine Clearance: 70.8 mL/min (by C-G formula based on Cr of 0.8).  BNP   Date Value Ref Range Status   03/04/2018 1965.0 (H) 0.0 - 100.0 pg/mL Final     Comment:     Results may be falsely decreased if patient taking Biotin.     No results found for: PHART    Microbiology Results Abnormal     Procedure Component Value - Date/Time    Influenza A & B, RT PCR - Swab, Nasopharynx [[id number]]  (Normal) Collected:  03/04/18 1424    Lab Status:  Final result Specimen:  Swab from Nasopharynx Updated:  03/04/18 1542     Influenza A PCR Not Detected     Influenza B PCR Not Detected    Blood Culture - Blood, [[id number]] Collected:  03/02/18 1235    Lab Status:  Preliminary result Specimen:  Blood from Arm, Left Updated:  03/04/18 1401     Blood Culture No growth at 2 days      Aerobic bottle only    Blood Culture - Blood, [[id number]]  (Normal) Collected:  03/02/18 1305    Lab Status:  Preliminary result Specimen:  Blood from Arm, Left Updated:  03/04/18 1401     Blood Culture No growth at 2 days    Blood Culture - Blood, [[id number]]  (Normal) Collected:  03/02/18 1231    Lab Status:  Preliminary result Specimen:  Blood from Hand, Right Updated:  03/03/18 1746     Blood Culture No growth at 24 hours          Imaging Results (last 24 hours)     Procedure Component Value Units Date/Time    XR Chest 1 View [[id number]] Updated:  03/04/18 1413        Results for orders placed during the hospital encounter of 02/07/18   Adult Transthoracic Echo Complete W/ Cont if Necessary Per Protocol    Narrative · Left ventricular wall thickness is consistent with mild concentric    hypertrophy.  · The following left ventricular wall segments are hypokinetic: basal   inferoseptal, mid inferoseptal and basal inferoseptal.  · Moderately reduced right ventricular systolic function noted.  · Left atrial cavity size is moderately dilated.  · Mild-to-moderate mitral valve regurgitation is present  · Mild tricuspid valve regurgitation is present.  · Left ventricular systolic function is normal. Estimated EF = 60%.  · there is akinesis of base of septum and base of inferior wall          I have reviewed the medications.    Assessment/Plan   Assessment / Plan     Hospital Problem List     * (Principal)SIRS (systemic inflammatory response syndrome)    Atrial fibrillation    Overview Signed 6/20/2016  3:33 PM by DENI Gates     Description: chads vasc 7         Chronic coronary artery disease    Overview Signed 6/20/2016  3:33 PM by DENI Gates     Description: cabg 2004, data deficient         Chronic diastolic heart failure    Overview Signed 6/20/2016  3:33 PM by DENI Gates     Description: NYHA II-III  Per echo: EF 50% September 2013         Chronic obstructive pulmonary disease    CKD (chronic kidney disease), stage III    Leukocytosis    PVD (peripheral vascular disease)             Brief Hospital Course to date:  Alexander Adan is a 77 y.o. male       Assessment & Plan:  *Right sided facial swelling most likely parotitis. Very significant improvement with IV antibiotics.    * CT evidence of pneumonia possibly even aspiration pneumonia.    * Sepsis currently patient is also on IV antibiotic.    * Severe weight loss and cachexia.  Etiology is unknown.    * Atrial fibrillation with rapid ventricular response    PLAN:  - DC IV metoprolol continue home dose sotolol.    - PICC line    - ID consult    -cardiology consult in am    -labs in am.    DVT Prophylaxis:      CODE STATUS: DNR    Disposition:TBD.      Electronically signed by Anderson Yu MD, 03/04/18, 4:28  PM.

## 2018-03-04 NOTE — PLAN OF CARE
Problem: Patient Care Overview (Adult)  Goal: Plan of Care Review  Outcome: Ongoing (interventions implemented as appropriate)   03/04/18 1307   Coping/Psychosocial Response Interventions   Plan Of Care Reviewed With patient;family   Patient Care Overview   Progress improving   Outcome Evaluation   Outcome Summary/Follow up Plan WOC consult for muliple skin issues. 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 4x2x.2cm Per daughter, at bedside,  bilaeral heels are arteral ulcers from blockages in leg. Not pressure injuries.  Both are 100% necortic tissue. very painful. Pt does move his feet/legs. Betaddine applied with dry dressing - apply betadine daily, dressing applied for protection.  Currently on a LEA. Pt very jj with muscle wasting noted. Small skin tear to left forearm, clean with NS xeroform and rolled gauze applied - change every other day.  WOC nurse to follow.  Please reconsult if new issues arise.

## 2018-03-04 NOTE — CONSULTS
Alexander Adan  1940  [id number]    Date of Consult: 3/4/2018    Admit Date:  3/2/2018      Requesting Provider: No ref. provider found  Evaluating Physician: Enoch Dong MD    Chief Complaint: Right facial pain and dry cough    Reason for Consultation: Right parotitis and pneumonia    History of present illness:    Patient is a 77 y.o.  Yr old male with history of numerous comorbid conditions including past stroke/subdural hematoma and evacuation with bur hole 2015 with chronic kidney disease/debility.  Family reports chronic dysphagia symptoms such that he coughs with liquid/solids but no specific esophageal diagnosis.  He is admitted on March 2 with 3 days poor by mouth intake, generalized malaise with dry cough and right facial/parotid pain.      Patient is sleepy but answers no to all questions asked.  Reliability not entirely clear as he has had encephalopathy surrounding admission.  However, no headache photophobia or neck stiffness.  No chest pain.  No productive cough at present although has been intermittently productive at times.  No dysuria hematuria or pyuria.  No skin rash.  No diarrhea or abdominal pain.  He is constipated per family.  No hematochezia melena or hematemesis.    No raw or undercooked food.  No unpasteurized milk or milk products.  No animal insect or arthropod exposure.  No tick bites.  No outdoor camping or hunting exposure.  No travel exposure.  No ill exposure.  No history of TB or TB exposure.   Denies a history of MRSA/VRE and no history of C. difficile or ESBL/KPC  organisms.    Past Medical History:   Diagnosis Date   • Anemia    • Anxiety    • Chronic kidney disease     STAGE II (mild)   • Hyperlipidemia    • Hypertension    • Stroke     LACUNAR. SEPT 2013   • Subdural hematoma     Status post hemorrhagic evacuation with india holes 4/3/2015 per Dr. Gilson Brooke.       Past Surgical History:   Procedure Laterality Date   • INDIA HOLE FOR SUBDURAL HEMATOMA     •  CARDIAC PACEMAKER PLACEMENT      PERMANENT   • CAROTID STENT Left    • CHOLECYSTECTOMY     • CORONARY ARTERY BYPASS GRAFT     • PACEMAKER IMPLANTATION     • THROMBOENDARTERECTOMY      CAROTID, LEFT       Pediatric History   Patient Guardian Status   • Not on file.     Other Topics Concern   • Not on file     Social History Narrative    Pt consumes 0-1 cups coffee, 2 20oz Mountain Dew sodas, and sometimes sweet tea. Pt lives in his own home with wife.    No tobacco alcohol or illicit drugs    family history includes Cancer in his brother and mother; Diabetes in his brother, father, mother, and sister; Heart disease in his other; Heart failure in his father, mother, and sister; Hypertension in his mother and other; Other in his other; Stroke in his mother.    Allergies   Allergen Reactions   • Simvastatin Hives   • Altace [Ramipril] Other (See Comments)     bleeding   • Penicillins Hives   • Rivaroxaban Other (See Comments)     Hx of head bleed   • Cephalexin Rash     unknown   • Procaine GI Intolerance   • Ropinirole Anxiety       Medication:  Current Facility-Administered Medications   Medication Dose Route Frequency Provider Last Rate Last Dose   • !vanc trough due prior to dose 3-4 1200. Hold vanc until labs are back. Rx is following.   Does not apply Once Luz Owens [facility]       • acetaminophen (TYLENOL) tablet 650 mg  650 mg Oral Q4H PRN Chrissy Sullivan MD       • albuterol (PROVENTIL) nebulizer solution 0.083% 2.5 mg/3mL  2.5 mg Nebulization Q6H PRN Chrissy Sullivan MD       • amitriptyline (ELAVIL) tablet 10 mg  10 mg Oral Nightly PRN Chrissy Sullivan MD       • aspirin chewable tablet 81 mg  81 mg Oral Daily Chrissy Sullivan MD   81 mg at 03/04/18 1002   • aztreonam (AZACTAM) in SWFI 2 grams/10ml IV PUSH syringe  2 g Intravenous Q8H Chrissy Sullivan MD   2 g at 03/04/18 0620   • bisacodyl (DULCOLAX) EC tablet 5 mg  5 mg Oral Daily PRN Chrissy Sullivan MD       • cilostazol  (PLETAL) tablet 50 mg  50 mg Oral Q12H Chrissy Sullivan MD   50 mg at 03/04/18 1001   • dextrose 5 % with KCl 20 mEq/L infusion  100 mL/hr Intravenous Continuous Anderson Yu MD   Stopped at 03/04/18 0945   • doxycycline (VIBRAMYCIN) 100 mg/100 mL 0.9% NS MBP  100 mg Intravenous Q12H Enoch Dong MD       • gabapentin (NEURONTIN) capsule 600 mg  600 mg Oral Q8H Chrissy Sullivan MD       • heparin (porcine) 5000 UNIT/ML injection 5,000 Units  5,000 Units Subcutaneous Q8H Chrissy Sullivan MD   5,000 Units at 03/04/18 0620   • HYDROcodone-acetaminophen (NORCO) 5-325 MG per tablet 1 tablet  1 tablet Oral Q6H PRN Chrissy Sullivan MD       • ipratropium-albuterol (DUO-NEB) nebulizer solution 3 mL  3 mL Nebulization 4x Daily - RT Anderson Yu MD   3 mL at 03/04/18 0850   • LORazepam (ATIVAN) tablet 1 mg  1 mg Oral Q8H PRN Chrissy Sullivan MD       • Magnesium Sulfate 2 gram Bolus, followed by 8 gram infusion (total Mg dose 10 grams)- Mg less than or equal to 1mg/dL  2 g Intravenous PRN Anderson Yu MD        Or   • Magnesium Sulfate 6 gram Infusion (2 gm x 3) -Mg 1.1 -1.5 mg/dL  2 g Intravenous PRN Anderson Yu MD        Or   • magnesium sulfate 4 gram infusion- Mg 1.6-1.9 mg/dL  4 g Intravenous PRN Anderson Yu MD       • metoprolol tartrate (LOPRESSOR) injection 5 mg  5 mg Intravenous Q5 Min PRN Galileo Woodruff MD   5 mg at 03/02/18 1133   • metroNIDAZOLE (FLAGYL) IVPB 500 mg  500 mg Intravenous Q6H Enoch Dong MD       • Morphine sulfate (PF) injection 2 mg  2 mg Intravenous Q3H PRN Anderson Yu MD   2 mg at 03/03/18 1619   • nicotine (NICODERM CQ) 14 MG/24HR patch 1 patch  1 patch Transdermal Q24H Chrissy Sullivan MD   1 patch at 03/04/18 0822   • pantoprazole (PROTONIX) EC tablet 40 mg  40 mg Oral BID AC Chrissy Sullivan MD   40 mg at 03/04/18 1001   • Pharmacy to dose vancomycin   Does not apply Continuous PRN Chrissy Sullivan MD       •  potassium chloride (KLOR-CON) packet 40 mEq  40 mEq Oral PRN Anderson Yu MD       • potassium chloride (MICRO-K) CR capsule 40 mEq  40 mEq Oral PRN Anderson Yu MD   40 mEq at 03/04/18 1002   • potassium chloride in NS 20 mEq/250 mL IVPB  20 mEq Intravenous Q2H PRN Anderson Yu MD       • potassium phosphate 45 mmol in sodium chloride 0.9 % 500 mL infusion  45 mmol Intravenous PRN Anderson Yu MD        Or   • potassium phosphate 30 mmol in sodium chloride 0.9 % 250 mL infusion  30 mmol Intravenous PRN Anderson Yu MD        Or   • potassium phosphate 15 mmol in sodium chloride 0.9 % 100 mL infusion  15 mmol Intravenous PRN Anderson Yu MD        Or   • sodium phosphates 45 mmol in sodium chloride 0.9 % 500 mL IVPB  45 mmol Intravenous PRN Anderson Yu MD        Or   • sodium phosphates 30 mmol in sodium chloride 0.9 % 250 mL IVPB  30 mmol Intravenous PRN Anderson Yu MD        Or   • sodium phosphates 15 mmol in sodium chloride 0.9 % 250 mL IVPB  15 mmol Intravenous PRN Anderson Yu MD       • sodium chloride 0.9 % flush 1-10 mL  1-10 mL Intravenous PRN Chrissy Sullivan MD       • sodium chloride 0.9 % flush 10 mL  10 mL Intravenous PRN Galileo Woodruff MD       • sucralfate (CARAFATE) tablet 1 g  1 g Oral BID AC Chrissy Sullivan MD   1 g at 03/04/18 1001   • vancomycin (VANCOCIN) in iso-osmotic dextrose IVPB 1 g (premix) 200 mL  15 mg/kg Intravenous Q24H Luz Owens [facility]   1,000 mg at 03/03/18 1253       Antibiotics:  Anti-Infectives     Ordered     Dose/Rate Route Frequency Start Stop    03/04/18 1114  metroNIDAZOLE (FLAGYL) IVPB 500 mg     Ordering Provider:  Enoch Dong MD    500 mg  100 mL/hr over 60 Minutes Intravenous Every 6 Hours 03/04/18 1145 03/11/18 1144    03/04/18 1114  doxycycline (VIBRAMYCIN) 100 mg/100 mL 0.9% NS MBP     Ordering Provider:  Enoch Dong MD    100 mg  over 60 Minutes Intravenous Every 12 Hours 03/04/18 1145 03/11/18 1144     03/02/18 1711  vancomycin (VANCOCIN) in iso-osmotic dextrose IVPB 1 g (premix) 200 mL     Ordering Provider:  Luz Owens RPH    15 mg/kg × 65.8 kg  over 60 Minutes Intravenous Every 24 Hours 03/03/18 1200 03/13/18 1159    03/02/18 1657  aztreonam (AZACTAM) in SWFI 2 grams/10ml IV PUSH syringe     Ordering Provider:  Chrissy Sullivan MD    2 g  over 5 Minutes Intravenous Every 8 Hours 03/02/18 2200 03/09/18 2159    03/02/18 1657  Pharmacy to dose vancomycin     Comments:  Alexander Adan  6B, [id number]  By Chrissy Sullivan MD   Ordering Provider:  Chrissy Sullivan MD     Does not apply Continuous PRN 03/02/18 1657 03/09/18 1656    03/02/18 1138  aztreonam (AZACTAM) in SWFI 2 grams/10ml IV PUSH syringe     Ordering Provider:  Galileo Woodruff MD    2 g  over 5 Minutes Intravenous Once 03/02/18 1240 03/02/18 1325    03/02/18 1138  vancomycin 1250 mg/250 mL 0.9% NS IVPB ([facility])     Ordering Provider:  Galileo Woodruff MD    20 mg/kg × 65.8 kg Intravenous Once 03/02/18 1140 03/02/18 1327            Review of Systems    Limited with his encephalopathy and reliability unclear, but answers no to the following:    Constitutional-- No Fever, chills or sweats.   Heent-- No new vision, hearing or throat complaints.  No epistaxis or oral sores.  Denies odynophagia or dysphagia.  No flashers, floaters or eye pain. No odynophagia or dysphagia. No headache, photophobia or neck stiffness.  CV-- No chest pain, palpitation or syncope  Resp-- No SOB or Hemoptysis  GI- No hematochezia, melena, or hematemesis. Denies jaundice or chronic liver disease.  -- No dysuria, hematuria, or flank pain.  Denies hesitancy, urgency or flank pain.  Lymph- no swollen lymph nodes in neck/axilla or groin.   Heme- No active bruising or bleeding; no Hx of DVT or PE.  MS-- no swelling or pain in the bones or joints of arms/legs.  No new back pain.  Neuro-- No acute focal weakness or numbness in the arms or legs.  No seizures.    Full 12 point  "review of systems reviewed and negative otherwise for acute complaints, except for above    Physical Exam: nursing/chaperone present  Vital Signs :  /67 (BP Location: Right arm, Patient Position: Lying)  Pulse (!) 123  Temp 97.5 °F (36.4 °C) (Oral)   Resp 16  Ht 165.1 cm (65\") Comment: Ht reported by pt's wife; c/w other EHR documentation  Wt 65.8 kg (145 lb)  SpO2 100%  BMI 22.05 kg/m2    GENERAL: Thin chronically ill-appearing and elderly, sleepy but answers questions  HEENT: Normocephalic, atraumatic.  Left eyelid droop compared to right is chronic according to family. No conjunctival injection. No icterus. Oropharynx clear without evidence of thrush or exudate. No evidence of peridontal disease.  Also see below   NECK: Supple without nuchal rigidity. No mass.  LYMPH: No cervical, axillary or inguinal lymphadenopathy.  HEART: Tachycardic; No murmur, rubs, gallops.   LUNGS: Diminished at bases with rhonchi more so on the right than left. Normal respiratory effort. Nonlabored. No dullness.  ABDOMEN: Soft, nontender, nondistended. Positive bowel sounds. No rebound or guarding. NO mass or HSM.  EXT:  No cyanosis, clubbing or edema. No cord.  : Genitalia generally unremarkable.  Without Aguilar catheter.  MSK: FROM without joint effusions noted arms/legs.    SKIN: Warm and dry without cutaneous eruptions on Inspection/palpation.    NEURO: He does not cooperate with a detailed motor/sensory exam but does move all 4 extremities.    No peripheral stigmata/phenomena of endocarditis    Right face/parotid gland with vague erythema/induration and tenderness.  No fluctuance.  No crepitus or bulla.      Laboratory Data      Results from last 7 days  Lab Units 03/04/18  0519 03/03/18  0645 03/02/18  1100   WBC 10*3/mm3 15.44* 19.34* 28.06*   HEMOGLOBIN g/dL 11.4* 10.9* 13.9   HEMATOCRIT % 36.2* 34.8* 42.5   PLATELETS 10*3/mm3 215 202 272       Results from last 7 days  Lab Units 03/04/18  0519   SODIUM mmol/L 142 "   POTASSIUM mmol/L 3.5   CHLORIDE mmol/L 120*   CO2 mmol/L 17.0*   BUN mg/dL 14   CREATININE mg/dL 0.80   GLUCOSE mg/dL 112*   CALCIUM mg/dL 8.6*       Results from last 7 days  Lab Units 03/04/18  0519   ALK PHOS U/L 85   BILIRUBIN mg/dL 0.2*   ALT (SGPT) U/L 10   AST (SGOT) U/L 19               Estimated Creatinine Clearance: 70.8 mL/min (by C-G formula based on Cr of 0.8).      Microbiology:      Radiology:  Imaging Results (last 72 hours)     Procedure Component Value Units Date/Time    CT Head Without Contrast [[id number]] Collected:  03/02/18 1231     Updated:  03/02/18 1300    Narrative:       EXAMINATION: CT HEAD WO CONTRAST-      INDICATION: Encephalopathy.      TECHNIQUE: Axial CT of the head without intravenous contrast  administration.     The radiation dose reduction device was turned on for each scan per the  ALARA (As Low as Reasonably Achievable) protocol.     COMPARISON: 02/16/2018.     FINDINGS: Midline structures are symmetric without evidence of mass,  mass effect or midline shift. No intraaxial hemorrhage or extraaxial  fluid collection. Ventricles and sulci are prominent consistent with  components of atrophy. Mild to moderate attenuation changes within the  periventricular and deep white matter consistent with chronic small  vessel ischemic disease. Globes and orbits are partially visualized and  grossly unremarkable. Paranasal sinuses and mastoid air cells  demonstrate total opacification of the right maxillary sinus as well as  opacification in the right ethmoid air cells and sphenoid sinus.  Calvarium demonstrates prior india holes left frontal and left temporal  regions otherwise grossly intact.       Impression:       No acute intracranial abnormality.     D:  03/02/2018  E:  03/02/2018     This report was finalized on 3/2/2018 12:58 PM by Dr. Maurice Alas.       CT Abdomen Pelvis Without Contrast [[id number]] Collected:  03/02/18 1254     Updated:  03/02/18 1300    Narrative:        EXAMINATION: CT ABDOMEN AND PELVIS WO CONTRAST-      INDICATION: Abdomen pain, guarding, urinary incontinence.      TECHNIQUE: CT abdomen and pelvis without intravenous contrast  administration.     The radiation dose reduction device was turned on for each scan per the  ALARA (As Low as Reasonably Achievable) protocol.     COMPARISON: 08/05/2017.     FINDINGS: Motion degraded examination particularly within the  subdiaphragmatic soft tissues.  Lung bases demonstrate patchy  tree-in-bud opacifications involving much of the right lower lobe and  visualized right middle lobe consistent with infectious or inflammatory  etiology, however, no focal lobar consolidation. Left hemithorax  partially visualized and grossly clear. Liver without focal lesion.  Gallbladder is surgically absent. Pancreas and spleen within normal  limits. Adrenals without distinct nodule. Kidneys without hydronephrosis  or hydroureter. No obstructive uropathy or urolithiasis. Atherosclerotic  nonaneurysmal abdominal aorta. No bulky retroperitoneal adenopathy. GI  tract evaluation without focal thickening or disproportionate dilatation  of bowel. No free fluid or intraabdominal free air. Pelvic viscera  unremarkable without bulky pelvic adenopathy or significant free fluid.  Degenerative changes of the spine and pelvis without aggressive osseous  abnormality identified within the limited evaluation due to motion. No  soft tissue body wall findings of concern.       Impression:       1. Limited evaluation due to significant patient motion artifact  limiting the subdiaphragmatic soft tissues, however, the limits of the  study there is scattered tree-in-bud opacifications involving the  visualized right middle lobe and right lower lobe along with nodularity  consistent of acute infectious or inflammatory etiology without lobar  consolidation identified. No significant pleural effusion.  2. No acute intraabdominal abnormality within the limited  evaluation due  to significant motion specifically no disproportionate dilatation of  bowel to suggest mechanical obstructive process or loculated  intraabdominal fluid collection.     D:  03/02/2018  E:  03/02/2018     This report was finalized on 3/2/2018 12:58 PM by Dr. Maurice Alas.       XR Chest 1 View [[id number]] Collected:  03/02/18 1131     Updated:  03/02/18 1301    Narrative:       EXAMINATION: XR CHEST 1 VW- 03/02/2018     INDICATION: weakness, altered mental status      COMPARISON: Chest x-ray 02/16/2018.     FINDINGS: Cardiac size within normal limits. Post CABG and post median  sternotomy changes. No focal opacification or consolidation. No  pneumothorax or pleural effusion. Left chest wall pacing device with  leads intact. Degenerative changes of the thoracic spine.           Impression:       No acute cardiopulmonary process.     D:  03/02/2018  E:  03/02/2018     This report was finalized on 3/2/2018 12:58 PM by Dr. Maurice Alas.       CT Soft Tissue Neck With Contrast [[id number]] Collected:  03/02/18 1627     Updated:  03/02/18 1646    Narrative:       EXAMINATION: CT NECK SOFT TISSUES W/CONTRAST - 03/02/2018      INDICATION: K11.21-Acute sialoadenitis; J01.00-Acute maxillary  sinusitis, unspecified; R53.1-Weakness.      TECHNIQUE: CT neck soft tissues with intravenous contrast  administration.     The radiation dose reduction device was turned on for each scan per the  ALARA (As Low as Reasonably Achievable) protocol.     COMPARISON: None.     FINDINGS: For intracranial findings, please see recent CT head report  performed the same day. Total opacification right maxillary sinus  consistent with components of sinusitis. Severely asymmetric appearance  of the right parotid gland which is nearly entirely replaced by  heterogeneous enhancing soft tissue attenuation involving the  superficial and deep lobes with overall dimensions measuring  approximately 4.6 x 4.0 cm, indicative of soft tissue mass  involvement.  There is no discrete osseous erosion of the adjacent mandible or  temporomandibular joint with right mastoid air cells grossly clear.  Parapharyngeal spaces are preserved with supraglottic soft tissues,  unremarkable. Valleculae and piriform sinuses within normal limits. The  vocal cords and subglottic trachea are unremarkable. Visualized portions  of the lung apices are grossly unremarkable. Thyroid is homogeneous in  attenuation. No bulky cervical adenopathy is identified.       Impression:       Soft tissue mass involvement of the superficial and deep  lobes of the right parotid gland. 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 No adjacent osseous erosion. No bulky cervical  adenopathy otherwise identified. Opacification of the right maxillary  sinus again noted consistent with sinusitis.     DICTATED:     03/02/2018  EDITED    :     03/02/2018      This report was finalized on 3/2/2018 4:44 PM by Dr. Maurice Alas.               Impression:     --Acute sepsis with leukocytosis/fever and tachycardia, associated with encephalopathy and likely severe sepsis.  Primary focal symptoms/findings include right parotitis and acute right lower lobe pneumonia with broad spectrum antimicrobials.  No preceding symptoms of central nervous system focus and no meningismus at present.  Likely metabolic encephalopathy associated with sepsis.  Abdomen benign by exam with no intra-abdominal pathology per radiology on CT scan, although motion artifact and if symptoms persist/recur, then you should give consideration to further GI workup by either repeat imaging or GI evaluation.  No diarrhea at present but if it develops you could consider stool studies.  Urinalysis not consistent with UTI.  No obvious soft tissue source other than above.    --Acute right parotitis;   gram-positive organisms are a primary concern.  In addition there is description of a masslike quality by radiology and he will likely need to give consideration to ENT input.  I will discuss with Dr. Yu;  no focal abscess so far per radiology.    --Acute cough with right lower lobe pneumonia by imaging and risk for community-acquired pathogens/aspiration.  Broad-spectrum coverage ongoing.  Try to collect sputum for culture.  Order urine Legionella and strep antigens and influenza PCR.    --Dysphagia.  Family reports difficulty with solids/liquids and cough.  Speech therapy has seen.  You'll likely need to give consideration to GI consultation for EGD as well.    --Acute encephalopathy.  Family reports this is common with acute illness for him.  Further neurologic workup per internal medicine at their discretion.  Preceding symptoms and current exam not consistent with meningismus.  Improving in general per family back towards baseline    --Chronic kidney disease stage III per records.  Monitor to help guide further adjustments with antimicrobials    --QTc > 500 ms EKG        PLAN: Thank you for asking us to see Alexander Adan, I recommend the following:     --IV vancomycin/Azactam/Flagyl and oral doxycycline    --Monitor IV and IV antibiotic with risk for systemic complication and potential drug interaction    --Check/review labs cultures and scans    --Highly complex set of problems with high risk for further serious morbidity and other serious sequela    --You will likely need to give consideration to ENT consultation and also GI consultation/EGD; d/edith Yu    --Discussed with family.  Partial history per them    --Scheduled microbiology.  Partial history per nursing staff as well      Enoch Dong MD  3/4/2018

## 2018-03-04 NOTE — PLAN OF CARE
Problem: Patient Care Overview (Adult)  Goal: Plan of Care Review  Outcome: Ongoing (interventions implemented as appropriate)   03/04/18 0951   Coping/Psychosocial Response Interventions   Plan Of Care Reviewed With patient   Patient Care Overview   Progress (Evaluation)   Outcome Evaluation   Outcome Summary/Follow up Plan Clinical Dys re-Eval completed. Pt still confused but much more alert and cooperative for assessment this am. Pt positioned upright, OMEs assessed, R sided facial swelling improved but pt still has significant pain to superficial touch and rotary chewing motion, tongue deviation to R. All other OMEs WFL. No s/s w/ any PO even when pushed. Timing and elevation adequate per palpation w/ all. Voice strong and clear after each swallow. Pt unable to masticate solid item 2' discomfort w/ swelling, he wanted to attempt solid trial but cues were required for him to clear oral residue. Rec: michaela and thins, meds in puree, SLP to f/u for DT and possible upgrade tomorrow. ? S/L eval if confusion persists. Family is concerned but pt appears to be improving daily. SLP to follow-up.

## 2018-03-04 NOTE — NURSING NOTE
Patients IV was lost today and unable to restart another IV.  Dr. Maria aware and gave order for a PICC line, double lumen.  PICC RN to place a PICC line today but not till the afternoon.  Pharmacy was notified r/t delay of IV antibiotics as well electrolytes.

## 2018-03-04 NOTE — THERAPY EVALUATION
Acute Care - Speech Language Pathology   Swallow Initial Evaluation [facility]   Clinical Swallow Evaluation       Patient Name: Alexander Adan  : 1940  MRN: [id number]  Today's Date: 3/4/2018        Referring Physician: Hospitalist      Admit Date: 3/2/2018    Visit Dx:     ICD-10-CM ICD-9-CM   1. Parotitis, acute K11.21 527.2   2. Subacute maxillary sinusitis J01.00 461.0   3. Generalized weakness R53.1 780.79   4. Dysphagia, unspecified type R13.10 787.20     Patient Active Problem List   Diagnosis   • Atrial fibrillation   • Chronic coronary artery disease   • Chronic diastolic heart failure   • Chronic obstructive pulmonary disease   • Cognitive impairment, mild, so stated   • Tobacco use   • Fall at home   • CKD (chronic kidney disease), stage III   • Leukocytosis   • Bronchitis   • Vasovagal syncope   • Dehydration   • Hypotension due to dehydration   • PVD (peripheral vascular disease)   • SIRS (systemic inflammatory response syndrome)     Past Medical History:   Diagnosis Date   • Anemia    • Anxiety    • Chronic kidney disease     STAGE II (mild)   • Hyperlipidemia    • Hypertension    • Stroke     LACUNAR. 2013   • Subdural hematoma     Status post hemorrhagic evacuation with sherrill holes 4/3/2015 per Dr. Gilson Brooke.     Past Surgical History:   Procedure Laterality Date   • SHERRILL HOLE FOR SUBDURAL HEMATOMA     • CARDIAC PACEMAKER PLACEMENT      PERMANENT   • CAROTID STENT Left    • CHOLECYSTECTOMY     • CORONARY ARTERY BYPASS GRAFT     • PACEMAKER IMPLANTATION     • THROMBOENDARTERECTOMY      CAROTID, LEFT          SWALLOW EVALUATION (last 72 hours)      Swallow Evaluation       18 0930 18 1200             Rehab Evaluation    Document Type evaluation  -SG evaluation  -LR       Subjective Information no complaints;agree to therapy  -SG unable to respond  -LR       Patient Effort, Rehab Treatment good  -SG        Symptoms Noted During/After Treatment none  -SG        General  Information    Patient Profile Review yes  -SG yes  -LR       Onset of Illness/Injury 02/25/18  -SG        Referring Physician Hospitalist  -SG        Subjective Patient Observations pt alert, confused, and cooperative  -SG        Pertinent History Of Current Problem SIRS, mass of R paritod gland  -SG CT soft tissue neck- soft tissue mass of R parotid gland  -LR       Current Diet Limitations NPO  -SG NPO  -LR       Precautions/Limitations, Vision WFL  -SG        Precautions/Limitations, Hearing WFL  -SG        Prior Level of Function- Communication functional in all spheres  -SG functional in all spheres  -LR       Prior Level of Function- Swallowing no diet consistency restrictions  -SG no diet consistency restrictions   pt's wife reported difficulty w/ eating & drinking x 1 week  -LR       Plans/Goals Discussed With spouse/S.O.;agreed upon  -SG spouse/S.O.  -LR       Barriers to Rehab none identified  -SG cognitive status  -LR       Clinical Impression    Patient's Goals For Discharge return to PO diet  -SG patient did not state  -LR       Family Goals For Discharge patient able to return to PO diet  -SG family did not state  -LR       SLP Swallowing Diagnosis other (see comments)   no s/s of pharyngeal dys  -SG --   could not assess  -LR       Criteria for Skilled Therapeutic Interventions Met no problems identified which require skilled intervention  -SG        FCM, Swallowing 7-->Level 7  -SG        Therapy Frequency evaluation only  -SG        SLP Diet Recommendation II - pureed;thin liquids  -SG --   could not assess  -LR       Recommended Diagnostics other (see comments)   DT f/u  -SG reassess via clinical swallow (non-instrumental exam)  -LR       Recommended Feeding/Eating Techniques small sips/bites  -SG        SLP Rec. for Method of Medication Administration meds whole in pudding/applesauce;meds crushed in pudding/applesauce  -SG        Anticipated Discharge Disposition other (see comments)   unknown at  this time  -SG        Pain Assessment    Pain Assessment 0-10  -SG        Pain Score 5  -SG        Post Pain Score 5  -SG        Pain Type Acute pain  -SG        Pain Location Face   R  -SG        Pain Intervention(s) Repositioned  -SG        Response to Interventions tolerated, RN aware, giving meds  -SG        Oral Motor Structure and Function    Oral Motor Anatomy and Physiology  --   could not assess  -LR       Oral Lesions or Structural Abnormalities  --   pt did have white oral lesion on anterior tongue  -LR       Dentition Assessment edentulous, does not have dentures  -SG edentulous, does not have dentures  -LR       Secretion Management WNL/WFL  -SG WNL/WFL  -LR       Mucosal Quality moist, healthy  -SG moist, healthy  -LR       Velar Elevation WFL (within functional limits)  -SG        Gag Response absent  -SG        Volitional Swallow no difficulties initiating volitional swallow  -SG        Volitional Cough no difficulties initiating volitional cough  -SG        Oral Motor Assessment Comment R facial swelling/pain, tongue deviation to R.  -SG Pt only minimally opened mouth and was unable to follow oral motor commands. Pt did c/o tongue pain.   -LR       General Feeding/Swallowing Observations    Current Feeding Method NPO  -SG        Respiratory Support Currently in Use nasal cannula in use  -SG nasal cannula in use  -LR       Observations of Self Feeding Skills self feeding was very difficult  -SG        Observations of Posture During Feeding upright at 90 for evaluation  -SG        Clinical Swallow Exam    Mode of Presentation fed by clinician;cup;spoon;straw  -SG fed by clinician;spoon  -LR       Oral Preparation Concerns incomplete bolus preparation   w/ solid  -SG        Oral Transit Concerns increased oral transit time  -SG        Oral Residue tongue residue   w/ solid  -SG        Oral Phase Results impaired oral phase, signs of dysfunction present  -SG        Pharyngeal Phase Results no  signs/symptoms of pharyngeal impairment  -SG        Summary of Clinical Exam Clinical Dys re-Eval completed. Pt still confused but much more alert and cooperative for assessment this am. Pt positioned upright, OMEs assessed, R sided facial swelling improved but pt still has significant pain to superficial touch and rotary chewing motion, tongue deviation to R. All other OMEs WFL. No s/s w/ any PO even when pushed. Timing and elevation adequate per palpation w/ all. Voice strong and clear after each swallow. Pt unable to masticate solid item 2' discomfort w/ swelling, he wanted to attempt solid trial but cues were required for him to clear oral residue. Rec: puree and thins, meds in puree, SLP to f/u for DT and possible upgrade tomorrow. ? S/L eval if confusion persists. Family is concerned but pt appears to be improving daily. SLP to follow-up.  -SG Attempted clinical swallow eval. Pt very confused and following minimal commands. Pt c/o tongue pain and refused to open mouth. Pt did accept ice chip x 1. No difficulty with ice chip noted. No overt s/s of aspiration with ice chip x 1. RN notified of tongue pain. RN medicated pt and contacted SLP to re-evaluate. After pt was medicated, pt denied any tongue pain, but continued to refuse to open mouth. Pt refused all PO trials despite max cues. Unable to evaluate swallow at this time due to pt refusal to participate. Will attempt to evaluate swallow tomorrow.   -LR       Swallow Recommendations    Eating Assistance requires caregiver assistance for eating;requires caregiver to assist with positioning during eating  -SG        Oral Care oral care with toothbrush and dentifrice BID and PRN  -SG oral care with toothbrush and dentifrice BID and PRN   pt refused oral care at time of the eval  -LR       Modified Eating Strategies upright positioning 90 degrees;specific placement of bolus to enhance swallowing   bolus on L  -SG        Other Recommendations puree;thin  -SG         Recommended Diet II - pureed;thin liquids  -SG        Dysphagia Other 1    Dysphagia Other 1 Objective Patient will demonstrate no s/s with recommended diet textures.  -SG        Status: Dysphagia Other 1 New  -SG        Dysphagia Other 1 Progress continue to address  -SG          User Key  (r) = Recorded By, (t) = Taken By, (c) = Cosigned By    Initials Name Effective Dates    SG Alexa Ambrocio-Flakita, MS [facility]-SLP 06/22/15 -     LR Charlotte Mccormick, MS CCC-SLP 06/22/15 -         EDUCATION  The patient has been educated in the following areas:   Dysphagia (Swallowing Impairment) Oral Care/Hydration Modified Diet Instruction.    SLP Recommendation and Plan  SLP Swallowing Diagnosis: other (see comments) (no s/s of pharyngeal dys)  SLP Diet Recommendation: II - pureed, thin liquids  Recommended Feeding/Eating Techniques: small sips/bites  SLP Rec. for Method of Medication Administration: meds whole in pudding/applesauce, meds crushed in pudding/applesauce     Recommended Diagnostics: other (see comments) (DT f/u)  Criteria for Skilled Therapeutic Interventions Met: no problems identified which require skilled intervention  Anticipated Discharge Disposition: other (see comments) (unknown at this time)     Therapy Frequency: evaluation only             Plan of Care Review  Plan Of Care Reviewed With: patient  Progress:  (Evaluation)  Outcome Summary/Follow up Plan:   Clinical Dys re-Eval completed. Pt still confused but much more alert and cooperative for assessment this am. Pt positioned upright, OMEs assessed, R sided facial swelling improved but pt still has significant pain to superficial touch and rotary chewing motion, tongue deviation to R. All other OMEs WFL. No s/s w/ any PO even when pushed. Timing and elevation adequate per palpation w/ all. Voice strong and clear after each swallow. Pt unable to masticate solid item 2' discomfort w/ swelling, he wanted to attempt solid trial but cues were required for him  to clear oral residue. Rec: puree and thins, meds in puree, SLP to f/u for DT and possible upgrade tomorrow. ? S/L eval if confusion persists. Family is concerned but pt appears to be improving daily. SLP to follow-up.             Time Calculation:         Time Calculation- SLP       03/04/18 0956          Time Calculation- SLP    SLP Start Time 0930  -      SLP Received On 02/25/18  -        User Key  (r) = Recorded By, (t) = Taken By, (c) = Cosigned By    Initials Name Provider Type     Alexa Ferguson MS CCC-SLP Speech and Language Pathologist          Therapy Charges for Today     Code Description Service Date Service Provider Modifiers Qty    66091761208 HC ST EVAL ORAL PHARYNG SWALLOW 4 3/4/2018 Alexa Ferguson MS CCC-SLP GN 1               Alexa Ferguson MS CCC-SLP  3/4/2018

## 2018-03-04 NOTE — CONSULTS
5FR PICC placed in the right upper arm, X-Ray ordered to confirm PICC TIP. Placed by Gayatri ALVARADONI.

## 2018-03-04 NOTE — PROGRESS NOTES
"Pharmacy Consult-Vancomycin Dosing  Alexander Adan is a  77 y.o. male receiving vancomycin therapy.     Indication: Sepsis  Consulting Provider: Hospitalist  ID Consult: Yes (Letitia)    Goal Trough: 15-20 mcg/mL    Current Antimicrobial Therapy  Vancomycin (pharmacy to dose)  Aztreonam 2g IV q8h  Doxycyline 100 mg IV q12h  Metronidazole 500 mg IV q6h      Allergies  Allergies as of 03/02/2018 - Erik as Reviewed 03/02/2018   Allergen Reaction Noted   • Simvastatin Hives 06/13/2016   • Altace [ramipril] Other (See Comments) 11/02/2016   • Penicillins Hives 06/13/2016   • Rivaroxaban Other (See Comments) 06/13/2016   • Cephalexin Rash 06/13/2016   • Procaine GI Intolerance 06/13/2016   • Ropinirole Anxiety 03/02/2018       Labs    Results from last 7 days   Lab Units 03/04/18  0519 03/03/18  0645 03/02/18  1100   BUN mg/dL 14 20 30*   CREATININE mg/dL 0.80 0.90 1.20     Results from last 7 days   Lab Units 03/04/18  0519 03/03/18  0645 03/02/18  1100   WBC 10*3/mm3 15.44* 19.34* 28.06*       Evaluation of Dosing     Last Dose Received in the ED/Outside Facility:   3/2 @ 1327 Vancomycin 1250 mg IV x 1     Ht - 165.1 cm (65\")  Wt - 65.8 kg (145 lb)    Estimated Creatinine Clearance: 70.8 mL/min (by C-G formula based on Cr of 0.8).    Intake & Output (last 3 days)         03/01 0701 - 03/02 0700 03/02 0701 - 03/03 0700 03/03 0701 - 03/04 0700 03/04 0701 - 03/05 0700      P.O.   0     I.V. (mL/kg)   3000 (45.6)     IV Piggyback  1020 220     Total Intake(mL/kg)  1020 (15.5) 3220 (49)     Net   +1020 +3220              Unmeasured Urine Occurrence  2 x 3 x             Microbiology and Radiology  Microbiology Results (last 10 days)       Procedure Component Value - Date/Time      Blood Culture - Blood, [[id number]]  (Normal) Collected:  03/02/18 1305    Lab Status:  Preliminary result Specimen:  Blood from Arm, Left Updated:  03/03/18 1401     Blood Culture No growth at 24 hours    Blood Culture - Blood, [[id number]] " Collected:  03/02/18 1235    Lab Status:  Preliminary result Specimen:  Blood from Arm, Left Updated:  03/03/18 1401     Blood Culture No growth at 24 hours      Aerobic bottle only    Blood Culture - Blood, [[id number]]  (Normal) Collected:  03/02/18 1231    Lab Status:  Preliminary result Specimen:  Blood from Hand, Right Updated:  03/03/18 1746     Blood Culture No growth at 24 hours            Evaluation of Level    3/4 @ 1121 vancomycin trough = 15.7 mcg/mL (23 hr level, prior to 3rd dose) -- receiving vancomycin 1000 mg IV q24h    Assessment/Plan:    1. Pharmacy to dose vancomycin for sepsis. Pt received vancomycin 1250 mg (20 mg/kg) IV load, followed by vancomycin 1000 mg (15 mg/kg) IV q24h.  2. Vancomycin trough today is therapeutic at 15.7 mcg/mL, goal trough 15-20 mcg/mL. SCr continues to improve, urine output unmeasured. Patient lost IV access this morning, had to retime vancomycin. Continue vancomycin 1000 mg IV q24h.  3. No vancomycin trough ordered. Consider trough in 5-7 days or sooner if renal function or clinical status changes.   4. Pharmacy will continue to monitor and adjust vancomycin dose as necessary based on renal function, cultures, labs, and clinical status.     Thank you,  Cristiana Templeton, PharmD  Pharmacy Resident  3/4/2018  1:41 PM

## 2018-03-05 LAB
ALBUMIN SERPL-MCNC: 2.5 G/DL (ref 3.2–4.8)
ALBUMIN/GLOB SERPL: 1 G/DL (ref 1.5–2.5)
ALP SERPL-CCNC: 75 U/L (ref 25–100)
ALT SERPL W P-5'-P-CCNC: 9 U/L (ref 7–40)
ANION GAP SERPL CALCULATED.3IONS-SCNC: 4 MMOL/L (ref 3–11)
AST SERPL-CCNC: 18 U/L (ref 0–33)
BASOPHILS # BLD AUTO: 0.02 10*3/MM3 (ref 0–0.2)
BASOPHILS NFR BLD AUTO: 0.2 % (ref 0–1)
BILIRUB SERPL-MCNC: 0.3 MG/DL (ref 0.3–1.2)
BUN BLD-MCNC: 14 MG/DL (ref 9–23)
BUN/CREAT SERPL: 17.5 (ref 7–25)
CALCIUM SPEC-SCNC: 7.7 MG/DL (ref 8.7–10.4)
CHLORIDE SERPL-SCNC: 115 MMOL/L (ref 99–109)
CO2 SERPL-SCNC: 19 MMOL/L (ref 20–31)
CREAT BLD-MCNC: 0.8 MG/DL (ref 0.6–1.3)
DEPRECATED RDW RBC AUTO: 61.7 FL (ref 37–54)
EOSINOPHIL # BLD AUTO: 0.05 10*3/MM3 (ref 0–0.3)
EOSINOPHIL NFR BLD AUTO: 0.5 % (ref 0–3)
ERYTHROCYTE [DISTWIDTH] IN BLOOD BY AUTOMATED COUNT: 17.4 % (ref 11.3–14.5)
GFR SERPL CREATININE-BSD FRML MDRD: 93 ML/MIN/1.73
GLOBULIN UR ELPH-MCNC: 2.5 GM/DL
GLUCOSE BLD-MCNC: 89 MG/DL (ref 70–100)
HCT VFR BLD AUTO: 32.9 % (ref 38.9–50.9)
HGB BLD-MCNC: 10.2 G/DL (ref 13.1–17.5)
IMM GRANULOCYTES # BLD: 0.2 10*3/MM3 (ref 0–0.03)
IMM GRANULOCYTES NFR BLD: 1.9 % (ref 0–0.6)
LYMPHOCYTES # BLD AUTO: 1.06 10*3/MM3 (ref 0.6–4.8)
LYMPHOCYTES NFR BLD AUTO: 9.8 % (ref 24–44)
MAGNESIUM SERPL-MCNC: 2.5 MG/DL (ref 1.3–2.7)
MCH RBC QN AUTO: 30 PG (ref 27–31)
MCHC RBC AUTO-ENTMCNC: 31 G/DL (ref 32–36)
MCV RBC AUTO: 96.8 FL (ref 80–99)
MONOCYTES # BLD AUTO: 0.62 10*3/MM3 (ref 0–1)
MONOCYTES NFR BLD AUTO: 5.8 % (ref 0–12)
NEUTROPHILS # BLD AUTO: 8.82 10*3/MM3 (ref 1.5–8.3)
NEUTROPHILS NFR BLD AUTO: 81.8 % (ref 41–71)
PHOSPHATE SERPL-MCNC: 1.6 MG/DL (ref 2.4–5.1)
PLATELET # BLD AUTO: 179 10*3/MM3 (ref 150–450)
PMV BLD AUTO: 10.6 FL (ref 6–12)
POTASSIUM BLD-SCNC: 5 MMOL/L (ref 3.5–5.5)
PROT SERPL-MCNC: 5 G/DL (ref 5.7–8.2)
PSA FREE MFR SERPL: 24.7 %
PSA FREE SERPL-MCNC: 0.37 NG/ML
PSA SERPL-MCNC: 1.5 NG/ML (ref 0–4)
RBC # BLD AUTO: 3.4 10*6/MM3 (ref 4.2–5.76)
SODIUM BLD-SCNC: 138 MMOL/L (ref 132–146)
WBC NRBC COR # BLD: 10.77 10*3/MM3 (ref 3.5–10.8)

## 2018-03-05 PROCEDURE — 87205 SMEAR GRAM STAIN: CPT | Performed by: INTERNAL MEDICINE

## 2018-03-05 PROCEDURE — 80053 COMPREHEN METABOLIC PANEL: CPT | Performed by: INTERNAL MEDICINE

## 2018-03-05 PROCEDURE — 94640 AIRWAY INHALATION TREATMENT: CPT

## 2018-03-05 PROCEDURE — 87449 NOS EACH ORGANISM AG IA: CPT | Performed by: INTERNAL MEDICINE

## 2018-03-05 PROCEDURE — 87070 CULTURE OTHR SPECIMN AEROBIC: CPT | Performed by: INTERNAL MEDICINE

## 2018-03-05 PROCEDURE — 92526 ORAL FUNCTION THERAPY: CPT

## 2018-03-05 PROCEDURE — 83735 ASSAY OF MAGNESIUM: CPT | Performed by: INTERNAL MEDICINE

## 2018-03-05 PROCEDURE — 94799 UNLISTED PULMONARY SVC/PX: CPT

## 2018-03-05 PROCEDURE — 97161 PT EVAL LOW COMPLEX 20 MIN: CPT

## 2018-03-05 PROCEDURE — 87186 SC STD MICRODIL/AGAR DIL: CPT | Performed by: INTERNAL MEDICINE

## 2018-03-05 PROCEDURE — 97110 THERAPEUTIC EXERCISES: CPT

## 2018-03-05 PROCEDURE — 87077 CULTURE AEROBIC IDENTIFY: CPT | Performed by: INTERNAL MEDICINE

## 2018-03-05 PROCEDURE — 25810000003 DEXTROSE 5 % WITH KCL 20 MEQ 20-5 MEQ/L-% SOLUTION: Performed by: INTERNAL MEDICINE

## 2018-03-05 PROCEDURE — 87086 URINE CULTURE/COLONY COUNT: CPT | Performed by: INTERNAL MEDICINE

## 2018-03-05 PROCEDURE — 25010000002 HEPARIN (PORCINE) PER 1000 UNITS: Performed by: INTERNAL MEDICINE

## 2018-03-05 PROCEDURE — 84100 ASSAY OF PHOSPHORUS: CPT | Performed by: INTERNAL MEDICINE

## 2018-03-05 PROCEDURE — 99232 SBSQ HOSP IP/OBS MODERATE 35: CPT | Performed by: INTERNAL MEDICINE

## 2018-03-05 PROCEDURE — 25010000002 VANCOMYCIN PER 500 MG

## 2018-03-05 PROCEDURE — 87147 CULTURE TYPE IMMUNOLOGIC: CPT | Performed by: INTERNAL MEDICINE

## 2018-03-05 PROCEDURE — 87899 AGENT NOS ASSAY W/OPTIC: CPT | Performed by: INTERNAL MEDICINE

## 2018-03-05 PROCEDURE — 85025 COMPLETE CBC W/AUTO DIFF WBC: CPT | Performed by: INTERNAL MEDICINE

## 2018-03-05 RX ORDER — ACETAMINOPHEN 650 MG
TABLET, EXTENDED RELEASE ORAL DAILY
Status: DISCONTINUED | OUTPATIENT
Start: 2018-03-05 | End: 2018-03-08

## 2018-03-05 RX ADMIN — DOXYCYCLINE 100 MG: 100 INJECTION, POWDER, LYOPHILIZED, FOR SOLUTION INTRAVENOUS at 05:20

## 2018-03-05 RX ADMIN — WATER 2 G: 1 INJECTION INTRAMUSCULAR; INTRAVENOUS; SUBCUTANEOUS at 09:37

## 2018-03-05 RX ADMIN — VANCOMYCIN HYDROCHLORIDE 1000 MG: 1 INJECTION, SOLUTION INTRAVENOUS at 17:01

## 2018-03-05 RX ADMIN — SOTALOL HYDROCHLORIDE 120 MG: 80 TABLET ORAL at 08:32

## 2018-03-05 RX ADMIN — BISACODYL 5 MG: 5 TABLET, COATED ORAL at 13:10

## 2018-03-05 RX ADMIN — WATER 2 G: 1 INJECTION INTRAMUSCULAR; INTRAVENOUS; SUBCUTANEOUS at 02:30

## 2018-03-05 RX ADMIN — CASTOR OIL AND BALSAM, PERU: 788; 87 OINTMENT TOPICAL at 21:16

## 2018-03-05 RX ADMIN — POTASSIUM CHLORIDE AND DEXTROSE MONOHYDRATE 100 ML/HR: 150; 5 INJECTION, SOLUTION INTRAVENOUS at 14:33

## 2018-03-05 RX ADMIN — SODIUM PHOSPHATE, MONOBASIC, MONOHYDRATE 30 MMOL: 276; 142 INJECTION, SOLUTION INTRAVENOUS at 08:32

## 2018-03-05 RX ADMIN — METRONIDAZOLE 500 MG: 500 INJECTION, SOLUTION INTRAVENOUS at 00:45

## 2018-03-05 RX ADMIN — HEPARIN SODIUM 5000 UNITS: 5000 INJECTION, SOLUTION INTRAVENOUS; SUBCUTANEOUS at 05:19

## 2018-03-05 RX ADMIN — METRONIDAZOLE 500 MG: 500 INJECTION, SOLUTION INTRAVENOUS at 11:49

## 2018-03-05 RX ADMIN — IPRATROPIUM BROMIDE AND ALBUTEROL SULFATE 3 ML: 2.5; .5 SOLUTION RESPIRATORY (INHALATION) at 09:06

## 2018-03-05 RX ADMIN — CASTOR OIL AND BALSAM, PERU: 788; 87 OINTMENT TOPICAL at 08:31

## 2018-03-05 RX ADMIN — GABAPENTIN 600 MG: 250 SOLUTION ORAL at 05:19

## 2018-03-05 RX ADMIN — HEPARIN SODIUM 5000 UNITS: 5000 INJECTION, SOLUTION INTRAVENOUS; SUBCUTANEOUS at 21:16

## 2018-03-05 RX ADMIN — SUCRALFATE 1 G: 1 TABLET ORAL at 16:59

## 2018-03-05 RX ADMIN — WATER 2 G: 1 INJECTION INTRAMUSCULAR; INTRAVENOUS; SUBCUTANEOUS at 17:01

## 2018-03-05 RX ADMIN — SOTALOL HYDROCHLORIDE 120 MG: 80 TABLET ORAL at 21:13

## 2018-03-05 RX ADMIN — IPRATROPIUM BROMIDE AND ALBUTEROL SULFATE 3 ML: 2.5; .5 SOLUTION RESPIRATORY (INHALATION) at 16:10

## 2018-03-05 RX ADMIN — GABAPENTIN 600 MG: 250 SOLUTION ORAL at 13:10

## 2018-03-05 RX ADMIN — NICOTINE 1 PATCH: 14 PATCH TRANSDERMAL at 08:35

## 2018-03-05 RX ADMIN — IPRATROPIUM BROMIDE AND ALBUTEROL SULFATE 3 ML: 2.5; .5 SOLUTION RESPIRATORY (INHALATION) at 12:55

## 2018-03-05 RX ADMIN — METRONIDAZOLE 500 MG: 500 INJECTION, SOLUTION INTRAVENOUS at 05:19

## 2018-03-05 RX ADMIN — GABAPENTIN 600 MG: 250 SOLUTION ORAL at 21:13

## 2018-03-05 RX ADMIN — IPRATROPIUM BROMIDE AND ALBUTEROL SULFATE 3 ML: 2.5; .5 SOLUTION RESPIRATORY (INHALATION) at 19:50

## 2018-03-05 RX ADMIN — ASPIRIN 81 MG 81 MG: 81 TABLET ORAL at 08:33

## 2018-03-05 RX ADMIN — SODIUM CHLORIDE 250 ML: 9 INJECTION, SOLUTION INTRAVENOUS at 09:59

## 2018-03-05 RX ADMIN — METRONIDAZOLE 500 MG: 500 INJECTION, SOLUTION INTRAVENOUS at 17:01

## 2018-03-05 RX ADMIN — CILOSTAZOL 50 MG: 50 TABLET ORAL at 21:15

## 2018-03-05 RX ADMIN — POTASSIUM CHLORIDE AND DEXTROSE MONOHYDRATE 100 ML/HR: 150; 5 INJECTION, SOLUTION INTRAVENOUS at 05:40

## 2018-03-05 RX ADMIN — HYDROCODONE BITARTRATE AND ACETAMINOPHEN 1 TABLET: 5; 325 TABLET ORAL at 14:33

## 2018-03-05 RX ADMIN — SUCRALFATE 1 G: 1 TABLET ORAL at 07:44

## 2018-03-05 RX ADMIN — DOXYCYCLINE 100 MG: 100 INJECTION, POWDER, LYOPHILIZED, FOR SOLUTION INTRAVENOUS at 17:01

## 2018-03-05 RX ADMIN — POVIDONE IODINE: 100 SOLUTION TOPICAL at 08:32

## 2018-03-05 RX ADMIN — HYDROCODONE BITARTRATE AND ACETAMINOPHEN 1 TABLET: 5; 325 TABLET ORAL at 20:20

## 2018-03-05 RX ADMIN — PANTOPRAZOLE SODIUM 40 MG: 40 TABLET, DELAYED RELEASE ORAL at 16:59

## 2018-03-05 RX ADMIN — HEPARIN SODIUM 5000 UNITS: 5000 INJECTION, SOLUTION INTRAVENOUS; SUBCUTANEOUS at 13:11

## 2018-03-05 RX ADMIN — HYDROCODONE BITARTRATE AND ACETAMINOPHEN 1 TABLET: 5; 325 TABLET ORAL at 06:09

## 2018-03-05 RX ADMIN — PANTOPRAZOLE SODIUM 40 MG: 40 TABLET, DELAYED RELEASE ORAL at 07:44

## 2018-03-05 NOTE — THERAPY TREATMENT NOTE
Acute Care - Speech Language Pathology   Swallow Progress Note [facility]     Patient Name: Alexander Adan  : 1940  MRN: [id number]  Today's Date: 3/5/2018        Referring Physician: Hospitalist      Admit Date: 3/2/2018    Visit Dx:      ICD-10-CM ICD-9-CM   1. Parotitis, acute K11.21 527.2   2. Subacute maxillary sinusitis J01.00 461.0   3. Generalized weakness R53.1 780.79   4. Dysphagia, unspecified type R13.10 787.20     Patient Active Problem List   Diagnosis   • Atrial fibrillation   • Chronic coronary artery disease   • Chronic diastolic heart failure   • Chronic obstructive pulmonary disease   • Cognitive impairment, mild, so stated   • Tobacco use   • Fall at home   • CKD (chronic kidney disease), stage III   • Leukocytosis   • Bronchitis   • Vasovagal syncope   • Dehydration   • Hypotension due to dehydration   • PVD (peripheral vascular disease)   • SIRS (systemic inflammatory response syndrome)             Adult Rehabilitation Note       18 1000          Rehab Assessment/Intervention    Discipline speech language pathologist  -AV      Document Type therapy note (daily note)  -AV      Recorded by [AV] Alysia Bradford MS CCC-VITA      Pain Assessment    Pain Assessment No/denies pain  -AV      Recorded by [AV] MS SYEDA Mccall      Dysphagia/Swallow Intervention    Dysphagia/Swallow Intervention PAtient sleepy this am, difficulty to keep awake for trials.  PAtient trialed with soft solids.  No complaints or oral pain. Demonstrated chewing but prolonged. Rec level 3 diet and explained to family. Will cont to monitor for diet tolerance and upgrade possibilities. FAmily reports confusing improving but still not 100%. Will monitor.   -AV      Recorded by [AV] MS SYEDA Mccall        User Key  (r) = Recorded By, (t) = Taken By, (c) = Cosigned By    Initials Name Effective Dates    AV MS SYEDA Mccall 16 -                 EDUCATION  The patient  has been educated in the following areas:   Dysphagia (Swallowing Impairment) Oral Care/Hydration.    SLP Recommendation and Plan                                           Plan of Care Review  Plan Of Care Reviewed With: patient, family  Outcome Summary/Follow up Plan: PAtient sleepy this am, difficulty to keep awake for trials.  PAtient trialed with soft solids.  No complaints or oral pain. Demonstrated chewing but prolonged. Rec level 3 diet and explained to family. Will cont to monitor for diet tolerance and upgrade possibilities. FAmily reports confusing improving but still not 100%. Will monitor.            Time Calculation:         Time Calculation- SLP       03/05/18 1038          Time Calculation- SLP    SLP Start Time 1000  -AV      SLP Received On 03/05/18  -AV        User Key  (r) = Recorded By, (t) = Taken By, (c) = Cosigned By    Initials Name Provider Type    AV Alysia Bradford MS CCC-SLP Speech and Language Pathologist          Therapy Charges for Today     Code Description Service Date Service Provider Modifiers Qty    85971631943 HC ST TREATMENT SWALLOW 3 3/5/2018 Alysia Bradford MS CCC-SLP GN 1                 MS SYEDA Mccall  3/5/2018

## 2018-03-05 NOTE — PROGRESS NOTES
Discharge Planning Assessment  [facility]     Patient Name: Alexander Adan  MRN: [id number]  Today's Date: 3/5/2018    Admit Date: 3/2/2018          Discharge Needs Assessment       03/05/18 1541    Living Environment    Lives With spouse    Living Arrangements mobile home    Home Accessibility stairs to enter home    Number of Stairs to Enter Home 5    Type of Financial/Environmental Concern none    Transportation Available car;family or friend will provide    Discharge Needs Assessment    Concerns To Be Addressed no discharge needs identified;denies needs/concerns at this time    Readmission Within The Last 30 Days no previous admission in last 30 days    Anticipated Changes Related to Illness none    Equipment Currently Used at Home cane, straight;wheelchair;rollator;shower chair    Equipment Needed After Discharge none    Discharge Disposition home or self-care;rehab facility    Discharge Contact Information if Applicable Inés Adan, wife/POA  [phone]            Discharge Plan       03/05/18 1542    Case Management/Social Work Plan    Plan Home vs rehab    Patient/Family In Agreement With Plan yes    Additional Comments Spoke with patient and wife at bedside regarding discharge planning.  Patient has use [employer] in the past and has a Straight Cane, Wheelchair, Rollator and Shower Chair.  Patient has prescription coverage.  Patient lives with his wife in a  with 5 steps to access.  Patient denies home safety concerns.  Per PT recommendations discussed possibility of need for rehab.  Provided facility list for [facility] as well as [facility].  CM following for discharge planning.  Patient plan is TBD.        Discharge Placement     No information found        Expected Discharge Date and Time     Expected Discharge Date Expected Discharge Time    Mar 9, 2018               Demographic Summary       03/05/18 1536    Referral Information    Admission Type inpatient    Arrived From  home or self-care    Referral Source admission list    Reason For Consult discharge planning    Record Reviewed clinical discipline documentation;history and physical;patient profile    Primary Care Physician Information    Name Harrison Smart MD            Functional Status       03/05/18 0138    Functional Status Current    Current Functional Level Comment Per Nursing Assessment    Functional Status Prior    Ambulation 1-->assistive equipment    Transferring 1-->assistive equipment    Toileting 1-->assistive equipment    Bathing 1-->assistive equipment    Dressing 0-->independent    Eating 0-->independent    Communication 0-->understands/communicates without difficulty    Swallowing 0-->swallows foods/liquids without difficulty    IADL    Medications independent    Meal Preparation assistive equipment    Housekeeping assistive equipment    Laundry assistive equipment    Shopping assistive equipment    Oral Care independent    Activity Tolerance    Current Activity Limitations none    Usual Activity Tolerance moderate    Current Activity Tolerance fair    Employment/Financial    Employment/Finance Comments [employer] Medicare Replacement            Psychosocial     None            Abuse/Neglect     None            Legal     None            Substance Abuse     None            Patient Forms     None          Randee Singleton, RN

## 2018-03-05 NOTE — THERAPY EVALUATION
Acute Care - Physical Therapy Initial Evaluation  [facility]     Patient Name: Alexander Adan  : 1940  MRN: [id number]  Today's Date: 3/5/2018   Onset of Illness/Injury or Date of Surgery Date: 18  Date of Referral to PT: 18  Referring Physician: DENI Naqvi      Admit Date: 3/2/2018     Visit Dx:    ICD-10-CM ICD-9-CM   1. Parotitis, acute K11.21 527.2   2. Subacute maxillary sinusitis J01.00 461.0   3. Generalized weakness R53.1 780.79   4. Dysphagia, unspecified type R13.10 787.20   5. Impaired functional mobility, balance, gait, and endurance Z74.09 V49.89     Patient Active Problem List   Diagnosis   • Atrial fibrillation   • Chronic coronary artery disease   • Chronic diastolic heart failure   • Chronic obstructive pulmonary disease   • Cognitive impairment, mild, so stated   • Tobacco use   • Fall at home   • CKD (chronic kidney disease), stage III   • Leukocytosis   • Bronchitis   • Vasovagal syncope   • Dehydration   • Hypotension due to dehydration   • PVD (peripheral vascular disease)   • SIRS (systemic inflammatory response syndrome)     Past Medical History:   Diagnosis Date   • Anemia    • Anxiety    • Chronic kidney disease     STAGE II (mild)   • Hyperlipidemia    • Hypertension    • Stroke     LACUNAR. 2013   • Subdural hematoma     Status post hemorrhagic evacuation with india holes 4/3/2015 per Dr. Gilson Brooke.     Past Surgical History:   Procedure Laterality Date   • [place] HOLE FOR SUBDURAL HEMATOMA     • CARDIAC PACEMAKER PLACEMENT      PERMANENT   • CAROTID STENT Left    • CHOLECYSTECTOMY     • CORONARY ARTERY BYPASS GRAFT     • PACEMAKER IMPLANTATION     • THROMBOENDARTERECTOMY      CAROTID, LEFT          PT ASSESSMENT (last 72 hours)      PT Evaluation       18 1541 18 1439    Rehab Evaluation    Document Type  evaluation  -KR    Subjective Information  agree to therapy;no complaints  -KR    Patient Effort, Rehab Treatment  good  -KR     Symptoms Noted During/After Treatment  none  -KR    General Information    Patient Profile Review  yes  -KR    Onset of Illness/Injury or Date of Surgery Date  03/02/18  -KR    Referring Physician  DENI Naqvi  -KR    Pertinent History Of Current Problem  Pt presents with c/o abdominal pain and anorexia for past 3 days. Pt reportedly has had unintentional weight loss over last few months. Pt also c/o pain in ear and R side of face/jaw. Found to have enlarged parotid gland on R side that was tender to the touch.   -KR    Precautions/Limitations  fall precautions  -KR    Prior Level of Function  mod assist:;all household mobility;gait;transfer;ADL's;dressing;bathing  -KR    Equipment Currently Used at Home cane, straight;wheelchair;rollator;shower chair  - cane, straight;walker, rolling;shower chair;oxygen  -KR    Plans/Goals Discussed With  patient;agreed upon  -KR    Risks Reviewed  patient:;LOB;dizziness;increased discomfort;change in vital signs  -KR    Benefits Reviewed  patient:;improve function;increase independence;increase strength;increase balance  -KR    Barriers to Rehab  none identified  -KR    Living Environment    Lives With spouse  -MC spouse  -KR    Living Arrangements mobile home  - mobile home  -KR    Home Accessibility stairs to enter home  -MC stairs to enter home;tub/shower is not walk in;bed and bath on same level  -KR    Number of Stairs to Enter Home 5  -MC 5  -KR    Stair Railings at Home  outside, present at both sides  -KR    Type of Financial/Environmental Concern none  -MC none  -KR    Transportation Available car;family or friend will provide  - family or friend will provide  -KR    Clinical Impression    Date of Referral to PT  03/05/18  -KR    PT Diagnosis  impaired functional mobility  -KR    Patient/Family Goals Statement  return to PLOF  -KR    Criteria for Skilled Therapeutic Interventions Met  yes;treatment indicated  -KR    Rehab Potential  good, to achieve stated  therapy goals  -KR    Vital Signs    Pre Systolic BP Rehab  102  -KR    Pre Treatment Diastolic BP  58  -KR    Pretreatment Heart Rate (beats/min)  78  -KR    Posttreatment Heart Rate (beats/min)  92  -KR    Pre SpO2 (%)  100  -KR    O2 Delivery Pre Treatment  supplemental O2  -KR    Post SpO2 (%)  100  -KR    O2 Delivery Post Treatment  supplemental O2  -KR    Pre Patient Position  Supine  -KR    Intra Patient Position  Standing  -KR    Post Patient Position  Sitting  -KR    Pain Assessment    Pain Assessment  0-10  -KR    Pain Score  0  -KR    Post Pain Score  0  -KR    Cognitive Assessment/Intervention    Current Cognitive/Communication Assessment  impaired  -KR    Orientation Status  oriented to;person;place;required verbal cueing (specifiy in comments)  -KR    Follows Commands/Answers Questions  able to follow single-step instructions;75% of the time;needs cueing;needs increased time;needs repetition  -KR    Personal Safety  moderate impairment;decreased awareness, need for assist;decreased awareness, need for safety  -KR    Personal Safety Interventions  fall prevention program maintained;gait belt;nonskid shoes/slippers when out of bed  -KR    ROM (Range of Motion)    General ROM  no range of motion deficits identified  -KR    General ROM Detail  BLE WFL  -KR    MMT (Manual Muscle Testing)    General MMT Assessment  lower extremity strength deficits identified  -KR    General MMT Assessment Detail  BLE grossly 3+/5  -KR    Bed Mobility, Assessment/Treatment    Bed Mob, Supine to Sit, [place]  moderate assist (50% patient effort);verbal cues required  -KR    Bed Mob, Sit to Supine, [place]  not tested  -KR    Bed Mobility, Safety Issues  decreased use of arms for pushing/pulling  -KR    Bed Mobility, Impairments  strength decreased;impaired balance  -KR    Bed Mobility, Comment  VC's for sequencing. Pt required increased assistance at trunk.  -KR    Transfer Assessment/Treatment    Transfers,  Sit-Stand [place]  minimum assist (75% patient effort);2 person assist required;verbal cues required  -KR    Transfers, Stand-Sit [place]  minimum assist (75% patient effort);2 person assist required;verbal cues required  -KR    Transfer, Safety Issues  step length decreased  -KR    Transfer, Impairments  strength decreased;impaired balance  -KR    Transfer, Comment  VC's for sequencing and hand placement.   -KR    Gait Assessment/Treatment    Gait, [place] Level  minimum assist (75% patient effort);2 person assist required;verbal cues required  -KR    Gait, Assistive Device  --   LUE support  -KR    Gait, Distance (Feet)  30  -KR    Gait, Gait Pattern Analysis  swing-through gait  -KR    Gait, Gait Deviations  sima decreased;step length decreased;weight-shifting ability decreased;decreased heel strike  -KR    Gait, Safety Issues  step length decreased;weight-shifting ability decreased;balance decreased during turns  -KR    Gait, Impairments  strength decreased;impaired balance;coordination impaired  -KR    Gait, Comment  Pt given VC's for upright posture. Pt unsteady, demonstrating impaired balance throughout ambulation.   -KR    Motor Skills/Interventions    Additional Documentation  Balance Skills Training (Group)  -KR    Balance Skills Training    Sitting-Level of Assistance  Contact guard  -KR    Sitting-Balance Support  Feet supported  -KR    Standing-Level of Assistance  Minimum assistance  -KR    Static Standing Balance Support  Left upper extremity supported  -KR    Gait Balance-Level of Assistance  Minimum assistance;x2  -KR    Gait Balance Support  Left upper extremity supported  -KR    Sensory Assessment/Intervention    Light Touch  LLE;RLE  -KR    LLE Light Touch  mild impairment  -KR    RLE Light Touch  mild impairment  -KR    Positioning and Restraints    Pre-Treatment Position  in bed  -KR    Post Treatment Position  chair  -KR    In Chair  notified nsg;sitting;call light within  reach;encouraged to call for assist;exit alarm on;with family/caregiver  -KR      03/05/18 1244 03/05/18 1000    Rehab Evaluation    Document Type  therapy note (daily note)  -AV    Living Environment    Transportation Available family or friend will provide  -CG     Pain Assessment    Pain Assessment  No/denies pain  -AV      03/04/18 0930 03/03/18 1200    Rehab Evaluation    Document Type evaluation  -SG evaluation  -LR    Subjective Information no complaints;agree to therapy  -SG unable to respond  -LR    Patient Effort, Rehab Treatment good  -SG     Symptoms Noted During/After Treatment none  -SG     Pain Assessment    Pain Assessment 0-10  -SG     Pain Score 5  -SG     Post Pain Score 5  -SG     Pain Type Acute pain  -SG     Pain Location Face   R  -SG     Pain Intervention(s) Repositioned  -SG     Response to Interventions tolerated, RN aware, giving meds  -SG       User Key  (r) = Recorded By, (t) = Taken By, (c) = Cosigned By    Initials Name Provider Type    SG Alexa Ferguson, MS CCC-SLP Speech and Language Pathologist    LR Charlotte Mccormick, MS CCC-SLP Speech and Language Pathologist     Randee Singleton, RN Case Manager    AV Alysia Bradford, MS CCC-SLP Speech and Language Pathologist     Jerry Macdonald, RN Registered Nurse    ANDREEA Short, PT Physical Therapist          Physical Therapy Education     Title: PT OT SLP Therapies (Active)     Topic: Physical Therapy (Active)     Point: Mobility training (Active)    Learning Progress Summary    Learner Readiness Method Response Comment Documented by Status   Patient Acceptance E NR  KR 03/05/18 1615 Active               Point: Home exercise program (Active)    Learning Progress Summary    Learner Readiness Method Response Comment Documented by Status   Patient Acceptance E NR  KR 03/05/18 1615 Active               Point: Body mechanics (Active)    Learning Progress Summary    Learner Readiness Method Response Comment Documented by  Status   Patient Acceptance E NR  KR 03/05/18 1615 Active               Point: Precautions (Active)    Learning Progress Summary    Learner Readiness Method Response Comment Documented by Status   Patient Acceptance E NR  KR 03/05/18 1615 Active                      User Key     Initials Effective Dates Name Provider Type Discipline    KR 09/25/17 -  Jasmin Short, PT Physical Therapist PT                PT Recommendation and Plan  Anticipated Discharge Disposition: skilled nursing facility  PT Frequency: daily  Plan of Care Review  Plan Of Care Reviewed With: patient  Outcome Summary/Follow up Plan: PT initial evaluation completed for pt presenting with generalized weakness, impaired balance and a decline in functional mobility. Pt required Ivet x2 to ambulate 30ft with HHA. Pt's decreased functional independence warrants PT skilled care. Recommend D/C to SNF for additional rehab.           IP PT Goals       03/05/18 1615          Bed Mobility PT LTG    Bed Mobility PT LTG, Date Established 03/05/18  -KR      Bed Mobility PT LTG, Time to Achieve 2 wks  -KR      Bed Mobility PT LTG, Activity Type all bed mobility  -KR      Bed Mobility PT LTG, [place] Level independent  -KR      Bed Mobility PT LTG, Date Goal Reviewed 03/05/18  -KR      Transfer Training PT LTG    Transfer Training PT LTG, Date Established 03/05/18  -KR      Transfer Training PT LTG, Time to Achieve 2 wks  -KR      Transfer Training PT LTG, Activity Type sit to stand/stand to sit  -KR      Transfer Training PT LTG, [place] Level conditional independence  -KR      Transfer Training PT LTG, Assist Device walker, rolling  -KR      Transfer Training PT  LTG, Date Goal Reviewed 03/05/18  -KR      Gait Training PT LTG    Gait Training Goal PT LTG, Date Established 03/05/18  -KR      Gait Training Goal PT LTG, Time to Achieve 2 wks  -KR      Gait Training Goal PT LTG, [place] Level conditional independence  -KR      Gait Training Goal PT  LTG, Assist Device walker, rolling  -KR      Gait Training Goal PT LTG, Distance to Achieve 200 feet  -KR      Gait Training Goal PT LTG, Date Goal Reviewed 03/05/18  -KR      Stair Training PT LTG    Stair Training Goal PT LTG, Date Established 03/05/18  -KR      Stair Training Goal PT LTG, Time to Achieve 2 wks  -KR      Stair Training Goal PT LTG, Number of Steps 5  -KR      Stair Training Goal PT LTG, [place] Level contact guard assist  -KR      Stair Training Goal PT LTG, Assist Device 2 handrails  -KR      Stair Training Goal PT LTG, Date Goal Reviewed 03/05/18  -KR        User Key  (r) = Recorded By, (t) = Taken By, (c) = Cosigned By    Initials Name Provider Type    ANDREEA Short PT Physical Therapist                Outcome Measures       03/05/18 1439          How much help from another person do you currently need...    Turning from your back to your side while in flat bed without using bedrails? 3  -KR      Moving from lying on back to sitting on the side of a flat bed without bedrails? 3  -KR      Moving to and from a bed to a chair (including a wheelchair)? 3  -KR      Standing up from a chair using your arms (e.g., wheelchair, bedside chair)? 3  -KR      Climbing 3-5 steps with a railing? 2  -KR      To walk in hospital room? 3  -KR      AM-PAC 6 Clicks Score 17  -KR      Functional Assessment    Outcome Measure Options AM-PAC 6 Clicks Basic Mobility (PT)  -KR        User Key  (r) = Recorded By, (t) = Taken By, (c) = Cosigned By    Initials Name Provider Type    ANDREEA Short PT Physical Therapist           Time Calculation:         PT Charges       03/05/18 1618          Time Calculation    Start Time 1439  -KR      PT Received On 03/05/18  -KR      PT Goal Re-Cert Due Date 03/15/18  -KR      Time Calculation- PT    Total Timed Code Minutes- PT 9 minute(s)  -KR        User Key  (r) = Recorded By, (t) = Taken By, (c) = Cosigned By    Initials Name Provider Type    ANDREEA Short PT  Physical Therapist          Therapy Charges for Today     Code Description Service Date Service Provider Modifiers Qty    24290871103 HC PT EVAL LOW COMPLEXITY 4 3/5/2018 Jasmin Short, PT GP 1    98767297768 HC PT THER PROC EA 15 MIN 3/5/2018 Jasmin Short, PT GP 1    44604269768 HC PT THER SUPP EA 15 MIN 3/5/2018 Jasmin Short, PT GP 2          PT G-Codes  Outcome Measure Options: AM-PAC 6 Clicks Basic Mobility (PT)      Orly Short, PT  3/5/2018

## 2018-03-05 NOTE — PROGRESS NOTES
[facility] Progress Note        Antibiotics:  Anti-Infectives     Ordered     Dose/Rate Route Frequency Start Stop    03/04/18 1634  metroNIDAZOLE (FLAGYL) IVPB 500 mg     Ordering Provider:  Enoch Dong MD    500 mg  100 mL/hr over 60 Minutes Intravenous Every 6 Hours 03/04/18 1800 03/11/18 0559    03/04/18 1634  aztreonam (AZACTAM) in SWFI 2 grams/10ml IV PUSH syringe     Ordering Provider:  Enoch Dong MD    2 g  over 5 Minutes Intravenous Every 8 Hours 03/04/18 1800 03/09/18 1759    03/04/18 1634  doxycycline (VIBRAMYCIN) 100 mg/100 mL 0.9% NS MBP     Ordering Provider:  Enoch Dong MD    100 mg  over 60 Minutes Intravenous Every 12 Hours 03/04/18 1800 03/11/18 0559    03/04/18 1709  vancomycin (VANCOCIN) in iso-osmotic dextrose IVPB 1 g (premix) 200 mL     Ordering Provider:  MIGUEL Finley RPH    15 mg/kg × 65.8 kg  over 60 Minutes Intravenous Every 24 Hours 03/04/18 1800 03/11/18 1759    03/02/18 1657  Pharmacy to dose vancomycin     Comments:  Alexander Adan  6B, N-[id number]  By Chrissy Sullivan MD   Ordering Provider:  Chrissy Sullivan MD     Does not apply Continuous PRN 03/02/18 1657 03/09/18 1656    03/02/18 1138  aztreonam (AZACTAM) in SWFI 2 grams/10ml IV PUSH syringe     Ordering Provider:  Galileo Woodruff MD    2 g  over 5 Minutes Intravenous Once 03/02/18 1240 03/02/18 1325    03/02/18 1138  vancomycin 1250 mg/250 mL 0.9% NS IVPB ([facility])     Ordering Provider:  Galileo Woodruff MD    20 mg/kg × 65.8 kg Intravenous Once 03/02/18 1140 03/02/18 1327          CC: parotitis, pneumonia    HPI:  Patient is a 77 y.o.  Yr old male with history of numerous comorbid conditions including past stroke/subdural hematoma and evacuation with bur hole 2015 with chronic kidney disease/debility.  Family reports chronic dysphagia symptoms such that he coughs with liquid/solids but no specific esophageal diagnosis.  He is admitted on March 2 with 3 days poor by mouth intake, generalized malaise with dry  "cough and right facial/parotid pain.       3/5/18 Patient is sleepy but arouss and  answers no to all questions asked.  Reliability not entirely clear as he has had encephalopathy surrounding admission.  However, no headache photophobia or neck stiffness.  No chest pain.  No productive cough at present although has been intermittently productive at times.  No dysuria hematuria or pyuria.  No skin rash.  No diarrhea or abdominal pain.  He is constipated per family.  No hematochezia melena or hematemesis.     ROS:      3/5/18 Limited  and reliability unclear, but answers no to the following:    No f/c/s. No n/v/d. No rash. No new ADR to Abx.     Constitutional-- No Fever, chills or sweats.  Appetite good, and no malaise. No fatigue.  Heent-- No new vision, hearing or throat complaints.  No epistaxis or oral sores.  Denies odynophagia or dysphagia.  No flashers, floaters or eye pain. No odynophagia or dysphagia. No headache, photophobia or neck stiffness.  CV-- No chest pain, palpitation or syncope  Resp-- No SOB/cough/Hemoptysis  GI- No nausea, vomiting, or diarrhea.  No hematochezia, melena, or hematemesis. Denies jaundice or chronic liver disease.  -- No dysuria, hematuria, or flank pain.  Denies hesitancy, urgency or flank pain.  Lymph- no swollen lymph nodes in neck/axilla or groin.   Heme- No active bruising or bleeding; no Hx of DVT or PE.  MS-- no swelling or pain in the bones or joints of arms/legs.  No new back pain.  Neuro-- No acute focal weakness or numbness in the arms or legs.  No seizures.    Full 12 point review of systems reviewed and negative otherwise for acute complaints,       PE: nursing/chaperone present  /58  Pulse 80  Temp 97.4 °F (36.3 °C) (Oral)   Resp 18  Ht 165.1 cm (65\") Comment: Ht reported by pt's wife; c/w other EHR documentation  Wt 65.8 kg (145 lb)  SpO2 100%  BMI 22.05 kg/m2    GENERAL: Thin chronically ill-appearing and elderly, sleepy but answers questions  HEENT: " Normocephalic, atraumatic.  Left eyelid droop compared to right is chronic according to family. No conjunctival injection. No icterus. Oropharynx clear without evidence of thrush or exudate. No evidence of peridontal disease.  Also see below   NECK: Supple without nuchal rigidity. No mass.  LYMPH: No cervical, axillary or inguinal lymphadenopathy.  HEART: Tachycardic; No murmur, rubs, gallops.   LUNGS: Diminished at bases with rhonchi more so on the right than left. Normal respiratory effort. Nonlabored. No dullness.  ABDOMEN: Soft, nontender, nondistended. Positive bowel sounds. No rebound or guarding. NO mass or HSM.  EXT:  No cyanosis, clubbing or edema. No cord.  : Genitalia generally unremarkable.  Without Aguilar catheter.  MSK: FROM without joint effusions noted arms/legs.    SKIN: Warm and dry without cutaneous eruptions on Inspection/palpation.    NEURO: He does not cooperate with a detailed motor/sensory exam but does move all 4 extremities.     No peripheral stigmata/phenomena of endocarditis     Right face/parotid gland with vague erythema/induration and tenderness.  No fluctuance.  No crepitus or bulla.       Laboratory Data      Results from last 7 days  Lab Units 03/05/18  0622 03/04/18  0519 03/03/18  0645   WBC 10*3/mm3 10.77 15.44* 19.34*   HEMOGLOBIN g/dL 10.2* 11.4* 10.9*   HEMATOCRIT % 32.9* 36.2* 34.8*   PLATELETS 10*3/mm3 179 215 202       Results from last 7 days  Lab Units 03/05/18  0622   SODIUM mmol/L 138   POTASSIUM mmol/L 5.0   CHLORIDE mmol/L 115*   CO2 mmol/L 19.0*   BUN mg/dL 14   CREATININE mg/dL 0.80   GLUCOSE mg/dL 89   CALCIUM mg/dL 7.7*       Results from last 7 days  Lab Units 03/05/18  0622   ALK PHOS U/L 75   BILIRUBIN mg/dL 0.3   ALT (SGPT) U/L 9   AST (SGOT) U/L 18               Estimated Creatinine Clearance: 70.8 mL/min (by C-G formula based on Cr of 0.8).      Microbiology:      Radiology:  Imaging Results (last 72 hours)     Procedure Component Value Units Date/Time     CT Head Without Contrast [[id number]] Collected:  03/02/18 1231     Updated:  03/02/18 1300    Narrative:       EXAMINATION: CT HEAD WO CONTRAST-      INDICATION: Encephalopathy.      TECHNIQUE: Axial CT of the head without intravenous contrast  administration.     The radiation dose reduction device was turned on for each scan per the  ALARA (As Low as Reasonably Achievable) protocol.     COMPARISON: 02/16/2018.     FINDINGS: Midline structures are symmetric without evidence of mass,  mass effect or midline shift. No intraaxial hemorrhage or extraaxial  fluid collection. Ventricles and sulci are prominent consistent with  components of atrophy. Mild to moderate attenuation changes within the  periventricular and deep white matter consistent with chronic small  vessel ischemic disease. Globes and orbits are partially visualized and  grossly unremarkable. Paranasal sinuses and mastoid air cells  demonstrate total opacification of the right maxillary sinus as well as  opacification in the right ethmoid air cells and sphenoid sinus.  Calvarium demonstrates prior [place] holes left frontal and left temporal  regions otherwise grossly intact.       Impression:       No acute intracranial abnormality.     D:  03/02/2018  E:  03/02/2018     This report was finalized on 3/2/2018 12:58 PM by Dr. Maurice Alas.       CT Abdomen Pelvis Without Contrast [[id number]] Collected:  03/02/18 1254     Updated:  03/02/18 1300    Narrative:       EXAMINATION: CT ABDOMEN AND PELVIS WO CONTRAST-      INDICATION: Abdomen pain, guarding, urinary incontinence.      TECHNIQUE: CT abdomen and pelvis without intravenous contrast  administration.     The radiation dose reduction device was turned on for each scan per the  ALARA (As Low as Reasonably Achievable) protocol.     COMPARISON: 08/05/2017.     FINDINGS: Motion degraded examination particularly within the  subdiaphragmatic soft tissues.  Lung bases demonstrate patchy  tree-in-bud  opacifications involving much of the right lower lobe and  visualized right middle lobe consistent with infectious or inflammatory  etiology, however, no focal lobar consolidation. Left hemithorax  partially visualized and grossly clear. Liver without focal lesion.  Gallbladder is surgically absent. Pancreas and spleen within normal  limits. Adrenals without distinct nodule. Kidneys without hydronephrosis  or hydroureter. No obstructive uropathy or urolithiasis. Atherosclerotic  nonaneurysmal abdominal aorta. No bulky retroperitoneal adenopathy. GI  tract evaluation without focal thickening or disproportionate dilatation  of bowel. No free fluid or intraabdominal free air. Pelvic viscera  unremarkable without bulky pelvic adenopathy or significant free fluid.  Degenerative changes of the spine and pelvis without aggressive osseous  abnormality identified within the limited evaluation due to motion. No  soft tissue body wall findings of concern.       Impression:       1. Limited evaluation due to significant patient motion artifact  limiting the subdiaphragmatic soft tissues, however, the limits of the  study there is scattered tree-in-bud opacifications involving the  visualized right middle lobe and right lower lobe along with nodularity  consistent of acute infectious or inflammatory etiology without lobar  consolidation identified. No significant pleural effusion.  2. No acute intraabdominal abnormality within the limited evaluation due  to significant motion specifically no disproportionate dilatation of  bowel to suggest mechanical obstructive process or loculated  intraabdominal fluid collection.     D:  03/02/2018  E:  03/02/2018     This report was finalized on 3/2/2018 12:58 PM by Dr. Maurice Alas.       XR Chest 1 View [[id number]] Collected:  03/02/18 1131     Updated:  03/02/18 1301    Narrative:       EXAMINATION: XR CHEST 1 VW- 03/02/2018     INDICATION: weakness, altered mental status      COMPARISON:  Chest x-ray 02/16/2018.     FINDINGS: Cardiac size within normal limits. Post CABG and post median  sternotomy changes. No focal opacification or consolidation. No  pneumothorax or pleural effusion. Left chest wall pacing device with  leads intact. Degenerative changes of the thoracic spine.           Impression:       No acute cardiopulmonary process.     D:  03/02/2018  E:  03/02/2018     This report was finalized on 3/2/2018 12:58 PM by Dr. Maurice Alas.       CT Soft Tissue Neck With Contrast [[id number]] Collected:  03/02/18 1627     Updated:  03/02/18 1646    Narrative:       EXAMINATION: CT NECK SOFT TISSUES W/CONTRAST - 03/02/2018      INDICATION: K11.21-Acute sialoadenitis; J01.00-Acute maxillary  sinusitis, unspecified; R53.1-Weakness.      TECHNIQUE: CT neck soft tissues with intravenous contrast  administration.     The radiation dose reduction device was turned on for each scan per the  ALARA (As Low as Reasonably Achievable) protocol.     COMPARISON: None.     FINDINGS: For intracranial findings, please see recent CT head report  performed the same day. Total opacification right maxillary sinus  consistent with components of sinusitis. Severely asymmetric appearance  of the right parotid gland which is nearly entirely replaced by  heterogeneous enhancing soft tissue attenuation involving the  superficial and deep lobes with overall dimensions measuring  approximately 4.6 x 4.0 cm, indicative of soft tissue mass involvement.  There is no discrete osseous erosion of the adjacent mandible or  temporomandibular joint with right mastoid air cells grossly clear.  Parapharyngeal spaces are preserved with supraglottic soft tissues,  unremarkable. Valleculae and piriform sinuses within normal limits. The  vocal cords and subglottic trachea are unremarkable. Visualized portions  of the lung apices are grossly unremarkable. Thyroid is homogeneous in  attenuation. No bulky cervical adenopathy is identified.        Impression:       Soft tissue mass involvement of the superficial and deep  lobes of the right parotid gland. 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 No adjacent osseous erosion. No bulky cervical  adenopathy otherwise identified. Opacification of the right maxillary  sinus again noted consistent with sinusitis.     DICTATED:     03/02/2018  EDITED    :     03/02/2018      This report was finalized on 3/2/2018 4:44 PM by Dr. Maurice Alas.       XR Chest 1 View [[id number]] Collected:  03/04/18 1701     Updated:  03/04/18 7807    Narrative:          EXAMINATION: XR CHEST 1 VW - 03/04/2018     INDICATION:  K11.21-Acute sialoadenitis; J01.00-Acute maxillary  sinusitis, unspecified; R53.1-Weakness; R13.10-Dysphagia, unspecified.      COMPARISON: 03/02/2018.     FINDINGS: Portable chest reveals PICC line placement with tip in the  SVC. There are chronic changes identified within the lung fields with  mild increased markings seen within the right middle lower lung field.  Development of a small right pleural effusion. Heart is borderline  enlarged with slight prominence of the pulmonary vascularity.           Impression:       PICC line catheter on the right with tip in the SVC.  Interval development of mild increased markings within the right mid to  lower lung field as well as development of a small right pleural  effusion.     DICTATED:     03/04/2018  EDITED    :     03/04/2018      This report was finalized on 3/4/2018 6:55 PM by Dr. Donna Matthews MD.               Impression:   --Acute sepsis with leukocytosis/fever and tachycardia, associated with encephalopathy and likely severe sepsis.  Primary focal symptoms/findings include right parotitis and acute right lower lobe pneumonia with broad spectrum antimicrobials.  No preceding symptoms of central nervous  system focus and no meningismus at present.  Likely metabolic encephalopathy associated with sepsis.  Abdomen benign by exam with no intra-abdominal pathology per radiology on CT scan, although motion artifact and if symptoms persist/recur, then you should give consideration to further GI workup by either repeat imaging or GI evaluation.  No diarrhea at present but if it develops you could consider stool studies.  Urinalysis not consistent with UTI.  No obvious soft tissue source other than above.     --Acute right parotitis;  gram-positive organisms are a primary concern.  In addition there is description of a masslike quality by radiology and he will likely need to give consideration to ENT input.  I will discuss with Dr. Yu;  no focal abscess so far per radiology.     --Acute cough with right lower lobe pneumonia by imaging and risk for community-acquired pathogens/aspiration.  Broad-spectrum coverage ongoing.  Try to collect sputum for culture.  Order urine Legionella and strep antigens and influenza PCR.     --Dysphagia.  Family reports difficulty with solids/liquids and cough.  Speech therapy has seen.  You'll likely need to give consideration to GI consultation for EGD as well.     --Acute encephalopathy.  Family reports this is common with acute illness for him.  Further neurologic workup per internal medicine at their discretion.  Preceding symptoms and current exam not consistent with meningismus.  Improving in general per family back towards baseline     --Chronic kidney disease stage III per records.  Monitor to help guide further adjustments with antimicrobials     --QTc > 500 ms EKG       PLAN:   --IV vancomycin/Azactam/Flagyl and oral doxycycline     --Monitor IV and IV antibiotic with risk for systemic complication and potential drug interaction     --Check/review labs cultures and scans     --Highly complex set of problems with high risk for further serious morbidity and other serious  sequela     --You will likely need to give consideration to ENT consultation and also GI consultation/EGD; d/w Dr. Yu     --Discussed with Dr Yu.  d/w micro     --Partial history per nursing staff as well    Enoch Dong MD  3/5/2018

## 2018-03-05 NOTE — PLAN OF CARE
Problem: Patient Care Overview (Adult)  Goal: Plan of Care Review  Outcome: Ongoing (interventions implemented as appropriate)   03/05/18 1615   Coping/Psychosocial Response Interventions   Plan Of Care Reviewed With patient   Outcome Evaluation   Outcome Summary/Follow up Plan PT initial evaluation completed for pt presenting with generalized weakness, impaired balance and a decline in functional mobility. Pt required Ivet x2 to ambulate 30ft with HHA. Pt's decreased functional independence warrants PT skilled care. Recommend D/C to SNF for additional rehab.        Problem: Inpatient Physical Therapy  Goal: Bed Mobility Goal LTG- PT  Outcome: Ongoing (interventions implemented as appropriate)   03/05/18 1615   Bed Mobility PT LTG   Bed Mobility PT LTG, Date Established 03/05/18   Bed Mobility PT LTG, Time to Achieve 2 wks   Bed Mobility PT LTG, Activity Type all bed mobility   Bed Mobility PT LTG, [place] Level independent   Bed Mobility PT LTG, Date Goal Reviewed 03/05/18     Goal: Transfer Training Goal 1 LTG- PT  Outcome: Ongoing (interventions implemented as appropriate)   03/05/18 1615   Transfer Training PT LTG   Transfer Training PT LTG, Date Established 03/05/18   Transfer Training PT LTG, Time to Achieve 2 wks   Transfer Training PT LTG, Activity Type sit to stand/stand to sit   Transfer Training PT LTG, [place] Level conditional independence   Transfer Training PT LTG, Assist Device walker, rolling   Transfer Training PT LTG, Date Goal Reviewed 03/05/18     Goal: Gait Training Goal LTG- PT  Outcome: Ongoing (interventions implemented as appropriate)   03/05/18 1615   Gait Training PT LTG   Gait Training Goal PT LTG, Date Established 03/05/18   Gait Training Goal PT LTG, Time to Achieve 2 wks   Gait Training Goal PT LTG, [place] Level conditional independence   Gait Training Goal PT LTG, Assist Device walker, rolling   Gait Training Goal PT LTG, Distance to Achieve 200 feet   Gait Training  Goal PT LTG, Date Goal Reviewed 03/05/18     Goal: Stair Training Goal LTG- PT  Outcome: Ongoing (interventions implemented as appropriate)   03/05/18 1615   Stair Training PT LTG   Stair Training Goal PT LTG, Date Established 03/05/18   Stair Training Goal PT LTG, Time to Achieve 2 wks   Stair Training Goal PT LTG, Number of Steps 5   Stair Training Goal PT LTG, [place] Level contact guard assist   Stair Training Goal PT LTG, Assist Device 2 handrails   Stair Training Goal PT LTG, Date Goal Reviewed 03/05/18

## 2018-03-05 NOTE — PROGRESS NOTES
"Adult Nutrition  Assessment/PES    Patient Name:  Alexander Adan  YOB: 1940  MRN: [id number]  Admit Date:  3/2/2018    Assessment Date:  3/5/2018          Reason for Assessment       03/05/18 1447    Reason for Assessment    Reason For Assessment/Visit follow up protocol    Time Spent (min) 30    Diagnosis --   per notes this admission    Other diagnosis chronic dysphagia, ?PNA or aspiration PNA              Nutrition/Diet History       03/05/18 1447    Nutrition/Diet History    Reported/Observed By Patient;Family    Other Patient and family report patient has had poor appetite and PO intake (<50% of usual) for 1 month with resulting unintentional weight loss. Requests Boost Plus with each meal. Eager to eat better, feel better, and get home to see his 6 dogs.            Anthropometrics       03/05/18 1449    Anthropometrics (Special Considerations)    Height Used for Calculations 1.651 m (5' 5\")    Weight Used for Calculations 53.5 kg (118 lb)   family reports patient weighed 118 lbs on standing scale at MD office at a recent visit within the last 2-3 weeks    Usual Body Weight (UBW)    Usual Body Weight 62.1 kg (137 lb)   per family and per EMR (2/7/18)    Weight Loss 8.618 kg (19 lb)    % Weight Loss  13.9 %    Weight Loss Time Frame 1 month            Labs/Tests/Procedures/Meds       03/05/18 1452    Labs/Tests/Procedures/Meds    Labs/Tests Review Reviewed    Medication Review Reviewed, pertinent                      Malnutrition Severity Assessment       03/05/18 1452    Malnutrition Severity Assessment    Malnutrition Type Acute Illness/Injury Malnutrition    Physical Signs of Malnutrition (Acute)    Muscle Wasting Moderate   RD observed severe muscle wasting at clavicle and acromion regions, moderate muscle wasting at temporal region    Weight Status (Acute)    BMI --   BMI = 19.6 (based on reported standing scale weight 118 lbs)    %IBW Mild (<90%)   87% IBW    %UBW Mild (86-90%)   86% UBW "    Weight Loss Severe (>5% / 1 mo)   patient has lost 19 lbs (13.9%) within 1 month based on reported/documented  lbs and reported standing scale weight 118 lbs    Energy Intake Status (Acute)    Energy Intake Severe (< or equal to 50% / > or equal to 5d)   <50% of usual PO intake x 1 month per family    Criteria Met (Must meet criteria for severity in at least 2 of these categories: M Wasting, Fat Loss, Fluid, Secondary Signs, Wt. Status, Intake)    Patient meets criteria for  Moderate malnutrition   Patient currently meets criteria for moderate, acute malnutrition based on reported intake hx, reported weight hx, and muscle wasting observed by RD during nutrition focused physical exam.        Problem/Interventions:          Problem 2       03/05/18 1457    Nutrition Diagnoses Problem 2    Problem 2 Malnutrition   moderate, acute    Etiology (related to) Factors Affecting Nutrition    Appetite Poor    Signs/Symptoms (evidenced by) Unintended Weight Change;Report of Minimal PO Intake;Report/Observation   <50% of usual PO intake x 1 month    Other Comment severe muscle wasting at clavicle and acromion regions, moderate muscle wasting at temporal region observed by RD    Unintended Weight Change Loss    Number of Pounds Lost 19 lbs (13.9%)    Weight loss time period 1 month                  Intervention Goal       03/05/18 1458    Intervention Goal    General Nutrition support treatment    PO Establish PO;Tolerate PO            Nutrition Intervention       03/05/18 1459    Nutrition Intervention    RD/Tech Action Care plan reviewd;Follow Tx progress;Encourage intake;Recommend/ordered;Interview for preference    Recommended/Ordered Supplement            Nutrition Prescription       03/05/18 1459    Nutrition Prescription PO    PO Prescription Begin/change supplement    Supplement Boost Plus    Supplement Frequency 3 times a day    New PO Prescription Ordered? Yes            Education/Evaluation       03/05/18  1459    Monitor/Evaluation    Monitor Per protocol;PO intake;Supplement intake;Weight;Skin status        Electronically signed by:  Rachel Carrero RD  03/05/18 2:59 PM

## 2018-03-05 NOTE — PLAN OF CARE
Problem: Patient Care Overview (Adult)  Goal: Plan of Care Review  Outcome: Ongoing (interventions implemented as appropriate)   03/05/18 1039   Coping/Psychosocial Response Interventions   Plan Of Care Reviewed With patient;family   Outcome Evaluation   Outcome Summary/Follow up Plan PAtient sleepy this am, difficulty to keep awake for trials. PAtient trialed with soft solids. No complaints or oral pain. Demonstrated chewing but prolonged. Rec level 3 diet and explained to family. Will cont to monitor for diet tolerance and upgrade possibilities. FAmily reports confusing improving but still not 100%. Will monitor.

## 2018-03-05 NOTE — PROGRESS NOTES
Malnutrition Severity Assessment    Patient Name:  Alexander Adan  YOB: 1940  MRN: [id number]  Admit Date:  3/2/2018    Patient meets criteria for : Moderate malnutrition (Patient currently meets criteria for moderate, acute malnutrition based on reported intake hx, reported weight hx, and muscle wasting observed by RD during nutrition focused physical exam.)    Malnutrition Type: Acute Illness/Injury Malnutrition     Malnutrition Type (last 8 hours)      Malnutrition Severity Assessment       03/05/18 1452    Malnutrition Severity Assessment    Malnutrition Type Acute Illness/Injury Malnutrition      03/05/18 1452    Physical Signs of Malnutrition (Acute)    Muscle Wasting Moderate   RD observed severe muscle wasting at clavicle and acromion regions, moderate muscle wasting at temporal region      03/05/18 1452    Weight Status (Acute)    BMI --   BMI = 19.6 (based on reported standing scale weight 118 lbs)    %IBW Mild (<90%)   87% IBW    %UBW Mild (86-90%)   86% UBW    Weight Loss Severe (>5% / 1 mo)   patient has lost 19 lbs (13.9%) within 1 month based on reported/documented  lbs and reported standing scale weight 118 lbs      03/05/18 1452    Energy Intake Status (Acute)    Energy Intake Severe (< or equal to 50% / > or equal to 5d)   <50% of usual PO intake x 1 month per family      03/05/18 1452    Criteria Met (Must meet criteria for severity in at least 2 of these categories: M Wasting, Fat Loss, Fluid, Secondary Signs, Wt. Status, Intake)    Patient meets criteria for  Moderate malnutrition   Patient currently meets criteria for moderate, acute malnutrition based on reported intake hx, reported weight hx, and muscle wasting observed by RD during nutrition focused physical exam.          Electronically signed by:  Rachel Carrero RD  03/05/18 3:00 PM

## 2018-03-05 NOTE — PLAN OF CARE
Problem: Arrhythmia/Dysrhythmia (Symptomatic) (Adult)  Goal: Signs and Symptoms of Listed Potential Problems Will be Absent or Manageable (Arrhythmia/Dysrhythmia)  Outcome: Ongoing (interventions implemented as appropriate)  Continues in A-Fib with rate control.   03/05/18 1244   Arrhythmia/Dysrhythmia (Symptomatic)   Problems Present (Arrhythmia/Dysrhythmia) other (see comments)       Problem: Nutrition, Imbalanced: Inadequate Oral Intake (Adult)  Goal: Identify Related Risk Factors and Signs and Symptoms  Outcome: Ongoing (interventions implemented as appropriate)    Goal: Improved Oral Intake  Outcome: Ongoing (interventions implemented as appropriate)    Goal: Prevent Further Weight Loss  Outcome: Ongoing (interventions implemented as appropriate)      Problem: Infection, Risk/Actual (Adult)  Goal: Identify Related Risk Factors and Signs and Symptoms  Outcome: Ongoing (interventions implemented as appropriate)    Goal: Infection Prevention/Resolution  Outcome: Ongoing (interventions implemented as appropriate)      Problem: Skin Integrity Impairment, Risk/Actual (Adult)  Goal: Identify Related Risk Factors and Signs and Symptoms  Outcome: Ongoing (interventions implemented as appropriate)    Goal: Skin Integrity/Wound Healing  Outcome: Ongoing (interventions implemented as appropriate)      Problem: Fall Risk (Adult)  Goal: Identify Related Risk Factors and Signs and Symptoms  Outcome: Ongoing (interventions implemented as appropriate)    Goal: Absence of Falls  Outcome: Ongoing (interventions implemented as appropriate)      Problem: Patient Care Overview (Adult)  Goal: Plan of Care Review  Outcome: Ongoing (interventions implemented as appropriate)  Patient is more alert today, able to follow all directions and can hold a conversation.  PT/OT eval and Tx orders placed.   03/05/18 1244   Coping/Psychosocial Response Interventions   Plan Of Care Reviewed With patient;spouse   Patient Care Overview   Progress  improving     Goal: Adult Individualization and Mutuality  Outcome: Ongoing (interventions implemented as appropriate)    Goal: Discharge Needs Assessment  Outcome: Ongoing (interventions implemented as appropriate)

## 2018-03-05 NOTE — PROGRESS NOTES
[facility] Medicine Services  PROGRESS NOTE    Patient Name: Alexander Adan  : 1940  MRN: [id number]    Date of Admission: 3/2/2018  Length of Stay: 3  Primary Care Physician: Harrison Smart MD    Subjective   Subjective     CC:Right facial swelling, weight loss    Subjective:  Resting in bed,  asleep but arousable.  Denies any specific pain or shortness of breath.  No fever or chills.  No chest pain or palpitation or shortness of breath address.  No nausea, vomiting, diarrhea, abdominal pain.    Review of Systems  Unable to obtain        Objective   Objective     Vital Signs:   Temp:  [97.4 °F (36.3 °C)-99 °F (37.2 °C)] 97.9 °F (36.6 °C)  Heart Rate:  [70-98] 97  Resp:  [14-24] 18  BP: ()/(43-65) 131/65        Physical Exam:  Constitutional: No acute distress, very cachectic and frail  Eyes: PERRLA, sclerae anicteric, no conjunctival injection  HENT: NCAT, mucous membranes moist.  Right submandibular swelling most likely she Parotid gland swelling.  Neck: Supple, no thyromegaly, no lymphadenopathy, trachea midline  Respiratory: Clear to auscultation bilaterally, nonlabored respirations   Cardiovascular: RRR, no murmurs, rubs, or gallops, palpable pedal pulses bilaterally  Gastrointestinal: Positive bowel sounds, soft, nontender, nondistended  Musculoskeletal: Very cachectic No bilateral ankle edema, no clubbing or cyanosis to extremities  Neurologic: Awake, alert, follows commands.  Patient moves all the limbs.  Skin: No rashes    Results Reviewed:  I have personally reviewed current lab, radiology, and data and agree.      Results from last 7 days  Lab Units 18  0618  0519 18  0645   WBC 10*3/mm3 10.77 15.44* 19.34*   HEMOGLOBIN g/dL 10.2* 11.4* 10.9*   HEMATOCRIT % 32.9* 36.2* 34.8*   PLATELETS 10*3/mm3 179 215 202       Results from last 7 days  Lab Units 18  0622 18  1831 18  0519 18  0645 18  1100   SODIUM  mmol/L 138  --  142 148* 144   POTASSIUM mmol/L 5.0 4.7 3.5 3.3* 3.3*   CHLORIDE mmol/L 115*  --  120* 121* 113*   CO2 mmol/L 19.0*  --  17.0* 20.0 22.0   BUN mg/dL 14  --  14 20 30*   CREATININE mg/dL 0.80  --  0.80 0.90 1.20   GLUCOSE mg/dL 89  --  112* 95 126*   CALCIUM mg/dL 7.7*  --  8.6* 8.4* 10.2   ALT (SGPT) U/L 9  --  10  --  11   AST (SGOT) U/L 18  --  19  --  17     Estimated Creatinine Clearance: 70.8 mL/min (by C-G formula based on Cr of 0.8).  BNP   Date Value Ref Range Status   03/04/2018 1965.0 (H) 0.0 - 100.0 pg/mL Final     Comment:     Results may be falsely decreased if patient taking Biotin.     No results found for: PHART    Microbiology Results Abnormal     Procedure Component Value - Date/Time    Blood Culture - Blood, [[id number]]  (Normal) Collected:  03/02/18 1231    Lab Status:  Preliminary result Specimen:  Blood from Hand, Right Updated:  03/05/18 1746     Blood Culture No growth at 3 days    Respiratory Culture - Sputum, Cough [[id number]] Collected:  03/05/18 0751    Lab Status:  Preliminary result Specimen:  Sputum from Cough Updated:  03/05/18 1506     Gram Stain Result Many (4+) WBCs per low power field      Moderate (3+) Epithelial cells per low power field      Moderate (3+) Gram positive cocci in pairs and clusters    Blood Culture - Blood, [[id number]] Collected:  03/02/18 1235    Lab Status:  Preliminary result Specimen:  Blood from Arm, Left Updated:  03/05/18 1401     Blood Culture No growth at 3 days      Aerobic bottle only    Blood Culture - Blood, [[id number]]  (Normal) Collected:  03/02/18 1305    Lab Status:  Preliminary result Specimen:  Blood from Arm, Left Updated:  03/05/18 1401     Blood Culture No growth at 3 days    Influenza A & B, RT PCR - Swab, Nasopharynx [[id number]]  (Normal) Collected:  03/04/18 1424    Lab Status:  Final result Specimen:  Swab from Nasopharynx Updated:  03/04/18 1542     Influenza A PCR Not Detected     Influenza B PCR Not Detected           Imaging Results (last 24 hours)     Procedure Component Value Units Date/Time    XR Chest 1 View [[id number]] Collected:  03/04/18 1701     Updated:  03/04/18 1857    Narrative:          EXAMINATION: XR CHEST 1 VW - 03/04/2018     INDICATION:  K11.21-Acute sialoadenitis; J01.00-Acute maxillary  sinusitis, unspecified; R53.1-Weakness; R13.10-Dysphagia, unspecified.      COMPARISON: 03/02/2018.     FINDINGS: Portable chest reveals PICC line placement with tip in the  SVC. There are chronic changes identified within the lung fields with  mild increased markings seen within the right middle lower lung field.  Development of a small right pleural effusion. Heart is borderline  enlarged with slight prominence of the pulmonary vascularity.           Impression:       PICC line catheter on the right with tip in the SVC.  Interval development of mild increased markings within the right mid to  lower lung field as well as development of a small right pleural  effusion.     DICTATED:     03/04/2018  EDITED    :     03/04/2018      This report was finalized on 3/4/2018 6:55 PM by Dr. Donna Matthews MD.           Results for orders placed during the hospital encounter of 02/07/18   Adult Transthoracic Echo Complete W/ Cont if Necessary Per Protocol    Narrative · Left ventricular wall thickness is consistent with mild concentric   hypertrophy.  · The following left ventricular wall segments are hypokinetic: basal   inferoseptal, mid inferoseptal and basal inferoseptal.  · Moderately reduced right ventricular systolic function noted.  · Left atrial cavity size is moderately dilated.  · Mild-to-moderate mitral valve regurgitation is present  · Mild tricuspid valve regurgitation is present.  · Left ventricular systolic function is normal. Estimated EF = 60%.  · there is akinesis of base of septum and base of inferior wall          I have reviewed the medications.    Assessment/Plan   Assessment / Plan     Hospital Problem  List     * (Principal)SIRS (systemic inflammatory response syndrome)    Atrial fibrillation    Overview Signed 6/20/2016  3:33 PM by DENI Gates     Description: chads vasc 7         Chronic coronary artery disease    Overview Signed 6/20/2016  3:33 PM by DENI Gates     Description: cabg 2004, data deficient         Chronic diastolic heart failure    Overview Signed 6/20/2016  3:33 PM by DENI Gates     Description: NYHA II-III  Per echo: EF 50% September 2013         Chronic obstructive pulmonary disease    CKD (chronic kidney disease), stage III    Leukocytosis    PVD (peripheral vascular disease)             Brief Hospital Course to date:  Alexander Adan is a 77 y.o. male       Assessment & Plan:  *Right sided facial swelling most likely parotitis. Very significant improvement with IV antibiotics.    * CT evidence of pneumonia possibly even aspiration pneumonia.    * Sepsis currently patient is  on IV antibiotic, much improved.    * Severe weight loss and cachexia.  Etiology is unknown.    * Atrial fibrillation with rapid ventricular response, rate better controlled.    PLAN:  - cont current care  - monitor BP closely  - labs in am    DVT Prophylaxis:      CODE STATUS: DNR    Disposition:TBD.      Electronically signed by Anderson Yu MD, 03/05/18, 6:19 PM.

## 2018-03-06 LAB
ANION GAP SERPL CALCULATED.3IONS-SCNC: 4 MMOL/L (ref 3–11)
BASOPHILS # BLD AUTO: 0.03 10*3/MM3 (ref 0–0.2)
BASOPHILS # BLD MANUAL: 0 10*3/MM3 (ref 0–0.2)
BASOPHILS NFR BLD AUTO: 0 % (ref 0–1)
BASOPHILS NFR BLD AUTO: 0.4 % (ref 0–1)
BUN BLD-MCNC: 12 MG/DL (ref 9–23)
BUN/CREAT SERPL: 17.1 (ref 7–25)
CALCIUM SPEC-SCNC: 7.3 MG/DL (ref 8.7–10.4)
CHLORIDE SERPL-SCNC: 112 MMOL/L (ref 99–109)
CO2 SERPL-SCNC: 16 MMOL/L (ref 20–31)
CREAT BLD-MCNC: 0.7 MG/DL (ref 0.6–1.3)
DEPRECATED RDW RBC AUTO: 61.9 FL (ref 37–54)
EOSINOPHIL # BLD AUTO: 0.12 10*3/MM3 (ref 0–0.3)
EOSINOPHIL # BLD MANUAL: 0.08 10*3/MM3 (ref 0.1–0.3)
EOSINOPHIL NFR BLD AUTO: 1.5 % (ref 0–3)
EOSINOPHIL NFR BLD MANUAL: 1 % (ref 0–3)
ERYTHROCYTE [DISTWIDTH] IN BLOOD BY AUTOMATED COUNT: 17.2 % (ref 11.3–14.5)
GFR SERPL CREATININE-BSD FRML MDRD: 109 ML/MIN/1.73
GLUCOSE BLD-MCNC: 113 MG/DL (ref 70–100)
HCT VFR BLD AUTO: 35.8 % (ref 38.9–50.9)
HGB BLD-MCNC: 11.1 G/DL (ref 13.1–17.5)
IMM GRANULOCYTES # BLD: 0.31 10*3/MM3 (ref 0–0.03)
IMM GRANULOCYTES NFR BLD: 3.8 % (ref 0–0.6)
LYMPHOCYTES # BLD AUTO: 1.28 10*3/MM3 (ref 0.6–4.8)
LYMPHOCYTES # BLD MANUAL: 1.22 10*3/MM3 (ref 0.6–4.8)
LYMPHOCYTES NFR BLD AUTO: 15.8 % (ref 24–44)
LYMPHOCYTES NFR BLD MANUAL: 15 % (ref 24–44)
LYMPHOCYTES NFR BLD MANUAL: 7 % (ref 0–12)
MAGNESIUM SERPL-MCNC: 2 MG/DL (ref 1.3–2.7)
MCH RBC QN AUTO: 30.6 PG (ref 27–31)
MCHC RBC AUTO-ENTMCNC: 31 G/DL (ref 32–36)
MCV RBC AUTO: 98.6 FL (ref 80–99)
METAMYELOCYTES NFR BLD MANUAL: 1 % (ref 0–0)
MONOCYTES # BLD AUTO: 0.57 10*3/MM3 (ref 0–1)
MONOCYTES # BLD AUTO: 0.69 10*3/MM3 (ref 0–1)
MONOCYTES NFR BLD AUTO: 8.5 % (ref 0–12)
MYELOCYTES NFR BLD MANUAL: 1 % (ref 0–0)
NEUTROPHILS # BLD AUTO: 5.67 10*3/MM3 (ref 1.5–8.3)
NEUTROPHILS # BLD AUTO: 5.99 10*3/MM3 (ref 1.5–8.3)
NEUTROPHILS NFR BLD AUTO: 70 % (ref 41–71)
NEUTROPHILS NFR BLD MANUAL: 67 % (ref 41–71)
NEUTS BAND NFR BLD MANUAL: 7 % (ref 0–5)
PHOSPHATE SERPL-MCNC: 2.3 MG/DL (ref 2.4–5.1)
PHOSPHATE SERPL-MCNC: 2.6 MG/DL (ref 2.4–5.1)
PLAT MORPH BLD: NORMAL
PLATELET # BLD AUTO: 140 10*3/MM3 (ref 150–450)
PMV BLD AUTO: 10.5 FL (ref 6–12)
POTASSIUM BLD-SCNC: 5 MMOL/L (ref 3.5–5.5)
RBC # BLD AUTO: 3.63 10*6/MM3 (ref 4.2–5.76)
RBC MORPH BLD: NORMAL
SODIUM BLD-SCNC: 132 MMOL/L (ref 132–146)
VARIANT LYMPHS NFR BLD MANUAL: 1 % (ref 0–5)
WBC MORPH BLD: NORMAL
WBC NRBC COR # BLD: 8.1 10*3/MM3 (ref 3.5–10.8)

## 2018-03-06 PROCEDURE — 83735 ASSAY OF MAGNESIUM: CPT | Performed by: NURSE PRACTITIONER

## 2018-03-06 PROCEDURE — 84100 ASSAY OF PHOSPHORUS: CPT | Performed by: NURSE PRACTITIONER

## 2018-03-06 PROCEDURE — 85025 COMPLETE CBC W/AUTO DIFF WBC: CPT | Performed by: NURSE PRACTITIONER

## 2018-03-06 PROCEDURE — 85007 BL SMEAR W/DIFF WBC COUNT: CPT | Performed by: NURSE PRACTITIONER

## 2018-03-06 PROCEDURE — 25010000002 MORPHINE SULFATE (PF) 2 MG/ML SOLUTION: Performed by: INTERNAL MEDICINE

## 2018-03-06 PROCEDURE — 99232 SBSQ HOSP IP/OBS MODERATE 35: CPT | Performed by: INTERNAL MEDICINE

## 2018-03-06 PROCEDURE — 92526 ORAL FUNCTION THERAPY: CPT

## 2018-03-06 PROCEDURE — 94799 UNLISTED PULMONARY SVC/PX: CPT

## 2018-03-06 PROCEDURE — 97110 THERAPEUTIC EXERCISES: CPT

## 2018-03-06 PROCEDURE — 94640 AIRWAY INHALATION TREATMENT: CPT

## 2018-03-06 PROCEDURE — 25810000003 DEXTROSE 5 % WITH KCL 20 MEQ 20-5 MEQ/L-% SOLUTION: Performed by: INTERNAL MEDICINE

## 2018-03-06 PROCEDURE — 80048 BASIC METABOLIC PNL TOTAL CA: CPT | Performed by: NURSE PRACTITIONER

## 2018-03-06 PROCEDURE — 25010000002 HEPARIN (PORCINE) PER 1000 UNITS: Performed by: INTERNAL MEDICINE

## 2018-03-06 PROCEDURE — 97165 OT EVAL LOW COMPLEX 30 MIN: CPT

## 2018-03-06 PROCEDURE — 25010000002 VANCOMYCIN PER 500 MG

## 2018-03-06 RX ORDER — DOXYCYCLINE 100 MG/1
100 CAPSULE ORAL EVERY 12 HOURS SCHEDULED
Status: DISCONTINUED | OUTPATIENT
Start: 2018-03-06 | End: 2018-03-10

## 2018-03-06 RX ORDER — BISACODYL 10 MG
10 SUPPOSITORY, RECTAL RECTAL DAILY PRN
Status: DISCONTINUED | OUTPATIENT
Start: 2018-03-06 | End: 2018-03-13 | Stop reason: HOSPADM

## 2018-03-06 RX ORDER — GABAPENTIN 300 MG/1
600 CAPSULE ORAL EVERY 8 HOURS SCHEDULED
Status: DISCONTINUED | OUTPATIENT
Start: 2018-03-06 | End: 2018-03-13 | Stop reason: HOSPADM

## 2018-03-06 RX ADMIN — CILOSTAZOL 50 MG: 50 TABLET ORAL at 10:03

## 2018-03-06 RX ADMIN — POTASSIUM CHLORIDE AND DEXTROSE MONOHYDRATE 100 ML/HR: 150; 5 INJECTION, SOLUTION INTRAVENOUS at 02:01

## 2018-03-06 RX ADMIN — METRONIDAZOLE 500 MG: 500 INJECTION, SOLUTION INTRAVENOUS at 00:11

## 2018-03-06 RX ADMIN — SUCRALFATE 1 G: 1 TABLET ORAL at 10:04

## 2018-03-06 RX ADMIN — DOXYCYCLINE 100 MG: 100 INJECTION, POWDER, LYOPHILIZED, FOR SOLUTION INTRAVENOUS at 06:07

## 2018-03-06 RX ADMIN — WATER 2 G: 1 INJECTION INTRAMUSCULAR; INTRAVENOUS; SUBCUTANEOUS at 18:05

## 2018-03-06 RX ADMIN — WATER 2 G: 1 INJECTION INTRAMUSCULAR; INTRAVENOUS; SUBCUTANEOUS at 10:03

## 2018-03-06 RX ADMIN — POTASSIUM CHLORIDE AND DEXTROSE MONOHYDRATE 100 ML/HR: 150; 5 INJECTION, SOLUTION INTRAVENOUS at 13:44

## 2018-03-06 RX ADMIN — MORPHINE SULFATE 2 MG: 2 INJECTION, SOLUTION INTRAMUSCULAR; INTRAVENOUS at 00:06

## 2018-03-06 RX ADMIN — IPRATROPIUM BROMIDE AND ALBUTEROL SULFATE 3 ML: 2.5; .5 SOLUTION RESPIRATORY (INHALATION) at 11:55

## 2018-03-06 RX ADMIN — SOTALOL HYDROCHLORIDE 120 MG: 80 TABLET ORAL at 10:10

## 2018-03-06 RX ADMIN — DOXYCYCLINE 100 MG: 100 CAPSULE ORAL at 10:03

## 2018-03-06 RX ADMIN — CASTOR OIL AND BALSAM, PERU: 788; 87 OINTMENT TOPICAL at 13:10

## 2018-03-06 RX ADMIN — NICOTINE 1 PATCH: 14 PATCH TRANSDERMAL at 10:07

## 2018-03-06 RX ADMIN — PANTOPRAZOLE SODIUM 40 MG: 40 TABLET, DELAYED RELEASE ORAL at 10:04

## 2018-03-06 RX ADMIN — IPRATROPIUM BROMIDE AND ALBUTEROL SULFATE 3 ML: 2.5; .5 SOLUTION RESPIRATORY (INHALATION) at 15:57

## 2018-03-06 RX ADMIN — HEPARIN SODIUM 5000 UNITS: 5000 INJECTION, SOLUTION INTRAVENOUS; SUBCUTANEOUS at 06:07

## 2018-03-06 RX ADMIN — WATER 2 G: 1 INJECTION INTRAMUSCULAR; INTRAVENOUS; SUBCUTANEOUS at 01:48

## 2018-03-06 RX ADMIN — HEPARIN SODIUM 5000 UNITS: 5000 INJECTION, SOLUTION INTRAVENOUS; SUBCUTANEOUS at 22:16

## 2018-03-06 RX ADMIN — GABAPENTIN 600 MG: 300 CAPSULE ORAL at 13:09

## 2018-03-06 RX ADMIN — POVIDONE IODINE: 100 SOLUTION TOPICAL at 13:15

## 2018-03-06 RX ADMIN — MORPHINE SULFATE 2 MG: 2 INJECTION, SOLUTION INTRAMUSCULAR; INTRAVENOUS at 14:53

## 2018-03-06 RX ADMIN — METRONIDAZOLE 500 MG: 500 INJECTION, SOLUTION INTRAVENOUS at 13:11

## 2018-03-06 RX ADMIN — METRONIDAZOLE 500 MG: 500 INJECTION, SOLUTION INTRAVENOUS at 06:07

## 2018-03-06 RX ADMIN — METRONIDAZOLE 500 MG: 500 INJECTION, SOLUTION INTRAVENOUS at 18:05

## 2018-03-06 RX ADMIN — VANCOMYCIN HYDROCHLORIDE 1000 MG: 1 INJECTION, SOLUTION INTRAVENOUS at 18:05

## 2018-03-06 RX ADMIN — SODIUM PHOSPHATE, MONOBASIC, MONOHYDRATE 15 MMOL: 276; 142 INJECTION, SOLUTION INTRAVENOUS at 13:27

## 2018-03-06 RX ADMIN — HYDROCODONE BITARTRATE AND ACETAMINOPHEN 1 TABLET: 5; 325 TABLET ORAL at 10:04

## 2018-03-06 RX ADMIN — IPRATROPIUM BROMIDE AND ALBUTEROL SULFATE 3 ML: 2.5; .5 SOLUTION RESPIRATORY (INHALATION) at 07:44

## 2018-03-06 RX ADMIN — IPRATROPIUM BROMIDE AND ALBUTEROL SULFATE 3 ML: 2.5; .5 SOLUTION RESPIRATORY (INHALATION) at 19:53

## 2018-03-06 RX ADMIN — LORAZEPAM 1 MG: 1 TABLET ORAL at 01:32

## 2018-03-06 RX ADMIN — CASTOR OIL AND BALSAM, PERU: 788; 87 OINTMENT TOPICAL at 22:31

## 2018-03-06 RX ADMIN — LORAZEPAM 1 MG: 1 TABLET ORAL at 15:08

## 2018-03-06 RX ADMIN — ASPIRIN 81 MG 81 MG: 81 TABLET ORAL at 10:04

## 2018-03-06 RX ADMIN — SUCRALFATE 1 G: 1 TABLET ORAL at 18:05

## 2018-03-06 RX ADMIN — BISACODYL 10 MG: 10 SUPPOSITORY RECTAL at 06:06

## 2018-03-06 RX ADMIN — HEPARIN SODIUM 5000 UNITS: 5000 INJECTION, SOLUTION INTRAVENOUS; SUBCUTANEOUS at 13:09

## 2018-03-06 RX ADMIN — PANTOPRAZOLE SODIUM 40 MG: 40 TABLET, DELAYED RELEASE ORAL at 18:05

## 2018-03-06 NOTE — THERAPY EVALUATION
Acute Care - Occupational Therapy Initial Evaluation  [facility]     Patient Name: Alexander Adan  : 1940  MRN: [id number]  Today's Date: 3/6/2018  Onset of Illness/Injury or Date of Surgery Date: 18  Date of Referral to OT: 18  Referring Physician: DENI Naqvi    Admit Date: 3/2/2018       ICD-10-CM ICD-9-CM   1. Parotitis, acute K11.21 527.2   2. Subacute maxillary sinusitis J01.00 461.0   3. Generalized weakness R53.1 780.79   4. Dysphagia, unspecified type R13.10 787.20   5. Impaired functional mobility, balance, gait, and endurance Z74.09 V49.89   6. Impaired mobility and ADLs Z74.09 799.89     Patient Active Problem List   Diagnosis   • Atrial fibrillation   • Chronic coronary artery disease   • Chronic diastolic heart failure   • Chronic obstructive pulmonary disease   • Cognitive impairment, mild, so stated   • Tobacco use   • Fall at home   • CKD (chronic kidney disease), stage III   • Leukocytosis   • Bronchitis   • Vasovagal syncope   • Dehydration   • Hypotension due to dehydration   • PVD (peripheral vascular disease)   • SIRS (systemic inflammatory response syndrome)     Past Medical History:   Diagnosis Date   • Anemia    • Anxiety    • Chronic kidney disease     STAGE II (mild)   • Hyperlipidemia    • Hypertension    • Stroke     LACUNAR. 2013   • Subdural hematoma     Status post hemorrhagic evacuation with [place] holes 4/3/2015 per Dr. Gilson Brooke.     Past Surgical History:   Procedure Laterality Date   • [place] HOLE FOR SUBDURAL HEMATOMA     • CARDIAC PACEMAKER PLACEMENT      PERMANENT   • CAROTID STENT Left    • CHOLECYSTECTOMY     • CORONARY ARTERY BYPASS GRAFT     • PACEMAKER IMPLANTATION     • THROMBOENDARTERECTOMY      CAROTID, LEFT          OT ASSESSMENT FLOWSHEET (last 72 hours)      OT Evaluation       18 0924 18 1541 18 1540 18 1439 18 1244    Rehab Evaluation    Document Type evaluation  -KF   evaluation  -KR      Subjective Information agree to therapy;complains of;pain  -KF   agree to therapy;no complaints  -KR     Patient Effort, Rehab Treatment adequate  -KF   good  -KR     Symptoms Noted During/After Treatment fatigue  -KF   none  -KR     General Information    Patient Profile Review yes  -KF   yes  -KR     Onset of Illness/Injury or Date of Surgery Date 03/02/18  -KF   03/02/18  -KR     Referring Physician DENI Naqvi  -DENI Mckeon  -ANDREEA     General Observations Pt supine HOB elevated with family present  -KF        Pertinent History Of Current Problem Pt presented to ED c/o abdominal pain, refusal of medications, anorexia for 3 days. Reportedly had unintentional weight loss over months and c/o pain in ear, R side of jaw. Enlarged parotid gland on R side.   -KF   Pt presents with c/o abdominal pain and anorexia for past 3 days. Pt reportedly has had unintentional weight loss over last few months. Pt also c/o pain in ear and R side of face/jaw. Found to have enlarged parotid gland on R side that was tender to the touch.   -KR     Precautions/Limitations fall precautions;oxygen therapy device and L/min  -KF   fall precautions  -KR     Prior Level of Function independent:;ADL's  -KF   mod assist:;all household mobility;gait;transfer;ADL's;dressing;bathing  -KR     Equipment Currently Used at Home bath bench;cane, straight;rollator;wheelchair  -KF cane, straight;wheelchair;rollator;shower chair  -  cane, straight;walker, rolling;shower chair;oxygen  -KR     Plans/Goals Discussed With patient;spouse/S.O.;agreed upon  -KF   patient;agreed upon  -KR     Risks Reviewed patient:;spouse/S.O.:;increased discomfort;LOB  -KF   patient:;LOB;dizziness;increased discomfort;change in vital signs  -KR     Benefits Reviewed patient:;spouse/S.O.:;improve function;increase independence;increase strength;increase balance;decrease pain;improve skin integrity;increase knowledge  -KF   patient:;improve function;increase  independence;increase strength;increase balance  -KR     Barriers to Rehab none identified  -KF   none identified  -KR     Living Environment    Lives With spouse  -KF spouse  -MC  spouse  -KR     Living Arrangements mobile home  -KF mobile home  -MC  mobile home  -KR     Home Accessibility tub/shower is not walk in;bed and bath on same level;stairs to enter home  -KF stairs to enter home  -MC  stairs to enter home;tub/shower is not walk in;bed and bath on same level  -KR     Number of Stairs to Enter Home 5  -KF 5  -MC  5  -KR     Stair Railings at Home outside, present at both sides  -KF   outside, present at both sides  -KR     Type of Financial/Environmental Concern  none  -MC  none  -KR     Transportation Available  car;family or friend will provide  -MC  family or friend will provide  -KR family or friend will provide  -CG    Clinical Impression    Date of Referral to OT 03/05/18  -KF        OT Diagnosis Decline in ADLs  -KF        Criteria for Skilled Therapeutic Interventions Met yes;treatment indicated  -KF        Rehab Potential good, to achieve stated therapy goals  -KF        Therapy Frequency daily  -KF        Anticipated Equipment Needs At Discharge other (see comments)   TBA  -KF        Anticipated Discharge Disposition skilled nursing facility  -KF        Functional Level Prior    Ambulation   1-->assistive equipment  -MC      Transferring   1-->assistive equipment  -MC      Toileting   1-->assistive equipment  -MC      Bathing   1-->assistive equipment  -MC      Dressing   0-->independent  -MC      Eating   0-->independent  -MC      Communication   0-->understands/communicates without difficulty  -MC      Swallowing   0-->swallows foods/liquids without difficulty  -MC      Vital Signs    Pre Systolic BP Rehab 132  -KF   102  -KR     Pre Treatment Diastolic BP 67  -KF   58  -KR     Pretreatment Heart Rate (beats/min) 110  -KF   78  -KR     Intratreatment Heart Rate (beats/min) 120  -KF         Posttreatment Heart Rate (beats/min) 101  -KF   92  -KR     Pre SpO2 (%) 95  -KF   100  -KR     O2 Delivery Pre Treatment supplemental O2  -KF   supplemental O2  -KR     O2 Delivery Intra Treatment supplemental O2  -KF        Post SpO2 (%) 100  -KF   100  -KR     O2 Delivery Post Treatment supplemental O2  -KF   supplemental O2  -KR     Pre Patient Position Supine  -KF   Supine  -KR     Intra Patient Position Supine  -KF   Standing  -KR     Post Patient Position Supine  -KF   Sitting  -KR     Pain Assessment    Pain Assessment 0-10  -KF   0-10  -KR     Pain Score 8  -KF   0  -KR     Post Pain Score 8  -KF   0  -KR     Pain Type Acute pain  -KF        Pain Location Leg  -KF        Pain Orientation Right;Left  -KF        Pain Intervention(s) Ambulation/increased activity;Repositioned  -KF        Response to Interventions tolerated; RN aware  -KF        Cognitive Assessment/Intervention    Current Cognitive/Communication Assessment impaired  -KF   impaired  -KR     Orientation Status oriented to;person;place;required verbal cueing (specifiy in comments);time  -KF   oriented to;person;place;required verbal cueing (specifiy in comments)  -KR     Follows Commands/Answers Questions 75% of the time;able to follow single-step instructions;needs repetition;needs increased time;needs cueing  -KF   able to follow single-step instructions;75% of the time;needs cueing;needs increased time;needs repetition  -KR     Personal Safety moderate impairment;decreased awareness, need for assist;decreased awareness, need for safety;decreased insight to deficits  -KF   moderate impairment;decreased awareness, need for assist;decreased awareness, need for safety  -KR     Personal Safety Interventions fall prevention program maintained;muscle strengthening facilitated;nonskid shoes/slippers when out of bed  -KF   fall prevention program maintained;gait belt;nonskid shoes/slippers when out of bed  -KR     ROM (Range of Motion)    General ROM  upper extremity range of motion deficits identified  -KF   no range of motion deficits identified  -KR     General ROM Detail RUE AROM  -KF   BLE WFL  -KR     MMT (Manual Muscle Testing)    General MMT Assessment upper extremity strength deficits identified  -KF   lower extremity strength deficits identified  -KR     General MMT Assessment Detail RUE 3+; LUE grossly -4/5  -KF   BLE grossly 3+/5  -KR     Bed Mobility, Assessment/Treatment    Bed Mobility, Assistive Device bed rails;head of bed elevated;draw sheet  -KF        Bed Mobility, Roll Left, [place] moderate assist (50% patient effort)  -KF        Bed Mobility, Scoot/Bridge, [place] dependent (less than 25% patient effort)  -KF        Bed Mob, Supine to Sit, [place]    moderate assist (50% patient effort);verbal cues required  -KR     Bed Mob, Sit to Supine, [place]    not tested  -KR     Bed Mobility, Safety Issues decreased use of arms for pushing/pulling;decreased use of legs for bridging/pushing  -KF   decreased use of arms for pushing/pulling  -KR     Bed Mobility, Impairments strength decreased;pain;impaired balance  -KF   strength decreased;impaired balance  -KR     Bed Mobility, Comment    VC's for sequencing. Pt required increased assistance at trunk.  -KR     Transfer Assessment/Treatment    Transfers, Sit-Stand [place]    minimum assist (75% patient effort);2 person assist required;verbal cues required  -KR     Transfers, Stand-Sit [place]    minimum assist (75% patient effort);2 person assist required;verbal cues required  -KR     Transfer, Safety Issues    step length decreased  -KR     Transfer, Impairments    strength decreased;impaired balance  -KR     Transfer, Comment    VC's for sequencing and hand placement.   -KR     Motor Skills/Interventions    Additional Documentation Gross Motor Coordination Training (Group)  -KF   Balance Skills Training (Group)  -KR     Balance Skills Training    Sitting-Level of  Assistance    Contact guard  -KR     Sitting-Balance Support    Feet supported  -KR     Standing-Level of Assistance    Minimum assistance  -KR     Static Standing Balance Support    Left upper extremity supported  -KR     Gait Balance-Level of Assistance    Minimum assistance;x2  -KR     Gait Balance Support    Left upper extremity supported  -KR     Gross Motor Coordination Training    Gross Motor Skill, Impairments Detail BUE moderately impaired  -KF        Sensory Assessment/Intervention    Light Touch LUE;RUE  -KF   LLE;RLE  -KR     LUE Light Touch mild impairment  -KF        RUE Light Touch mild impairment  -KF        LLE Light Touch    mild impairment  -KR     RLE Light Touch    mild impairment  -KR     Positioning and Restraints    Pre-Treatment Position in bed  -KF   in bed  -KR     Post Treatment Position bed  -KF   chair  -KR     In Bed supine;notified nsg;fowlers;call light within reach;encouraged to call for assist;exit alarm on;with family/caregiver;legs elevated;heels elevated  -KF        In Chair    notified nsg;sitting;call light within reach;encouraged to call for assist;exit alarm on;with family/caregiver  -KR       03/05/18 1000 03/04/18 0930 03/03/18 1200          Rehab Evaluation    Document Type therapy note (daily note)  -AV evaluation  -SG evaluation  -LR      Subjective Information  no complaints;agree to therapy  -SG unable to respond  -LR      Patient Effort, Rehab Treatment  good  -SG       Symptoms Noted During/After Treatment  none  -SG       Pain Assessment    Pain Assessment No/denies pain  -AV 0-10  -SG       Pain Score  5  -SG       Post Pain Score  5  -SG       Pain Type  Acute pain  -SG       Pain Location  Face   R  -SG       Pain Intervention(s)  Repositioned  -SG       Response to Interventions  tolerated, RN aware, giving meds  -SG         User Key  (r) = Recorded By, (t) = Taken By, (c) = Cosigned By    Initials Name Effective Dates    SG Alexa Ferguson, MS  [facility]-SLP 06/22/15 -     LR Charlotte Mccormick, MS CCC-SLP 06/22/15 -     JARAD Singleton, RN 06/21/17 -     RANDALL Bradford, MS CCC-SLP 03/14/16 -     CG Jerry Macdonald, RN 02/06/17 -     ANDREEA Short, PT 09/25/17 -     KF Lyudmila Cortés, OT 02/14/18 -            Occupational Therapy Education     Title: PT OT SLP Therapies (Active)     Topic: Occupational Therapy (Done)     Point: ADL training (Done)    Description: Instruct learner(s) on proper safety adaptation and remediation techniques during self care or transfers.   Instruct in proper use of assistive devices.    Learning Progress Summary    Learner Readiness Method Response Comment Documented by Status   Patient Acceptance E VU,NR Pt education provided on HEP, repositioning for bed mobility, and body mechanics for bed mobility.  03/06/18 1010 Done   Significant Other Acceptance E VU,NR Pt education provided on HEP, repositioning for bed mobility, and body mechanics for bed mobility.  03/06/18 1010 Done               Point: Home exercise program (Done)    Description: Instruct learner(s) on appropriate technique for monitoring, assisting and/or progressing therapeutic exercises/activities.    Learning Progress Summary    Learner Readiness Method Response Comment Documented by Status   Patient Acceptance E VU,NR Pt education provided on HEP, repositioning for bed mobility, and body mechanics for bed mobility.  03/06/18 1010 Done   Significant Other Acceptance E VU,NR Pt education provided on HEP, repositioning for bed mobility, and body mechanics for bed mobility.  03/06/18 1010 Done               Point: Precautions (Done)    Description: Instruct learner(s) on prescribed precautions during self-care and functional transfers.    Learning Progress Summary    Learner Readiness Method Response Comment Documented by Status   Patient Acceptance E VU,NR Pt education provided on HEP, repositioning for bed mobility, and body mechanics for bed  mobility.  03/06/18 1010 Done   Significant Other Acceptance E VU,NR Pt education provided on HEP, repositioning for bed mobility, and body mechanics for bed mobility.  03/06/18 1010 Done               Point: Body mechanics (Done)    Description: Instruct learner(s) on proper positioning and spine alignment during self-care, functional mobility activities and/or exercises.    Learning Progress Summary    Learner Readiness Method Response Comment Documented by Status   Patient Acceptance E VU,NR Pt education provided on HEP, repositioning for bed mobility, and body mechanics for bed mobility.  03/06/18 1010 Done   Significant Other Acceptance E VU,NR Pt education provided on HEP, repositioning for bed mobility, and body mechanics for bed mobility.  03/06/18 1010 Done                      User Key     Initials Effective Dates Name Provider Type Discipline     02/14/18 -  Lyudmila Cortés, OT Occupational Therapist OT                  OT Recommendation and Plan  Anticipated Equipment Needs At Discharge: other (see comments) (TBA)  Anticipated Discharge Disposition: skilled nursing facility  Therapy Frequency: daily  Plan of Care Review  Plan Of Care Reviewed With: patient  Progress: no change  Outcome Summary/Follow up Plan: OT initial eval completed with chart review. Pt presented with generalized weakness in UE, impaired bed mobility, and increased pain today. Pt max A for scooting and bridging with bed mobility. Recommend IPOT with d/c to SNF.          OT Goals       03/06/18 1012          Bed Mobility OT LTG    Bed Mobility OT LTG, Time to Achieve by discharge  -      Bed Mobility OT LTG, Activity Type supine to sit/sit to supine  -      Bed Mobility OT LTG, [place] Level minimum assist (75% patient effort)  -      Bed Mobility OT LTG, Assist Device bed rails  -      Strength OT LTG    Strength Goal OT LTG, Time to Achieve by discharge  -      Strength Goal OT LTG, Measure to Achieve  Tolerate 15 reps HEP  -KF      Static Sitting Balance OT LTG    Static Sitting Balance OT LTG, Time to Achieve by discharge  -KF      Static Sitting Balance OT LTG, [place] Level contact guard assist  -KF      Static Sitting Balance OT LTG, Assist Device UE Support  -KF      Follow Directions OT LTG    Follow Directions OT LTG, Time to Achieve by discharge  -KF      Follow Directions OT LTG, Activity Type 100% treatment session  -KF      Follow Directions OT LTG, [place] Level with verbal cues  -KF        User Key  (r) = Recorded By, (t) = Taken By, (c) = Cosigned By    Initials Name Provider Type    MICHELLE Cortés, CHARBEL Occupational Therapist                Outcome Measures       03/06/18 0924 03/05/18 3059       How much help from another person do you currently need...    Turning from your back to your side while in flat bed without using bedrails?  3  -KR     Moving from lying on back to sitting on the side of a flat bed without bedrails?  3  -KR     Moving to and from a bed to a chair (including a wheelchair)?  3  -KR     Standing up from a chair using your arms (e.g., wheelchair, bedside chair)?  3  -KR     Climbing 3-5 steps with a railing?  2  -KR     To walk in hospital room?  3  -KR     AM-PAC 6 Clicks Score  17  -KR     How much help from another is currently needed...    Putting on and taking off regular lower body clothing? 2  -KF      Bathing (including washing, rinsing, and drying) 2  -KF      Toileting (which includes using toilet bed pan or urinal) 2  -KF      Putting on and taking off regular upper body clothing 3  -KF      Taking care of personal grooming (such as brushing teeth) 3  -KF      Eating meals 4  -KF      Score 16  -KF      Functional Assessment    Outcome Measure Options AM-PAC 6 Clicks Daily Activity (OT)  -KF AM-PAC 6 Clicks Basic Mobility (PT)  -KR       User Key  (r) = Recorded By, (t) = Taken By, (c) = Cosigned By    Initials Name Provider Type    ANDREEA Short,  PT Physical Therapist    KF Lyudmila Cortés, OT Occupational Therapist          Time Calculation:   OT Start Time: 0924    Therapy Charges for Today     Code Description Service Date Service Provider Modifiers Qty    [id number] HC OT EVAL LOW COMPLEXITY 4 3/6/2018 Lyudmila Cortés, CHARBEL GO 1               Lyudmila Cortés, OT  3/6/2018

## 2018-03-06 NOTE — PLAN OF CARE
Problem: Patient Care Overview (Adult)  Goal: Plan of Care Review  Outcome: Ongoing (interventions implemented as appropriate)   03/06/18 1037   Coping/Psychosocial Response Interventions   Plan Of Care Reviewed With patient   Patient Care Overview   Progress progress toward functional goals is gradual   Outcome Evaluation   Outcome Summary/Follow up Plan Pt ambulated 25ft with [place] and RUE support. Pt required max cueing for sequencing and safe technique with transfers and mobility. Pt lethargic and very limited by increased confusion.        Problem: Inpatient Physical Therapy  Goal: Bed Mobility Goal LTG- PT  Outcome: Ongoing (interventions implemented as appropriate)   03/05/18 1615 03/06/18 1037   Bed Mobility PT LTG   Bed Mobility PT LTG, Date Established 03/05/18 --    Bed Mobility PT LTG, Time to Achieve 2 wks --    Bed Mobility PT LTG, Activity Type all bed mobility --    Bed Mobility PT LTG, [place] Level independent --    Bed Mobility PT LTG, Date Goal Reviewed 03/05/18 --    Bed Mobility PT LTG, Outcome --  goal ongoing     Goal: Transfer Training Goal 1 LTG- PT  Outcome: Ongoing (interventions implemented as appropriate)   03/05/18 1615 03/06/18 1037   Transfer Training PT LTG   Transfer Training PT LTG, Date Established 03/05/18 --    Transfer Training PT LTG, Time to Achieve 2 wks --    Transfer Training PT LTG, Activity Type sit to stand/stand to sit --    Transfer Training PT LTG, [place] Level conditional independence --    Transfer Training PT LTG, Assist Device walker, rolling --    Transfer Training PT LTG, Date Goal Reviewed 03/05/18 --    Transfer Training PT LTG, Outcome --  goal ongoing     Goal: Gait Training Goal LTG- PT  Outcome: Ongoing (interventions implemented as appropriate)   03/05/18 1615 03/06/18 1037   Gait Training PT LTG   Gait Training Goal PT LTG, Date Established 03/05/18 --    Gait Training Goal PT LTG, Time to Achieve 2 wks --    Gait Training Goal PT LTG,  [place] Level conditional independence --    Gait Training Goal PT LTG, Assist Device walker, rolling --    Gait Training Goal PT LTG, Distance to Achieve 200 feet --    Gait Training Goal PT LTG, Date Goal Reviewed 03/05/18 --    Gait Training Goal PT LTG, Outcome --  goal ongoing     Goal: Stair Training Goal LTG- PT  Outcome: Ongoing (interventions implemented as appropriate)   03/05/18 1615 03/06/18 1037   Stair Training PT LTG   Stair Training Goal PT LTG, Date Established 03/05/18 --    Stair Training Goal PT LTG, Time to Achieve 2 wks --    Stair Training Goal PT LTG, Number of Steps 5 --    Stair Training Goal PT LTG, [place] Level contact guard assist --    Stair Training Goal PT LTG, Assist Device 2 handrails --    Stair Training Goal PT LTG, Date Goal Reviewed 03/05/18 --    Stair Training Goal PT LTG, Outcome --  goal ongoing

## 2018-03-06 NOTE — PLAN OF CARE
Problem: Patient Care Overview (Adult)  Goal: Plan of Care Review  Outcome: Ongoing (interventions implemented as appropriate)   03/06/18 1012   Coping/Psychosocial Response Interventions   Plan Of Care Reviewed With patient   Patient Care Overview   Progress no change   Outcome Evaluation   Outcome Summary/Follow up Plan OT initial eval completed with chart review. Pt presented with generalized weakness in UE, impaired bed mobility, and increased pain today. Pt max A for scooting and bridging with bed mobility. Recommend IPOT with d/c to SNF.       Problem: Inpatient Occupational Therapy  Goal: Bed Mobility Goal LTG- OT  Outcome: Ongoing (interventions implemented as appropriate)   03/06/18 1012   Bed Mobility OT LTG   Bed Mobility OT LTG, Time to Achieve by discharge   Bed Mobility OT LTG, Activity Type supine to sit/sit to supine   Bed Mobility OT LTG, [place] Level minimum assist (75% patient effort)   Bed Mobility OT LTG, Assist Device bed rails     Goal: Strength Goal LTG- OT  Outcome: Ongoing (interventions implemented as appropriate)   03/06/18 1012   Strength OT LTG   Strength Goal OT LTG, Time to Achieve by discharge   Strength Goal OT LTG, Measure to Achieve Tolerate 15 reps HEP     Goal: Static Sitting Balance Goal LTG- OT  Outcome: Ongoing (interventions implemented as appropriate)   03/06/18 1012   Static Sitting Balance OT LTG   Static Sitting Balance OT LTG, Time to Achieve by discharge   Static Sitting Balance OT LTG, [place] Level contact guard assist   Static Sitting Balance OT LTG, Assist Device UE Support     Goal: Follow Directions Goal LTG- OT  Outcome: Ongoing (interventions implemented as appropriate)   03/06/18 1012   Follow Directions OT LTG   Follow Directions OT LTG, Time to Achieve by discharge   Follow Directions OT LTG, Activity Type 100% treatment session   Follow Directions OT LTG, [place] Level with verbal cues

## 2018-03-06 NOTE — THERAPY TREATMENT NOTE
Acute Care - Physical Therapy Treatment Note  [facility]     Patient Name: Alexander Adan  : 1940  MRN: [id number]  Today's Date: 3/6/2018  Onset of Illness/Injury or Date of Surgery Date: 18  Date of Referral to PT: 18  Referring Physician: DENI Navqi    Admit Date: 3/2/2018    Visit Dx:    ICD-10-CM ICD-9-CM   1. Parotitis, acute K11.21 527.2   2. Subacute maxillary sinusitis J01.00 461.0   3. Generalized weakness R53.1 780.79   4. Dysphagia, unspecified type R13.10 787.20   5. Impaired functional mobility, balance, gait, and endurance Z74.09 V49.89   6. Impaired mobility and ADLs Z74.09 799.89     Patient Active Problem List   Diagnosis   • Atrial fibrillation   • Chronic coronary artery disease   • Chronic diastolic heart failure   • Chronic obstructive pulmonary disease   • Cognitive impairment, mild, so stated   • Tobacco use   • Fall at home   • CKD (chronic kidney disease), stage III   • Leukocytosis   • Bronchitis   • Vasovagal syncope   • Dehydration   • Hypotension due to dehydration   • PVD (peripheral vascular disease)   • SIRS (systemic inflammatory response syndrome)               Adult Rehabilitation Note       18 0822 18 1000       Rehab Assessment/Intervention    Discipline physical therapist  -KR speech language pathologist  -AV     Document Type therapy note (daily note)  -KR therapy note (daily note)  -AV     Subjective Information agree to therapy;no complaints  -KR      Patient Effort, Rehab Treatment adequate  -KR      Symptoms Noted During/After Treatment fatigue  -KR      Precautions/Limitations fall precautions;oxygen therapy device and L/min  -KR      Specific Treatment Considerations Pt with increased confusion this treatment session  -KR      Recorded by [KR] Jasmin Short, PT [AV] Alysia Bradford, MS CCC-SLP     Vital Signs    Pre Systolic BP Rehab 132  -KR      Pre Treatment Diastolic BP 67  -KR      Pretreatment Heart Rate (beats/min)  98  -KR      Posttreatment Heart Rate (beats/min) 96  -KR      Pre SpO2 (%) 99  -KR      O2 Delivery Pre Treatment supplemental O2  -KR      Post SpO2 (%) 94  -KR      O2 Delivery Post Treatment supplemental O2  -KR      Pre Patient Position Supine  -KR      Intra Patient Position Standing  -KR      Post Patient Position Supine  -KR      Recorded by [KR] Jasmin Short, PT      Pain Assessment    Pain Assessment 0-10  -KR No/denies pain  -AV     Pain Score 0  -KR      Post Pain Score 0  -KR      Recorded by [KR] Jasmin Short, PT [AV] Alysia Bradford, MS CCC-SLP     Cognitive Assessment/Intervention    Current Cognitive/Communication Assessment impaired  -KR      Orientation Status oriented to;person  -KR      Follows Commands/Answers Questions able to follow single-step instructions;50% of the time;75% of the time;needs cueing;needs increased time;needs repetition  -KR      Personal Safety moderate impairment;decreased awareness, need for assist;decreased awareness, need for safety;decreased insight to deficits  -KR      Personal Safety Interventions fall prevention program maintained;gait belt;nonskid shoes/slippers when out of bed  -KR      Recorded by [KR] Jasmin Short, PT      Dysphagia/Swallow Intervention    Dysphagia/Swallow Intervention  PAtient sleepy this am, difficulty to keep awake for trials.  PAtient trialed with soft solids.  No complaints or oral pain. Demonstrated chewing but prolonged. Rec level 3 diet and explained to family. Will cont to monitor for diet tolerance and upgrade possibilities. FAmily reports confusing improving but still not 100%. Will monitor.   -AV     Recorded by  [AV] Alysia Bradford MS CCC-SLP     Bed Mobility, Assessment/Treatment    Bed Mob, Supine to Sit, [place] maximum assist (25% patient effort);verbal cues required  -KR      Bed Mob, Sit to Supine, [place] moderate assist (50% patient effort);verbal cues required  -KR      Bed Mobility, Safety Issues  decreased use of arms for pushing/pulling;decreased use of legs for bridging/pushing;cognitive deficits limit understanding  -KR      Bed Mobility, Impairments strength decreased;impaired balance  -KR      Bed Mobility, Comment Pt very lethargic and confused with difficulty following commands. Pt required increased assistance at trunk.  -KR      Recorded by [KR] Jasmin Short, PT      Transfer Assessment/Treatment    Transfers, Sit-Stand [place] minimum assist (75% patient effort);verbal cues required  -KR      Transfers, Stand-Sit [place] minimum assist (75% patient effort);verbal cues required  -KR      Transfers, Sit-Stand-Sit, Assist Device other (see comments)   R UE support  -KR      Toilet Transfer, [place] minimum assist (75% patient effort)  -KR      Toilet Transfer, Assistive Device other (see comments)   RUE support  -KR      Transfer, Safety Issues step length decreased;weight-shifting ability decreased  -KR      Transfer, Impairments strength decreased;impaired balance;coordination impaired  -KR      Transfer, Comment Pt required max cueing for sequencing and safety awareness.   -KR      Recorded by [KR] Jasmin Short, PT      Gait Assessment/Treatment    Gait, [place] Level minimum assist (75% patient effort);verbal cues required  -KR      Gait, Assistive Device --   R UE support  -KR      Gait, Distance (Feet) 25  -KR      Gait, Gait Pattern Analysis swing-through gait  -KR      Gait, Gait Deviations sima decreased;forward flexed posture;step length decreased;weight-shifting ability decreased  -KR      Gait, Safety Issues sequencing ability decreased;step length decreased;weight-shifting ability decreased;balance decreased during turns  -KR      Gait, Impairments strength decreased;impaired balance;coordination impaired;postural control impaired  -KR      Gait, Comment Pt required max cueing for sequencing and safe technique. Pt very limited by increased confusion. Increased  difficulty following commands.   -KR      Recorded by [KR] Jasmin Short PT      Motor Skills/Interventions    Additional Documentation Balance Skills Training (Group)  -KR      Recorded by [ANDREEA] Jasmin Short PT      Balance Skills Training    Sitting-Level of Assistance Contact guard  -KR      Sitting-Balance Support Feet supported  -KR      Standing-Level of Assistance Minimum assistance  -KR      Static Standing Balance Support Right upper extremity supported  -KR      Gait Balance-Level of Assistance Minimum assistance  -KR      Gait Balance Support Right upper extremity supported  -KR      Recorded by [ANDREEA] Jasmin Short PT      Positioning and Restraints    Pre-Treatment Position in bed  -KR      Post Treatment Position bed  -KR      In Bed notified nsg;supine;call light within reach;encouraged to call for assist;with family/caregiver;exit alarm on;side rails up x3;legs elevated  -KR      Recorded by [ANDREEA] Jasmin Short PT        User Key  (r) = Recorded By, (t) = Taken By, (c) = Cosigned By    Initials Name Effective Dates    AV Alysia Bradford MS CCC-SLP 03/14/16 -     KR Jasmin Short, PT 09/25/17 -                 IP PT Goals       03/06/18 1037 03/05/18 1615       Bed Mobility PT LTG    Bed Mobility PT LTG, Date Established  03/05/18  -KR     Bed Mobility PT LTG, Time to Achieve  2 wks  -KR     Bed Mobility PT LTG, Activity Type  all bed mobility  -KR     Bed Mobility PT LTG, [place] Level  independent  -KR     Bed Mobility PT LTG, Date Goal Reviewed  03/05/18  -KR     Bed Mobility PT LTG, Outcome goal ongoing  -KR      Transfer Training PT LTG    Transfer Training PT LTG, Date Established  03/05/18  -KR     Transfer Training PT LTG, Time to Achieve  2 wks  -KR     Transfer Training PT LTG, Activity Type  sit to stand/stand to sit  -KR     Transfer Training PT LTG, [place] Level  conditional independence  -KR     Transfer Training PT LTG, Assist Device  walker, rolling  -KR      Transfer Training PT  LTG, Date Goal Reviewed  03/05/18  -KR     Transfer Training PT LTG, Outcome goal ongoing  -KR      Gait Training PT LTG    Gait Training Goal PT LTG, Date Established  03/05/18  -KR     Gait Training Goal PT LTG, Time to Achieve  2 wks  -KR     Gait Training Goal PT LTG, [place] Level  conditional independence  -KR     Gait Training Goal PT LTG, Assist Device  walker, rolling  -KR     Gait Training Goal PT LTG, Distance to Achieve  200 feet  -KR     Gait Training Goal PT LTG, Date Goal Reviewed  03/05/18  -KR     Gait Training Goal PT LTG, Outcome goal ongoing  -KR      Stair Training PT LTG    Stair Training Goal PT LTG, Date Established  03/05/18  -KR     Stair Training Goal PT LTG, Time to Achieve  2 wks  -KR     Stair Training Goal PT LTG, Number of Steps  5  -KR     Stair Training Goal PT LTG, [place] Level  contact guard assist  -KR     Stair Training Goal PT LTG, Assist Device  2 handrails  -KR     Stair Training Goal PT LTG, Date Goal Reviewed  03/05/18  -KR     Stair Training Goal PT LTG, Outcome goal ongoing  -KR        User Key  (r) = Recorded By, (t) = Taken By, (c) = Cosigned By    Initials Name Provider Type    ANDREEA Short PT Physical Therapist          Physical Therapy Education     Title: PT OT SLP Therapies (Active)     Topic: Physical Therapy (Active)     Point: Mobility training (Active)    Learning Progress Summary    Learner Readiness Method Response Comment Documented by Status   Patient Acceptance E NR  KR 03/06/18 1036 Active    Acceptance E NR  KR 03/05/18 1615 Active               Point: Home exercise program (Active)    Learning Progress Summary    Learner Readiness Method Response Comment Documented by Status   Patient Acceptance E NR  KR 03/06/18 1036 Active    Acceptance E NR  KR 03/05/18 1615 Active               Point: Body mechanics (Active)    Learning Progress Summary    Learner Readiness Method Response Comment Documented by Status   Patient  Acceptance E NR  KR 03/06/18 1036 Active    Acceptance E NR  KR 03/05/18 1615 Active               Point: Precautions (Active)    Learning Progress Summary    Learner Readiness Method Response Comment Documented by Status   Patient Acceptance E NR  KR 03/06/18 1036 Active    Acceptance E NR  KR 03/05/18 1615 Active                      User Key     Initials Effective Dates Name Provider Type Discipline    KR 09/25/17 -  Jasmin Short, PT Physical Therapist PT                    PT Recommendation and Plan  Anticipated Discharge Disposition: skilled nursing facility  PT Frequency: daily  Plan of Care Review  Plan Of Care Reviewed With: patient  Progress: progress toward functional goals is gradual  Outcome Summary/Follow up Plan: Pt ambulated 25ft with Ivet and RUE support. Pt required max cueing for sequencing and safe technique with transfers and mobility. Pt lethargic and very limited by increased confusion.           Outcome Measures       03/06/18 0924 03/06/18 0822 03/05/18 1439    How much help from another person do you currently need...    Turning from your back to your side while in flat bed without using bedrails?  3  -KR 3  -KR    Moving from lying on back to sitting on the side of a flat bed without bedrails?  2  -KR 3  -KR    Moving to and from a bed to a chair (including a wheelchair)?  3  -KR 3  -KR    Standing up from a chair using your arms (e.g., wheelchair, bedside chair)?  3  -KR 3  -KR    Climbing 3-5 steps with a railing?  2  -KR 2  -KR    To walk in hospital room?  3  -KR 3  -KR    AM-PAC 6 Clicks Score  16  -KR 17  -KR    How much help from another is currently needed...    Putting on and taking off regular lower body clothing? 2  -KF      Bathing (including washing, rinsing, and drying) 2  -KF      Toileting (which includes using toilet bed pan or urinal) 2  -KF      Putting on and taking off regular upper body clothing 3  -KF      Taking care of personal grooming (such as brushing teeth) 3   -KF      Eating meals 4  -KF      Score 16  -KF      Functional Assessment    Outcome Measure Options AM-PAC 6 Clicks Daily Activity (OT)  -KF AM-PAC 6 Clicks Basic Mobility (PT)  -KR AM-PAC 6 Clicks Basic Mobility (PT)  -KR      User Key  (r) = Recorded By, (t) = Taken By, (c) = Cosigned By    Initials Name Provider Type    KR Jasmin N Ring, PT Physical Therapist    KF Lyudmila Cortés, OT Occupational Therapist           Time Calculation:         PT Charges       03/06/18 1039          Time Calculation    Start Time 0822  -KR      PT Received On 03/06/18  -KR      PT Goal Re-Cert Due Date 03/15/18  -KR      Time Calculation- PT    Total Timed Code Minutes- PT 26 minute(s)  -KR        User Key  (r) = Recorded By, (t) = Taken By, (c) = Cosigned By    Initials Name Provider Type    KR Jasmin N Ring, PT Physical Therapist          Therapy Charges for Today     Code Description Service Date Service Provider Modifiers Qty    42373095013 HC PT EVAL LOW COMPLEXITY 4 3/5/2018 Jasmin N Ring, PT GP 1    30438566335 HC PT THER PROC EA 15 MIN 3/5/2018 Jasmin N Ring, PT GP 1    03179845835 HC PT THER SUPP EA 15 MIN 3/5/2018 Jasmin N Ring, PT GP 2    05689204135 HC PT THER PROC EA 15 MIN 3/6/2018 Jasmin N Ring, PT GP 2    40853951856 HC PT THER SUPP EA 15 MIN 3/6/2018 Jasmin N Ring, PT GP 2          PT G-Codes  Outcome Measure Options: AM-PAC 6 Clicks Daily Activity (OT)    Orly Short, PT  3/6/2018

## 2018-03-06 NOTE — PROGRESS NOTES
[facility] Medicine Services  PROGRESS NOTE    Patient Name: Alexander Adan  : 1940  MRN: [id number]    Date of Admission: 3/2/2018  Length of Stay: 4  Primary Care Physician: Harrison Smart MD    Subjective   Subjective     CC:Right facial swelling, weight loss    Subjective:  Resting in bedIn no acute distress and in very good mood.  Denies any specific pain or shortness of breath.  No fever or chills.  No chest pain or palpitation or shortness of breath address.  No nausea, vomiting, diarrhea, abdominal pain.    Review of Systems  Unable to obtain        Objective   Objective     Vital Signs:   Temp:  [97.7 °F (36.5 °C)-98.6 °F (37 °C)] 98.3 °F (36.8 °C)  Heart Rate:  [] 92  Resp:  [16-24] 24  BP: (102-132)/(51-82) 116/69        Physical Exam:  Constitutional: No acute distress, very cachectic and frail  Eyes: PERRLA, sclerae anicteric, no conjunctival injection  HENT: NCAT, mucous membranes moist.  Right submandibular swelling most likely she Parotid gland swelling.  Neck: Supple, no thyromegaly, no lymphadenopathy, trachea midline  Respiratory: Clear to auscultation bilaterally, nonlabored respirations   Cardiovascular: RRR, no murmurs, rubs, or gallops, palpable pedal pulses bilaterally  Gastrointestinal: Positive bowel sounds, soft, nontender, nondistended  Musculoskeletal: Very cachectic No bilateral ankle edema, no clubbing or cyanosis to extremities  Neurologic: Awake, alert, follows commands.  Patient moves all the limbs.  Skin: No rashes    Results Reviewed:  I have personally reviewed current lab, radiology, and data and agree.      Results from last 7 days  Lab Units 18  04418  0618  0519   WBC 10*3/mm3 8.10 10.77 15.44*   HEMOGLOBIN g/dL 11.1* 10.2* 11.4*   HEMATOCRIT % 35.8* 32.9* 36.2*   PLATELETS 10*3/mm3 140* 179 215       Results from last 7 days  Lab Units 18  0441 18  0622 18  1831 18  0519   03/02/18  1100   SODIUM mmol/L 132 138  --  142  < > 144   POTASSIUM mmol/L 5.0 5.0 4.7 3.5  < > 3.3*   CHLORIDE mmol/L 112* 115*  --  120*  < > 113*   CO2 mmol/L 16.0* 19.0*  --  17.0*  < > 22.0   BUN mg/dL 12 14  --  14  < > 30*   CREATININE mg/dL 0.70 0.80  --  0.80  < > 1.20   GLUCOSE mg/dL 113* 89  --  112*  < > 126*   CALCIUM mg/dL 7.3* 7.7*  --  8.6*  < > 10.2   ALT (SGPT) U/L  --  9  --  10  --  11   AST (SGOT) U/L  --  18  --  19  --  17   < > = values in this interval not displayed.  Estimated Creatinine Clearance: 70.8 mL/min (by C-G formula based on Cr of 0.7).  BNP   Date Value Ref Range Status   03/04/2018 1965.0 (H) 0.0 - 100.0 pg/mL Final     Comment:     Results may be falsely decreased if patient taking Biotin.     No results found for: PHART    Microbiology Results Abnormal     Procedure Component Value - Date/Time    Blood Culture - Blood, [[id number]] Collected:  03/02/18 1235    Lab Status:  Preliminary result Specimen:  Blood from Arm, Left Updated:  03/06/18 1401     Blood Culture No growth at 4 days      Aerobic bottle only    Blood Culture - Blood, [[id number]]  (Normal) Collected:  03/02/18 1305    Lab Status:  Preliminary result Specimen:  Blood from Arm, Left Updated:  03/06/18 1401     Blood Culture No growth at 4 days    Respiratory Culture - Sputum, Cough [[id number]]  (Abnormal) Collected:  03/05/18 0751    Lab Status:  Preliminary result Specimen:  Sputum from Cough Updated:  03/06/18 1023     Respiratory Culture --      Moderate growth (3+) Staphylococcus aureus (A)      Moderate growth (3+) Normal Respiratory Tiffanie     Gram Stain Result Many (4+) WBCs per low power field      Moderate (3+) Epithelial cells per low power field      Moderate (3+) Gram positive cocci in pairs and clusters    Urine Culture - Urine, Urine, Clean Catch [[id number]]  (Normal) Collected:  03/05/18 0538    Lab Status:  Preliminary result Specimen:  Urine from Urine, Clean Catch Updated:  03/06/18 0712      Urine Culture No growth    Blood Culture - Blood, [[id number]]  (Normal) Collected:  03/02/18 1231    Lab Status:  Preliminary result Specimen:  Blood from Hand, Right Updated:  03/05/18 1746     Blood Culture No growth at 3 days    Influenza A & B, RT PCR - Swab, Nasopharynx [[id number]]  (Normal) Collected:  03/04/18 1424    Lab Status:  Final result Specimen:  Swab from Nasopharynx Updated:  03/04/18 1542     Influenza A PCR Not Detected     Influenza B PCR Not Detected          Imaging Results (last 24 hours)     ** No results found for the last 24 hours. **        Results for orders placed during the hospital encounter of 02/07/18   Adult Transthoracic Echo Complete W/ Cont if Necessary Per Protocol    Narrative · Left ventricular wall thickness is consistent with mild concentric   hypertrophy.  · The following left ventricular wall segments are hypokinetic: basal   inferoseptal, mid inferoseptal and basal inferoseptal.  · Moderately reduced right ventricular systolic function noted.  · Left atrial cavity size is moderately dilated.  · Mild-to-moderate mitral valve regurgitation is present  · Mild tricuspid valve regurgitation is present.  · Left ventricular systolic function is normal. Estimated EF = 60%.  · there is akinesis of base of septum and base of inferior wall          I have reviewed the medications.    Assessment/Plan   Assessment / Plan     Hospital Problem List     * (Principal)SIRS (systemic inflammatory response syndrome)    Atrial fibrillation    Overview Signed 6/20/2016  3:33 PM by DENI Gates     Description: chads vasc 7         Chronic coronary artery disease    Overview Signed 6/20/2016  3:33 PM by DENI Gates     Description: cabg 2004, data deficient         Chronic diastolic heart failure    Overview Signed 6/20/2016  3:33 PM by DENI Gates     Description: NYHA II-III  Per echo: EF 50% September 2013         Chronic obstructive pulmonary disease    CKD  (chronic kidney disease), stage III    Leukocytosis    PVD (peripheral vascular disease)             Brief Hospital Course to date:  Alexander Adan is a 77 y.o. male       Assessment & Plan:  *Right sided facial swelling most likely parotitis. Very significant improvement with IV antibiotics.    * CT evidence of pneumonia possibly even aspiration pneumonia.    * Sepsis currently patient is  on IV antibiotic, much improved.    * Severe weight loss and cachexia.  Etiology is unknown.    * Atrial fibrillation with rapid ventricular response, rate better controlled.    PLAN:  - cont current care  - monitor BP closely  - PT/OT    DVT Prophylaxis:      CODE STATUS: DNR    Disposition:TBD.      Electronically signed by Anderson Yu MD, 03/06/18, 4:58 PM.

## 2018-03-06 NOTE — PLAN OF CARE
Problem: Patient Care Overview (Adult)  Goal: Plan of Care Review  Outcome: Ongoing (interventions implemented as appropriate)   03/06/18 2078   Coping/Psychosocial Response Interventions   Plan Of Care Reviewed With patient   Outcome Evaluation   Outcome Summary/Follow up Plan Dysphagia: Reviewed all - family has reported coughing on food/liquid (no fam currently present), has had weight loss, increased opacities noted on CXR from 3/4. Showed intermittent delayed throat clear with today's trials - could be completely related or not. But given all, I do think he would benefit from further pharyngeal and esophageal swallow assessment. Continue as is for now, with aspiration/reflux precautions. Will plan for MBS + limited UGI (performed upright position) tomorrow. Thanks

## 2018-03-06 NOTE — THERAPY TREATMENT NOTE
Acute Care - Speech Language Pathology   Swallow Treatment Note [facility]     Patient Name: Alexander Adan  : 1940  MRN: [id number]  Today's Date: 3/6/2018  Onset of Illness/Injury or Date of Surgery Date: 18     Referring Physician: Hospitalist      Admit Date: 3/2/2018    Visit Dx:      ICD-10-CM ICD-9-CM   1. Parotitis, acute K11.21 527.2   2. Subacute maxillary sinusitis J01.00 461.0   3. Generalized weakness R53.1 780.79   4. Dysphagia, unspecified type R13.10 787.20   5. Impaired functional mobility, balance, gait, and endurance Z74.09 V49.89   6. Impaired mobility and ADLs Z74.09 799.89     Patient Active Problem List   Diagnosis   • Atrial fibrillation   • Chronic coronary artery disease   • Chronic diastolic heart failure   • Chronic obstructive pulmonary disease   • Cognitive impairment, mild, so stated   • Tobacco use   • Fall at home   • CKD (chronic kidney disease), stage III   • Leukocytosis   • Bronchitis   • Vasovagal syncope   • Dehydration   • Hypotension due to dehydration   • PVD (peripheral vascular disease)   • SIRS (systemic inflammatory response syndrome)             Adult Rehabilitation Note       18 1530 18 0822 18 1000    Rehab Assessment/Intervention    Discipline speech language pathologist  -SM physical therapist  -KR speech language pathologist  -AV    Document Type therapy note (daily note)  -SM therapy note (daily note)  -KR therapy note (daily note)  -AV    Subjective Information agree to therapy  -SM agree to therapy;no complaints  -KR     Patient Effort, Rehab Treatment  adequate  -KR     Symptoms Noted During/After Treatment  fatigue  -KR     Precautions/Limitations  fall precautions;oxygen therapy device and L/min  -KR     Specific Treatment Considerations  Pt with increased confusion this treatment session  -KR     Recorded by [SM] Adri Perez, MS CCC-SLP [KR] Jasmin Short, PT [AV] Alysia Bradford, MS CCC-SLP    Vital Signs     Pre Systolic BP Rehab  132  -KR     Pre Treatment Diastolic BP  67  -KR     Pretreatment Heart Rate (beats/min)  98  -KR     Posttreatment Heart Rate (beats/min)  96  -KR     Pre SpO2 (%)  99  -KR     O2 Delivery Pre Treatment  supplemental O2  -KR     Post SpO2 (%)  94  -KR     O2 Delivery Post Treatment  supplemental O2  -KR     Pre Patient Position  Supine  -KR     Intra Patient Position  Standing  -KR     Post Patient Position  Supine  -KR     Recorded by  [KR] Jasmin Short, PT     Pain Assessment    Pain Assessment  0-10  -KR No/denies pain  -AV    Pain Score  0  -KR     Post Pain Score  0  -KR     Recorded by  [KR] Jasmin Short, PT [AV] Alysia Bradford, MS CCC-SLP    Cognitive Assessment/Intervention    Current Cognitive/Communication Assessment  impaired  -KR     Orientation Status  oriented to;person  -KR     Follows Commands/Answers Questions  able to follow single-step instructions;50% of the time;75% of the time;needs cueing;needs increased time;needs repetition  -KR     Personal Safety  moderate impairment;decreased awareness, need for assist;decreased awareness, need for safety;decreased insight to deficits  -KR     Personal Safety Interventions  fall prevention program maintained;gait belt;nonskid shoes/slippers when out of bed  -KR     Recorded by  [KR] Jasmin Short, PT     Swallow Assessment/Intervention    Additional Documentation Dysphagia/Swallow Intervention (Group)  -SM      Recorded by [SM] Adri Perez, MS CCC-SLP      Dysphagia/Swallow Intervention    Dysphagia/Swallow Intervention Given reports of coughing on food/liquid, weight loss, increased opacities R mid and lower lung on CXR 3/4/18. Given all, best for MBS + limited UGI to further assess.   -SM  PAtient sleepy this am, difficulty to keep awake for trials.  PAtient trialed with soft solids.  No complaints or oral pain. Demonstrated chewing but prolonged. Rec level 3 diet and explained to family. Will cont to monitor for  diet tolerance and upgrade possibilities. FAmily reports confusing improving but still not 100%. Will monitor.   -AV    Recorded by [SM] Adri Perez, MS CCC-SLP  [AV] Alysia Bradford, MS CCC-SLP    Dysphagia Other 1    Dysphagia Other 1 Objective Patient will demonstrate no s/s with recommended diet textures.  -SM      Dysphagia Other 1 Progress 50%;without cues  -SM      Comments: Dysphagia Other 1 Very delayed throat clear throughout with trials intermittently.   -SM      Recorded by [SM] Adri Perez, MS CCC-SLP      Bed Mobility, Assessment/Treatment    Bed Mob, Supine to Sit, [place]  maximum assist (25% patient effort);verbal cues required  -KR     Bed Mob, Sit to Supine, [place]  moderate assist (50% patient effort);verbal cues required  -KR     Bed Mobility, Safety Issues  decreased use of arms for pushing/pulling;decreased use of legs for bridging/pushing;cognitive deficits limit understanding  -KR     Bed Mobility, Impairments  strength decreased;impaired balance  -KR     Bed Mobility, Comment  Pt very lethargic and confused with difficulty following commands. Pt required increased assistance at trunk.  -KR     Recorded by  [KR] Jasmin Short, PT     Transfer Assessment/Treatment    Transfers, Sit-Stand [place]  minimum assist (75% patient effort);verbal cues required  -KR     Transfers, Stand-Sit [place]  minimum assist (75% patient effort);verbal cues required  -KR     Transfers, Sit-Stand-Sit, Assist Device  other (see comments)   R UE support  -KR     Toilet Transfer, [place]  minimum assist (75% patient effort)  -KR     Toilet Transfer, Assistive Device  other (see comments)   RUE support  -KR     Transfer, Safety Issues  step length decreased;weight-shifting ability decreased  -KR     Transfer, Impairments  strength decreased;impaired balance;coordination impaired  -KR     Transfer, Comment  Pt required max cueing for sequencing and safety awareness.   -KR      Recorded by  [KR] Jasmin Short PT     Gait Assessment/Treatment    Gait, [place] Level  minimum assist (75% patient effort);verbal cues required  -KR     Gait, Assistive Device  --   R UE support  -KR     Gait, Distance (Feet)  25  -KR     Gait, Gait Pattern Analysis  swing-through gait  -KR     Gait, Gait Deviations  sima decreased;forward flexed posture;step length decreased;weight-shifting ability decreased  -KR     Gait, Safety Issues  sequencing ability decreased;step length decreased;weight-shifting ability decreased;balance decreased during turns  -KR     Gait, Impairments  strength decreased;impaired balance;coordination impaired;postural control impaired  -KR     Gait, Comment  Pt required max cueing for sequencing and safe technique. Pt very limited by increased confusion. Increased difficulty following commands.   -KR     Recorded by  [ANDREEA] Jasmin Short PT     Motor Skills/Interventions    Additional Documentation  Balance Skills Training (Group)  -KR     Recorded by  [ANDREEA] Jasmin Short PT     Balance Skills Training    Sitting-Level of Assistance  Contact guard  -KR     Sitting-Balance Support  Feet supported  -KR     Standing-Level of Assistance  Minimum assistance  -KR     Static Standing Balance Support  Right upper extremity supported  -KR     Gait Balance-Level of Assistance  Minimum assistance  -KR     Gait Balance Support  Right upper extremity supported  -KR     Recorded by  [ANDREEA] Jasmin Short PT     Positioning and Restraints    Pre-Treatment Position  in bed  -KR     Post Treatment Position  bed  -KR     In Bed  notified nsg;supine;call light within reach;encouraged to call for assist;with family/caregiver;exit alarm on;side rails up x3;legs elevated  -KR     Recorded by  [KR] Jasmin Short PT       User Key  (r) = Recorded By, (t) = Taken By, (c) = Cosigned By    Initials Name Effective Dates    JAMAR Perez MS CCC-SLP 06/22/15 -     RANDALL Bradford MS  CCC-SLP 03/14/16 -     KR Jasmin Short, PT 09/25/17 -                 EDUCATION  The patient has been educated in the following areas:   Dysphagia (Swallowing Impairment).    SLP Recommendation and Plan                                           Plan of Care Review  Plan Of Care Reviewed With: patient  Outcome Summary/Follow up Plan: Dysphagia: Reviewed all - family has reported coughing on food/liquid (no fam currently present), has had weight loss, increased opacities noted on CXR from 3/4. Showed intermittent delayed throat clear with today's trials - could be completely related or not. But given all, I do think he would benefit from further pharyngeal and esophageal swallow assessment. Continue as is for now, with aspiration/reflux precautions. Will plan for MBS + limited UGI (performed upright position) tomorrow. Thanks            Time Calculation:         Time Calculation- SLP       03/06/18 1611          Time Calculation- SLP    SLP Start Time 1530  -      SLP Received On 03/06/18  -        User Key  (r) = Recorded By, (t) = Taken By, (c) = Cosigned By    Initials Name Provider Type     Adri Perez MS CCC-SLP Speech and Language Pathologist          Therapy Charges for Today     Code Description Service Date Service Provider Modifiers Qty    [id number] HC ST TREATMENT SWALLOW 3 3/6/2018 Adri Perez MS CCC-SLP GN 1                 Adri Perez MS CCC-VITA  3/6/2018

## 2018-03-06 NOTE — PROGRESS NOTES
[facility] Progress Note        Antibiotics:  Anti-Infectives     Ordered     Dose/Rate Route Frequency Start Stop    03/06/18 0815  doxycycline (MONODOX) capsule 100 mg     Ordering Provider:  Marcelo Head RPH    100 mg Oral Every 12 Hours Scheduled 03/06/18 0900 03/11/18 0859    03/04/18 1634  metroNIDAZOLE (FLAGYL) IVPB 500 mg     Ordering Provider:  Enoch Dong MD    500 mg  100 mL/hr over 60 Minutes Intravenous Every 6 Hours 03/04/18 1800 03/11/18 0559    03/04/18 1634  aztreonam (AZACTAM) in SWFI 2 grams/10ml IV PUSH syringe     Ordering Provider:  Enoch Dong MD    2 g  over 5 Minutes Intravenous Every 8 Hours 03/04/18 1800 03/09/18 1759    03/04/18 1709  vancomycin (VANCOCIN) in iso-osmotic dextrose IVPB 1 g (premix) 200 mL     Ordering Provider:  MIGUEL Finley RPH    15 mg/kg × 65.8 kg  over 60 Minutes Intravenous Every 24 Hours 03/04/18 1800 03/11/18 1759    03/02/18 1657  Pharmacy to dose vancomycin     Comments:  Alexander Adan  6B, N-[id number]  By Chrissy Sullivan MD   Ordering Provider:  Chrissy Sullivan MD     Does not apply Continuous PRN 03/02/18 1657 03/09/18 1656    03/02/18 1138  aztreonam (AZACTAM) in SWFI 2 grams/10ml IV PUSH syringe     Ordering Provider:  Galileo Woodruff MD    2 g  over 5 Minutes Intravenous Once 03/02/18 1240 03/02/18 1325    03/02/18 1138  vancomycin 1250 mg/250 mL 0.9% NS IVPB ([facility])     Ordering Provider:  Galileo Woodruff MD    20 mg/kg × 65.8 kg Intravenous Once 03/02/18 1140 03/02/18 1327          CC: parotitis, pneumonia    HPI:  Patient is a 77 y.o.  Yr old male with history of numerous comorbid conditions including past stroke/subdural hematoma and evacuation with bur hole 2015 with chronic kidney disease/debility.  Family reports chronic dysphagia symptoms such that he coughs with liquid/solids but no specific esophageal diagnosis.  He is admitted on March 2 with 3 days poor by mouth intake, generalized malaise with dry cough and right facial/parotid  "pain.       3/6/18 Patient is sleepy but arouses and answers no to all questions asked.  Reliability not entirely clear as he has had encephalopathy surrounding admission, albeit less.  However, no headache photophobia or neck stiffness.  No chest pain.  No productive cough at present although has been intermittently productive at times.  No dysuria hematuria or pyuria.  No skin rash.  No diarrhea or abdominal pain.  He is constipated per family.  No hematochezia melena or hematemesis. Family says he is improving     ROS:      3/6/18 Limited  and reliability unclear, but answers no to the following:    No f/c/s. No n/v/d. No rash. No new ADR to Abx.     Constitutional-- No Fever, chills or sweats.  Appetite good, and no malaise. No fatigue.  Heent-- No new vision, hearing or throat complaints.  No epistaxis or oral sores.  Denies odynophagia or dysphagia.  No flashers, floaters or eye pain. No odynophagia or dysphagia. No headache, photophobia or neck stiffness.  CV-- No chest pain, palpitation or syncope  Resp-- No SOB/cough/Hemoptysis  GI- No nausea, vomiting, or diarrhea.  No hematochezia, melena, or hematemesis. Denies jaundice or chronic liver disease.  -- No dysuria, hematuria, or flank pain.  Denies hesitancy, urgency or flank pain.  Lymph- no swollen lymph nodes in neck/axilla or groin.   Heme- No active bruising or bleeding; no Hx of DVT or PE.  MS-- no swelling or pain in the bones or joints of arms/legs.  No new back pain.  Neuro-- No acute focal weakness or numbness in the arms or legs.  No seizures.    Full 12 point review of systems reviewed and negative otherwise for acute complaints,       PE: nursing/chaperone present  /67  Pulse 86  Temp 98.6 °F (37 °C) (Oral)   Resp 20  Ht 165.1 cm (65\") Comment: Ht reported by pt's wife; c/w other EHR documentation  Wt 65.8 kg (145 lb)  SpO2 100%  BMI 22.05 kg/m2    GENERAL: Thin chronically ill-appearing and elderly, sleepy but answers " questions  HEENT: Normocephalic, atraumatic.  Left eyelid droop compared to right is chronic according to family. No conjunctival injection. No icterus. Oropharynx clear without evidence of thrush or exudate. No evidence of peridontal disease.  Also see below   NECK: Supple without nuchal rigidity. No mass.  LYMPH: No cervical, axillary or inguinal lymphadenopathy.  HEART: Tachycardic; No murmur, rubs, gallops.   LUNGS: Diminished at bases with rhonchi more so on the right than left. Normal respiratory effort. Nonlabored. No dullness.  ABDOMEN: Soft, nontender, nondistended. Positive bowel sounds. No rebound or guarding. NO mass or HSM.  EXT:  No cyanosis, clubbing or edema. No cord.  : Genitalia generally unremarkable.  Without Aguilar catheter.  MSK: FROM without joint effusions noted arms/legs.    SKIN: Warm and dry without cutaneous eruptions on Inspection/palpation.    NEURO: He does not cooperate with a detailed motor/sensory exam but does move all 4 extremities.     No peripheral stigmata/phenomena of endocarditis     Right face/parotid gland with vague erythema/induration and tenderness.  No fluctuance.  No crepitus or bulla.       Laboratory Data      Results from last 7 days  Lab Units 03/06/18  0441 03/05/18  0622 03/04/18  0519   WBC 10*3/mm3 8.10 10.77 15.44*   HEMOGLOBIN g/dL 11.1* 10.2* 11.4*   HEMATOCRIT % 35.8* 32.9* 36.2*   PLATELETS 10*3/mm3 140* 179 215       Results from last 7 days  Lab Units 03/06/18  0441   SODIUM mmol/L 132   POTASSIUM mmol/L 5.0   CHLORIDE mmol/L 112*   CO2 mmol/L 16.0*   BUN mg/dL 12   CREATININE mg/dL 0.70   GLUCOSE mg/dL 113*   CALCIUM mg/dL 7.3*       Results from last 7 days  Lab Units 03/05/18  0622   ALK PHOS U/L 75   BILIRUBIN mg/dL 0.3   ALT (SGPT) U/L 9   AST (SGOT) U/L 18               Estimated Creatinine Clearance: 70.8 mL/min (by C-G formula based on Cr of 0.7).      Microbiology:      Radiology:  Imaging Results (last 72 hours)     Procedure Component Value  Units Date/Time    CT Head Without Contrast [[id number]] Collected:  03/02/18 1231     Updated:  03/02/18 1300    Narrative:       EXAMINATION: CT HEAD WO CONTRAST-      INDICATION: Encephalopathy.      TECHNIQUE: Axial CT of the head without intravenous contrast  administration.     The radiation dose reduction device was turned on for each scan per the  ALARA (As Low as Reasonably Achievable) protocol.     COMPARISON: 02/16/2018.     FINDINGS: Midline structures are symmetric without evidence of mass,  mass effect or midline shift. No intraaxial hemorrhage or extraaxial  fluid collection. Ventricles and sulci are prominent consistent with  components of atrophy. Mild to moderate attenuation changes within the  periventricular and deep white matter consistent with chronic small  vessel ischemic disease. Globes and orbits are partially visualized and  grossly unremarkable. Paranasal sinuses and mastoid air cells  demonstrate total opacification of the right maxillary sinus as well as  opacification in the right ethmoid air cells and sphenoid sinus.  Calvarium demonstrates prior [place] holes left frontal and left temporal  regions otherwise grossly intact.       Impression:       No acute intracranial abnormality.     D:  03/02/2018  E:  03/02/2018     This report was finalized on 3/2/2018 12:58 PM by Dr. Maurice Alas.       CT Abdomen Pelvis Without Contrast [[id number]] Collected:  03/02/18 1254     Updated:  03/02/18 1300    Narrative:       EXAMINATION: CT ABDOMEN AND PELVIS WO CONTRAST-      INDICATION: Abdomen pain, guarding, urinary incontinence.      TECHNIQUE: CT abdomen and pelvis without intravenous contrast  administration.     The radiation dose reduction device was turned on for each scan per the  ALARA (As Low as Reasonably Achievable) protocol.     COMPARISON: 08/05/2017.     FINDINGS: Motion degraded examination particularly within the  subdiaphragmatic soft tissues.  Lung bases demonstrate  patchy  tree-in-bud opacifications involving much of the right lower lobe and  visualized right middle lobe consistent with infectious or inflammatory  etiology, however, no focal lobar consolidation. Left hemithorax  partially visualized and grossly clear. Liver without focal lesion.  Gallbladder is surgically absent. Pancreas and spleen within normal  limits. Adrenals without distinct nodule. Kidneys without hydronephrosis  or hydroureter. No obstructive uropathy or urolithiasis. Atherosclerotic  nonaneurysmal abdominal aorta. No bulky retroperitoneal adenopathy. GI  tract evaluation without focal thickening or disproportionate dilatation  of bowel. No free fluid or intraabdominal free air. Pelvic viscera  unremarkable without bulky pelvic adenopathy or significant free fluid.  Degenerative changes of the spine and pelvis without aggressive osseous  abnormality identified within the limited evaluation due to motion. No  soft tissue body wall findings of concern.       Impression:       1. Limited evaluation due to significant patient motion artifact  limiting the subdiaphragmatic soft tissues, however, the limits of the  study there is scattered tree-in-bud opacifications involving the  visualized right middle lobe and right lower lobe along with nodularity  consistent of acute infectious or inflammatory etiology without lobar  consolidation identified. No significant pleural effusion.  2. No acute intraabdominal abnormality within the limited evaluation due  to significant motion specifically no disproportionate dilatation of  bowel to suggest mechanical obstructive process or loculated  intraabdominal fluid collection.     D:  03/02/2018  E:  03/02/2018     This report was finalized on 3/2/2018 12:58 PM by Dr. Maurice Alas.       XR Chest 1 View [[id number]] Collected:  03/02/18 1131     Updated:  03/02/18 1301    Narrative:       EXAMINATION: XR CHEST 1 VW- 03/02/2018     INDICATION: weakness, altered mental  status      COMPARISON: Chest x-ray 02/16/2018.     FINDINGS: Cardiac size within normal limits. Post CABG and post median  sternotomy changes. No focal opacification or consolidation. No  pneumothorax or pleural effusion. Left chest wall pacing device with  leads intact. Degenerative changes of the thoracic spine.           Impression:       No acute cardiopulmonary process.     D:  03/02/2018  E:  03/02/2018     This report was finalized on 3/2/2018 12:58 PM by Dr. Maurice Alas.       CT Soft Tissue Neck With Contrast [[id number]] Collected:  03/02/18 1627     Updated:  03/02/18 1646    Narrative:       EXAMINATION: CT NECK SOFT TISSUES W/CONTRAST - 03/02/2018      INDICATION: K11.21-Acute sialoadenitis; J01.00-Acute maxillary  sinusitis, unspecified; R53.1-Weakness.      TECHNIQUE: CT neck soft tissues with intravenous contrast  administration.     The radiation dose reduction device was turned on for each scan per the  ALARA (As Low as Reasonably Achievable) protocol.     COMPARISON: None.     FINDINGS: For intracranial findings, please see recent CT head report  performed the same day. Total opacification right maxillary sinus  consistent with components of sinusitis. Severely asymmetric appearance  of the right parotid gland which is nearly entirely replaced by  heterogeneous enhancing soft tissue attenuation involving the  superficial and deep lobes with overall dimensions measuring  approximately 4.6 x 4.0 cm, indicative of soft tissue mass involvement.  There is no discrete osseous erosion of the adjacent mandible or  temporomandibular joint with right mastoid air cells grossly clear.  Parapharyngeal spaces are preserved with supraglottic soft tissues,  unremarkable. Valleculae and piriform sinuses within normal limits. The  vocal cords and subglottic trachea are unremarkable. Visualized portions  of the lung apices are grossly unremarkable. Thyroid is homogeneous in  attenuation. No bulky cervical  adenopathy is identified.       Impression:       Soft tissue mass involvement of the superficial and deep  lobes of the right parotid gland. 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 No adjacent osseous erosion. No bulky cervical  adenopathy otherwise identified. Opacification of the right maxillary  sinus again noted consistent with sinusitis.     DICTATED:     03/02/2018  EDITED    :     03/02/2018      This report was finalized on 3/2/2018 4:44 PM by Dr. Maurice Alas.       XR Chest 1 View [[id number]] Collected:  03/04/18 1701     Updated:  03/04/18 1857    Narrative:          EXAMINATION: XR CHEST 1 VW - 03/04/2018     INDICATION:  K11.21-Acute sialoadenitis; J01.00-Acute maxillary  sinusitis, unspecified; R53.1-Weakness; R13.10-Dysphagia, unspecified.      COMPARISON: 03/02/2018.     FINDINGS: Portable chest reveals PICC line placement with tip in the  SVC. There are chronic changes identified within the lung fields with  mild increased markings seen within the right middle lower lung field.  Development of a small right pleural effusion. Heart is borderline  enlarged with slight prominence of the pulmonary vascularity.           Impression:       PICC line catheter on the right with tip in the SVC.  Interval development of mild increased markings within the right mid to  lower lung field as well as development of a small right pleural  effusion.     DICTATED:     03/04/2018  EDITED    :     03/04/2018      This report was finalized on 3/4/2018 6:55 PM by Dr. Donna Matthews MD.               Impression:   --Acute sepsis with leukocytosis/fever and tachycardia, associated with encephalopathy and likely severe sepsis.  Primary focal symptoms/findings include right parotitis and acute right lower lobe pneumonia with broad spectrum antimicrobials.  No  preceding symptoms of central nervous system focus and no meningismus at present.  Likely metabolic encephalopathy associated with sepsis.  Abdomen benign by exam with no intra-abdominal pathology per radiology on CT scan, although motion artifact and if symptoms persist/recur, then you should give consideration to further GI workup by either repeat imaging or GI evaluation.  No diarrhea at present but if it develops you could consider stool studies.  Urinalysis not consistent with UTI.  No obvious soft tissue source other than above.     --Acute right parotitis;  gram-positive organisms are a primary concern.  In addition there is description of a masslike quality by radiology and he will likely need to give consideration to ENT input.  I will discuss with Dr. Yu;  no focal abscess so far per radiology.     --Acute cough with right lower lobe pneumonia by imaging and risk for community-acquired pathogens/aspiration.  Broad-spectrum coverage ongoing.  Try to collect sputum for culture.  Order urine Legionella and strep antigens and influenza PCR.     --Dysphagia.  Family reports difficulty with solids/liquids and cough.  Speech therapy has seen.  You'll likely need to give consideration to GI consultation for EGD as well.     --Acute encephalopathy.  Family reports this is common with acute illness for him.  Further neurologic workup per internal medicine at their discretion.  Preceding symptoms and current exam not consistent with meningismus.  Improving in general per family back towards baseline     --Chronic kidney disease stage III per records.  Monitor to help guide further adjustments with antimicrobials     --QTc > 500 ms EKG    --TCP;  monitor       PLAN:   --IV vancomycin/Azactam/Flagyl and oral doxycycline     --Monitor IV and IV antibiotic with risk for systemic complication and potential drug interaction     --Check/review labs cultures and scans     --Highly complex set of problems with high risk  for further serious morbidity and other serious sequela     --You will likely need to give consideration to ENT consultation and also GI consultation/EGD; d/w Dr. Yu     --Discussed with Dr Yu.  d/w micro     --Partial history per nursing staff as well    Enoch Dong MD  3/6/2018

## 2018-03-07 ENCOUNTER — APPOINTMENT (OUTPATIENT)
Dept: CARDIOLOGY | Facility: HOSPITAL | Age: 78
End: 2018-03-07
Attending: INTERNAL MEDICINE

## 2018-03-07 ENCOUNTER — APPOINTMENT (OUTPATIENT)
Dept: GENERAL RADIOLOGY | Facility: HOSPITAL | Age: 78
End: 2018-03-07
Attending: INTERNAL MEDICINE

## 2018-03-07 LAB
ALBUMIN SERPL-MCNC: 2.5 G/DL (ref 3.2–4.8)
ALBUMIN/GLOB SERPL: 0.9 G/DL (ref 1.5–2.5)
ALP SERPL-CCNC: 85 U/L (ref 25–100)
ALT SERPL W P-5'-P-CCNC: 7 U/L (ref 7–40)
ANION GAP SERPL CALCULATED.3IONS-SCNC: 4 MMOL/L (ref 3–11)
ARTERIAL PATENCY WRIST A: ABNORMAL
AST SERPL-CCNC: 15 U/L (ref 0–33)
ATMOSPHERIC PRESS: ABNORMAL MMHG
BACTERIA FLD CULT: NORMAL
BACTERIA SPEC AEROBE CULT: NORMAL
BACTERIA SPEC RESP CULT: ABNORMAL
BASE EXCESS BLDA CALC-SCNC: -5.3 MMOL/L (ref 0–2)
BDY SITE: ABNORMAL
BILIRUB SERPL-MCNC: 0.2 MG/DL (ref 0.3–1.2)
BUN BLD-MCNC: 10 MG/DL (ref 9–23)
BUN/CREAT SERPL: 12.5 (ref 7–25)
CALCIUM SPEC-SCNC: 7.7 MG/DL (ref 8.7–10.4)
CHLORIDE SERPL-SCNC: 110 MMOL/L (ref 99–109)
CO2 BLDA-SCNC: 19 MMOL/L (ref 22–33)
CO2 SERPL-SCNC: 16 MMOL/L (ref 20–31)
COHGB MFR BLD: 1.3 % (ref 0–2)
CREAT BLD-MCNC: 0.8 MG/DL (ref 0.6–1.3)
D-LACTATE SERPL-SCNC: 1.9 MMOL/L (ref 0.5–2)
DEPRECATED RDW RBC AUTO: 59.6 FL (ref 37–54)
ERYTHROCYTE [DISTWIDTH] IN BLOOD BY AUTOMATED COUNT: 16.9 % (ref 11.3–14.5)
GFR SERPL CREATININE-BSD FRML MDRD: 93 ML/MIN/1.73
GLOBULIN UR ELPH-MCNC: 2.7 GM/DL
GLUCOSE BLD-MCNC: 125 MG/DL (ref 70–100)
GRAM STN SPEC: ABNORMAL
HCO3 BLDA-SCNC: 18.1 MMOL/L (ref 20–26)
HCT VFR BLD AUTO: 35.3 % (ref 38.9–50.9)
HCT VFR BLD CALC: 31.5 %
HGB BLD-MCNC: 11 G/DL (ref 13.1–17.5)
HGB BLDA-MCNC: 10.3 G/DL (ref 13.5–17.5)
HOROWITZ INDEX BLD+IHG-RTO: 28 %
L PNEUMO1 AG UR QL IA: NEGATIVE
Lab: NORMAL
MCH RBC QN AUTO: 30.1 PG (ref 27–31)
MCHC RBC AUTO-ENTMCNC: 31.2 G/DL (ref 32–36)
MCV RBC AUTO: 96.4 FL (ref 80–99)
METHGB BLD QL: 0.6 % (ref 0–1.5)
MODALITY: ABNORMAL
MUV IGM SER QL: <0.8 AU (ref 0–0.79)
ORGANISM ID: NORMAL
OXYHGB MFR BLDV: 95.9 % (ref 94–99)
PCO2 BLDA: 28.1 MM HG (ref 35–48)
PH BLDA: 7.42 PH UNITS (ref 7.35–7.45)
PHOSPHATE SERPL-MCNC: 2 MG/DL (ref 2.4–5.1)
PHOSPHATE SERPL-MCNC: 2.6 MG/DL (ref 2.4–5.1)
PLATELET # BLD AUTO: 193 10*3/MM3 (ref 150–450)
PMV BLD AUTO: 10.8 FL (ref 6–12)
PO2 BLDA: 85 MM HG (ref 83–108)
POTASSIUM BLD-SCNC: 4.8 MMOL/L (ref 3.5–5.5)
PROT SERPL-MCNC: 5.2 G/DL (ref 5.7–8.2)
RBC # BLD AUTO: 3.66 10*6/MM3 (ref 4.2–5.76)
S PNEUM AG SPEC QL LA: NEGATIVE
SODIUM BLD-SCNC: 130 MMOL/L (ref 132–146)
SPECIMEN SOURCE: NORMAL
WBC NRBC COR # BLD: 12.41 10*3/MM3 (ref 3.5–10.8)

## 2018-03-07 PROCEDURE — 25810000003 DEXTROSE 5 % WITH KCL 20 MEQ 20-5 MEQ/L-% SOLUTION: Performed by: INTERNAL MEDICINE

## 2018-03-07 PROCEDURE — 25010000002 HEPARIN (PORCINE) PER 1000 UNITS: Performed by: INTERNAL MEDICINE

## 2018-03-07 PROCEDURE — 25010000002 MORPHINE SULFATE (PF) 2 MG/ML SOLUTION: Performed by: INTERNAL MEDICINE

## 2018-03-07 PROCEDURE — 94799 UNLISTED PULMONARY SVC/PX: CPT

## 2018-03-07 PROCEDURE — 85027 COMPLETE CBC AUTOMATED: CPT | Performed by: INTERNAL MEDICINE

## 2018-03-07 PROCEDURE — 36600 WITHDRAWAL OF ARTERIAL BLOOD: CPT | Performed by: INTERNAL MEDICINE

## 2018-03-07 PROCEDURE — 83605 ASSAY OF LACTIC ACID: CPT | Performed by: INTERNAL MEDICINE

## 2018-03-07 PROCEDURE — 80053 COMPREHEN METABOLIC PANEL: CPT | Performed by: INTERNAL MEDICINE

## 2018-03-07 PROCEDURE — 99232 SBSQ HOSP IP/OBS MODERATE 35: CPT | Performed by: INTERNAL MEDICINE

## 2018-03-07 PROCEDURE — 94640 AIRWAY INHALATION TREATMENT: CPT

## 2018-03-07 PROCEDURE — 74230 X-RAY XM SWLNG FUNCJ C+: CPT

## 2018-03-07 PROCEDURE — 82805 BLOOD GASES W/O2 SATURATION: CPT | Performed by: INTERNAL MEDICINE

## 2018-03-07 PROCEDURE — 92611 MOTION FLUOROSCOPY/SWALLOW: CPT

## 2018-03-07 PROCEDURE — 25010000002 VANCOMYCIN PER 500 MG

## 2018-03-07 PROCEDURE — 84100 ASSAY OF PHOSPHORUS: CPT | Performed by: INTERNAL MEDICINE

## 2018-03-07 RX ADMIN — HEPARIN SODIUM 5000 UNITS: 5000 INJECTION, SOLUTION INTRAVENOUS; SUBCUTANEOUS at 05:56

## 2018-03-07 RX ADMIN — BARIUM SULFATE 20 ML: 400 PASTE ORAL at 14:16

## 2018-03-07 RX ADMIN — GABAPENTIN 600 MG: 300 CAPSULE ORAL at 21:58

## 2018-03-07 RX ADMIN — PANTOPRAZOLE SODIUM 40 MG: 40 TABLET, DELAYED RELEASE ORAL at 18:12

## 2018-03-07 RX ADMIN — BARIUM SULFATE 100 ML: 0.81 POWDER, FOR SUSPENSION ORAL at 14:15

## 2018-03-07 RX ADMIN — METRONIDAZOLE 500 MG: 500 INJECTION, SOLUTION INTRAVENOUS at 05:56

## 2018-03-07 RX ADMIN — GABAPENTIN 600 MG: 300 CAPSULE ORAL at 13:30

## 2018-03-07 RX ADMIN — WATER 2 G: 1 INJECTION INTRAMUSCULAR; INTRAVENOUS; SUBCUTANEOUS at 18:12

## 2018-03-07 RX ADMIN — POTASSIUM CHLORIDE AND DEXTROSE MONOHYDRATE 100 ML/HR: 150; 5 INJECTION, SOLUTION INTRAVENOUS at 10:33

## 2018-03-07 RX ADMIN — BARIUM SULFATE 10 ML: 400 SUSPENSION ORAL at 14:15

## 2018-03-07 RX ADMIN — HYDROCODONE BITARTRATE AND ACETAMINOPHEN 1 TABLET: 5; 325 TABLET ORAL at 15:56

## 2018-03-07 RX ADMIN — SUCRALFATE 1 G: 1 TABLET ORAL at 18:12

## 2018-03-07 RX ADMIN — IPRATROPIUM BROMIDE AND ALBUTEROL SULFATE 3 ML: 2.5; .5 SOLUTION RESPIRATORY (INHALATION) at 20:21

## 2018-03-07 RX ADMIN — POTASSIUM CHLORIDE AND DEXTROSE MONOHYDRATE 100 ML/HR: 150; 5 INJECTION, SOLUTION INTRAVENOUS at 01:07

## 2018-03-07 RX ADMIN — ASPIRIN 81 MG 81 MG: 81 TABLET ORAL at 10:03

## 2018-03-07 RX ADMIN — SOTALOL HYDROCHLORIDE 120 MG: 80 TABLET ORAL at 10:25

## 2018-03-07 RX ADMIN — SODIUM CHLORIDE 500 ML: 9 INJECTION, SOLUTION INTRAVENOUS at 22:07

## 2018-03-07 RX ADMIN — SODIUM PHOSPHATE, MONOBASIC, MONOHYDRATE 15 MMOL: 276; 142 INJECTION, SOLUTION INTRAVENOUS at 10:33

## 2018-03-07 RX ADMIN — GABAPENTIN 600 MG: 300 CAPSULE ORAL at 05:55

## 2018-03-07 RX ADMIN — ACETAMINOPHEN 650 MG: 325 TABLET ORAL at 22:24

## 2018-03-07 RX ADMIN — SOTALOL HYDROCHLORIDE 120 MG: 80 TABLET ORAL at 01:02

## 2018-03-07 RX ADMIN — METRONIDAZOLE 500 MG: 500 INJECTION, SOLUTION INTRAVENOUS at 18:12

## 2018-03-07 RX ADMIN — DOXYCYCLINE 100 MG: 100 CAPSULE ORAL at 21:58

## 2018-03-07 RX ADMIN — WATER 2 G: 1 INJECTION INTRAMUSCULAR; INTRAVENOUS; SUBCUTANEOUS at 10:03

## 2018-03-07 RX ADMIN — METRONIDAZOLE 500 MG: 500 INJECTION, SOLUTION INTRAVENOUS at 00:56

## 2018-03-07 RX ADMIN — CILOSTAZOL 50 MG: 50 TABLET ORAL at 10:03

## 2018-03-07 RX ADMIN — DOXYCYCLINE 100 MG: 100 CAPSULE ORAL at 10:24

## 2018-03-07 RX ADMIN — DOXYCYCLINE 100 MG: 100 CAPSULE ORAL at 01:00

## 2018-03-07 RX ADMIN — IPRATROPIUM BROMIDE AND ALBUTEROL SULFATE 3 ML: 2.5; .5 SOLUTION RESPIRATORY (INHALATION) at 12:15

## 2018-03-07 RX ADMIN — METRONIDAZOLE 500 MG: 500 INJECTION, SOLUTION INTRAVENOUS at 23:42

## 2018-03-07 RX ADMIN — CASTOR OIL AND BALSAM, PERU: 788; 87 OINTMENT TOPICAL at 10:24

## 2018-03-07 RX ADMIN — WATER 2 G: 1 INJECTION INTRAMUSCULAR; INTRAVENOUS; SUBCUTANEOUS at 00:55

## 2018-03-07 RX ADMIN — MORPHINE SULFATE 2 MG: 2 INJECTION, SOLUTION INTRAMUSCULAR; INTRAVENOUS at 20:01

## 2018-03-07 RX ADMIN — CILOSTAZOL 50 MG: 50 TABLET ORAL at 01:00

## 2018-03-07 RX ADMIN — BARIUM SULFATE 50 ML: 400 SUSPENSION ORAL at 14:15

## 2018-03-07 RX ADMIN — CASTOR OIL AND BALSAM, PERU: 788; 87 OINTMENT TOPICAL at 21:59

## 2018-03-07 RX ADMIN — SUCRALFATE 1 G: 1 TABLET ORAL at 10:03

## 2018-03-07 RX ADMIN — HEPARIN SODIUM 5000 UNITS: 5000 INJECTION, SOLUTION INTRAVENOUS; SUBCUTANEOUS at 21:58

## 2018-03-07 RX ADMIN — PANTOPRAZOLE SODIUM 40 MG: 40 TABLET, DELAYED RELEASE ORAL at 10:24

## 2018-03-07 RX ADMIN — VANCOMYCIN HYDROCHLORIDE 1000 MG: 1 INJECTION, SOLUTION INTRAVENOUS at 18:12

## 2018-03-07 RX ADMIN — IPRATROPIUM BROMIDE AND ALBUTEROL SULFATE 3 ML: 2.5; .5 SOLUTION RESPIRATORY (INHALATION) at 16:12

## 2018-03-07 RX ADMIN — POVIDONE IODINE: 100 SOLUTION TOPICAL at 10:25

## 2018-03-07 RX ADMIN — CILOSTAZOL 50 MG: 50 TABLET ORAL at 21:58

## 2018-03-07 RX ADMIN — IPRATROPIUM BROMIDE AND ALBUTEROL SULFATE 3 ML: 2.5; .5 SOLUTION RESPIRATORY (INHALATION) at 07:30

## 2018-03-07 RX ADMIN — LORAZEPAM 1 MG: 1 TABLET ORAL at 22:24

## 2018-03-07 RX ADMIN — NICOTINE 1 PATCH: 14 PATCH TRANSDERMAL at 10:24

## 2018-03-07 RX ADMIN — HEPARIN SODIUM 5000 UNITS: 5000 INJECTION, SOLUTION INTRAVENOUS; SUBCUTANEOUS at 13:30

## 2018-03-07 RX ADMIN — METRONIDAZOLE 500 MG: 500 INJECTION, SOLUTION INTRAVENOUS at 13:31

## 2018-03-07 NOTE — PROGRESS NOTES
Adult Nutrition  Assessment/PES    Patient Name:  Alexander Adan  YOB: 1940  MRN: [id number]  Admit Date:  3/2/2018    Assessment Date:  3/7/2018          Reason for Assessment       03/07/18 1208    Reason for Assessment    Reason For Assessment/Visit follow up protocol    Time Spent (min) 20    Diagnosis --   per notes this admission              Nutrition/Diet History       03/07/18 1209    Nutrition/Diet History    Reported/Observed By Patient;Family    Other Patient and family present at time of visit and report that patient did not too great yesterday due to sleeping a lot throughout the day. However, patient reports he ate some of the meal trays even though it was not immediately after he received them. Has been drinking Boost supplements. States he ate well at breakfast this morning and drank a whole Boost. Prefers Boost over Ensure.              Labs/Tests/Procedures/Meds       03/07/18 1211    Labs/Tests/Procedures/Meds    Labs/Tests Review Reviewed;Phos   RD notes phos being repleted    Procedure Review SLP    Swallow eval status Pending    Type of SLP Evaluation MBS   planned for today (3/7)    Medication Review Reviewed, pertinent                Nutrition Prescription Ordered       03/07/18 1211    Nutrition Prescription PO    Current PO Diet Dysphagia    Dysphagia Level 3  Pureed with some mashed    Fluid Consistency Thin    Supplement Boost Plus;Ensure HP   Ensure ordered by MD this AM    Supplement Frequency 3 times a day            Evaluation of Received Nutrient/Fluid Intake       03/07/18 1212    PO Evaluation    Number of Meals 7    % PO Intake 25            Problem/Interventions:            Problem 3       03/07/18 1213    Nutrition Diagnoses Problem 3    Problem 3 Inadequate Intake/Infusion    Inadequate Intake Type Oral    Etiology (related to) --   clinical condition    Signs/Symptoms (evidenced by) PO Intake    Percent (%) intake recorded 25 %   drinking some of Boost  supplements per patient    Over number of meals 7                Intervention Goal       03/07/18 1213    Intervention Goal    General Nutrition support treatment    PO Tolerate PO;Increase intake            Nutrition Intervention       03/07/18 1214    Nutrition Intervention    RD/Tech Action Care plan reviewd;Follow Tx progress;Interview for preference;Encourage intake;Recommend/ordered    Recommended/Ordered Supplement            Nutrition Prescription       03/07/18 1214    Nutrition Prescription PO    PO Prescription Discontinue supplement    Supplement Ensure HP    New PO Prescription Ordered? Yes   due to duplicate order            Education/Evaluation       03/07/18 1215    Monitor/Evaluation    Monitor Per protocol;PO intake;Supplement intake;Weight;Skin status;Pertinent labs        Electronically signed by:  Rachel Carrero RD  03/07/18 12:16 PM

## 2018-03-07 NOTE — PROGRESS NOTES
[facility] Progress Note        Antibiotics:  Anti-Infectives     Ordered     Dose/Rate Route Frequency Start Stop    03/06/18 0815  doxycycline (MONODOX) capsule 100 mg     Ordering Provider:  Marcelo Head RPH    100 mg Oral Every 12 Hours Scheduled 03/06/18 0900 03/11/18 0859    03/04/18 1634  metroNIDAZOLE (FLAGYL) IVPB 500 mg     Ordering Provider:  Enoch Dong MD    500 mg  100 mL/hr over 60 Minutes Intravenous Every 6 Hours 03/04/18 1800 03/11/18 0559    03/04/18 1634  aztreonam (AZACTAM) in SWFI 2 grams/10ml IV PUSH syringe     Ordering Provider:  Enoch Dong MD    2 g  over 5 Minutes Intravenous Every 8 Hours 03/04/18 1800 03/09/18 1759    03/04/18 1709  vancomycin (VANCOCIN) in iso-osmotic dextrose IVPB 1 g (premix) 200 mL     Ordering Provider:  MIGUEL Finley RPH    15 mg/kg × 65.8 kg  over 60 Minutes Intravenous Every 24 Hours 03/04/18 1800 03/11/18 1759    03/02/18 1657  Pharmacy to dose vancomycin     Comments:  Alexander Adan  6B, N-[id number]  By Chrissy Sullivan MD   Ordering Provider:  Chrissy Sullivan MD     Does not apply Continuous PRN 03/02/18 1657 03/09/18 1656    03/02/18 1138  aztreonam (AZACTAM) in SWFI 2 grams/10ml IV PUSH syringe     Ordering Provider:  Galileo Woodruff MD    2 g  over 5 Minutes Intravenous Once 03/02/18 1240 03/02/18 1325    03/02/18 1138  vancomycin 1250 mg/250 mL 0.9% NS IVPB ([facility])     Ordering Provider:  Galileo Woodruff MD    20 mg/kg × 65.8 kg Intravenous Once 03/02/18 1140 03/02/18 1327          CC: parotitis, pneumonia    HPI:  Patient is a 77 y.o.  Yr old male with history of numerous comorbid conditions including past stroke/subdural hematoma and evacuation with bur hole 2015 with chronic kidney disease/debility.  Family reports chronic dysphagia symptoms such that he coughs with liquid/solids but no specific esophageal diagnosis.  He is admitted on March 2 with 3 days poor by mouth intake, generalized malaise with dry cough and right facial/parotid  "pain.       3/7/18 Patient is sleepy but arouses and answers no to all questions asked.  Reliability not entirely clear as he has had encephalopathy surrounding admission, albeit less.  However, no headache photophobia or neck stiffness.  No chest pain.  No productive cough at present although has been intermittently productive at times.  No dysuria hematuria or pyuria.  No skin rash.  No diarrhea or abdominal pain.  He is constipated per family.  No hematochezia melena or hematemesis. Family says he is improving     ROS:      3/7/18 Limited  and reliability unclear, but answers no to the following:    No f/c/s. No n/v/d. No rash. No new ADR to Abx.     Constitutional-- No Fever, chills or sweats.  Appetite good, and no malaise. No fatigue.  Heent-- No new vision, hearing or throat complaints.  No epistaxis or oral sores.  Denies odynophagia or dysphagia.  No flashers, floaters or eye pain. No odynophagia or dysphagia. No headache, photophobia or neck stiffness.  CV-- No chest pain, palpitation or syncope  Resp-- No SOB/cough/Hemoptysis  GI- No nausea, vomiting, or diarrhea.  No hematochezia, melena, or hematemesis. Denies jaundice or chronic liver disease.  -- No dysuria, hematuria, or flank pain.  Denies hesitancy, urgency or flank pain.  Lymph- no swollen lymph nodes in neck/axilla or groin.   Heme- No active bruising or bleeding; no Hx of DVT or PE.  MS-- no swelling or pain in the bones or joints of arms/legs.  No new back pain.  Neuro-- No acute focal weakness or numbness in the arms or legs.  No seizures.    Full 12 point review of systems reviewed and negative otherwise for acute complaints,       PE: nursing/chaperone present  /60  Pulse 91  Temp 98.5 °F (36.9 °C) (Oral)   Resp 16  Ht 165.1 cm (65\") Comment: Ht reported by pt's wife; c/w other EHR documentation  Wt 65.8 kg (145 lb)  SpO2 100%  BMI 22.05 kg/m2    GENERAL: Thin chronically ill-appearing and elderly, sleepy but answers " questions  HEENT: Normocephalic, atraumatic.  Left eyelid droop compared to right is chronic according to family. No conjunctival injection. No icterus. Oropharynx clear without evidence of thrush or exudate. No evidence of peridontal disease.  Also see below   NECK: Supple without nuchal rigidity. No mass.  LYMPH: No cervical, axillary or inguinal lymphadenopathy.  HEART: Tachycardic; No murmur, rubs, gallops.   LUNGS: Diminished at bases with rhonchi more so on the right than left. Normal respiratory effort. Nonlabored. No dullness.  ABDOMEN: Soft, nontender, nondistended. Positive bowel sounds. No rebound or guarding. NO mass or HSM.  EXT:  No cyanosis, clubbing or edema. No cord.  : Genitalia generally unremarkable.  Without Aguilar catheter.  MSK: FROM without joint effusions noted arms/legs.    SKIN: Warm and dry without cutaneous eruptions on Inspection/palpation.    NEURO: He does not cooperate with a detailed motor/sensory exam but does move all 4 extremities.     No peripheral stigmata/phenomena of endocarditis     Right face/parotid gland with vague erythema/induration and tenderness.  No fluctuance.  No crepitus or bulla.       Laboratory Data      Results from last 7 days  Lab Units 03/07/18  0542 03/06/18  0441 03/05/18  0622   WBC 10*3/mm3 12.41* 8.10 10.77   HEMOGLOBIN g/dL 11.0* 11.1* 10.2*   HEMATOCRIT % 35.3* 35.8* 32.9*   PLATELETS 10*3/mm3 193 140* 179       Results from last 7 days  Lab Units 03/07/18  0542   SODIUM mmol/L 130*   POTASSIUM mmol/L 4.8   CHLORIDE mmol/L 110*   CO2 mmol/L 16.0*   BUN mg/dL 10   CREATININE mg/dL 0.80   GLUCOSE mg/dL 125*   CALCIUM mg/dL 7.7*       Results from last 7 days  Lab Units 03/07/18  0542   ALK PHOS U/L 85   BILIRUBIN mg/dL 0.2*   ALT (SGPT) U/L 7   AST (SGOT) U/L 15               Estimated Creatinine Clearance: 70.8 mL/min (by C-G formula based on Cr of 0.8).      Microbiology:      Radiology:  Imaging Results (last 72 hours)     Procedure Component Value  Units Date/Time    CT Head Without Contrast [[id number]] Collected:  03/02/18 1231     Updated:  03/02/18 1300    Narrative:       EXAMINATION: CT HEAD WO CONTRAST-      INDICATION: Encephalopathy.      TECHNIQUE: Axial CT of the head without intravenous contrast  administration.     The radiation dose reduction device was turned on for each scan per the  ALARA (As Low as Reasonably Achievable) protocol.     COMPARISON: 02/16/2018.     FINDINGS: Midline structures are symmetric without evidence of mass,  mass effect or midline shift. No intraaxial hemorrhage or extraaxial  fluid collection. Ventricles and sulci are prominent consistent with  components of atrophy. Mild to moderate attenuation changes within the  periventricular and deep white matter consistent with chronic small  vessel ischemic disease. Globes and orbits are partially visualized and  grossly unremarkable. Paranasal sinuses and mastoid air cells  demonstrate total opacification of the right maxillary sinus as well as  opacification in the right ethmoid air cells and sphenoid sinus.  Calvarium demonstrates prior [place] holes left frontal and left temporal  regions otherwise grossly intact.       Impression:       No acute intracranial abnormality.     D:  03/02/2018  E:  03/02/2018     This report was finalized on 3/2/2018 12:58 PM by Dr. Maurice Alas.       CT Abdomen Pelvis Without Contrast [[id number]] Collected:  03/02/18 1254     Updated:  03/02/18 1300    Narrative:       EXAMINATION: CT ABDOMEN AND PELVIS WO CONTRAST-      INDICATION: Abdomen pain, guarding, urinary incontinence.      TECHNIQUE: CT abdomen and pelvis without intravenous contrast  administration.     The radiation dose reduction device was turned on for each scan per the  ALARA (As Low as Reasonably Achievable) protocol.     COMPARISON: 08/05/2017.     FINDINGS: Motion degraded examination particularly within the  subdiaphragmatic soft tissues.  Lung bases demonstrate  patchy  tree-in-bud opacifications involving much of the right lower lobe and  visualized right middle lobe consistent with infectious or inflammatory  etiology, however, no focal lobar consolidation. Left hemithorax  partially visualized and grossly clear. Liver without focal lesion.  Gallbladder is surgically absent. Pancreas and spleen within normal  limits. Adrenals without distinct nodule. Kidneys without hydronephrosis  or hydroureter. No obstructive uropathy or urolithiasis. Atherosclerotic  nonaneurysmal abdominal aorta. No bulky retroperitoneal adenopathy. GI  tract evaluation without focal thickening or disproportionate dilatation  of bowel. No free fluid or intraabdominal free air. Pelvic viscera  unremarkable without bulky pelvic adenopathy or significant free fluid.  Degenerative changes of the spine and pelvis without aggressive osseous  abnormality identified within the limited evaluation due to motion. No  soft tissue body wall findings of concern.       Impression:       1. Limited evaluation due to significant patient motion artifact  limiting the subdiaphragmatic soft tissues, however, the limits of the  study there is scattered tree-in-bud opacifications involving the  visualized right middle lobe and right lower lobe along with nodularity  consistent of acute infectious or inflammatory etiology without lobar  consolidation identified. No significant pleural effusion.  2. No acute intraabdominal abnormality within the limited evaluation due  to significant motion specifically no disproportionate dilatation of  bowel to suggest mechanical obstructive process or loculated  intraabdominal fluid collection.     D:  03/02/2018  E:  03/02/2018     This report was finalized on 3/2/2018 12:58 PM by Dr. Maurice Alas.       XR Chest 1 View [[id number]] Collected:  03/02/18 1131     Updated:  03/02/18 1301    Narrative:       EXAMINATION: XR CHEST 1 VW- 03/02/2018     INDICATION: weakness, altered mental  status      COMPARISON: Chest x-ray 02/16/2018.     FINDINGS: Cardiac size within normal limits. Post CABG and post median  sternotomy changes. No focal opacification or consolidation. No  pneumothorax or pleural effusion. Left chest wall pacing device with  leads intact. Degenerative changes of the thoracic spine.           Impression:       No acute cardiopulmonary process.     D:  03/02/2018  E:  03/02/2018     This report was finalized on 3/2/2018 12:58 PM by Dr. Maurice Alas.       CT Soft Tissue Neck With Contrast [[id number]] Collected:  03/02/18 1627     Updated:  03/02/18 1646    Narrative:       EXAMINATION: CT NECK SOFT TISSUES W/CONTRAST - 03/02/2018      INDICATION: K11.21-Acute sialoadenitis; J01.00-Acute maxillary  sinusitis, unspecified; R53.1-Weakness.      TECHNIQUE: CT neck soft tissues with intravenous contrast  administration.     The radiation dose reduction device was turned on for each scan per the  ALARA (As Low as Reasonably Achievable) protocol.     COMPARISON: None.     FINDINGS: For intracranial findings, please see recent CT head report  performed the same day. Total opacification right maxillary sinus  consistent with components of sinusitis. Severely asymmetric appearance  of the right parotid gland which is nearly entirely replaced by  heterogeneous enhancing soft tissue attenuation involving the  superficial and deep lobes with overall dimensions measuring  approximately 4.6 x 4.0 cm, indicative of soft tissue mass involvement.  There is no discrete osseous erosion of the adjacent mandible or  temporomandibular joint with right mastoid air cells grossly clear.  Parapharyngeal spaces are preserved with supraglottic soft tissues,  unremarkable. Valleculae and piriform sinuses within normal limits. The  vocal cords and subglottic trachea are unremarkable. Visualized portions  of the lung apices are grossly unremarkable. Thyroid is homogeneous in  attenuation. No bulky cervical  adenopathy is identified.       Impression:       Soft tissue mass involvement of the superficial and deep  lobes of the right parotid gland. 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 No adjacent osseous erosion. No bulky cervical  adenopathy otherwise identified. Opacification of the right maxillary  sinus again noted consistent with sinusitis.     DICTATED:     03/02/2018  EDITED    :     03/02/2018      This report was finalized on 3/2/2018 4:44 PM by Dr. Maurice Alas.       XR Chest 1 View [[id number]] Collected:  03/04/18 1701     Updated:  03/04/18 1857    Narrative:          EXAMINATION: XR CHEST 1 VW - 03/04/2018     INDICATION:  K11.21-Acute sialoadenitis; J01.00-Acute maxillary  sinusitis, unspecified; R53.1-Weakness; R13.10-Dysphagia, unspecified.      COMPARISON: 03/02/2018.     FINDINGS: Portable chest reveals PICC line placement with tip in the  SVC. There are chronic changes identified within the lung fields with  mild increased markings seen within the right middle lower lung field.  Development of a small right pleural effusion. Heart is borderline  enlarged with slight prominence of the pulmonary vascularity.           Impression:       PICC line catheter on the right with tip in the SVC.  Interval development of mild increased markings within the right mid to  lower lung field as well as development of a small right pleural  effusion.     DICTATED:     03/04/2018  EDITED    :     03/04/2018      This report was finalized on 3/4/2018 6:55 PM by Dr. Donna Matthews MD.               Impression:   --Acute sepsis with leukocytosis/fever and tachycardia, associated with encephalopathy and likely severe sepsis.  Primary focal symptoms/findings include right parotitis and acute right lower lobe pneumonia with broad spectrum antimicrobials.  No  preceding symptoms of central nervous system focus and no meningismus at present.  Likely metabolic encephalopathy associated with sepsis.  Abdomen benign by exam with no intra-abdominal pathology per radiology on CT scan, although motion artifact and if symptoms persist/recur, then you should give consideration to further GI workup by either repeat imaging or GI evaluation.  No diarrhea at present but if it develops you could consider stool studies.  Urinalysis not consistent with UTI.  No obvious soft tissue source other than above.     --Acute right parotitis;  gram-positive organisms are a primary concern.  In addition there is description of a masslike quality by radiology and he will likely need to give consideration to ENT input.  I will discuss with Dr. Yu;  no focal abscess so far per radiology.     --Acute cough with right lower lobe pneumonia by imaging and risk for community-acquired pathogens/aspiration.  Broad-spectrum coverage ongoing. Culture with S Aureus and usual pankaj so far.  Order urine Legionella and strep antigens and influenza PCR.     --Dysphagia.  Family reports difficulty with solids/liquids and cough.  Speech therapy has seen.  You'll likely need to give consideration to GI consultation for EGD as well.     --Acute encephalopathy.  Family reports this is common with acute illness for him.  Further neurologic workup per internal medicine at their discretion.  Preceding symptoms and current exam not consistent with meningismus.  Improving in general per family back towards baseline     --Chronic kidney disease stage III per records.  Monitor to help guide further adjustments with antimicrobials     --QTc > 500 ms EKG    --TCP;  monitor       PLAN:     --IV vancomycin/Azactam/Flagyl and oral doxycycline     --Monitor IV and IV antibiotic with risk for systemic complication and potential drug interaction     --Check/review labs cultures and scans     --Highly complex set of problems  with high risk for further serious morbidity and other serious sequela     --You will likely need to give consideration to ENT consultation and also GI consultation/EGD; d/w Dr. Yu     --Discussed with Dr Yu.  d/w micro     --Partial history per nursing staff as well      Enoch Dong MD  3/7/2018

## 2018-03-07 NOTE — PLAN OF CARE
Problem: Arrhythmia/Dysrhythmia (Symptomatic) (Adult)  Goal: Signs and Symptoms of Listed Potential Problems Will be Absent or Manageable (Arrhythmia/Dysrhythmia)  Outcome: Ongoing (interventions implemented as appropriate)      Problem: Nutrition, Imbalanced: Inadequate Oral Intake (Adult)  Goal: Identify Related Risk Factors and Signs and Symptoms  Outcome: Ongoing (interventions implemented as appropriate)    Goal: Improved Oral Intake  Outcome: Ongoing (interventions implemented as appropriate)    Goal: Prevent Further Weight Loss  Outcome: Ongoing (interventions implemented as appropriate)      Problem: Infection, Risk/Actual (Adult)  Goal: Identify Related Risk Factors and Signs and Symptoms  Outcome: Ongoing (interventions implemented as appropriate)    Goal: Infection Prevention/Resolution  Outcome: Ongoing (interventions implemented as appropriate)      Problem: Skin Integrity Impairment, Risk/Actual (Adult)  Goal: Identify Related Risk Factors and Signs and Symptoms  Outcome: Ongoing (interventions implemented as appropriate)    Goal: Skin Integrity/Wound Healing  Outcome: Ongoing (interventions implemented as appropriate)      Problem: Fall Risk (Adult)  Goal: Identify Related Risk Factors and Signs and Symptoms  Outcome: Ongoing (interventions implemented as appropriate)    Goal: Absence of Falls  Outcome: Ongoing (interventions implemented as appropriate)

## 2018-03-07 NOTE — PLAN OF CARE
Problem: Patient Care Overview (Adult)  Goal: Plan of Care Review  Outcome: Ongoing (interventions implemented as appropriate)   03/07/18 1439   Coping/Psychosocial Response Interventions   Plan Of Care Reviewed With patient   Patient Care Overview   Progress (MBS)   Outcome Evaluation   Outcome Summary/Follow up Plan MBS completed this date.Moderate-severe oropharyngeal dysphagia on this exam. Inadequate mastication of solid item. Repeated loss of bolus to FOM 2' to disorganized oral prep and resulted in some prespill/mistiming. Deep penetration to the level of the TVC during the swallow w/ thins (via cup and straw), NT (via tsp and cup), and HT (via cup). High penetration w/ PT. Penetration was deepest w/ larger bolus amounts w/ liquids.  Penetration 2' mistiming w/ larger boli, and dec'd closure at airway entrance. Significant (moderate amounts of) residue coated lateral channels, valleculae and pys w/ thicker textures (worst w/ HT and PT). Residue mainly related to weak pharyngeal contraction but also worsened by dec'd hyolaryngeal elevation and excusion, and dec'd BOT retraction. No penetration w/ tsp of thins. Aspiration was imminent w/ all liquid trials (Except thins via tsp) given location of residue and pt's inability to consistently clear it. No coughing noted during MBS. Chin tuck, 3 second prep, effortful swallow, etc did not reduce amount of penetration/deep vestibule residue and pt was unable to consistently follow commands. Cued cough/throat clear clear majority of vestibular residue. Max cueing required. Rec: Puree and thins (via tsp ONLY), 1:1 supervision w/ all PO, cue pt to cough or clear throat every 3-5 swallows. Meds crushed in puree.        Problem: Inpatient SLP  Goal: Dysphagia- Patient will safely consume diet as per recommendation with no signs/symptoms of aspiration  Outcome: Ongoing (interventions implemented as appropriate)   03/07/18 1439   Safely Consume Diet   Safely Consume Diet-  SLP, Date Established 03/07/18   Safely Consume Diet- SLP, Time to Achieve by discharge   Safely Consume Diet- SLP, Date Goal Reviewed 03/07/18   Safely Consume Diet- SLP, Outcome goal ongoing

## 2018-03-07 NOTE — PROGRESS NOTES
Continued Stay Note  [facility]     Patient Name: Alexander Adan  MRN: [id number]  Today's Date: 3/7/2018    Admit Date: 3/2/2018          Discharge Plan       03/07/18 1455    Case Management/Social Work Plan    Plan update    Patient/Family In Agreement With Plan yes    Additional Comments Received a call from [facility] who advises that they can offer patient a bed.  Spoke with patient and wife to advise.  CM following for discharge needs.  Patient plan is to discharge to [facility] via car with family to transport when medically ready.      03/07/18 1322    Case Management/Social Work Plan    Plan update    Patient/Family In Agreement With Plan yes    Additional Comments Spoke with patient and wife at bedside regarding rehab options.  They have requested a referral to be sent to [facility].  Called [facility] with referral.  CM following for discharge needs.  Patient plan is to discharge to rehab via car with family to transport when medically ready.               Discharge Codes     None        Expected Discharge Date and Time     Expected Discharge Date Expected Discharge Time    Mar 9, 2018             Randee Singleton, RN

## 2018-03-07 NOTE — PROGRESS NOTES
[facility] Medicine Services  PROGRESS NOTE    Patient Name: Alexander Adan  : 1940  MRN: [id number]    Date of Admission: 3/2/2018  Length of Stay: 5  Primary Care Physician: Harrison Smart MD    Subjective   Subjective     CC:Right facial swelling, weight loss    Subjective:  Resting in bedIn no acute distress and in very good mood.  Denies any specific pain or shortness of breath.  No fever or chills.  No chest pain or palpitation or shortness of breath address.  No nausea, vomiting, diarrhea, abdominal pain.    Review of Systems  Unable to obtain        Objective   Objective     Vital Signs:   Temp:  [97.2 °F (36.2 °C)-99.2 °F (37.3 °C)] 97.3 °F (36.3 °C)  Heart Rate:  [] 82  Resp:  [16-18] 18  BP: ()/(60-76) 99/68        Physical Exam:  Constitutional: No acute distress, very cachectic and frail  Eyes: PERRLA, sclerae anicteric, no conjunctival injection  HENT: NCAT, mucous membranes moist.  Right submandibular swelling most likely she Parotid gland swelling.  Neck: Supple, no thyromegaly, no lymphadenopathy, trachea midline  Respiratory: Clear to auscultation bilaterally, nonlabored respirations   Cardiovascular: RRR, no murmurs, rubs, or gallops, palpable pedal pulses bilaterally  Gastrointestinal: Positive bowel sounds, soft, nontender, nondistended  Musculoskeletal: Very cachectic No bilateral ankle edema, no clubbing or cyanosis to extremities  Neurologic: Awake, alert, follows commands.  Patient moves all the limbs.  Skin: No rashes    Results Reviewed:  I have personally reviewed current lab, radiology, and data and agree.      Results from last 7 days  Lab Units 18  0542 18  06   WBC 10*3/mm3 12.41* 8.10 10.77   HEMOGLOBIN g/dL 11.0* 11.1* 10.2*   HEMATOCRIT % 35.3* 35.8* 32.9*   PLATELETS 10*3/mm3 193 140* 179       Results from last 7 days  Lab Units 18  0542 18  0441 18  0622  18  0519   SODIUM  mmol/L 130* 132 138  --  142   POTASSIUM mmol/L 4.8 5.0 5.0  < > 3.5   CHLORIDE mmol/L 110* 112* 115*  --  120*   CO2 mmol/L 16.0* 16.0* 19.0*  --  17.0*   BUN mg/dL 10 12 14  --  14   CREATININE mg/dL 0.80 0.70 0.80  --  0.80   GLUCOSE mg/dL 125* 113* 89  --  112*   CALCIUM mg/dL 7.7* 7.3* 7.7*  --  8.6*   ALT (SGPT) U/L 7  --  9  --  10   AST (SGOT) U/L 15  --  18  --  19   < > = values in this interval not displayed.  Estimated Creatinine Clearance: 70.8 mL/min (by C-G formula based on Cr of 0.8).  No results found for: BNP  pH, Arterial   Date Value Ref Range Status   03/07/2018 7.419 7.350 - 7.450 pH units Final       Microbiology Results Abnormal     Procedure Component Value - Date/Time    Legionella Antigen, Urine - Urine, Urine, Clean Catch [[id number]] Collected:  03/05/18 0538    Lab Status:  Final result Specimen:  Urine from Urine, Clean Catch Updated:  03/07/18 1522     L. pneumophila Serogp 1 Ur Ag Negative      Presumptive negative for L. pneumophila serogroup 1 antigen in urine,  suggesting no recent or current infection. Legionnaires' disease  cannot be ruled out since other serogroups and species may also cause  disease.       Narrative:       Performed at:  [address]  [id number]  : Gilson Gandara MD, Phone:  [phone]    S. Pneumo Ag Urine or CSF - Urine, Urine, Clean Catch [[id number]] Collected:  03/05/18 0538    Lab Status:  Preliminary result Specimen:  Urine from Urine, Clean Catch Updated:  03/07/18 1522     Please note Comment      [employer] standards require a culture to be  performed on CSF specimens submitted for bacterial antigen testing.  (CAP [id number]) Urine specimens will not be cultured.       Narrative:       Performed at:  [address]  [id number]  : Gilson Gandara MD, Phone:  [phone]    Blood Culture - Blood, [[id number]] Collected:   03/02/18 1235    Lab Status:  Final result Specimen:  Blood from Arm, Left Updated:  03/07/18 1401     Blood Culture No growth at 5 days      Aerobic bottle only    Blood Culture - Blood, [[id number]]  (Normal) Collected:  03/02/18 1305    Lab Status:  Final result Specimen:  Blood from Arm, Left Updated:  03/07/18 1401     Blood Culture No growth at 5 days    Respiratory Culture - Sputum, Cough [[id number]]  (Abnormal)  (Susceptibility) Collected:  03/05/18 0751    Lab Status:  Final result Specimen:  Sputum from Cough Updated:  03/07/18 1210     Respiratory Culture --      Moderate growth (3+) Staphylococcus aureus (A)      This isolate is presumed to be clindamycin resistant based on detection of inducible clindamycin resistance.  Clindamycin may still be effective in some patients.         Moderate growth (3+) Normal Respiratory Tiffanie     Gram Stain Result Many (4+) WBCs per low power field      Moderate (3+) Epithelial cells per low power field      Moderate (3+) Gram positive cocci in pairs and clusters    Susceptibility      Staphylococcus aureus     TORO     Ceftriaxone <=8 ug/ml Susceptible     Clindamycin Resistant     Erythromycin >4 ug/ml Resistant     Gentamicin <=4 ug/ml Susceptible     Levofloxacin <=1 ug/ml Susceptible  [1]      Linezolid <=1 ug/ml Susceptible     Oxacillin <=0.25 ug/ml Susceptible     Penicillin G 2 ug/ml Resistant     Quinupristin + Dalfopristin <=0.5 ug/ml Susceptible     Rifampin <=1 ug/ml Susceptible     Tetracycline >8 ug/ml Resistant     Trimethoprim + Sulfamethoxazole <=0.5/9.5 ug/ml Susceptible     Vancomycin 1 ug/ml Susceptible            [1]   Staphylococcus species may develop resistance during prolonged therapy with quinolones.  Isolates that are initially susceptible may become resistant within three to four days after initiation of therapy. Testing of repeat isolates may be warranted.                 Urine Culture - Urine, Urine, Clean Catch [[id number]]  (Normal)  Collected:  03/05/18 0538    Lab Status:  Final result Specimen:  Urine from Urine, Clean Catch Updated:  03/07/18 0820     Urine Culture No growth at 2 days    Blood Culture - Blood, [[id number]]  (Normal) Collected:  03/02/18 1231    Lab Status:  Preliminary result Specimen:  Blood from Hand, Right Updated:  03/06/18 1746     Blood Culture No growth at 4 days    Influenza A & B, RT PCR - Swab, Nasopharynx [[id number]]  (Normal) Collected:  03/04/18 1424    Lab Status:  Final result Specimen:  Swab from Nasopharynx Updated:  03/04/18 1542     Influenza A PCR Not Detected     Influenza B PCR Not Detected          Imaging Results (last 24 hours)     Procedure Component Value Units Date/Time    FL Video Swallow With Speech [[id number]] Collected:  03/07/18 1525     Updated:  03/07/18 1540    Narrative:       EXAMINATION: FL VIDEO SWALLOW W SPEECH-     INDICATION: dysphagia; K11.21-Acute sialoadenitis; J01.00-Acute  maxillary sinusitis, unspecified; R53.1-Weakness; R13.10-Dysphagia,  unspecified; Z74.09-Other reduced mobility; Z74.09-Other reduced  mobility     TECHNIQUE: 1 minute and 18 seconds of fluoroscopic time was used for  this exam. 1 associated image was saved. The patient was evaluated in  the seated lateral position while taking a variety of consistencies of  barium by mouth under the direction of speech pathology.     COMPARISON: NONE     FINDINGS: There was no penetration and no aspiration with small teaspoon  sized sips of thin barium. There was aspiration with large sized sips of  thin, nectar consistency barium. There was no penetration and no  aspiration with honey, or pudding consistency barium, however there was  a considerable amount of residue in the vallecula, and piriform sinuses  after swallows with honey, and pudding consistency barium.          Impression:       Fluoroscopy provided for a modified barium swallow. Please  see speech therapy report for full details and recommendations.         This  report was finalized on 3/7/2018 3:38 PM by Dr. Tim Murillo MD.           Results for orders placed during the hospital encounter of 02/07/18   Adult Transthoracic Echo Complete W/ Cont if Necessary Per Protocol    Narrative · Left ventricular wall thickness is consistent with mild concentric   hypertrophy.  · The following left ventricular wall segments are hypokinetic: basal   inferoseptal, mid inferoseptal and basal inferoseptal.  · Moderately reduced right ventricular systolic function noted.  · Left atrial cavity size is moderately dilated.  · Mild-to-moderate mitral valve regurgitation is present  · Mild tricuspid valve regurgitation is present.  · Left ventricular systolic function is normal. Estimated EF = 60%.  · there is akinesis of base of septum and base of inferior wall          I have reviewed the medications.    Assessment/Plan   Assessment / Plan     Hospital Problem List     * (Principal)SIRS (systemic inflammatory response syndrome)    Atrial fibrillation    Overview Signed 6/20/2016  3:33 PM by DENI Gates     Description: chads vasc 7         Chronic coronary artery disease    Overview Signed 6/20/2016  3:33 PM by DENI Gates     Description: cabg 2004, data deficient         Chronic diastolic heart failure    Overview Signed 6/20/2016  3:33 PM by DENI Gates     Description: NYHA II-III  Per echo: EF 50% September 2013         Chronic obstructive pulmonary disease    CKD (chronic kidney disease), stage III    Leukocytosis    PVD (peripheral vascular disease)             Brief Hospital Course to date:  Alexander Adan is a 77 y.o. male       Assessment & Plan:  *Right sided facial swelling most likely parotitis. Very significant improvement with IV antibiotics.    * CT evidence of pneumonia possibly even aspiration pneumonia.    * Sepsis currently patient is  on IV antibiotic, much improved.    * Severe weight loss and cachexia.  Etiology is unknown. The patient has  good appetite and good oral intake.    * Atrial fibrillation with rapid ventricular response, rate better controlled.    PLAN:  - cont current care  - monitor BP closely  - PT/OT    DVT Prophylaxis:      CODE STATUS: DNR    Disposition:TBD.      Electronically signed by Anderson Yu MD, 03/07/18, 4:42 PM.

## 2018-03-07 NOTE — PROGRESS NOTES
Continued Stay Note   [facility]     Patient Name: Alexander Adan  MRN: [id number]  Today's Date: 3/7/2018    Admit Date: 3/2/2018          Discharge Plan       03/07/18 1322    Case Management/Social Work Plan    Plan update    Patient/Family In Agreement With Plan yes    Additional Comments Spoke with patient and wife at bedside regarding rehab options.  They have requested a referral to be sent to [facility].  Called Jenna with [facility] with referral.  CM following for discharge needs.  Patient plan is to discharge to rehab via car with family to transport when medically ready.               Discharge Codes     None        Expected Discharge Date and Time     Expected Discharge Date Expected Discharge Time    Mar 9, 2018             Randee Singleton RN

## 2018-03-07 NOTE — MBS/VFSS/FEES
Acute Care - Speech Language Pathology   Swallow Initial Evaluation  [facility]   Modified Barium Swallow Study (MBS)       Patient Name: Alexander Adan  : 1940  MRN: [id number]  Today's Date: 3/7/2018  Onset of Illness/Injury or Date of Surgery Date: 18     Referring Physician: Hospitalist      Admit Date: 3/2/2018    Visit Dx:     ICD-10-CM ICD-9-CM   1. Oropharyngeal dysphagia R13.12 787.22   2. Parotitis, acute K11.21 527.2   3. Subacute maxillary sinusitis J01.00 461.0   4. Generalized weakness R53.1 780.79   5. Impaired functional mobility, balance, gait, and endurance Z74.09 V49.89   6. Impaired mobility and ADLs Z74.09 799.89     Patient Active Problem List   Diagnosis   • Atrial fibrillation   • Chronic coronary artery disease   • Chronic diastolic heart failure   • Chronic obstructive pulmonary disease   • Cognitive impairment, mild, so stated   • Tobacco use   • Fall at home   • CKD (chronic kidney disease), stage III   • Leukocytosis   • Bronchitis   • Vasovagal syncope   • Dehydration   • Hypotension due to dehydration   • PVD (peripheral vascular disease)   • SIRS (systemic inflammatory response syndrome)     Past Medical History:   Diagnosis Date   • Anemia    • Anxiety    • Chronic kidney disease     STAGE II (mild)   • Hyperlipidemia    • Hypertension    • Stroke     LACUNAR. 2013   • Subdural hematoma     Status post hemorrhagic evacuation with india holes 4/3/2015 per Dr. Gilson Brooke.     Past Surgical History:   Procedure Laterality Date   • INDIA HOLE FOR SUBDURAL HEMATOMA     • CARDIAC PACEMAKER PLACEMENT      PERMANENT   • CAROTID STENT Left    • CHOLECYSTECTOMY     • CORONARY ARTERY BYPASS GRAFT     • PACEMAKER IMPLANTATION     • THROMBOENDARTERECTOMY      CAROTID, LEFT          SWALLOW EVALUATION (last 72 hours)      Swallow Evaluation       18 1400                Rehab Evaluation    Document Type evaluation  -SG        Subjective Information no complaints;agree  to therapy  -SG        Patient Effort, Rehab Treatment good  -SG        Symptoms Noted During/After Treatment none  -SG        General Information    Patient Profile Review yes  -SG        Onset of Illness/Injury 02/25/18  -SG        Referring Physician Hospitalist  -SG        Subjective Patient Observations pt alert, confused  -SG        Pertinent History Of Current Problem SIRS  -SG        Current Diet Limitations puree;thin liquids  -SG        Precautions/Limitations, Vision WFL  -SG        Precautions/Limitations, Hearing WFL  -SG        Prior Level of Function- Communication functional in all spheres  -SG        Prior Level of Function- Swallowing no diet consistency restrictions  -SG        Plans/Goals Discussed With spouse/S.O.  -SG        Barriers to Rehab none identified  -SG        Clinical Impression    Patient's Goals For Discharge return to PO diet  -SG        Family Goals For Discharge patient able to eat/drink without coughing/choking  -SG        SLP Swallowing Diagnosis oral dysfunction;pharyngeal dysfunction  -SG        Rehab Potential/Prognosis, Swallowing good, to achieve stated therapy goals  -SG        Criteria for Skilled Therapeutic Interventions Met skilled criteria for dysphagia intervention met  -SG        FCM, Swallowing 5-->Level 5  -SG        Therapy Frequency 5 times/wk  -SG        SLP Diet Recommendation III - pureed with some mashed;thin liquids   via tsp only  -SG        Recommended Diagnostics VFSS (MBS)   completed this date  -SG        Recommended Feeding/Eating Techniques small sips/bites  -SG        SLP Rec. for Method of Medication Administration meds crushed in pudding/applesauce;meds whole in pudding/applesauce  -        Anticipated Discharge Disposition skilled nursing facility  -        Pain Assessment    Pain Assessment 0-10  -SG        Pain Score 0  -SG        Post Pain Score 0  -SG        Videofluoroscopic Swallowing Exam    Risks/Benefits Reviewed risks/benefits  explained;agreed to eval;patient;family  -SG        Positioning Needs for Swallow Exam upright at 90 for eval  -SG        Radiologic Views Used for Examination left lateral view  -SG        Motor Function During Phonation/Speech    Observation Anatomic Considerations no anatomic structural deviation via videofluroscopy  -SG        VFSS    Mode of Presentation cup;thin:;nectar:;honey:;pudding:;spoon;straw  -SG        Oral Phase Results incomplete bolus preparation;increased prep time   w/ solid  -SG        Post Swallow Residue post swallow residue present;residue present pyriform sinuses;residue present in valleculae;residue present on pharyngeal walls;unable to clear residue in pyriform sinuses;unable to clear residue in valleculae;unable to clear residue pharyngeal walls  -SG        Rosenbek's PenAsp Scale 5-->Level 5  -SG        Att Compensatory Maneuvers 3 second prep;chin down position;effortful (hard) swallow;bolus size;bolus presentation style  -SG        Pharyngeal Phase Results impaired pharyngeal phase of swallowing  -SG        Summary of VFSS MBS completed this date. Inadequate mastication of solid item. Deep penetration tot he level of the TVC during the swallow w/ thins, NT, HT, and puree. Penetration was deepest w/ larger boli of liquids, residue coated lateral channels, valleculae and pys w/ thicker textures (worst w/ HT and PT). No penetration w/ tsp of thins. Aspiration was imminent w/ all liquid trials (Except thins via tsp) given location of residue and pt's inability to consistently clear it. Chin tuck, 3 second prep, effortful swallow, etc did not reduce amount of penetration and pt was unable to consistently follow commands. Cued cough/throat clear clear majority of vestibular residue. Rec: Puree and thins (via tsp ONLY), supervision w/ all PO, cue pt to cough or clear throat every 3-5 swallows. Meds crushed in puree.    -SG        VFSS Swallow Recommendations    Eating Assistance needs constant  supervision during self eating activity  -SG        Oral Care oral care with toothbrush and dentifrice BID and PRN  -SG        Modified Eating Strategies upright positioning 90 degrees;reflux precautions  -SG        Other Recommendations puree with some mashed;thin   via tsp only  -SG        Recommended Diet III - pureed with some mashed;thin liquids  -SG        Improve timing of pharyngeal response    To improve timing of pharyngeal response, patient will: Swallow in timely way using three second prep;Swallow in timely way using super-supraglottic swallow;80%;with consistent cues  -SG        Status: Improve timing of pharyngeal response New  -SG        Timing of Pharyngeal Response Progress continue to adress  -SG        Improve closure at entrance to airway    To improve closure at entrance to airway, patient will: Complete super-supraglottic swallow;80%;with consistent cues  -SG        Status: Improve closure at entrance to airway New  -SG        Closure at Entrance to Airway Progress continue to adress  -SG        Improve laryngeal elevation    To improve laryngeal elevation, patient will: Complete super-supraglottic swallow;80%;with consistent cues  -SG        Status: Improve laryngeal elevation New  -SG        Laryngeal Elevation Progress continue to adress  -SG        Improve hyolaryngeal excursion    To improve hyolaryngeal excursion, patient will: Complete chin tuck against resistance (comment number of repetitions);80%;with consistent cues  -SG        Status: Improve hyolaryngeal excursion New  -SG        Hyolaryngeal Excursion Progress continue to adress  -SG        Improve tongue base & pharyngeal wall squeeze    To improve tongue base & pharyngeal wall squeeze, patient will: Complete effortful swallow;80%;with consistent cues  -SG        Status: Improve tongue base & pharyngeal wall squeeze New  -SG        Tongue Base/Pharyngeal Wall Squeeze Progress continue to adress  -SG        Dysphagia Other 1     Dysphagia Other 1 Objective Patient will demonstrate no s/s with recommended diet textures.  -SG        Status: Dysphagia Other 1 Progressing as expected  -SG        Dysphagia Other 1 Progress continue to address  -SG        Dysphagia Other 2    Dysphagia Other 2 Objective Patient will demonstrate compliance w/ dysphagia compensations (thins via tsp, coughing/TC every 3-5 swallows) with intermittent cueing  -SG        Status: Dysphagia Other 2 New  -SG        Dysphagia Other 2 Progress continue to address  -SG          User Key  (r) = Recorded By, (t) = Taken By, (c) = Cosigned By    Initials Name Effective Dates    SG Alexa Ambrocio-Flakita, MS [facility]-SLP 06/22/15 -         EDUCATION  The patient has been educated in the following areas:   Dysphagia (Swallowing Impairment) Oral Care/Hydration Modified Diet Instruction.    SLP Recommendation and Plan  SLP Swallowing Diagnosis: oral dysfunction, pharyngeal dysfunction  SLP Diet Recommendation: III - pureed with some mashed, thin liquids (via tsp only)  Recommended Feeding/Eating Techniques: small sips/bites  SLP Rec. for Method of Medication Administration: meds crushed in pudding/applesauce, meds whole in pudding/applesauce     Recommended Diagnostics: VFSS ([facility]) (completed this date)  Criteria for Skilled Therapeutic Interventions Met: skilled criteria for dysphagia intervention met  Anticipated Discharge Disposition: skilled nursing facility  Rehab Potential/Prognosis, Swallowing: good, to achieve stated therapy goals  Therapy Frequency: 5 times/wk             Plan of Care Review  Plan Of Care Reviewed With: patient  Progress:  ([facility])  Outcome Summary/Follow up Plan: [facility] completed this date. Inadequate mastication of solid item. Deep penetration tot he level of the TVC during the swallow w/ thins, NT, HT, and puree. Penetration was deepest w/ larger boli of liquids, residue coated lateral channels, valleculae and pys w/ thicker textures (worst w/ HT and PT). No  penetration w/ tsp of thins. Aspiration was imminent w/ all liquid trials (Except thins via tsp) given location of residue and pt's inability to consistently clear it. Chin tuck, 3 second prep, effortful swallow, etc did not reduce amount of penetration and pt was unable to consistently follow commands. Cued cough/throat clear clear majority of vestibular residue. Rec: Puree and thins (via tsp ONLY), supervision w/ all PO, cue pt to cough or clear throat every 3-5 swallows. Meds crushed in puree.           IP SLP Goals       03/07/18 1439          Safely Consume Diet    Safely Consume Diet- SLP, Date Established 03/07/18  -SG      Safely Consume Diet- SLP, Time to Achieve by discharge  -SG      Safely Consume Diet- SLP, Date Goal Reviewed 03/07/18  -SG      Safely Consume Diet- SLP, Outcome goal ongoing  -SG        User Key  (r) = Recorded By, (t) = Taken By, (c) = Cosigned By    Initials Name Provider Type    OFE Ferguson MS CCC-SLP Speech and Language Pathologist               Time Calculation:         Time Calculation- SLP       03/07/18 1456          Time Calculation- SLP    SLP Start Time 1400  -SG      SLP Received On 03/07/18  -        User Key  (r) = Recorded By, (t) = Taken By, (c) = Cosigned By    Initials Name Provider Type    OFE Ferguson MS CCC-VITA Speech and Language Pathologist          Therapy Charges for Today     Code Description Service Date Service Provider Modifiers Qty    [id number] HC ST MOTION FLUORO EVAL SWALLOW 6 3/7/2018 MS SYEDA Lanier GN 1               MS SYEDA Lanier  3/7/2018

## 2018-03-08 LAB — VANCOMYCIN TROUGH SERPL-MCNC: 15.9 MCG/ML (ref 10–20)

## 2018-03-08 PROCEDURE — 25810000003 DEXTROSE 5 % WITH KCL 20 MEQ 20-5 MEQ/L-% SOLUTION: Performed by: INTERNAL MEDICINE

## 2018-03-08 PROCEDURE — 94799 UNLISTED PULMONARY SVC/PX: CPT

## 2018-03-08 PROCEDURE — 99232 SBSQ HOSP IP/OBS MODERATE 35: CPT | Performed by: INTERNAL MEDICINE

## 2018-03-08 PROCEDURE — 87040 BLOOD CULTURE FOR BACTERIA: CPT | Performed by: INTERNAL MEDICINE

## 2018-03-08 PROCEDURE — 94760 N-INVAS EAR/PLS OXIMETRY 1: CPT

## 2018-03-08 PROCEDURE — 97530 THERAPEUTIC ACTIVITIES: CPT

## 2018-03-08 PROCEDURE — 97110 THERAPEUTIC EXERCISES: CPT

## 2018-03-08 PROCEDURE — 25010000002 VANCOMYCIN PER 500 MG

## 2018-03-08 PROCEDURE — 25010000002 HEPARIN (PORCINE) PER 1000 UNITS: Performed by: INTERNAL MEDICINE

## 2018-03-08 PROCEDURE — 80202 ASSAY OF VANCOMYCIN: CPT

## 2018-03-08 PROCEDURE — 94640 AIRWAY INHALATION TREATMENT: CPT

## 2018-03-08 PROCEDURE — 92526 ORAL FUNCTION THERAPY: CPT

## 2018-03-08 PROCEDURE — 25010000002 MORPHINE SULFATE (PF) 2 MG/ML SOLUTION: Performed by: INTERNAL MEDICINE

## 2018-03-08 RX ADMIN — HEPARIN SODIUM 5000 UNITS: 5000 INJECTION, SOLUTION INTRAVENOUS; SUBCUTANEOUS at 06:18

## 2018-03-08 RX ADMIN — WATER 2 G: 1 INJECTION INTRAMUSCULAR; INTRAVENOUS; SUBCUTANEOUS at 17:08

## 2018-03-08 RX ADMIN — MORPHINE SULFATE 2 MG: 2 INJECTION, SOLUTION INTRAMUSCULAR; INTRAVENOUS at 10:39

## 2018-03-08 RX ADMIN — WATER 2 G: 1 INJECTION INTRAMUSCULAR; INTRAVENOUS; SUBCUTANEOUS at 02:34

## 2018-03-08 RX ADMIN — METRONIDAZOLE 500 MG: 500 INJECTION, SOLUTION INTRAVENOUS at 12:49

## 2018-03-08 RX ADMIN — IPRATROPIUM BROMIDE AND ALBUTEROL SULFATE 3 ML: 2.5; .5 SOLUTION RESPIRATORY (INHALATION) at 15:52

## 2018-03-08 RX ADMIN — POTASSIUM CHLORIDE AND DEXTROSE MONOHYDRATE 100 ML/HR: 150; 5 INJECTION, SOLUTION INTRAVENOUS at 04:51

## 2018-03-08 RX ADMIN — CILOSTAZOL 50 MG: 50 TABLET ORAL at 08:58

## 2018-03-08 RX ADMIN — HEPARIN SODIUM 5000 UNITS: 5000 INJECTION, SOLUTION INTRAVENOUS; SUBCUTANEOUS at 22:03

## 2018-03-08 RX ADMIN — SODIUM CHLORIDE 500 ML: 9 INJECTION, SOLUTION INTRAVENOUS at 23:15

## 2018-03-08 RX ADMIN — METRONIDAZOLE 500 MG: 500 INJECTION, SOLUTION INTRAVENOUS at 06:18

## 2018-03-08 RX ADMIN — SUCRALFATE 1 G: 1 TABLET ORAL at 09:02

## 2018-03-08 RX ADMIN — METRONIDAZOLE 500 MG: 500 INJECTION, SOLUTION INTRAVENOUS at 17:08

## 2018-03-08 RX ADMIN — DOXYCYCLINE 100 MG: 100 CAPSULE ORAL at 08:57

## 2018-03-08 RX ADMIN — NICOTINE 1 PATCH: 14 PATCH TRANSDERMAL at 08:57

## 2018-03-08 RX ADMIN — VANCOMYCIN HYDROCHLORIDE 1000 MG: 1 INJECTION, SOLUTION INTRAVENOUS at 17:08

## 2018-03-08 RX ADMIN — MORPHINE SULFATE 2 MG: 2 INJECTION, SOLUTION INTRAMUSCULAR; INTRAVENOUS at 14:05

## 2018-03-08 RX ADMIN — GABAPENTIN 600 MG: 300 CAPSULE ORAL at 12:49

## 2018-03-08 RX ADMIN — PANTOPRAZOLE SODIUM 40 MG: 40 TABLET, DELAYED RELEASE ORAL at 09:01

## 2018-03-08 RX ADMIN — SOTALOL HYDROCHLORIDE 120 MG: 80 TABLET ORAL at 12:49

## 2018-03-08 RX ADMIN — ASPIRIN 81 MG 81 MG: 81 TABLET ORAL at 08:57

## 2018-03-08 RX ADMIN — DOXYCYCLINE 100 MG: 100 CAPSULE ORAL at 22:02

## 2018-03-08 RX ADMIN — AMITRIPTYLINE HYDROCHLORIDE 10 MG: 10 TABLET, FILM COATED ORAL at 08:58

## 2018-03-08 RX ADMIN — GABAPENTIN 600 MG: 300 CAPSULE ORAL at 22:02

## 2018-03-08 RX ADMIN — SUCRALFATE 1 G: 1 TABLET ORAL at 17:07

## 2018-03-08 RX ADMIN — GABAPENTIN 600 MG: 300 CAPSULE ORAL at 06:18

## 2018-03-08 RX ADMIN — IPRATROPIUM BROMIDE AND ALBUTEROL SULFATE 3 ML: 2.5; .5 SOLUTION RESPIRATORY (INHALATION) at 07:24

## 2018-03-08 RX ADMIN — HYDROCODONE BITARTRATE AND ACETAMINOPHEN 1 TABLET: 5; 325 TABLET ORAL at 17:49

## 2018-03-08 RX ADMIN — CASTOR OIL AND BALSAM, PERU: 788; 87 OINTMENT TOPICAL at 08:59

## 2018-03-08 RX ADMIN — HEPARIN SODIUM 5000 UNITS: 5000 INJECTION, SOLUTION INTRAVENOUS; SUBCUTANEOUS at 12:49

## 2018-03-08 RX ADMIN — PANTOPRAZOLE SODIUM 40 MG: 40 TABLET, DELAYED RELEASE ORAL at 17:07

## 2018-03-08 RX ADMIN — CASTOR OIL AND BALSAM, PERU: 788; 87 OINTMENT TOPICAL at 22:03

## 2018-03-08 RX ADMIN — CILOSTAZOL 50 MG: 50 TABLET ORAL at 22:02

## 2018-03-08 RX ADMIN — IPRATROPIUM BROMIDE AND ALBUTEROL SULFATE 3 ML: 2.5; .5 SOLUTION RESPIRATORY (INHALATION) at 19:32

## 2018-03-08 RX ADMIN — IPRATROPIUM BROMIDE AND ALBUTEROL SULFATE 3 ML: 2.5; .5 SOLUTION RESPIRATORY (INHALATION) at 12:09

## 2018-03-08 RX ADMIN — POVIDONE IODINE: 100 SOLUTION TOPICAL at 09:00

## 2018-03-08 RX ADMIN — WATER 2 G: 1 INJECTION INTRAMUSCULAR; INTRAVENOUS; SUBCUTANEOUS at 10:39

## 2018-03-08 RX ADMIN — HYDROCODONE BITARTRATE AND ACETAMINOPHEN 1 TABLET: 5; 325 TABLET ORAL at 09:02

## 2018-03-08 NOTE — PLAN OF CARE
Problem: Patient Care Overview (Adult)  Goal: Plan of Care Review  Outcome: Ongoing (interventions implemented as appropriate)   03/08/18 1155   Coping/Psychosocial Response Interventions   Plan Of Care Reviewed With patient   Patient Care Overview   Progress improving   Outcome Evaluation   Outcome Summary/Follow up Plan Pt increased ambulation distance to 150ft with Ivet x2 and BUE support. Pt demonstrated antalgic gait pattern with slow gait speed due to increased pain in jose feet. Pt continues to be limited by increased confusion.        Problem: Inpatient Physical Therapy  Goal: Bed Mobility Goal LTG- PT  Outcome: Ongoing (interventions implemented as appropriate)   03/05/18 1615 03/08/18 1155   Bed Mobility PT LTG   Bed Mobility PT LTG, Date Established 03/05/18 --    Bed Mobility PT LTG, Time to Achieve 2 wks --    Bed Mobility PT LTG, Activity Type all bed mobility --    Bed Mobility PT LTG, [place] Level independent --    Bed Mobility PT LTG, Date Goal Reviewed 03/05/18 --    Bed Mobility PT LTG, Outcome --  goal ongoing     Goal: Transfer Training Goal 1 LTG- PT  Outcome: Ongoing (interventions implemented as appropriate)   03/05/18 1615 03/08/18 1155   Transfer Training PT LTG   Transfer Training PT LTG, Date Established 03/05/18 --    Transfer Training PT LTG, Time to Achieve 2 wks --    Transfer Training PT LTG, Activity Type sit to stand/stand to sit --    Transfer Training PT LTG, [place] Level conditional independence --    Transfer Training PT LTG, Assist Device walker, rolling --    Transfer Training PT LTG, Date Goal Reviewed 03/05/18 --    Transfer Training PT LTG, Outcome --  goal ongoing     Goal: Gait Training Goal LTG- PT  Outcome: Ongoing (interventions implemented as appropriate)   03/05/18 1615 03/08/18 1155   Gait Training PT LTG   Gait Training Goal PT LTG, Date Established 03/05/18 --    Gait Training Goal PT LTG, Time to Achieve 2 wks --    Gait Training Goal PT LTG,  [place] Level conditional independence --    Gait Training Goal PT LTG, Assist Device walker, rolling --    Gait Training Goal PT LTG, Distance to Achieve 200 feet --    Gait Training Goal PT LTG, Date Goal Reviewed 03/05/18 --    Gait Training Goal PT LTG, Outcome --  goal ongoing     Goal: Stair Training Goal LTG- PT  Outcome: Ongoing (interventions implemented as appropriate)   03/05/18 1615 03/08/18 1155   Stair Training PT LTG   Stair Training Goal PT LTG, Date Established 03/05/18 --    Stair Training Goal PT LTG, Time to Achieve 2 wks --    Stair Training Goal PT LTG, Number of Steps 5 --    Stair Training Goal PT LTG, [place] Level contact guard assist --    Stair Training Goal PT LTG, Assist Device 2 handrails --    Stair Training Goal PT LTG, Date Goal Reviewed 03/05/18 --    Stair Training Goal PT LTG, Outcome --  goal ongoing

## 2018-03-08 NOTE — THERAPY TREATMENT NOTE
Acute Care - Occupational Therapy Treatment Note  [facility]     Patient Name: Alexander Adan  : 1940  MRN: [id number]  Today's Date: 3/8/2018  Onset of Illness/Injury or Date of Surgery Date: 18  Date of Referral to OT: 18  Referring Physician: DENI Naqvi      Admit Date: 3/2/2018    Visit Dx:     ICD-10-CM ICD-9-CM   1. Oropharyngeal dysphagia R13.12 787.22   2. Parotitis, acute K11.21 527.2   3. Subacute maxillary sinusitis J01.00 461.0   4. Generalized weakness R53.1 780.79   5. Impaired functional mobility, balance, gait, and endurance Z74.09 V49.89   6. Impaired mobility and ADLs Z74.09 799.89     Patient Active Problem List   Diagnosis   • Atrial fibrillation   • Chronic coronary artery disease   • Chronic diastolic heart failure   • Chronic obstructive pulmonary disease   • Cognitive impairment, mild, so stated   • Tobacco use   • Fall at home   • CKD (chronic kidney disease), stage III   • Leukocytosis   • Bronchitis   • Vasovagal syncope   • Dehydration   • Hypotension due to dehydration   • PVD (peripheral vascular disease)   • SIRS (systemic inflammatory response syndrome)             Adult Rehabilitation Note       18 1400 18 1345 18 0947    Rehab Assessment/Intervention    Discipline speech language pathologist  -SM occupational therapist  -KF physical therapist  -KR    Document Type therapy note (daily note)  -SM therapy note (daily note)  -KF therapy note (daily note)  -KR    Subjective Information agree to therapy  -SM agree to therapy;complains of;pain  -KF agree to therapy;complains of;pain  -KR    Patient Effort, Rehab Treatment good  -SM good  -KF good  -KR    Symptoms Noted During/After Treatment  fatigue;increased pain  -KF increased pain;fatigue  -KR    Precautions/Limitations  fall precautions;oxygen therapy device and L/min  -KF fall precautions;oxygen therapy device and L/min  -KR    Recorded by [SM] Adri Perez MS CCC-SLP [KF]  Lyudmila Cortés OT [KR] Jasmin Short, PT    Vital Signs    Pre Systolic BP Rehab  112  -KF 91  -KR    Pre Treatment Diastolic BP  70  -KF 56  -KR    Pretreatment Heart Rate (beats/min)  103  -KF 84  -KR    Posttreatment Heart Rate (beats/min)  100  -  -KR    Pre SpO2 (%)  94  -KF 93  -KR    O2 Delivery Pre Treatment  supplemental O2  -KF supplemental O2  -KR    Intra SpO2 (%)  90  -KF     O2 Delivery Intra Treatment  supplemental O2  -KF     Post SpO2 (%)  96  -KF     O2 Delivery Post Treatment  supplemental O2  -KF     Pre Patient Position  Supine  -KF Supine  -KR    Intra Patient Position  Standing  -KF Standing  -KR    Post Patient Position  Sitting  -KF Supine  -KR    Recorded by  [KF] Lyudmila Cortsé OT [KR] Jasmin Short, PT    Pain Assessment    Pain Assessment May- FACES  -SM 0-10  -KF 0-10  -KR    May- FACES Pain Rating 4  -SM      Pain Score  2  -KF 5  -KR    Post Pain Score  3  -KF 10  -KR    Pain Type  Acute pain  -KF Acute pain  -KR    Pain Location  Foot  -KF Foot  -KR    Pain Orientation  Right;Left  -KF Right;Left  -KR    Pain Intervention(s)  Ambulation/increased activity;Repositioned  -KF Repositioned;Ambulation/increased activity  -KR    Response to Interventions  tolerated  -KF tolerated  -KR    Recorded by [SM] Adri Perez MS CCC-SLP [KF] Lyudmila Cortés OT [KR] Jasmin Short, PT    Cognitive Assessment/Intervention    Current Cognitive/Communication Assessment  impaired  -KF impaired  -KR    Orientation Status  oriented to;person;required verbal cueing (specifiy in comments);time;place  -KF oriented to;person;time   month only  -KR    Follows Commands/Answers Questions  75% of the time;able to follow single-step instructions;needs cueing;needs increased time;needs repetition  -KF able to follow single-step instructions;50% of the time;75% of the time;needs cueing;needs increased time;needs repetition  -KR    Personal Safety  moderate impairment;decreased  awareness, need for assist;decreased awareness, need for safety;decreased insight to deficits  -KF moderate impairment;decreased awareness, need for assist;decreased awareness, need for safety;decreased insight to deficits  -KR    Personal Safety Interventions  fall prevention program maintained;gait belt;muscle strengthening facilitated;nonskid shoes/slippers when out of bed  -KF fall prevention program maintained;gait belt;nonskid shoes/slippers when out of bed  -KR    Recorded by  [KF] Lyudmila Cortés, OT [KR] Jasmin Short, PT    Dysphagia Other 1    Dysphagia Other 1 Objective Patient will demonstrate no s/s with recommended diet textures.  -SM      Status: Dysphagia Other 1 Progress slower than expected  -SM      Dysphagia Other 1 Progress 50%;with consistent cues;continue to address  -SM      Comments: Dysphagia Other 1 Pt sounds very wheezy/congested at baseline/  -SM      Recorded by [SM] Adri Perez MS CCC-SLP      Dysphagia Other 2    Dysphagia Other 2 Objective Patient will demonstrate compliance w/ dysphagia compensations (thins via tsp, coughing/TC every 3-5 swallows) with intermittent cueing  -SM      Status: Dysphagia Other 2 Progress slower than expected  -SM      Dysphagia Other 2 Progress 100%;with consistent cues;continue to address  -SM      Comments: Dysphagia Other 2 During tx. Pt/wife/RN had no knowledge of need for liquids off spoon. Thorough education completed. Brought cup of spoons into room  -SM      Recorded by [SM] Adri Perez MS CCC-SLP      Bed Mobility, Assessment/Treatment    Bed Mobility, Assistive Device  bed rails;head of bed elevated  -KF     Bed Mobility, Roll Right, [place]  minimum assist (75% patient effort)  -KF     Bed Mobility, Scoot/Bridge, [place]  contact guard assist  -KF     Bed Mob, Supine to Sit, [place]  minimum assist (75% patient effort);1 person + 1 person to manage equipment  -KF minimum assist (75% patient effort);2 person  assist required;verbal cues required  -KR    Bed Mob, Sit to Supine, [place]   minimum assist (75% patient effort);verbal cues required  -KR    Bed Mobility, Safety Issues  decreased use of arms for pushing/pulling;decreased use of legs for bridging/pushing;cognitive deficits limit understanding  -KF decreased use of arms for pushing/pulling;decreased use of legs for bridging/pushing;cognitive deficits limit understanding  -KR    Bed Mobility, Impairments  strength decreased;coordination impaired;impaired balance  -KF strength decreased;impaired balance;coordination impaired  -KR    Bed Mobility, Comment  VC's HP and sequencing  -KF VC's for sequencing. Pt required increased time to complete.  -KR    Recorded by  [KF] Lyudmila Cortés, OT [KR] Jasmin Short, PT    Transfer Assessment/Treatment    Transfers, Bed-Chair [place]  contact guard assist;verbal cues required  -KF     Transfers, Sit-Stand [place]  verbal cues required;contact guard assist  -KF minimum assist (75% patient effort);verbal cues required  -KR    Transfers, Stand-Sit [place]  contact guard assist;verbal cues required  -KF minimum assist (75% patient effort);verbal cues required  -KR    Transfers, Sit-Stand-Sit, Assist Device  rolling walker  -KF other (see comments)   L UE support  -KR    Transfer, Safety Issues  step length decreased;weight-shifting ability decreased;sequencing ability decreased  -KF step length decreased;weight-shifting ability decreased;sequencing ability decreased  -KR    Transfer, Impairments  impaired balance;strength decreased;postural control impaired;pain  -KF strength decreased;impaired balance;pain  -KR    Transfer, Comment  VC's for sequencing and HP  -KF Increased cueing required for sequencing. Pt c/o increased jose foot pain.   -KR    Recorded by  [KF] Lyudmila Cortés, OT [KR] Jasmin Short, PT    Gait Assessment/Treatment    Gait, [place] Level   minimum assist (75% patient effort);2  person assist required;verbal cues required  -KR    Gait, Assistive Device   other (see comments)   BUE support  -KR    Gait, Distance (Feet)   150  -KR    Gait, Gait Pattern Analysis   swing-through gait  -KR    Gait, Gait Deviations   antalgic;sima decreased;step length decreased;weight-shifting ability decreased  -KR    Gait, Safety Issues   step length decreased;weight-shifting ability decreased;supplemental O2  -KR    Gait, Impairments   strength decreased;impaired balance;pain  -KR    Gait, Comment   Pt demonstrated antalgic gait pattern due to increased pain in jose feet. Pt demonstrated slow smia with decreased step length; required more assistance with increased weight bearing through BUEs with onset of fatigue.   -KR    Recorded by   [KR] Jasmin Short, PT    Functional Mobility    Functional Mobility- Ind. Level  contact guard assist  -KF     Functional Mobility- Device  rolling walker  -KF     Functional Mobility-Distance (Feet)  5  -KF     Functional Mobility- Safety Issues  sequencing ability decreased;step length decreased;weight-shifting ability decreased;supplemental O2  -KF     Functional Mobility- Comment  VC's HP and sequencing  -KF     Recorded by  [KF] Lyudmila Cortés OT     Toileting Assessment/Training    Toileting Assess/Train, Assistive Device  urinal  -KF     Toileting Assess/Train, Position  supported sitting  -KF     Toileting Assess/Train, Indepen Level  minimum assist (75% patient effort)  -KF     Toileting Assess/Train, Impairments  coordination impaired;strength decreased;other (see comments)   cognitive deficits for sequencing  -KF     Recorded by  [KF] Lyudmila Cortés OT     Grooming Assessment/Training    Grooming Assess/Train, Position  supported sitting  -KF     Grooming Assess/Train, Indepen Level  verbal cues required;set up required  -KF     Grooming Assess/Train, Impairments  other (see comments)   cognitive deficits for sequencing  -KF     Recorded by  [KF]  Lyudmila Cortés OT     Motor Skills/Interventions    Additional Documentation  Balance Skills Training (Group)  -KF Balance Skills Training (Group)  -KR    Recorded by  [KF] Lyudmila Cortés OT [KR] Jasmin Short, PT    Balance Skills Training    Sitting-Level of Assistance  Contact guard  -KF Contact guard  -KR    Sitting-Balance Support  Feet supported  -KF Right upper extremity supported;Left upper extremity supported;Feet supported  -KR    Sitting-Balance Activities  Lateral lean;Forward lean;Trunk control activities  -KF     Standing-Level of Assistance  Contact guard  -KF Minimum assistance  -KR    Static Standing Balance Support  assistive device  -KF Left upper extremity supported  -KR    Standing-Balance Activities  Weight Shift A-P;Weight Shift R-L;Forward lean  -KF     Gait Balance-Level of Assistance   Minimum assistance;x2  -KR    Gait Balance Support   Right upper extremity supported;Left upper extremity supported  -KR    Recorded by  [KF] Lyudmila Cortés OT [KR] Jasmin Short, PT    Therapy Exercises    Bilateral Upper Extremity  AROM:;10 reps;elbow flexion/extension;hand pumps;shoulder horizontal abd/add;shoulder extension/flexion  -KF     Recorded by  [KF] Lyudmila Cortés OT     Positioning and Restraints    Pre-Treatment Position  in bed  -KF in bed  -KR    Post Treatment Position  chair  -KF bed  -KR    In Bed   notified nsg;supine;call light within reach;encouraged to call for assist;exit alarm on;side rails up x3;heels elevated;waffle boots/both  -KR    In Chair  notified nsg;reclined;sitting;call light within reach;encouraged to call for assist;exit alarm on;waffle cushion;legs elevated;heels elevated;with family/caregiver  -KF     Recorded by  [KF] Lyudmila Cortés OT [KR] Jasmin Short, PT      03/06/18 1530 03/06/18 0822       Rehab Assessment/Intervention    Discipline speech language pathologist  - physical therapist  -KR     Document Type therapy note (daily note)  -JAMAR  therapy note (daily note)  -KR     Subjective Information agree to therapy  -SM agree to therapy;no complaints  -KR     Patient Effort, Rehab Treatment  adequate  -KR     Symptoms Noted During/After Treatment  fatigue  -KR     Precautions/Limitations  fall precautions;oxygen therapy device and L/min  -KR     Specific Treatment Considerations  Pt with increased confusion this treatment session  -KR     Recorded by [SM] Adri Perez, MS CCC-SLP [KR] Jasmin Short, PT     Vital Signs    Pre Systolic BP Rehab  132  -KR     Pre Treatment Diastolic BP  67  -KR     Pretreatment Heart Rate (beats/min)  98  -KR     Posttreatment Heart Rate (beats/min)  96  -KR     Pre SpO2 (%)  99  -KR     O2 Delivery Pre Treatment  supplemental O2  -KR     Post SpO2 (%)  94  -KR     O2 Delivery Post Treatment  supplemental O2  -KR     Pre Patient Position  Supine  -KR     Intra Patient Position  Standing  -KR     Post Patient Position  Supine  -KR     Recorded by  [KR] Jasmni Short, PT     Pain Assessment    Pain Assessment  0-10  -KR     Pain Score  0  -KR     Post Pain Score  0  -KR     Recorded by  [KR] Jasmin Short, PT     Cognitive Assessment/Intervention    Current Cognitive/Communication Assessment  impaired  -KR     Orientation Status  oriented to;person  -KR     Follows Commands/Answers Questions  able to follow single-step instructions;50% of the time;75% of the time;needs cueing;needs increased time;needs repetition  -KR     Personal Safety  moderate impairment;decreased awareness, need for assist;decreased awareness, need for safety;decreased insight to deficits  -KR     Personal Safety Interventions  fall prevention program maintained;gait belt;nonskid shoes/slippers when out of bed  -KR     Recorded by  [KR] Jasmin Short, PT     Swallow Assessment/Intervention    Additional Documentation Dysphagia/Swallow Intervention (Group)  -SM      Recorded by [SM] Adri Perez, MS CCC-SLP      Dysphagia/Swallow  Intervention    Dysphagia/Swallow Intervention Given reports of coughing on food/liquid, weight loss, increased opacities R mid and lower lung on CXR 3/4/18. Given all, best for MBS + limited UGI to further assess.   -SM      Recorded by [SM] Adri Perez MS CCC-SLP      Dysphagia Other 1    Dysphagia Other 1 Objective Patient will demonstrate no s/s with recommended diet textures.  -SM      Dysphagia Other 1 Progress 50%;without cues  -SM      Comments: Dysphagia Other 1 Very delayed throat clear throughout with trials intermittently.   -SM      Recorded by [SM] Adri Perez, MS CCC-SLP      Bed Mobility, Assessment/Treatment    Bed Mob, Supine to Sit, [place]  maximum assist (25% patient effort);verbal cues required  -KR     Bed Mob, Sit to Supine, [place]  moderate assist (50% patient effort);verbal cues required  -KR     Bed Mobility, Safety Issues  decreased use of arms for pushing/pulling;decreased use of legs for bridging/pushing;cognitive deficits limit understanding  -KR     Bed Mobility, Impairments  strength decreased;impaired balance  -KR     Bed Mobility, Comment  Pt very lethargic and confused with difficulty following commands. Pt required increased assistance at trunk.  -KR     Recorded by  [KR] Jasmin Short, PT     Transfer Assessment/Treatment    Transfers, Sit-Stand [place]  minimum assist (75% patient effort);verbal cues required  -KR     Transfers, Stand-Sit [place]  minimum assist (75% patient effort);verbal cues required  -KR     Transfers, Sit-Stand-Sit, Assist Device  other (see comments)   R UE support  -KR     Toilet Transfer, [place]  minimum assist (75% patient effort)  -KR     Toilet Transfer, Assistive Device  other (see comments)   RUE support  -KR     Transfer, Safety Issues  step length decreased;weight-shifting ability decreased  -KR     Transfer, Impairments  strength decreased;impaired balance;coordination impaired  -KR     Transfer,  Comment  Pt required max cueing for sequencing and safety awareness.   -KR     Recorded by  [KR] Jasmin Short PT     Gait Assessment/Treatment    Gait, [place] Level  minimum assist (75% patient effort);verbal cues required  -KR     Gait, Assistive Device  --   R UE support  -KR     Gait, Distance (Feet)  25  -KR     Gait, Gait Pattern Analysis  swing-through gait  -KR     Gait, Gait Deviations  sima decreased;forward flexed posture;step length decreased;weight-shifting ability decreased  -KR     Gait, Safety Issues  sequencing ability decreased;step length decreased;weight-shifting ability decreased;balance decreased during turns  -KR     Gait, Impairments  strength decreased;impaired balance;coordination impaired;postural control impaired  -KR     Gait, Comment  Pt required max cueing for sequencing and safe technique. Pt very limited by increased confusion. Increased difficulty following commands.   -KR     Recorded by  [ANDREEA] Jasmin Short PT     Motor Skills/Interventions    Additional Documentation  Balance Skills Training (Group)  -KR     Recorded by  [ANDREEA] Jasmin Short PT     Balance Skills Training    Sitting-Level of Assistance  Contact guard  -KR     Sitting-Balance Support  Feet supported  -KR     Standing-Level of Assistance  Minimum assistance  -KR     Static Standing Balance Support  Right upper extremity supported  -KR     Gait Balance-Level of Assistance  Minimum assistance  -KR     Gait Balance Support  Right upper extremity supported  -KR     Recorded by  [ANDREEA] Jasmin Short PT     Positioning and Restraints    Pre-Treatment Position  in bed  -KR     Post Treatment Position  bed  -KR     In Bed  notified nsg;supine;call light within reach;encouraged to call for assist;with family/caregiver;exit alarm on;side rails up x3;legs elevated  -KR     Recorded by  [ANDREEA] Jasmin Short PT       User Key  (r) = Recorded By, (t) = Taken By, (c) = Cosigned By    Initials Name Effective Dates      Adri Perez, MS CCC-SLP 06/22/15 -     KR Jasmin Short, PT 09/25/17 - 03/06/18     Jasmin Short, PT 03/07/18 -     MICHELLE Cortés, OT 02/14/18 -                 OT Goals       03/08/18 1505 03/06/18 1012       Bed Mobility OT LTG    Bed Mobility OT LTG, Time to Achieve  by discharge  -KF     Bed Mobility OT LTG, Activity Type  supine to sit/sit to supine  -KF     Bed Mobility OT LTG, [place] Level supervision required  -KF minimum assist (75% patient effort)  -KF     Bed Mobility OT LTG, Assist Device  bed rails  -KF     Bed Mobility OT LTG, Outcome goal revised  -KF      Strength OT LTG    Strength Goal OT LTG, Time to Achieve  by discharge  -KF     Strength Goal OT LTG, Measure to Achieve  Tolerate 15 reps HEP  -KF     Strength Goal OT LTG, Outcome goal ongoing  -KF      Static Sitting Balance OT LTG    Static Sitting Balance OT LTG, Time to Achieve  by discharge  -KF     Static Sitting Balance OT LTG, [place] Level  contact guard assist  -KF     Static Sitting Balance OT LTG, Assist Device  UE Support  -KF     Static Sitting Balance OT LTG, Outcome goal met  -KF      Follow Directions OT LTG    Follow Directions OT LTG, Time to Achieve  by discharge  -KF     Follow Directions OT LTG, Activity Type  100% treatment session  -KF     Follow Directions OT LTG, [place] Level  with verbal cues  -KF     Follow Directions OT LTG, Outcome goal ongoing  -KF        User Key  (r) = Recorded By, (t) = Taken By, (c) = Cosigned By    Initials Name Provider Type    MICHELLE Cortés, OT Occupational Therapist          Occupational Therapy Education     Title: PT OT SLP Therapies (Active)     Topic: Occupational Therapy (Done)     Point: ADL training (Done)    Description: Instruct learner(s) on proper safety adaptation and remediation techniques during self care or transfers.   Instruct in proper use of assistive devices.    Learning Progress Summary    Learner Readiness Method Response  Comment Documented by Status   Patient Acceptance E VU,NR Pt educated on ADL retraining for toileting, safety for functional tf, and precautions during functional mobility. KF 03/08/18 1503 Done    Acceptance E VU,NR Pt education provided on HEP, repositioning for bed mobility, and body mechanics for bed mobility. KF 03/06/18 1010 Done   Significant Other Acceptance E VU,NR Pt educated on ADL retraining for toileting, safety for functional tf, and precautions during functional mobility. KF 03/08/18 1503 Done    Acceptance E VU,NR Pt education provided on HEP, repositioning for bed mobility, and body mechanics for bed mobility. KF 03/06/18 1010 Done               Point: Home exercise program (Done)    Description: Instruct learner(s) on appropriate technique for monitoring, assisting and/or progressing therapeutic exercises/activities.    Learning Progress Summary    Learner Readiness Method Response Comment Documented by Status   Patient Acceptance E VU,NR Pt educated on ADL retraining for toileting, safety for functional tf, and precautions during functional mobility.  03/08/18 1503 Done    Acceptance E VU,NR Pt education provided on HEP, repositioning for bed mobility, and body mechanics for bed mobility. KF 03/06/18 1010 Done   Significant Other Acceptance E VU,NR Pt educated on ADL retraining for toileting, safety for functional tf, and precautions during functional mobility. KF 03/08/18 1503 Done    Acceptance E VU,NR Pt education provided on HEP, repositioning for bed mobility, and body mechanics for bed mobility.  03/06/18 1010 Done               Point: Precautions (Done)    Description: Instruct learner(s) on prescribed precautions during self-care and functional transfers.    Learning Progress Summary    Learner Readiness Method Response Comment Documented by Status   Patient Acceptance E VU,NR Pt educated on ADL retraining for toileting, safety for functional tf, and precautions during functional  mobility.  03/08/18 1503 Done    Acceptance E VU,NR Pt education provided on HEP, repositioning for bed mobility, and body mechanics for bed mobility.  03/06/18 1010 Done   Significant Other Acceptance E VU,NR Pt educated on ADL retraining for toileting, safety for functional tf, and precautions during functional mobility.  03/08/18 1503 Done    Acceptance E VU,NR Pt education provided on HEP, repositioning for bed mobility, and body mechanics for bed mobility.  03/06/18 1010 Done               Point: Body mechanics (Done)    Description: Instruct learner(s) on proper positioning and spine alignment during self-care, functional mobility activities and/or exercises.    Learning Progress Summary    Learner Readiness Method Response Comment Documented by Status   Patient Acceptance E VU,NR Pt educated on ADL retraining for toileting, safety for functional tf, and precautions during functional mobility.  03/08/18 1503 Done    Acceptance E VU,NR Pt education provided on HEP, repositioning for bed mobility, and body mechanics for bed mobility.  03/06/18 1010 Done   Significant Other Acceptance E VU,NR Pt educated on ADL retraining for toileting, safety for functional tf, and precautions during functional mobility.  03/08/18 1503 Done    Acceptance E VU,NR Pt education provided on HEP, repositioning for bed mobility, and body mechanics for bed mobility.  03/06/18 1010 Done                      User Key     Initials Effective Dates Name Provider Type Discipline     02/14/18 -  Lyudmila Cortés OT Occupational Therapist OT                  OT Recommendation and Plan  Anticipated Equipment Needs At Discharge: other (see comments) (TBA)  Anticipated Discharge Disposition: skilled nursing facility  Therapy Frequency: daily  Plan of Care Review  Plan Of Care Reviewed With: patient  Progress: improving  Outcome Summary/Follow up Plan: Pt completed bed mobility from supine to sitting EOB with min A. Completed  tf to chair with FWW with CGA and max VC's for sequencing and HP.  Cont IPOT per POC revised goals as appropriate and met static sitting.        Outcome Measures       03/08/18 1345 03/08/18 0947 03/06/18 0924    How much help from another person do you currently need...    Turning from your back to your side while in flat bed without using bedrails?  3  -KR     Moving from lying on back to sitting on the side of a flat bed without bedrails?  3  -KR     Moving to and from a bed to a chair (including a wheelchair)?  3  -KR     Standing up from a chair using your arms (e.g., wheelchair, bedside chair)?  3  -KR     Climbing 3-5 steps with a railing?  2  -KR     To walk in hospital room?  3  -KR     AM-PAC 6 Clicks Score  17  -KR     How much help from another is currently needed...    Putting on and taking off regular lower body clothing? 2  -KF  2  -KF    Bathing (including washing, rinsing, and drying) 2  -KF  2  -KF    Toileting (which includes using toilet bed pan or urinal) 2  -KF  2  -KF    Putting on and taking off regular upper body clothing 3  -KF  3  -KF    Taking care of personal grooming (such as brushing teeth) 3  -KF  3  -KF    Eating meals 4  -KF  4  -KF    Score 16  -KF  16  -KF    Functional Assessment    Outcome Measure Options AM-PAC 6 Clicks Daily Activity (OT)  -KF AM-PAC 6 Clicks Basic Mobility (PT)  -KR AM-PAC 6 Clicks Daily Activity (OT)  -KF      03/06/18 0822          How much help from another person do you currently need...    Turning from your back to your side while in flat bed without using bedrails? 3  -KR      Moving from lying on back to sitting on the side of a flat bed without bedrails? 2  -KR      Moving to and from a bed to a chair (including a wheelchair)? 3  -KR      Standing up from a chair using your arms (e.g., wheelchair, bedside chair)? 3  -KR      Climbing 3-5 steps with a railing? 2  -KR      To walk in hospital room? 3  -KR      AM-PAC 6 Clicks Score 16  -KR       Functional Assessment    Outcome Measure Options AM-PAC 6 Clicks Basic Mobility (PT)  -KR        User Key  (r) = Recorded By, (t) = Taken By, (c) = Cosigned By    Initials Name Provider Type    KR Jasmin Short, PT Physical Therapist     Jasmin Short, PT Physical Therapist    MICHELLE Cortés, OT Occupational Therapist           Time Calculation:         Time Calculation- OT       03/08/18 1512          Time Calculation- OT    OT Start Time 1345  -KF      Total Timed Code Minutes- OT 26 minute(s)  -KF      OT Received On 03/08/18  -KF      OT Goal Re-Cert Due Date 03/16/18  -KF        User Key  (r) = Recorded By, (t) = Taken By, (c) = Cosigned By    Initials Name Provider Type    MICHELLE Cortés, OT Occupational Therapist           Therapy Charges for Today     Code Description Service Date Service Provider Modifiers Qty    56055515043  OT THERAPEUTIC ACT EA 15 MIN 3/8/2018 Lyudmila Cortés OT GO 2    [id number]  OT THER SUPP EA 15 MIN 3/8/2018 Lyudmila Cortés OT GO 2               Lyudmila Cortés OT  3/8/2018

## 2018-03-08 NOTE — SIGNIFICANT NOTE
"Patient hypotensive. Did receive morphine tonight. Will hold night time dose of sotalol. Administer 500 cc bolus of normal saline. Also instructed nurse to hold morphine. Got a call back from nurse stating that wife is \"irate and demanding IV pain meds be continued\". Patient is DNR, conditional code. It has been explained to the wife that IV pain meds have the potential to cause severe hypotension which may eventually lead to fluid overload and/or need for vasopressors. She understands the risks and wishes to keep the patient comfortable. Instructed nurse to monitor vitals closely with Q30 minutes BP cycles.    "

## 2018-03-08 NOTE — THERAPY TREATMENT NOTE
Acute Care - Physical Therapy Treatment Note  [facility]     Patient Name: Alexander Adan  : 1940  MRN: [id number]  Today's Date: 3/8/2018  Onset of Illness/Injury or Date of Surgery Date: 18  Date of Referral to PT: 18  Referring Physician: DENI Naqvi    Admit Date: 3/2/2018    Visit Dx:    ICD-10-CM ICD-9-CM   1. Oropharyngeal dysphagia R13.12 787.22   2. Parotitis, acute K11.21 527.2   3. Subacute maxillary sinusitis J01.00 461.0   4. Generalized weakness R53.1 780.79   5. Impaired functional mobility, balance, gait, and endurance Z74.09 V49.89   6. Impaired mobility and ADLs Z74.09 799.89     Patient Active Problem List   Diagnosis   • Atrial fibrillation   • Chronic coronary artery disease   • Chronic diastolic heart failure   • Chronic obstructive pulmonary disease   • Cognitive impairment, mild, so stated   • Tobacco use   • Fall at home   • CKD (chronic kidney disease), stage III   • Leukocytosis   • Bronchitis   • Vasovagal syncope   • Dehydration   • Hypotension due to dehydration   • PVD (peripheral vascular disease)   • SIRS (systemic inflammatory response syndrome)               Adult Rehabilitation Note       18 0947 18 1530 18 0822    Rehab Assessment/Intervention    Discipline physical therapist  -KR speech language pathologist  -SM physical therapist  -KR    Document Type therapy note (daily note)  -KR therapy note (daily note)  -SM therapy note (daily note)  -KR    Subjective Information agree to therapy;complains of;pain  -KR agree to therapy  -SM agree to therapy;no complaints  -KR    Patient Effort, Rehab Treatment good  -KR  adequate  -KR    Symptoms Noted During/After Treatment increased pain;fatigue  -KR  fatigue  -KR    Precautions/Limitations fall precautions;oxygen therapy device and L/min  -KR  fall precautions;oxygen therapy device and L/min  -KR    Specific Treatment Considerations   Pt with increased confusion this treatment session   -KR    Recorded by [KR] Jasmni Short PT [SM] Adri Perez, MS CCC-SLP [KR] Jasmin Short, PT    Vital Signs    Pre Systolic BP Rehab 91  -KR  132  -KR    Pre Treatment Diastolic BP 56  -KR  67  -KR    Pretreatment Heart Rate (beats/min) 84  -KR  98  -KR    Posttreatment Heart Rate (beats/min) 100  -KR  96  -KR    Pre SpO2 (%) 93  -KR  99  -KR    O2 Delivery Pre Treatment supplemental O2  -KR  supplemental O2  -KR    Post SpO2 (%)   94  -KR    O2 Delivery Post Treatment   supplemental O2  -KR    Pre Patient Position Supine  -KR  Supine  -KR    Intra Patient Position Standing  -KR  Standing  -KR    Post Patient Position Supine  -KR  Supine  -KR    Recorded by [KR] Jasmin Short, PT  [KR] Jasmin Short, PT    Pain Assessment    Pain Assessment 0-10  -KR  0-10  -KR    Pain Score 5  -KR  0  -KR    Post Pain Score 10  -KR  0  -KR    Pain Type Acute pain  -KR      Pain Location Foot  -KR      Pain Orientation Right;Left  -KR      Pain Intervention(s) Repositioned;Ambulation/increased activity  -KR      Response to Interventions tolerated  -KR      Recorded by [KR] Jasmin Short, PT  [KR] Jasmin Short, PT    Cognitive Assessment/Intervention    Current Cognitive/Communication Assessment impaired  -KR  impaired  -KR    Orientation Status oriented to;person;time   month only  -KR  oriented to;person  -KR    Follows Commands/Answers Questions able to follow single-step instructions;50% of the time;75% of the time;needs cueing;needs increased time;needs repetition  -KR  able to follow single-step instructions;50% of the time;75% of the time;needs cueing;needs increased time;needs repetition  -KR    Personal Safety moderate impairment;decreased awareness, need for assist;decreased awareness, need for safety;decreased insight to deficits  -KR  moderate impairment;decreased awareness, need for assist;decreased awareness, need for safety;decreased insight to deficits  -KR    Personal Safety Interventions fall  prevention program maintained;gait belt;nonskid shoes/slippers when out of bed  -KR  fall prevention program maintained;gait belt;nonskid shoes/slippers when out of bed  -KR    Recorded by [KR] Jasmin Short, PT  [KR] Jasmin Short, PT    Swallow Assessment/Intervention    Additional Documentation  Dysphagia/Swallow Intervention (Group)  -SM     Recorded by  [SM] Adri Perez MS CCC-SLP     Dysphagia/Swallow Intervention    Dysphagia/Swallow Intervention  Given reports of coughing on food/liquid, weight loss, increased opacities R mid and lower lung on CXR 3/4/18. Given all, best for MBS + limited UGI to further assess.   -SM     Recorded by  [SM] Adri Perez MS CCC-SLP     Dysphagia Other 1    Dysphagia Other 1 Objective  Patient will demonstrate no s/s with recommended diet textures.  -SM     Dysphagia Other 1 Progress  50%;without cues  -SM     Comments: Dysphagia Other 1  Very delayed throat clear throughout with trials intermittently.   -SM     Recorded by  [SM] Adri Perez MS CCC-SLP     Bed Mobility, Assessment/Treatment    Bed Mob, Supine to Sit, [place] minimum assist (75% patient effort);2 person assist required;verbal cues required  -KR  maximum assist (25% patient effort);verbal cues required  -KR    Bed Mob, Sit to Supine, [place] minimum assist (75% patient effort);verbal cues required  -KR  moderate assist (50% patient effort);verbal cues required  -KR    Bed Mobility, Safety Issues decreased use of arms for pushing/pulling;decreased use of legs for bridging/pushing;cognitive deficits limit understanding  -KR  decreased use of arms for pushing/pulling;decreased use of legs for bridging/pushing;cognitive deficits limit understanding  -KR    Bed Mobility, Impairments strength decreased;impaired balance;coordination impaired  -KR  strength decreased;impaired balance  -KR    Bed Mobility, Comment VC's for sequencing. Pt required increased time to complete.  -KR  Pt  very lethargic and confused with difficulty following commands. Pt required increased assistance at trunk.  -KR    Recorded by [KR] Jasmin Short, PT  [KR] Jasmin Short, PT    Transfer Assessment/Treatment    Transfers, Sit-Stand [place] minimum assist (75% patient effort);verbal cues required  -KR  minimum assist (75% patient effort);verbal cues required  -KR    Transfers, Stand-Sit [place] minimum assist (75% patient effort);verbal cues required  -KR  minimum assist (75% patient effort);verbal cues required  -KR    Transfers, Sit-Stand-Sit, Assist Device other (see comments)   L UE support  -KR  other (see comments)   R UE support  -KR    Toilet Transfer, [place]   minimum assist (75% patient effort)  -KR    Toilet Transfer, Assistive Device   other (see comments)   RUE support  -KR    Transfer, Safety Issues step length decreased;weight-shifting ability decreased;sequencing ability decreased  -KR  step length decreased;weight-shifting ability decreased  -KR    Transfer, Impairments strength decreased;impaired balance;pain  -KR  strength decreased;impaired balance;coordination impaired  -KR    Transfer, Comment Increased cueing required for sequencing. Pt c/o increased jose foot pain.   -KR  Pt required max cueing for sequencing and safety awareness.   -KR    Recorded by [KR] Jasmin Short, PT  [KR] Jasmin Short, PT    Gait Assessment/Treatment    Gait, [place] Level minimum assist (75% patient effort);2 person assist required;verbal cues required  -KR  minimum assist (75% patient effort);verbal cues required  -KR    Gait, Assistive Device other (see comments)   BUE support  -KR  --   R UE support  -KR    Gait, Distance (Feet) 150  -KR  25  -KR    Gait, Gait Pattern Analysis swing-through gait  -KR  swing-through gait  -KR    Gait, Gait Deviations antalgic;sima decreased;step length decreased;weight-shifting ability decreased  -KR  sima decreased;forward flexed posture;step length  decreased;weight-shifting ability decreased  -KR    Gait, Safety Issues step length decreased;weight-shifting ability decreased;supplemental O2  -KR  sequencing ability decreased;step length decreased;weight-shifting ability decreased;balance decreased during turns  -KR    Gait, Impairments strength decreased;impaired balance;pain  -KR  strength decreased;impaired balance;coordination impaired;postural control impaired  -KR    Gait, Comment Pt demonstrated antalgic gait pattern due to increased pain in jose feet. Pt demonstrated slow sima with decreased step length; required more assistance with increased weight bearing through BUEs with onset of fatigue.   -KR  Pt required max cueing for sequencing and safe technique. Pt very limited by increased confusion. Increased difficulty following commands.   -KR    Recorded by [KR] Jasmin Short, PT  [KR] Jasmin Short, PT    Motor Skills/Interventions    Additional Documentation Balance Skills Training (Group)  -KR  Balance Skills Training (Group)  -KR    Recorded by [KR] Jasmin Short, PT  [KR] Jasmin Short, PT    Balance Skills Training    Sitting-Level of Assistance Contact guard  -KR  Contact guard  -KR    Sitting-Balance Support Right upper extremity supported;Left upper extremity supported;Feet supported  -KR  Feet supported  -KR    Standing-Level of Assistance Minimum assistance  -KR  Minimum assistance  -KR    Static Standing Balance Support Left upper extremity supported  -KR  Right upper extremity supported  -KR    Gait Balance-Level of Assistance Minimum assistance;x2  -KR  Minimum assistance  -KR    Gait Balance Support Right upper extremity supported;Left upper extremity supported  -KR  Right upper extremity supported  -KR    Recorded by [KR] Jasmin Short, PT  [KR] Jasmin Short, PT    Positioning and Restraints    Pre-Treatment Position in bed  -KR  in bed  -KR    Post Treatment Position bed  -KR  bed  -KR    In Bed notified nsg;supine;call light  within reach;encouraged to call for assist;exit alarm on;side rails up x3;heels elevated;waffle boots/both  -KR  notified nsg;supine;call light within reach;encouraged to call for assist;with family/caregiver;exit alarm on;side rails up x3;legs elevated  -KR    Recorded by [KR] Jasmin N Ring, PT  [KR] Jasmin N Ring, PT      User Key  (r) = Recorded By, (t) = Taken By, (c) = Cosigned By    Initials Name Effective Dates    SM Adri Perez, MS CCC-SLP 06/22/15 -     KR Jasmin N Ring, PT 09/25/17 - 03/06/18     Jasmin N Ring, PT 03/07/18 -                 IP PT Goals       03/08/18 1155 03/06/18 1037 03/05/18 1615    Bed Mobility PT LTG    Bed Mobility PT LTG, Date Established   03/05/18  -KR    Bed Mobility PT LTG, Time to Achieve   2 wks  -KR    Bed Mobility PT LTG, Activity Type   all bed mobility  -KR    Bed Mobility PT LTG, [place] Level   independent  -KR    Bed Mobility PT LTG, Date Goal Reviewed   03/05/18  -KR    Bed Mobility PT LTG, Outcome goal ongoing  -KR goal ongoing  -KR     Transfer Training PT LTG    Transfer Training PT LTG, Date Established   03/05/18  -KR    Transfer Training PT LTG, Time to Achieve   2 wks  -KR    Transfer Training PT LTG, Activity Type   sit to stand/stand to sit  -KR    Transfer Training PT LTG, [place] Level   conditional independence  -KR    Transfer Training PT LTG, Assist Device   walker, rolling  -KR    Transfer Training PT  LTG, Date Goal Reviewed   03/05/18  -KR    Transfer Training PT LTG, Outcome goal ongoing  -KR goal ongoing  -KR     Gait Training PT LTG    Gait Training Goal PT LTG, Date Established   03/05/18  -KR    Gait Training Goal PT LTG, Time to Achieve   2 wks  -KR    Gait Training Goal PT LTG, [place] Level   conditional independence  -KR    Gait Training Goal PT LTG, Assist Device   walker, rolling  -KR    Gait Training Goal PT LTG, Distance to Achieve   200 feet  -KR    Gait Training Goal PT LTG, Date Goal Reviewed   03/05/18  -KR     Gait Training Goal PT LTG, Outcome goal ongoing  -KR goal ongoing  -KR     Stair Training PT LTG    Stair Training Goal PT LTG, Date Established   03/05/18  -KR    Stair Training Goal PT LTG, Time to Achieve   2 wks  -KR    Stair Training Goal PT LTG, Number of Steps   5  -KR    Stair Training Goal PT LTG, [place] Level   contact guard assist  -KR    Stair Training Goal PT LTG, Assist Device   2 handrails  -KR    Stair Training Goal PT LTG, Date Goal Reviewed   03/05/18  -KR    Stair Training Goal PT LTG, Outcome goal ongoing  -KR goal ongoing  -KR       User Key  (r) = Recorded By, (t) = Taken By, (c) = Cosigned By    Initials Name Provider Type    KR Jasmin Short, PT Physical Therapist     Jasmin Short, PT Physical Therapist          Physical Therapy Education     Title: PT OT SLP Therapies (Active)     Topic: Physical Therapy (Active)     Point: Mobility training (Active)    Learning Progress Summary    Learner Readiness Method Response Comment Documented by Status   Patient Acceptance E NR  KR 03/08/18 1155 Active    Acceptance E NR  KR 03/06/18 1036 Active    Acceptance E NR  KR 03/05/18 1615 Active               Point: Home exercise program (Active)    Learning Progress Summary    Learner Readiness Method Response Comment Documented by Status   Patient Acceptance E NR  KR 03/08/18 1155 Active    Acceptance E NR  KR 03/06/18 1036 Active    Acceptance E NR  KR 03/05/18 1615 Active               Point: Body mechanics (Active)    Learning Progress Summary    Learner Readiness Method Response Comment Documented by Status   Patient Acceptance E NR  KR 03/08/18 1155 Active    Acceptance E NR  KR 03/06/18 1036 Active    Acceptance E NR  KR 03/05/18 1615 Active               Point: Precautions (Active)    Learning Progress Summary    Learner Readiness Method Response Comment Documented by Status   Patient Acceptance E NR  KR 03/08/18 1155 Active    Acceptance E NR  KR 03/06/18 1036 Active    Acceptance E NR   KR 03/05/18 1615 Active                      User Key     Initials Effective Dates Name Provider Type Discipline    KR 09/25/17 - 03/06/18 Jasmin Short, PT Physical Therapist PT    KR 03/07/18 -  Jasmin Short PT Physical Therapist PT                    PT Recommendation and Plan  Anticipated Discharge Disposition: skilled nursing facility  PT Frequency: daily  Plan of Care Review  Plan Of Care Reviewed With: patient  Progress: improving  Outcome Summary/Follow up Plan: Pt increased ambulation distance to 150ft with Ivet x2 and BUE support. Pt demonstrated antalgic gait pattern with slow gait speed due to increased pain in jose feet. Pt continues to be limited by increased confusion.           Outcome Measures       03/08/18 0947 03/06/18 0924 03/06/18 0822    How much help from another person do you currently need...    Turning from your back to your side while in flat bed without using bedrails? 3  -KR  3  -KR    Moving from lying on back to sitting on the side of a flat bed without bedrails? 3  -KR  2  -KR    Moving to and from a bed to a chair (including a wheelchair)? 3  -KR  3  -KR    Standing up from a chair using your arms (e.g., wheelchair, bedside chair)? 3  -KR  3  -KR    Climbing 3-5 steps with a railing? 2  -KR  2  -KR    To walk in hospital room? 3  -KR  3  -KR    AM-PAC 6 Clicks Score 17  -KR  16  -KR    How much help from another is currently needed...    Putting on and taking off regular lower body clothing?  2  -KF     Bathing (including washing, rinsing, and drying)  2  -KF     Toileting (which includes using toilet bed pan or urinal)  2  -KF     Putting on and taking off regular upper body clothing  3  -KF     Taking care of personal grooming (such as brushing teeth)  3  -KF     Eating meals  4  -KF     Score  16  -KF     Functional Assessment    Outcome Measure Options AM-PAC 6 Clicks Basic Mobility (PT)  -KR AM-PAC 6 Clicks Daily Activity (OT)  -KF AM-PAC 6 Clicks Basic Mobility (PT)  -KR       03/05/18 1439          How much help from another person do you currently need...    Turning from your back to your side while in flat bed without using bedrails? 3  -KR      Moving from lying on back to sitting on the side of a flat bed without bedrails? 3  -KR      Moving to and from a bed to a chair (including a wheelchair)? 3  -KR      Standing up from a chair using your arms (e.g., wheelchair, bedside chair)? 3  -KR      Climbing 3-5 steps with a railing? 2  -KR      To walk in hospital room? 3  -KR      AM-PAC 6 Clicks Score 17  -KR      Functional Assessment    Outcome Measure Options AM-PAC 6 Clicks Basic Mobility (PT)  -KR        User Key  (r) = Recorded By, (t) = Taken By, (c) = Cosigned By    Initials Name Provider Type    ANDREEA Short, PT Physical Therapist     Jasmin Short, PT Physical Therapist    MICHELLE Cortés, OT Occupational Therapist           Time Calculation:         PT Charges       03/08/18 1157          Time Calculation    Start Time 0947  -KR      PT Received On 03/08/18  -KR      PT Goal Re-Cert Due Date 03/15/18  -KR      Time Calculation- PT    Total Timed Code Minutes- PT 25 minute(s)  -KR        User Key  (r) = Recorded By, (t) = Taken By, (c) = Cosigned By    Initials Name Provider Type    ANDREEA Short, PT Physical Therapist          Therapy Charges for Today     Code Description Service Date Service Provider Modifiers Qty    51992747205 HC PT THER PROC EA 15 MIN 3/8/2018 Jasmin Short, PT GP 2    91429030381 HC PT THER SUPP EA 15 MIN 3/8/2018 Jasmin Short, PT GP 2          PT G-Codes  Outcome Measure Options: AM-PAC 6 Clicks Basic Mobility (PT)    Orly Short PT  3/8/2018

## 2018-03-08 NOTE — PLAN OF CARE
Problem: Arrhythmia/Dysrhythmia (Symptomatic) (Adult)  Goal: Signs and Symptoms of Listed Potential Problems Will be Absent or Manageable (Arrhythmia/Dysrhythmia)  Outcome: Ongoing (interventions implemented as appropriate)      Problem: Nutrition, Imbalanced: Inadequate Oral Intake (Adult)  Goal: Identify Related Risk Factors and Signs and Symptoms  Outcome: Outcome(s) achieved Date Met: 03/08/18    Goal: Improved Oral Intake  Outcome: Ongoing (interventions implemented as appropriate)    Goal: Prevent Further Weight Loss  Outcome: Ongoing (interventions implemented as appropriate)      Problem: Infection, Risk/Actual (Adult)  Goal: Identify Related Risk Factors and Signs and Symptoms  Outcome: Outcome(s) achieved Date Met: 03/08/18    Goal: Infection Prevention/Resolution  Outcome: Ongoing (interventions implemented as appropriate)      Problem: Skin Integrity Impairment, Risk/Actual (Adult)  Goal: Identify Related Risk Factors and Signs and Symptoms  Outcome: Outcome(s) achieved Date Met: 03/08/18    Goal: Skin Integrity/Wound Healing  Outcome: Ongoing (interventions implemented as appropriate)      Problem: Fall Risk (Adult)  Goal: Identify Related Risk Factors and Signs and Symptoms  Outcome: Outcome(s) achieved Date Met: 03/08/18    Goal: Absence of Falls  Outcome: Ongoing (interventions implemented as appropriate)      Problem: Patient Care Overview (Adult)  Goal: Plan of Care Review  Outcome: Ongoing (interventions implemented as appropriate)    Goal: Adult Individualization and Mutuality  Outcome: Ongoing (interventions implemented as appropriate)    Goal: Discharge Needs Assessment  Outcome: Ongoing (interventions implemented as appropriate)

## 2018-03-08 NOTE — PROGRESS NOTES
[facility] Progress Note        Antibiotics:  Anti-Infectives     Ordered     Dose/Rate Route Frequency Start Stop    18 0945  aztreonam (AZACTAM) 2 g in sodium chloride 0.9 % 100 mL IVPB-MBP     Ordering Provider:  Gilson Mustafa IV, RPH    2 g  200 mL/hr over 30 Minutes Intravenous Every 8 Hours 18 0200 18 0959    18 0815  doxycycline (MONODOX) capsule 100 mg     Ordering Provider:  Marcelo Head RPH    100 mg Oral Every 12 Hours Scheduled 18 0900 18 0859    18 1634  metroNIDAZOLE (FLAGYL) IVPB 500 mg     Ordering Provider:  Enoch Dong MD    500 mg  100 mL/hr over 60 Minutes Intravenous Every 6 Hours 18 1800 18 0559    18 1634  aztreonam (AZACTAM) in SWFI 2 grams/10ml IV PUSH syringe     Gilson Mustafa IV RPH let the order  on 18 0945.   Ordering Provider:  Gilson Mustafa IV, RPH    2 g  over 5 Minutes Intravenous Every 8 Hours 18 1800 18 0159    18 1709  vancomycin (VANCOCIN) in iso-osmotic dextrose IVPB 1 g (premix) 200 mL     Ordering Provider:  MIGUEL Finley RPH    15 mg/kg × 65.8 kg  over 60 Minutes Intravenous Every 24 Hours 18 1800 18 1759    18 1657  Pharmacy to dose vancomycin     Comments:  Alexander Adan  6B, N-[id number]  By Chrissy Sullivan MD   Ordering Provider:  Chrissy Sullivan MD     Does not apply Continuous PRN 18 1657 18 1656    18 1138  aztreonam (AZACTAM) in SWFI 2 grams/10ml IV PUSH syringe     Ordering Provider:  Galileo Woodruff MD    2 g  over 5 Minutes Intravenous Once 18 1240 18 1325    18 1138  vancomycin 1250 mg/250 mL 0.9% NS IVPB ([facility])     Ordering Provider:  Galileo Woodruff MD    20 mg/kg × 65.8 kg Intravenous Once 18 1140 18 1327          CC: parotitis, pneumonia    HPI:  Patient is a 77 y.o.  Yr old male with history of numerous comorbid conditions including past stroke/subdural hematoma and evacuation with  bur hole 2015 with chronic kidney disease/debility.  Family reports chronic dysphagia symptoms such that he coughs with liquid/solids but no specific esophageal diagnosis.  He is admitted on March 2 with 3 days poor by mouth intake, generalized malaise with dry cough and right facial/parotid pain.       3/8/18 Patient more awake but still drowsy at my visit.  Reliability not entirely clear as he has had encephalopathy surrounding admission, albeit less.  However, no headache photophobia or neck stiffness.  No chest pain.  No productive cough at present although has been intermittently productive at times.  No dysuria hematuria or pyuria.  No skin rash.  No diarrhea or abdominal pain.  He is constipated per family.  No hematochezia melena or hematemesis. Family says he is improving     ROS:      3/8/18 Limited  and reliability unclear, but answers no to the following:    No f/c/s. No n/v/d. No rash. No new ADR to Abx.     Constitutional-- No Fever, chills or sweats.  Appetite good, and no malaise. No fatigue.  Heent-- No new vision, hearing or throat complaints.  No epistaxis or oral sores.  Denies odynophagia or dysphagia.  No flashers, floaters or eye pain. No odynophagia or dysphagia. No headache, photophobia or neck stiffness.  CV-- No chest pain, palpitation or syncope  Resp-- No SOB/cough/Hemoptysis  GI- No nausea, vomiting, or diarrhea.  No hematochezia, melena, or hematemesis. Denies jaundice or chronic liver disease.  -- No dysuria, hematuria, or flank pain.  Denies hesitancy, urgency or flank pain.  Lymph- no swollen lymph nodes in neck/axilla or groin.   Heme- No active bruising or bleeding; no Hx of DVT or PE.  MS-- no swelling or pain in the bones or joints of arms/legs.  No new back pain.  Neuro-- No acute focal weakness or numbness in the arms or legs.  No seizures.    Full 12 point review of systems reviewed and negative otherwise for acute complaints,       PE: nursing/chaperone present  BP (!)  "84/49  Pulse 95  Temp 97.8 °F (36.6 °C) (Oral)   Resp 20  Ht 165.1 cm (65\") Comment: Ht reported by pt's wife; c/w other EHR documentation  Wt 65.8 kg (145 lb)  SpO2 94%  BMI 22.05 kg/m2    GENERAL: Thin chronically ill-appearing and elderly, sleepy but answers questions  HEENT: Normocephalic, atraumatic.  Left eyelid droop compared to right is chronic according to family. No conjunctival injection. No icterus. Oropharynx clear without evidence of thrush or exudate. No evidence of peridontal disease.  Also see below   NECK: Supple without nuchal rigidity. No mass.  LYMPH: No cervical, axillary or inguinal lymphadenopathy.  HEART: Tachycardic; No murmur, rubs, gallops.   LUNGS: Diminished at bases with rhonchi more so on the right than left. Normal respiratory effort. Nonlabored. No dullness.  ABDOMEN: Soft, nontender, nondistended. Positive bowel sounds. No rebound or guarding. NO mass or HSM.  EXT:  No cyanosis, clubbing or edema. No cord.  : Genitalia generally unremarkable.  Without Aguilar catheter.  MSK: FROM without joint effusions noted arms/legs.    SKIN: Warm and dry without cutaneous eruptions on Inspection/palpation.    NEURO: He does not cooperate with a detailed motor/sensory exam but does move all 4 extremities.     No peripheral stigmata/phenomena of endocarditis     Right face/parotid gland with vague erythema/induration and tenderness.  No fluctuance.  No crepitus or bulla.       Laboratory Data      Results from last 7 days  Lab Units 03/07/18  0542 03/06/18  0441 03/05/18  0622   WBC 10*3/mm3 12.41* 8.10 10.77   HEMOGLOBIN g/dL 11.0* 11.1* 10.2*   HEMATOCRIT % 35.3* 35.8* 32.9*   PLATELETS 10*3/mm3 193 140* 179       Results from last 7 days  Lab Units 03/07/18  0542   SODIUM mmol/L 130*   POTASSIUM mmol/L 4.8   CHLORIDE mmol/L 110*   CO2 mmol/L 16.0*   BUN mg/dL 10   CREATININE mg/dL 0.80   GLUCOSE mg/dL 125*   CALCIUM mg/dL 7.7*       Results from last 7 days  Lab Units 03/07/18  0542 "   ALK PHOS U/L 85   BILIRUBIN mg/dL 0.2*   ALT (SGPT) U/L 7   AST (SGOT) U/L 15               Estimated Creatinine Clearance: 70.8 mL/min (by C-G formula based on Cr of 0.8).      Microbiology:      Radiology:  Imaging Results (last 72 hours)     Procedure Component Value Units Date/Time    CT Head Without Contrast [[id number]] Collected:  03/02/18 1231     Updated:  03/02/18 1300    Narrative:       EXAMINATION: CT HEAD WO CONTRAST-      INDICATION: Encephalopathy.      TECHNIQUE: Axial CT of the head without intravenous contrast  administration.     The radiation dose reduction device was turned on for each scan per the  ALARA (As Low as Reasonably Achievable) protocol.     COMPARISON: 02/16/2018.     FINDINGS: Midline structures are symmetric without evidence of mass,  mass effect or midline shift. No intraaxial hemorrhage or extraaxial  fluid collection. Ventricles and sulci are prominent consistent with  components of atrophy. Mild to moderate attenuation changes within the  periventricular and deep white matter consistent with chronic small  vessel ischemic disease. Globes and orbits are partially visualized and  grossly unremarkable. Paranasal sinuses and mastoid air cells  demonstrate total opacification of the right maxillary sinus as well as  opacification in the right ethmoid air cells and sphenoid sinus.  Calvarium demonstrates prior [place] holes left frontal and left temporal  regions otherwise grossly intact.       Impression:       No acute intracranial abnormality.     D:  03/02/2018  E:  03/02/2018     This report was finalized on 3/2/2018 12:58 PM by Dr. Maurice Alas.       CT Abdomen Pelvis Without Contrast [[id number]] Collected:  03/02/18 1254     Updated:  03/02/18 1300    Narrative:       EXAMINATION: CT ABDOMEN AND PELVIS WO CONTRAST-      INDICATION: Abdomen pain, guarding, urinary incontinence.      TECHNIQUE: CT abdomen and pelvis without intravenous contrast  administration.     The radiation  dose reduction device was turned on for each scan per the  ALARA (As Low as Reasonably Achievable) protocol.     COMPARISON: 08/05/2017.     FINDINGS: Motion degraded examination particularly within the  subdiaphragmatic soft tissues.  Lung bases demonstrate patchy  tree-in-bud opacifications involving much of the right lower lobe and  visualized right middle lobe consistent with infectious or inflammatory  etiology, however, no focal lobar consolidation. Left hemithorax  partially visualized and grossly clear. Liver without focal lesion.  Gallbladder is surgically absent. Pancreas and spleen within normal  limits. Adrenals without distinct nodule. Kidneys without hydronephrosis  or hydroureter. No obstructive uropathy or urolithiasis. Atherosclerotic  nonaneurysmal abdominal aorta. No bulky retroperitoneal adenopathy. GI  tract evaluation without focal thickening or disproportionate dilatation  of bowel. No free fluid or intraabdominal free air. Pelvic viscera  unremarkable without bulky pelvic adenopathy or significant free fluid.  Degenerative changes of the spine and pelvis without aggressive osseous  abnormality identified within the limited evaluation due to motion. No  soft tissue body wall findings of concern.       Impression:       1. Limited evaluation due to significant patient motion artifact  limiting the subdiaphragmatic soft tissues, however, the limits of the  study there is scattered tree-in-bud opacifications involving the  visualized right middle lobe and right lower lobe along with nodularity  consistent of acute infectious or inflammatory etiology without lobar  consolidation identified. No significant pleural effusion.  2. No acute intraabdominal abnormality within the limited evaluation due  to significant motion specifically no disproportionate dilatation of  bowel to suggest mechanical obstructive process or loculated  intraabdominal fluid collection.     D:  03/02/2018  E:  03/02/2018      This report was finalized on 3/2/2018 12:58 PM by Dr. Maurice Alas.       XR Chest 1 View [[id number]] Collected:  03/02/18 1131     Updated:  03/02/18 1301    Narrative:       EXAMINATION: XR CHEST 1 VW- 03/02/2018     INDICATION: weakness, altered mental status      COMPARISON: Chest x-ray 02/16/2018.     FINDINGS: Cardiac size within normal limits. Post CABG and post median  sternotomy changes. No focal opacification or consolidation. No  pneumothorax or pleural effusion. Left chest wall pacing device with  leads intact. Degenerative changes of the thoracic spine.           Impression:       No acute cardiopulmonary process.     D:  03/02/2018  E:  03/02/2018     This report was finalized on 3/2/2018 12:58 PM by Dr. Maurice Alas.       CT Soft Tissue Neck With Contrast [[id number]] Collected:  03/02/18 1627     Updated:  03/02/18 1646    Narrative:       EXAMINATION: CT NECK SOFT TISSUES W/CONTRAST - 03/02/2018      INDICATION: K11.21-Acute sialoadenitis; J01.00-Acute maxillary  sinusitis, unspecified; R53.1-Weakness.      TECHNIQUE: CT neck soft tissues with intravenous contrast  administration.     The radiation dose reduction device was turned on for each scan per the  ALARA (As Low as Reasonably Achievable) protocol.     COMPARISON: None.     FINDINGS: For intracranial findings, please see recent CT head report  performed the same day. Total opacification right maxillary sinus  consistent with components of sinusitis. Severely asymmetric appearance  of the right parotid gland which is nearly entirely replaced by  heterogeneous enhancing soft tissue attenuation involving the  superficial and deep lobes with overall dimensions measuring  approximately 4.6 x 4.0 cm, indicative of soft tissue mass involvement.  There is no discrete osseous erosion of the adjacent mandible or  temporomandibular joint with right mastoid air cells grossly clear.  Parapharyngeal spaces are preserved with supraglottic soft  tissues,  unremarkable. Valleculae and piriform sinuses within normal limits. The  vocal cords and subglottic trachea are unremarkable. Visualized portions  of the lung apices are grossly unremarkable. Thyroid is homogeneous in  attenuation. No bulky cervical adenopathy is identified.       Impression:       Soft tissue mass involvement of the superficial and deep  lobes of the right parotid gland. 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 No adjacent osseous erosion. No bulky cervical  adenopathy otherwise identified. Opacification of the right maxillary  sinus again noted consistent with sinusitis.     DICTATED:     03/02/2018  EDITED    :     03/02/2018      This report was finalized on 3/2/2018 4:44 PM by Dr. Maurice Alas.       XR Chest 1 View [[id number]] Collected:  03/04/18 1701     Updated:  03/04/18 1857    Narrative:          EXAMINATION: XR CHEST 1 VW - 03/04/2018     INDICATION:  K11.21-Acute sialoadenitis; J01.00-Acute maxillary  sinusitis, unspecified; R53.1-Weakness; R13.10-Dysphagia, unspecified.      COMPARISON: 03/02/2018.     FINDINGS: Portable chest reveals PICC line placement with tip in the  SVC. There are chronic changes identified within the lung fields with  mild increased markings seen within the right middle lower lung field.  Development of a small right pleural effusion. Heart is borderline  enlarged with slight prominence of the pulmonary vascularity.           Impression:       PICC line catheter on the right with tip in the SVC.  Interval development of mild increased markings within the right mid to  lower lung field as well as development of a small right pleural  effusion.     DICTATED:     03/04/2018  EDITED    :     03/04/2018      This report was finalized on 3/4/2018 6:55 PM by Dr. Donna Matthews MD.                Impression:   --Acute sepsis with leukocytosis/fever and tachycardia, associated with encephalopathy and likely severe sepsis.  Primary focal symptoms/findings include right parotitis and acute right lower lobe pneumonia with broad spectrum antimicrobials.  No preceding symptoms of central nervous system focus and no meningismus at present.  Likely metabolic encephalopathy associated with sepsis.  Abdomen benign by exam with no intra-abdominal pathology per radiology on CT scan, although motion artifact and if symptoms persist/recur, then you should give consideration to further GI workup by either repeat imaging or GI evaluation.  No diarrhea at present but if it develops you could consider stool studies.  Urinalysis not consistent with UTI.  No obvious soft tissue source other than above.     --Acute right parotitis;  gram-positive organisms are a primary concern.  In addition there is description of a masslike quality by radiology and he will likely need to give consideration to ENT input.  I will discuss with Dr. Yu;  no focal abscess so far per radiology.     --Acute cough with right lower lobe pneumonia by imaging and risk for community-acquired pathogens/aspiration.  Broad-spectrum coverage ongoing. Culture with S Aureus and usual pankaj so far.  Order urine Legionella and strep antigens and influenza PCR.     --Dysphagia.  Family reports difficulty with solids/liquids and cough.  Speech therapy has seen.  You'll likely need to give consideration to GI consultation for EGD as well.     --Acute encephalopathy.  Family reports this is common with acute illness for him.  Further neurologic workup per internal medicine at their discretion.  Preceding symptoms and current exam not consistent with meningismus.  Improving in general per family back towards baseline     --Chronic kidney disease stage III per records.  Monitor to help guide further adjustments with antimicrobials     --QTc > 500 ms  EKG    --TCP;  Monitor    --hypotension;  Medicine workup/intervention on going;  Afebrile, no other focal symptoms;  Repeat blood cultures       PLAN:     --IV vancomycin/Azactam/Flagyl and oral doxycycline     --Monitor IV and IV antibiotic with risk for systemic complication and potential drug interaction     --Check/review labs cultures and scans     --Highly complex set of problems with high risk for further serious morbidity and other serious sequela     --You will likely need to give consideration to ENT consultation and also GI consultation/EGD; d/w Dr. Yu     --Discussed with Dr Yu.  d/w micro     --Partial history per nursing staff as well      Enoch Dong MD  3/8/2018

## 2018-03-08 NOTE — PLAN OF CARE
Problem: Patient Care Overview (Adult)  Goal: Plan of Care Review  Outcome: Ongoing (interventions implemented as appropriate)   03/08/18 1505   Coping/Psychosocial Response Interventions   Plan Of Care Reviewed With patient   Patient Care Overview   Progress improving   Outcome Evaluation   Outcome Summary/Follow up Plan Pt completed bed mobility from supine to sitting EOB with min A. Completed tf to chair with FWW with CGA and max VC's for sequencing and HP.Met static sitting goal. Cont IPOT per POC revised goals as appropriate.       Problem: Inpatient Occupational Therapy  Goal: Bed Mobility Goal LTG- OT  Outcome: Revised   03/06/18 1012 03/08/18 1505   Bed Mobility OT LTG   Bed Mobility OT LTG, Time to Achieve by discharge --    Bed Mobility OT LTG, Activity Type supine to sit/sit to supine --    Bed Mobility OT LTG, [place] Level --  supervision required   Bed Mobility OT LTG, Assist Device bed rails --    Bed Mobility OT LTG, Outcome --  goal revised     Goal: Strength Goal LTG- OT  Outcome: Ongoing (interventions implemented as appropriate)   03/06/18 1012 03/08/18 1505   Strength OT LTG   Strength Goal OT LTG, Time to Achieve by discharge --    Strength Goal OT LTG, Measure to Achieve Tolerate 15 reps HEP --    Strength Goal OT LTG, Outcome --  goal ongoing     Goal: Static Sitting Balance Goal LTG- OT  Outcome: Outcome(s) achieved Date Met: 03/08/18 03/06/18 1012 03/08/18 1505   Static Sitting Balance OT LTG   Static Sitting Balance OT LTG, Time to Achieve by discharge --    Static Sitting Balance OT LTG, [place] Level contact guard assist --    Static Sitting Balance OT LTG, Assist Device UE Support --    Static Sitting Balance OT LTG, Outcome --  goal met     Goal: Follow Directions Goal LTG- OT  Outcome: Ongoing (interventions implemented as appropriate)   03/06/18 1012 03/08/18 1505   Follow Directions OT LTG   Follow Directions OT LTG, Time to Achieve by discharge --    Follow Directions  OT LTG, Activity Type 100% treatment session --    Follow Directions OT LTG, [place] Level with verbal cues --    Follow Directions OT LTG, Outcome --  goal ongoing

## 2018-03-08 NOTE — PLAN OF CARE
Problem: Arrhythmia/Dysrhythmia (Symptomatic) (Adult)  Goal: Signs and Symptoms of Listed Potential Problems Will be Absent or Manageable (Arrhythmia/Dysrhythmia)  Outcome: Ongoing (interventions implemented as appropriate)      Problem: Nutrition, Imbalanced: Inadequate Oral Intake (Adult)  Goal: Identify Related Risk Factors and Signs and Symptoms  Outcome: Ongoing (interventions implemented as appropriate)    Goal: Improved Oral Intake  Outcome: Ongoing (interventions implemented as appropriate)    Goal: Prevent Further Weight Loss  Outcome: Ongoing (interventions implemented as appropriate)      Problem: Infection, Risk/Actual (Adult)  Goal: Identify Related Risk Factors and Signs and Symptoms  Outcome: Ongoing (interventions implemented as appropriate)    Goal: Infection Prevention/Resolution  Outcome: Ongoing (interventions implemented as appropriate)      Problem: Skin Integrity Impairment, Risk/Actual (Adult)  Goal: Identify Related Risk Factors and Signs and Symptoms  Outcome: Ongoing (interventions implemented as appropriate)    Goal: Skin Integrity/Wound Healing  Outcome: Ongoing (interventions implemented as appropriate)      Problem: Fall Risk (Adult)  Goal: Identify Related Risk Factors and Signs and Symptoms  Outcome: Ongoing (interventions implemented as appropriate)    Goal: Absence of Falls  Outcome: Ongoing (interventions implemented as appropriate)      Problem: Patient Care Overview (Adult)  Goal: Plan of Care Review  Outcome: Ongoing (interventions implemented as appropriate)    Goal: Adult Individualization and Mutuality  Outcome: Ongoing (interventions implemented as appropriate)    Goal: Discharge Needs Assessment  Outcome: Ongoing (interventions implemented as appropriate)

## 2018-03-08 NOTE — PLAN OF CARE
Problem: Skin Integrity Impairment, Risk/Actual (Adult)  Goal: Skin Integrity/Wound Healing  Outcome: Ongoing (interventions implemented as appropriate)   03/08/18 1045   Skin Integrity Impairment, Risk/Actual (Adult)   Skin Integrity/Wound Healing making progress toward outcome       Problem: Patient Care Overview (Adult)  Goal: Plan of Care Review  Outcome: Ongoing (interventions implemented as appropriate)   03/08/18 1045   Coping/Psychosocial Response Interventions   Plan Of Care Reviewed With patient   Patient Care Overview   Progress improving   Outcome Evaluation   Outcome Summary/Follow up Plan WOC nurse f/u to assess wounds on heels, buttocks and skin tears. Pt has multiple skin tears on bilateral arms; all cleaned with N/S, Xeroform applied, covered with foam or gauze dressings. 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 Bilateral heels moist with 100% eschar. Plan to start Thera Honey daily; PT Wound Care consulted to evaluate for debridement. Continue black heel boots; other skin interventions in place. WOC nurse will f/u. Please contact WOC nurse as needed for concerns.

## 2018-03-08 NOTE — PROGRESS NOTES
Adult Nutrition  Assessment/PES    Patient Name:  Alexander Adan  YOB: 1940  MRN: [id number]  Admit Date:  3/2/2018    Assessment Date:  3/8/2018    Comments:            Reason for Assessment       03/08/18 1623    Reason for Assessment    Reason For Assessment/Visit follow up protocol    Time Spent (min) 20                  Labs/Tests/Procedures/Meds       03/08/18 1627    Labs/Tests/Procedures/Meds    Procedure Review SLP    Swallow eval status Done    Type of SLP Evaluation MBS   noted results of 3/7 eval                Nutrition Prescription Ordered       03/08/18 1627    Nutrition Prescription PO    Supplement Boost Plus   pt states he is willing to try to drink Boost.    Supplement Frequency 3 times a day      03/08/18 1623    Nutrition Prescription PO    Current PO Diet Pureed    Fluid Consistency Thin            Evaluation of Received Nutrient/Fluid Intake       03/08/18 1630    PO Evaluation    % PO Intake --   50      03/08/18 1625    PO Evaluation    Number of Meals 3   past 3 meals recorded, except for dinner last night (ate less).  PT reports his appetite has improved, and he likes his food texture better now.            Problem/Interventions:        Problem 1       03/08/18 1628    Nutrition Diagnoses Problem 1    Problem 1 Inadequate Intake/Infusion    Etiology (related to) MNT for Treatment/Condition    Signs/Symptoms (evidenced by) PO Intake    Percent (%) intake recorded 50 %    Over number of meals 3   past 3 meals recorded, except evening meal last night (outlier)                    Intervention Goal       03/08/18 1628    Intervention Goal    PO Continue positive trend            Nutrition Intervention       03/08/18 1629    Nutrition Intervention    RD/Tech Action Follow Tx progress;Supplement provided;Interview for preference;Encourage intake      03/08/18 1628    Nutrition Intervention    RD/Tech Action Follow Tx progress              Education/Evaluation       03/08/18  1629    Monitor/Evaluation    Monitor Per protocol      03/08/18 1628    Monitor/Evaluation    Monitor Per protocol        Electronically signed by:  Paris Yost MS,RD,LD  03/08/18 4:30 PM

## 2018-03-08 NOTE — PLAN OF CARE
Problem: Patient Care Overview (Adult)  Goal: Plan of Care Review  Outcome: Ongoing (interventions implemented as appropriate)   03/08/18 1812   Coping/Psychosocial Response Interventions   Plan Of Care Reviewed With patient;spouse   Outcome Evaluation   Outcome Summary/Follow up Plan Dysphagia: Pt sounding wet/congested at baseline, did not improve significantly with cued throat clearing and cough. Wife feels has been this way since drinking coffee this morning - and they were unaware he was to take liquids off spoon. Based on MBS, pt would not have this significant of a change over such short period of time - aspiration was minimal. RN does report reflux episode when laying flat, had to sit up quickly. Do highly suspect/worry about aspiration of refluxed material. Had originally ordered for limited UGI to be completed in conjunction with MBS yesterday - no certain as to why no completed. For now,     Recommend: Strict use of liquids via tsp (wife and RN aware, placed order in computer). Strict reflux precautions. If any further decline with dinner, make NPO x meds in applesauce and ice chips for thirst. SLP will re-assess tomorrow to determine further recommendations. Thanks        Problem: Inpatient SLP  Goal: Dysphagia- Patient will safely consume diet as per recommendation with no signs/symptoms of aspiration  Outcome: Ongoing (interventions implemented as appropriate)   03/07/18 1439   Safely Consume Diet   Safely Consume Diet- SLP, Date Established 03/07/18   Safely Consume Diet- SLP, Time to Achieve by discharge   Safely Consume Diet- SLP, Date Goal Reviewed 03/07/18   Safely Consume Diet- SLP, Outcome goal ongoing

## 2018-03-08 NOTE — THERAPY TREATMENT NOTE
Acute Care - Speech Language Pathology   Swallow Treatment Note [facility]     Patient Name: Alexander Adan  : 1940  MRN: [id number]  Today's Date: 3/8/2018  Onset of Illness/Injury or Date of Surgery Date: 18     Referring Physician: Hospitalist      Admit Date: 3/2/2018    Visit Dx:      ICD-10-CM ICD-9-CM   1. Oropharyngeal dysphagia R13.12 787.22   2. Parotitis, acute K11.21 527.2   3. Subacute maxillary sinusitis J01.00 461.0   4. Generalized weakness R53.1 780.79   5. Impaired functional mobility, balance, gait, and endurance Z74.09 V49.89   6. Impaired mobility and ADLs Z74.09 799.89     Patient Active Problem List   Diagnosis   • Atrial fibrillation   • Chronic coronary artery disease   • Chronic diastolic heart failure   • Chronic obstructive pulmonary disease   • Cognitive impairment, mild, so stated   • Tobacco use   • Fall at home   • CKD (chronic kidney disease), stage III   • Leukocytosis   • Bronchitis   • Vasovagal syncope   • Dehydration   • Hypotension due to dehydration   • PVD (peripheral vascular disease)   • SIRS (systemic inflammatory response syndrome)             Adult Rehabilitation Note       18 1400 18 1345 18 0947    Rehab Assessment/Intervention    Discipline speech language pathologist  -SM occupational therapist  -KF physical therapist  -KR    Document Type therapy note (daily note)  -SM therapy note (daily note)  -KF therapy note (daily note)  -KR    Subjective Information agree to therapy  -SM agree to therapy;complains of;pain  -KF agree to therapy;complains of;pain  -KR    Patient Effort, Rehab Treatment good  -SM good  -KF good  -KR    Symptoms Noted During/After Treatment  fatigue;increased pain  -KF increased pain;fatigue  -KR    Precautions/Limitations  fall precautions;oxygen therapy device and L/min  -KF fall precautions;oxygen therapy device and L/min  -KR    Recorded by [SM] Adri Perez MS CCC-SLP [KF] Lyudmila Cortés, OT  [KR] Jasmin Short, PT    Vital Signs    Pre Systolic BP Rehab  112  -KF 91  -KR    Pre Treatment Diastolic BP  70  -KF 56  -KR    Pretreatment Heart Rate (beats/min)  103  -KF 84  -KR    Posttreatment Heart Rate (beats/min)  100  -  -KR    Pre SpO2 (%)  94  -KF 93  -KR    O2 Delivery Pre Treatment  supplemental O2  -KF supplemental O2  -KR    Intra SpO2 (%)  90  -KF     O2 Delivery Intra Treatment  supplemental O2  -KF     Post SpO2 (%)  96  -KF     O2 Delivery Post Treatment  supplemental O2  -KF     Pre Patient Position  Supine  -KF Supine  -KR    Intra Patient Position  Standing  -KF Standing  -KR    Post Patient Position  Sitting  -KF Supine  -KR    Recorded by  [KF] Lyudmila Cortés OT [KR] Jasmin Short, PT    Pain Assessment    Pain Assessment May- FACES  -SM 0-10  -KF 0-10  -KR    May- FACES Pain Rating 4  -SM      Pain Score  2  -KF 5  -KR    Post Pain Score  3  -KF 10  -KR    Pain Type  Acute pain  -KF Acute pain  -KR    Pain Location  Foot  -KF Foot  -KR    Pain Orientation  Right;Left  -KF Right;Left  -KR    Pain Intervention(s)  Ambulation/increased activity;Repositioned  -KF Repositioned;Ambulation/increased activity  -KR    Response to Interventions  tolerated  -KF tolerated  -KR    Recorded by [SM] Adri Perez MS CCC-SLP [KF] Lyudmila Cortés OT [KR] Jasmin Short, PT    Cognitive Assessment/Intervention    Current Cognitive/Communication Assessment  impaired  -KF impaired  -KR    Orientation Status  oriented to;person;required verbal cueing (specifiy in comments);time;place  -KF oriented to;person;time   month only  -KR    Follows Commands/Answers Questions  75% of the time;able to follow single-step instructions;needs cueing;needs increased time;needs repetition  -KF able to follow single-step instructions;50% of the time;75% of the time;needs cueing;needs increased time;needs repetition  -KR    Personal Safety  moderate impairment;decreased awareness, need for  assist;decreased awareness, need for safety;decreased insight to deficits  -KF moderate impairment;decreased awareness, need for assist;decreased awareness, need for safety;decreased insight to deficits  -KR    Personal Safety Interventions  fall prevention program maintained;gait belt;muscle strengthening facilitated;nonskid shoes/slippers when out of bed  -KF fall prevention program maintained;gait belt;nonskid shoes/slippers when out of bed  -KR    Recorded by  [KF] Lyudmila Cortés, OT [KR] Jasmin Short, PT    Dysphagia Other 1    Dysphagia Other 1 Objective Patient will demonstrate no s/s with recommended diet textures.  -SM      Status: Dysphagia Other 1 Progress slower than expected  -SM      Dysphagia Other 1 Progress 50%;with consistent cues;continue to address  -SM      Comments: Dysphagia Other 1 Pt sounds very wheezy/congested at baseline/  -SM      Recorded by [SM] Adri Perez MS CCC-SLP      Dysphagia Other 2    Dysphagia Other 2 Objective Patient will demonstrate compliance w/ dysphagia compensations (thins via tsp, coughing/TC every 3-5 swallows) with intermittent cueing  -SM      Status: Dysphagia Other 2 Progress slower than expected  -SM      Dysphagia Other 2 Progress 100%;with consistent cues;continue to address  -SM      Comments: Dysphagia Other 2 During tx. Pt/wife/RN had no knowledge of need for liquids off spoon. Thorough education completed. Brought cup of spoons into room  -SM      Recorded by [SM] Adri Perez MS CCC-SLP      Bed Mobility, Assessment/Treatment    Bed Mobility, Assistive Device  bed rails;head of bed elevated  -KF     Bed Mobility, Roll Right, [place]  minimum assist (75% patient effort)  -KF     Bed Mobility, Scoot/Bridge, [place]  contact guard assist  -KF     Bed Mob, Supine to Sit, [place]  minimum assist (75% patient effort);1 person + 1 person to manage equipment  -KF minimum assist (75% patient effort);2 person assist  required;verbal cues required  -KR    Bed Mob, Sit to Supine, [place]   minimum assist (75% patient effort);verbal cues required  -KR    Bed Mobility, Safety Issues  decreased use of arms for pushing/pulling;decreased use of legs for bridging/pushing;cognitive deficits limit understanding  -KF decreased use of arms for pushing/pulling;decreased use of legs for bridging/pushing;cognitive deficits limit understanding  -KR    Bed Mobility, Impairments  strength decreased;coordination impaired;impaired balance  -KF strength decreased;impaired balance;coordination impaired  -KR    Bed Mobility, Comment  VC's HP and sequencing  -KF VC's for sequencing. Pt required increased time to complete.  -KR    Recorded by  [KF] Lyudmila Cortés, OT [KR] Jasmin Short, PT    Transfer Assessment/Treatment    Transfers, Bed-Chair [place]  contact guard assist;verbal cues required  -KF     Transfers, Sit-Stand [place]  verbal cues required;contact guard assist  -KF minimum assist (75% patient effort);verbal cues required  -KR    Transfers, Stand-Sit [place]  contact guard assist;verbal cues required  -KF minimum assist (75% patient effort);verbal cues required  -KR    Transfers, Sit-Stand-Sit, Assist Device  rolling walker  -KF other (see comments)   L UE support  -KR    Transfer, Safety Issues  step length decreased;weight-shifting ability decreased;sequencing ability decreased  -KF step length decreased;weight-shifting ability decreased;sequencing ability decreased  -KR    Transfer, Impairments  impaired balance;strength decreased;postural control impaired;pain  -KF strength decreased;impaired balance;pain  -KR    Transfer, Comment  VC's for sequencing and HP  -KF Increased cueing required for sequencing. Pt c/o increased jose foot pain.   -KR    Recorded by  [KF] Lyudmila Cortés, OT [KR] Jasmin Short, PT    Gait Assessment/Treatment    Gait, [place] Level   minimum assist (75% patient effort);2 person  assist required;verbal cues required  -KR    Gait, Assistive Device   other (see comments)   BUE support  -KR    Gait, Distance (Feet)   150  -KR    Gait, Gait Pattern Analysis   swing-through gait  -KR    Gait, Gait Deviations   antalgic;sima decreased;step length decreased;weight-shifting ability decreased  -KR    Gait, Safety Issues   step length decreased;weight-shifting ability decreased;supplemental O2  -KR    Gait, Impairments   strength decreased;impaired balance;pain  -KR    Gait, Comment   Pt demonstrated antalgic gait pattern due to increased pain in jose feet. Pt demonstrated slow sima with decreased step length; required more assistance with increased weight bearing through BUEs with onset of fatigue.   -KR    Recorded by   [KR] Jasmin Short, PT    Functional Mobility    Functional Mobility- Ind. Level  contact guard assist  -KF     Functional Mobility- Device  rolling walker  -KF     Functional Mobility-Distance (Feet)  5  -KF     Functional Mobility- Safety Issues  sequencing ability decreased;step length decreased;weight-shifting ability decreased;supplemental O2  -KF     Functional Mobility- Comment  VC's HP and sequencing  -KF     Recorded by  [KF] Lyudmila Cortés OT     Toileting Assessment/Training    Toileting Assess/Train, Assistive Device  urinal  -KF     Toileting Assess/Train, Position  supported sitting  -KF     Toileting Assess/Train, Indepen Level  minimum assist (75% patient effort)  -KF     Toileting Assess/Train, Impairments  coordination impaired;strength decreased;other (see comments)   cognitive deficits for sequencing  -KF     Recorded by  [KF] Lyudmila Cortés OT     Grooming Assessment/Training    Grooming Assess/Train, Position  supported sitting  -KF     Grooming Assess/Train, Indepen Level  verbal cues required;set up required  -KF     Grooming Assess/Train, Impairments  other (see comments)   cognitive deficits for sequencing  -KF     Recorded by  [KF] Lyudmila FORBES  CHARBEL Cortés     Motor Skills/Interventions    Additional Documentation  Balance Skills Training (Group)  -KF Balance Skills Training (Group)  -KR    Recorded by  [KF] Lyudmila Cortés OT [KR] Jasmin Short, PT    Balance Skills Training    Sitting-Level of Assistance  Contact guard  -KF Contact guard  -KR    Sitting-Balance Support  Feet supported  -KF Right upper extremity supported;Left upper extremity supported;Feet supported  -KR    Sitting-Balance Activities  Lateral lean;Forward lean;Trunk control activities  -KF     Standing-Level of Assistance  Contact guard  -KF Minimum assistance  -KR    Static Standing Balance Support  assistive device  -KF Left upper extremity supported  -KR    Standing-Balance Activities  Weight Shift A-P;Weight Shift R-L;Forward lean  -KF     Gait Balance-Level of Assistance   Minimum assistance;x2  -KR    Gait Balance Support   Right upper extremity supported;Left upper extremity supported  -KR    Recorded by  [KF] Lyudmila Cortés OT [KR] Jasmin Short, PT    Therapy Exercises    Bilateral Upper Extremity  AROM:;10 reps;elbow flexion/extension;hand pumps;shoulder horizontal abd/add;shoulder extension/flexion  -KF     Recorded by  [KF] Lyudmila Cortés OT     Positioning and Restraints    Pre-Treatment Position  in bed  -KF in bed  -KR    Post Treatment Position  chair  -KF bed  -KR    In Bed   notified nsg;supine;call light within reach;encouraged to call for assist;exit alarm on;side rails up x3;heels elevated;waffle boots/both  -KR    In Chair  notified nsg;reclined;sitting;call light within reach;encouraged to call for assist;exit alarm on;waffle cushion;legs elevated;heels elevated;with family/caregiver  -KF     Recorded by  [KF] Lyudmila Cortés OT [KR] Jasmin Short, PT      03/06/18 1530 03/06/18 0822       Rehab Assessment/Intervention    Discipline speech language pathologist  - physical therapist  -KR     Document Type therapy note (daily note)  - therapy note  (daily note)  -KR     Subjective Information agree to therapy  -SM agree to therapy;no complaints  -KR     Patient Effort, Rehab Treatment  adequate  -KR     Symptoms Noted During/After Treatment  fatigue  -KR     Precautions/Limitations  fall precautions;oxygen therapy device and L/min  -KR     Specific Treatment Considerations  Pt with increased confusion this treatment session  -KR     Recorded by [SM] Adri Perez, MS CCC-SLP [KR] Jasmin Short, PT     Vital Signs    Pre Systolic BP Rehab  132  -KR     Pre Treatment Diastolic BP  67  -KR     Pretreatment Heart Rate (beats/min)  98  -KR     Posttreatment Heart Rate (beats/min)  96  -KR     Pre SpO2 (%)  99  -KR     O2 Delivery Pre Treatment  supplemental O2  -KR     Post SpO2 (%)  94  -KR     O2 Delivery Post Treatment  supplemental O2  -KR     Pre Patient Position  Supine  -KR     Intra Patient Position  Standing  -KR     Post Patient Position  Supine  -KR     Recorded by  [KR] Jasmin Short, PT     Pain Assessment    Pain Assessment  0-10  -KR     Pain Score  0  -KR     Post Pain Score  0  -KR     Recorded by  [KR] Jasmin Short, PT     Cognitive Assessment/Intervention    Current Cognitive/Communication Assessment  impaired  -KR     Orientation Status  oriented to;person  -KR     Follows Commands/Answers Questions  able to follow single-step instructions;50% of the time;75% of the time;needs cueing;needs increased time;needs repetition  -KR     Personal Safety  moderate impairment;decreased awareness, need for assist;decreased awareness, need for safety;decreased insight to deficits  -KR     Personal Safety Interventions  fall prevention program maintained;gait belt;nonskid shoes/slippers when out of bed  -KR     Recorded by  [KR] Jasmin Short, PT     Swallow Assessment/Intervention    Additional Documentation Dysphagia/Swallow Intervention (Group)  -SM      Recorded by [SM] Adri Perez, MS CCC-SLP      Dysphagia/Swallow Intervention     Dysphagia/Swallow Intervention Given reports of coughing on food/liquid, weight loss, increased opacities R mid and lower lung on CXR 3/4/18. Given all, best for MBS + limited UGI to further assess.   -SM      Recorded by [SM] Adri Perez MS CCC-SLP      Dysphagia Other 1    Dysphagia Other 1 Objective Patient will demonstrate no s/s with recommended diet textures.  -SM      Dysphagia Other 1 Progress 50%;without cues  -SM      Comments: Dysphagia Other 1 Very delayed throat clear throughout with trials intermittently.   -SM      Recorded by [SM] Adri Perez MS CCC-SLP      Bed Mobility, Assessment/Treatment    Bed Mob, Supine to Sit, [place]  maximum assist (25% patient effort);verbal cues required  -KR     Bed Mob, Sit to Supine, [place]  moderate assist (50% patient effort);verbal cues required  -KR     Bed Mobility, Safety Issues  decreased use of arms for pushing/pulling;decreased use of legs for bridging/pushing;cognitive deficits limit understanding  -KR     Bed Mobility, Impairments  strength decreased;impaired balance  -KR     Bed Mobility, Comment  Pt very lethargic and confused with difficulty following commands. Pt required increased assistance at trunk.  -KR     Recorded by  [KR] Jasmin Short, PT     Transfer Assessment/Treatment    Transfers, Sit-Stand [place]  minimum assist (75% patient effort);verbal cues required  -KR     Transfers, Stand-Sit [place]  minimum assist (75% patient effort);verbal cues required  -KR     Transfers, Sit-Stand-Sit, Assist Device  other (see comments)   R UE support  -KR     Toilet Transfer, [place]  minimum assist (75% patient effort)  -KR     Toilet Transfer, Assistive Device  other (see comments)   RUE support  -KR     Transfer, Safety Issues  step length decreased;weight-shifting ability decreased  -KR     Transfer, Impairments  strength decreased;impaired balance;coordination impaired  -KR     Transfer, Comment  Pt required  max cueing for sequencing and safety awareness.   -KR     Recorded by  [KR] Jasmin Short, PT     Gait Assessment/Treatment    Gait, [place] Level  minimum assist (75% patient effort);verbal cues required  -KR     Gait, Assistive Device  --   R UE support  -KR     Gait, Distance (Feet)  25  -KR     Gait, Gait Pattern Analysis  swing-through gait  -KR     Gait, Gait Deviations  sima decreased;forward flexed posture;step length decreased;weight-shifting ability decreased  -KR     Gait, Safety Issues  sequencing ability decreased;step length decreased;weight-shifting ability decreased;balance decreased during turns  -KR     Gait, Impairments  strength decreased;impaired balance;coordination impaired;postural control impaired  -KR     Gait, Comment  Pt required max cueing for sequencing and safe technique. Pt very limited by increased confusion. Increased difficulty following commands.   -KR     Recorded by  [ANDREEA] Jasmin Short PT     Motor Skills/Interventions    Additional Documentation  Balance Skills Training (Group)  -KR     Recorded by  [ANDEREA] Jasmin Short PT     Balance Skills Training    Sitting-Level of Assistance  Contact guard  -KR     Sitting-Balance Support  Feet supported  -KR     Standing-Level of Assistance  Minimum assistance  -KR     Static Standing Balance Support  Right upper extremity supported  -KR     Gait Balance-Level of Assistance  Minimum assistance  -KR     Gait Balance Support  Right upper extremity supported  -KR     Recorded by  [ANDREEA] Jasmin Short PT     Positioning and Restraints    Pre-Treatment Position  in bed  -KR     Post Treatment Position  bed  -KR     In Bed  notified nsg;supine;call light within reach;encouraged to call for assist;with family/caregiver;exit alarm on;side rails up x3;legs elevated  -KR     Recorded by  [ANDREEA] Jasmin Short PT       User Key  (r) = Recorded By, (t) = Taken By, (c) = Cosigned By    Initials Name Effective Dates    JAMAR FORBES  Ana, MS CCC-SLP 06/22/15 -     KR Jasmin Short, PT 09/25/17 - 03/06/18     Jasmin Short, PT 03/07/18 -     MICHELLE Cortés, OT 02/14/18 -                   IP SLP Goals       03/07/18 1439          Safely Consume Diet    Safely Consume Diet- SLP, Date Established 03/07/18  -SG      Safely Consume Diet- SLP, Time to Achieve by discharge  -SG      Safely Consume Diet- SLP, Date Goal Reviewed 03/07/18  -SG      Safely Consume Diet- SLP, Outcome goal ongoing  -SG        User Key  (r) = Recorded By, (t) = Taken By, (c) = Cosigned By    Initials Name Provider Type    SG Alexa Ferguson, MS CCC-SLP Speech and Language Pathologist          EDUCATION  The patient has been educated in the following areas:   Dysphagia (Swallowing Impairment) Modified Diet Instruction.    SLP Recommendation and Plan                                           Plan of Care Review  Plan Of Care Reviewed With: patient, spouse  Outcome Summary/Follow up Plan: Dysphagia: Pt sounding wet/congested at baseline, did not improve significantly with cued throat clearing and cough. Wife feels has been this way since drinking coffee this morning - and they were unaware he was to take liquids off spoon. Based on MBS, pt would not have this significant of a change over such short period of time - aspiration was minimal. RN does report reflux episode when laying flat, had to sit up quickly. Do highly suspect/worry about aspiration of refluxed material. Had originally ordered for limited UGI to be completed in conjunction with MBS yesterday - no certain as to why no completed. For now, Recommend: Strict use of liquids via tsp (wife and RN aware, placed order in computer). Strict reflux precautions. If any further decline with dinner, make NPO x meds in applesauce and ice chips for thirst. SLP will re-assess tomorrow to determine further recommendations. Thanks            Time Calculation:         Time Calculation- SLP       03/08/18 9817           Time Calculation- SLP    SLP Start Time 1330  -      SLP Received On 03/08/18  -        User Key  (r) = Recorded By, (t) = Taken By, (c) = Cosigned By    Initials Name Provider Type    JAMAR Perez MS CCC-SLP Speech and Language Pathologist          Therapy Charges for Today     Code Description Service Date Service Provider Modifiers Qty    [id number]  ST TREATMENT SWALLOW 3 3/8/2018 Adri Perez MS CCC-SLP GN 1                 Adri Perez MS CCC-VITA  3/8/2018

## 2018-03-08 NOTE — PROGRESS NOTES
[facility] Medicine Services  PROGRESS NOTE    Patient Name: Alexander Adan  : 1940  MRN: [id number]    Date of Admission: 3/2/2018  Length of Stay: 6  Primary Care Physician: Harrison Smart MD    Subjective   Subjective     CC:Right facial swelling, weight loss    Subjective:  Resting in bed in no acute distress But he does not feel very well today.  Tells me that after he had his coffee started having some breathing difficulty and overall his condition has declined since then..  Denies any specific pain or shortness of breath.  No fever or chills.  No chest pain or palpitation or shortness of breath address.  No nausea, vomiting, diarrhea, abdominal pain.    Review of Systems  Unable to obtain        Objective   Objective     Vital Signs:   Temp:  [97.6 °F (36.4 °C)-97.8 °F (36.6 °C)] 97.6 °F (36.4 °C)  Heart Rate:  [] 110  Resp:  [18-20] 18  BP: ()/(42-89) 121/60        Physical Exam:  Constitutional: No acute distress, very cachectic and frail  Eyes: PERRLA, sclerae anicteric, no conjunctival injection  HENT: NCAT, mucous membranes moist.  Right submandibular swelling most likely she Parotid gland swelling.  Neck: Supple, no thyromegaly, no lymphadenopathy, trachea midline  Respiratory: Clear to auscultation bilaterally, nonlabored respirations   Cardiovascular: RRR, no murmurs, rubs, or gallops, palpable pedal pulses bilaterally  Gastrointestinal: Positive bowel sounds, soft, nontender, nondistended  Musculoskeletal: Very cachectic No bilateral ankle edema, no clubbing or cyanosis to extremities  Neurologic: Awake, alert, follows commands.  Patient moves all the limbs.  Skin: No rashes    Results Reviewed:  I have personally reviewed current lab, radiology, and data and agree.      Results from last 7 days  Lab Units 18  0542 18  0441 18  0622   WBC 10*3/mm3 12.41* 8.10 10.77   HEMOGLOBIN g/dL 11.0* 11.1* 10.2*   HEMATOCRIT % 35.3* 35.8*  32.9*   PLATELETS 10*3/mm3 193 140* 179       Results from last 7 days  Lab Units 03/07/18  0542 03/06/18  0441 03/05/18  0622  03/04/18  0519   SODIUM mmol/L 130* 132 138  --  142   POTASSIUM mmol/L 4.8 5.0 5.0  < > 3.5   CHLORIDE mmol/L 110* 112* 115*  --  120*   CO2 mmol/L 16.0* 16.0* 19.0*  --  17.0*   BUN mg/dL 10 12 14  --  14   CREATININE mg/dL 0.80 0.70 0.80  --  0.80   GLUCOSE mg/dL 125* 113* 89  --  112*   CALCIUM mg/dL 7.7* 7.3* 7.7*  --  8.6*   ALT (SGPT) U/L 7  --  9  --  10   AST (SGOT) U/L 15  --  18  --  19   < > = values in this interval not displayed.  Estimated Creatinine Clearance: 70.8 mL/min (by C-G formula based on Cr of 0.8).  No results found for: BNP  pH, Arterial   Date Value Ref Range Status   03/07/2018 7.419 7.350 - 7.450 pH units Final       Microbiology Results Abnormal     Procedure Component Value - Date/Time    Blood Culture - Blood, [[id number]]  (Normal) Collected:  03/02/18 1231    Lab Status:  Final result Specimen:  Blood from Hand, Right Updated:  03/07/18 1746     Blood Culture No growth at 5 days    S. Pneumo Ag Urine or CSF - Urine, Urine, Clean Catch [[id number]] Collected:  03/05/18 0538    Lab Status:  Final result Specimen:  Urine from Urine, Clean Catch Updated:  03/07/18 1711     Specimen Source Urine     STREP PNEUMONIAE ANTIGEN Negative     Body Fluid Culture, Sterile Not Indicated     Organism ID Not indicated.     Please note Comment      College of [employer]ologists standards require a culture to be  performed on CSF specimens submitted for bacterial antigen testing.  (CAP TROO.48294) Urine specimens will not be cultured.       Narrative:       Performed at:  [address]  [id number]  : Gilson Gandara MD, Phone:  [phone]    Legionella Antigen, Urine - Urine, Urine, Clean Catch [[id number]] Collected:  03/05/18 0538    Lab Status:  Final result Specimen:  Urine from Urine, Clean Catch Updated:  03/07/18  1522     L. pneumophila Serogp 1 Ur Ag Negative      Presumptive negative for L. pneumophila serogroup 1 antigen in urine,  suggesting no recent or current infection. Legionnaires' disease  cannot be ruled out since other serogroups and species may also cause  disease.       Narrative:       Performed at:  [facility] [address] [place], [place]  [id number]  : Gilson Gandara MD, Phone:  [phone]    Blood Culture - Blood, [[id number]] Collected:  03/02/18 1235    Lab Status:  Final result Specimen:  Blood from Arm, Left Updated:  03/07/18 1401     Blood Culture No growth at 5 days      Aerobic bottle only    Blood Culture - Blood, [[id number]]  (Normal) Collected:  03/02/18 1305    Lab Status:  Final result Specimen:  Blood from Arm, Left Updated:  03/07/18 1401     Blood Culture No growth at 5 days    Respiratory Culture - Sputum, Cough [[id number]]  (Abnormal)  (Susceptibility) Collected:  03/05/18 0751    Lab Status:  Final result Specimen:  Sputum from Cough Updated:  03/07/18 1210     Respiratory Culture --      Moderate growth (3+) Staphylococcus aureus (A)      This isolate is presumed to be clindamycin resistant based on detection of inducible clindamycin resistance.  Clindamycin may still be effective in some patients.         Moderate growth (3+) Normal Respiratory Tiffanie     Gram Stain Result Many (4+) WBCs per low power field      Moderate (3+) Epithelial cells per low power field      Moderate (3+) Gram positive cocci in pairs and clusters    Susceptibility      Staphylococcus aureus     TORO     Ceftriaxone <=8 ug/ml Susceptible     Clindamycin Resistant     Erythromycin >4 ug/ml Resistant     Gentamicin <=4 ug/ml Susceptible     Levofloxacin <=1 ug/ml Susceptible  [1]      Linezolid <=1 ug/ml Susceptible     Oxacillin <=0.25 ug/ml Susceptible     Penicillin G 2 ug/ml Resistant     Quinupristin + Dalfopristin <=0.5 ug/ml Susceptible     Rifampin <=1 ug/ml Susceptible      Tetracycline >8 ug/ml Resistant     Trimethoprim + Sulfamethoxazole <=0.5/9.5 ug/ml Susceptible     Vancomycin 1 ug/ml Susceptible            [1]   Staphylococcus species may develop resistance during prolonged therapy with quinolones.  Isolates that are initially susceptible may become resistant within three to four days after initiation of therapy. Testing of repeat isolates may be warranted.                 Urine Culture - Urine, Urine, Clean Catch [[id number]]  (Normal) Collected:  03/05/18 0538    Lab Status:  Final result Specimen:  Urine from Urine, Clean Catch Updated:  03/07/18 0820     Urine Culture No growth at 2 days    Influenza A & B, RT PCR - Swab, Nasopharynx [[id number]]  (Normal) Collected:  03/04/18 1424    Lab Status:  Final result Specimen:  Swab from Nasopharynx Updated:  03/04/18 1542     Influenza A PCR Not Detected     Influenza B PCR Not Detected          Imaging Results (last 24 hours)     ** No results found for the last 24 hours. **        Results for orders placed during the hospital encounter of 02/07/18   Adult Transthoracic Echo Complete W/ Cont if Necessary Per Protocol    Narrative · Left ventricular wall thickness is consistent with mild concentric   hypertrophy.  · The following left ventricular wall segments are hypokinetic: basal   inferoseptal, mid inferoseptal and basal inferoseptal.  · Moderately reduced right ventricular systolic function noted.  · Left atrial cavity size is moderately dilated.  · Mild-to-moderate mitral valve regurgitation is present  · Mild tricuspid valve regurgitation is present.  · Left ventricular systolic function is normal. Estimated EF = 60%.  · there is akinesis of base of septum and base of inferior wall          I have reviewed the medications.    Assessment/Plan   Assessment / Plan     Hospital Problem List     * (Principal)SIRS (systemic inflammatory response syndrome)    Atrial fibrillation    Overview Signed 6/20/2016  3:33 PM by Zulay CONCEPCION  DENI Olson     Description: chads vasc 7         Chronic coronary artery disease    Overview Signed 6/20/2016  3:33 PM by DENI Gates     Description: cabg 2004, data deficient         Chronic diastolic heart failure    Overview Signed 6/20/2016  3:33 PM by DENI Gates     Description: NYHA II-III  Per echo: EF 50% September 2013         Chronic obstructive pulmonary disease    CKD (chronic kidney disease), stage III    Leukocytosis    PVD (peripheral vascular disease)             Brief Hospital Course to date:  Alexander Adan is a 77 y.o. male       Assessment & Plan:  *Right sided facial swelling most likely parotitis. Very significant improvement with IV antibiotics.    * Weakness which was worse today compared to yesterday.    * CT evidence of pneumonia possibly even aspiration pneumonia.    * Sepsis currently patient is  on IV antibiotic, much improved.    * Severe weight loss and cachexia.  Etiology is unknown. The patient has good appetite and good oral intake.    * Atrial fibrillation with rapid ventricular response, rate better controlled.    PLAN:  - cont current care  - follow labs including cultures done today  - PT/OT    DVT Prophylaxis:      CODE STATUS: DNR    Disposition:TBD.      Electronically signed by Anderson Yu MD, 03/08/18, 4:09 PM.

## 2018-03-08 NOTE — PROGRESS NOTES
Continued Stay Note  [facility]     Patient Name: Alexander Adan  MRN: [id number]  Today's Date: 3/8/2018    Admit Date: 3/2/2018          Discharge Plan     Consent obtained for the participation in the [facility] Transitions Program. Aurelia Head RN                Discharge Codes     None        Expected Discharge Date and Time     Expected Discharge Date Expected Discharge Time    Mar 9, 2018             Aurelia Head RN

## 2018-03-09 ENCOUNTER — APPOINTMENT (OUTPATIENT)
Dept: GENERAL RADIOLOGY | Facility: HOSPITAL | Age: 78
End: 2018-03-09

## 2018-03-09 LAB
ALBUMIN SERPL-MCNC: 1.9 G/DL (ref 3.2–4.8)
ALBUMIN/GLOB SERPL: 0.8 G/DL (ref 1.5–2.5)
ALP SERPL-CCNC: 64 U/L (ref 25–100)
ALT SERPL W P-5'-P-CCNC: 10 U/L (ref 7–40)
ANION GAP SERPL CALCULATED.3IONS-SCNC: 10 MMOL/L (ref 3–11)
AST SERPL-CCNC: 17 U/L (ref 0–33)
BASOPHILS # BLD AUTO: 0.04 10*3/MM3 (ref 0–0.2)
BASOPHILS NFR BLD AUTO: 0.3 % (ref 0–1)
BILIRUB SERPL-MCNC: 0.2 MG/DL (ref 0.3–1.2)
BILIRUB UR QL STRIP: NEGATIVE
BNP SERPL-MCNC: 675 PG/ML (ref 0–100)
BUN BLD-MCNC: 13 MG/DL (ref 9–23)
BUN/CREAT SERPL: 16.3 (ref 7–25)
CALCIUM SPEC-SCNC: 7.3 MG/DL (ref 8.7–10.4)
CHLORIDE SERPL-SCNC: 111 MMOL/L (ref 99–109)
CLARITY UR: CLEAR
CO2 SERPL-SCNC: 16 MMOL/L (ref 20–31)
COLOR UR: YELLOW
CREAT BLD-MCNC: 0.8 MG/DL (ref 0.6–1.3)
D-LACTATE SERPL-SCNC: 1.1 MMOL/L (ref 0.5–2)
D-LACTATE SERPL-SCNC: 1.5 MMOL/L (ref 0.5–2)
DEPRECATED RDW RBC AUTO: 63.2 FL (ref 37–54)
EOSINOPHIL # BLD AUTO: 0.23 10*3/MM3 (ref 0–0.3)
EOSINOPHIL NFR BLD AUTO: 1.6 % (ref 0–3)
ERYTHROCYTE [DISTWIDTH] IN BLOOD BY AUTOMATED COUNT: 17.6 % (ref 11.3–14.5)
FERRITIN SERPL-MCNC: 186 NG/ML (ref 22–322)
GFR SERPL CREATININE-BSD FRML MDRD: 93 ML/MIN/1.73
GLOBULIN UR ELPH-MCNC: 2.5 GM/DL
GLUCOSE BLD-MCNC: 84 MG/DL (ref 70–100)
GLUCOSE UR STRIP-MCNC: NEGATIVE MG/DL
HCT VFR BLD AUTO: 31.3 % (ref 38.9–50.9)
HGB BLD-MCNC: 9.7 G/DL (ref 13.1–17.5)
HGB UR QL STRIP.AUTO: NEGATIVE
IMM GRANULOCYTES # BLD: 0.11 10*3/MM3 (ref 0–0.03)
IMM GRANULOCYTES NFR BLD: 0.8 % (ref 0–0.6)
IRON 24H UR-MRATE: 16 MCG/DL (ref 50–175)
IRON SATN MFR SERPL: 10 % (ref 20–50)
KETONES UR QL STRIP: NEGATIVE
LEUKOCYTE ESTERASE UR QL STRIP.AUTO: NEGATIVE
LYMPHOCYTES # BLD AUTO: 1.36 10*3/MM3 (ref 0.6–4.8)
LYMPHOCYTES NFR BLD AUTO: 9.5 % (ref 24–44)
MCH RBC QN AUTO: 30.5 PG (ref 27–31)
MCHC RBC AUTO-ENTMCNC: 31 G/DL (ref 32–36)
MCV RBC AUTO: 98.4 FL (ref 80–99)
MONOCYTES # BLD AUTO: 1.28 10*3/MM3 (ref 0–1)
MONOCYTES NFR BLD AUTO: 8.9 % (ref 0–12)
NEUTROPHILS # BLD AUTO: 11.32 10*3/MM3 (ref 1.5–8.3)
NEUTROPHILS NFR BLD AUTO: 78.9 % (ref 41–71)
NITRITE UR QL STRIP: NEGATIVE
PH UR STRIP.AUTO: <=5 [PH] (ref 5–8)
PLATELET # BLD AUTO: 182 10*3/MM3 (ref 150–450)
PMV BLD AUTO: 11.2 FL (ref 6–12)
POTASSIUM BLD-SCNC: 4.6 MMOL/L (ref 3.5–5.5)
PROT SERPL-MCNC: 4.4 G/DL (ref 5.7–8.2)
PROT UR QL STRIP: NEGATIVE
RBC # BLD AUTO: 3.18 10*6/MM3 (ref 4.2–5.76)
SODIUM BLD-SCNC: 137 MMOL/L (ref 132–146)
SP GR UR STRIP: 1.02 (ref 1–1.03)
TIBC SERPL-MCNC: 160 MCG/DL (ref 250–450)
TROPONIN I SERPL-MCNC: 0.03 NG/ML
UROBILINOGEN UR QL STRIP: NORMAL
WBC NRBC COR # BLD: 14.34 10*3/MM3 (ref 3.5–10.8)

## 2018-03-09 PROCEDURE — 94799 UNLISTED PULMONARY SVC/PX: CPT

## 2018-03-09 PROCEDURE — 94760 N-INVAS EAR/PLS OXIMETRY 1: CPT

## 2018-03-09 PROCEDURE — 25010000002 VANCOMYCIN PER 500 MG

## 2018-03-09 PROCEDURE — 85025 COMPLETE CBC W/AUTO DIFF WBC: CPT | Performed by: INTERNAL MEDICINE

## 2018-03-09 PROCEDURE — 82728 ASSAY OF FERRITIN: CPT | Performed by: FAMILY MEDICINE

## 2018-03-09 PROCEDURE — 83605 ASSAY OF LACTIC ACID: CPT | Performed by: INTERNAL MEDICINE

## 2018-03-09 PROCEDURE — 71045 X-RAY EXAM CHEST 1 VIEW: CPT

## 2018-03-09 PROCEDURE — 83540 ASSAY OF IRON: CPT | Performed by: FAMILY MEDICINE

## 2018-03-09 PROCEDURE — 84484 ASSAY OF TROPONIN QUANT: CPT | Performed by: FAMILY MEDICINE

## 2018-03-09 PROCEDURE — 80053 COMPREHEN METABOLIC PANEL: CPT | Performed by: INTERNAL MEDICINE

## 2018-03-09 PROCEDURE — 94640 AIRWAY INHALATION TREATMENT: CPT

## 2018-03-09 PROCEDURE — 83550 IRON BINDING TEST: CPT | Performed by: FAMILY MEDICINE

## 2018-03-09 PROCEDURE — 83880 ASSAY OF NATRIURETIC PEPTIDE: CPT | Performed by: FAMILY MEDICINE

## 2018-03-09 PROCEDURE — 25010000002 DOBUTAMINE PER 250 MG: Performed by: FAMILY MEDICINE

## 2018-03-09 PROCEDURE — 99233 SBSQ HOSP IP/OBS HIGH 50: CPT | Performed by: INTERNAL MEDICINE

## 2018-03-09 PROCEDURE — 81003 URINALYSIS AUTO W/O SCOPE: CPT | Performed by: INTERNAL MEDICINE

## 2018-03-09 PROCEDURE — 25010000002 HEPARIN (PORCINE) PER 1000 UNITS: Performed by: INTERNAL MEDICINE

## 2018-03-09 RX ORDER — SOTALOL HYDROCHLORIDE 80 MG/1
40 TABLET ORAL EVERY 12 HOURS SCHEDULED
Status: DISCONTINUED | OUTPATIENT
Start: 2018-03-09 | End: 2018-03-12

## 2018-03-09 RX ORDER — DOBUTAMINE HYDROCHLORIDE 100 MG/100ML
2 INJECTION INTRAVENOUS CONTINUOUS
Status: DISCONTINUED | OUTPATIENT
Start: 2018-03-09 | End: 2018-03-09

## 2018-03-09 RX ORDER — TRAMADOL HYDROCHLORIDE 50 MG/1
50 TABLET ORAL EVERY 6 HOURS PRN
Status: DISCONTINUED | OUTPATIENT
Start: 2018-03-09 | End: 2018-03-12

## 2018-03-09 RX ADMIN — CILOSTAZOL 50 MG: 50 TABLET ORAL at 21:50

## 2018-03-09 RX ADMIN — GABAPENTIN 600 MG: 300 CAPSULE ORAL at 05:23

## 2018-03-09 RX ADMIN — SODIUM CHLORIDE 500 ML: 9 INJECTION, SOLUTION INTRAVENOUS at 00:29

## 2018-03-09 RX ADMIN — SUCRALFATE 1 G: 1 TABLET ORAL at 08:58

## 2018-03-09 RX ADMIN — ALBUTEROL SULFATE 2.5 MG: 2.5 SOLUTION RESPIRATORY (INHALATION) at 03:12

## 2018-03-09 RX ADMIN — SODIUM CHLORIDE 500 ML: 9 INJECTION, SOLUTION INTRAVENOUS at 14:43

## 2018-03-09 RX ADMIN — NICOTINE 1 PATCH: 14 PATCH TRANSDERMAL at 08:59

## 2018-03-09 RX ADMIN — METRONIDAZOLE 500 MG: 500 INJECTION, SOLUTION INTRAVENOUS at 05:20

## 2018-03-09 RX ADMIN — DOXYCYCLINE 100 MG: 100 CAPSULE ORAL at 21:51

## 2018-03-09 RX ADMIN — SODIUM CHLORIDE 500 ML: 9 INJECTION, SOLUTION INTRAVENOUS at 03:00

## 2018-03-09 RX ADMIN — HEPARIN SODIUM 5000 UNITS: 5000 INJECTION, SOLUTION INTRAVENOUS; SUBCUTANEOUS at 13:26

## 2018-03-09 RX ADMIN — PANTOPRAZOLE SODIUM 40 MG: 40 TABLET, DELAYED RELEASE ORAL at 17:15

## 2018-03-09 RX ADMIN — ACETAMINOPHEN 650 MG: 325 TABLET ORAL at 17:26

## 2018-03-09 RX ADMIN — METRONIDAZOLE 500 MG: 500 INJECTION, SOLUTION INTRAVENOUS at 11:56

## 2018-03-09 RX ADMIN — TRAMADOL HYDROCHLORIDE 50 MG: 50 TABLET, COATED ORAL at 18:16

## 2018-03-09 RX ADMIN — SODIUM CHLORIDE 2 G: 9 INJECTION, SOLUTION INTRAVENOUS at 02:13

## 2018-03-09 RX ADMIN — HEPARIN SODIUM 5000 UNITS: 5000 INJECTION, SOLUTION INTRAVENOUS; SUBCUTANEOUS at 05:20

## 2018-03-09 RX ADMIN — DOBUTAMINE HYDROCHLORIDE 2 MCG/KG/MIN: 100 INJECTION INTRAVENOUS at 06:06

## 2018-03-09 RX ADMIN — CASTOR OIL AND BALSAM, PERU: 788; 87 OINTMENT TOPICAL at 09:00

## 2018-03-09 RX ADMIN — ACETAMINOPHEN 650 MG: 325 TABLET ORAL at 22:50

## 2018-03-09 RX ADMIN — ACETAMINOPHEN 650 MG: 325 TABLET ORAL at 05:20

## 2018-03-09 RX ADMIN — METRONIDAZOLE 500 MG: 500 INJECTION, SOLUTION INTRAVENOUS at 17:15

## 2018-03-09 RX ADMIN — VANCOMYCIN HYDROCHLORIDE 1000 MG: 1 INJECTION, SOLUTION INTRAVENOUS at 17:15

## 2018-03-09 RX ADMIN — SODIUM CHLORIDE 2 G: 9 INJECTION, SOLUTION INTRAVENOUS at 09:00

## 2018-03-09 RX ADMIN — METRONIDAZOLE 500 MG: 500 INJECTION, SOLUTION INTRAVENOUS at 00:29

## 2018-03-09 RX ADMIN — CASTOR OIL AND BALSAM, PERU: 788; 87 OINTMENT TOPICAL at 21:51

## 2018-03-09 RX ADMIN — ASPIRIN 81 MG 81 MG: 81 TABLET ORAL at 08:59

## 2018-03-09 RX ADMIN — HEPARIN SODIUM 5000 UNITS: 5000 INJECTION, SOLUTION INTRAVENOUS; SUBCUTANEOUS at 21:50

## 2018-03-09 RX ADMIN — GABAPENTIN 600 MG: 300 CAPSULE ORAL at 21:51

## 2018-03-09 RX ADMIN — ACETAMINOPHEN 650 MG: 325 TABLET ORAL at 13:26

## 2018-03-09 RX ADMIN — IPRATROPIUM BROMIDE AND ALBUTEROL SULFATE 3 ML: 2.5; .5 SOLUTION RESPIRATORY (INHALATION) at 19:49

## 2018-03-09 RX ADMIN — SODIUM CHLORIDE 2 G: 9 INJECTION, SOLUTION INTRAVENOUS at 17:15

## 2018-03-09 RX ADMIN — GABAPENTIN 600 MG: 300 CAPSULE ORAL at 13:26

## 2018-03-09 RX ADMIN — IPRATROPIUM BROMIDE AND ALBUTEROL SULFATE 3 ML: 2.5; .5 SOLUTION RESPIRATORY (INHALATION) at 16:31

## 2018-03-09 RX ADMIN — DOXYCYCLINE 100 MG: 100 CAPSULE ORAL at 08:58

## 2018-03-09 RX ADMIN — IPRATROPIUM BROMIDE AND ALBUTEROL SULFATE 3 ML: 2.5; .5 SOLUTION RESPIRATORY (INHALATION) at 12:55

## 2018-03-09 RX ADMIN — IPRATROPIUM BROMIDE AND ALBUTEROL SULFATE 3 ML: 2.5; .5 SOLUTION RESPIRATORY (INHALATION) at 10:01

## 2018-03-09 RX ADMIN — SODIUM CHLORIDE 500 ML: 9 INJECTION, SOLUTION INTRAVENOUS at 06:58

## 2018-03-09 RX ADMIN — SUCRALFATE 1 G: 1 TABLET ORAL at 17:15

## 2018-03-09 RX ADMIN — SOTALOL HYDROCHLORIDE 40 MG: 80 TABLET ORAL at 21:51

## 2018-03-09 RX ADMIN — PANTOPRAZOLE SODIUM 40 MG: 40 TABLET, DELAYED RELEASE ORAL at 08:59

## 2018-03-09 NOTE — SIGNIFICANT NOTE
03/09/18 1632   SLP Deferred Reason   SLP Deferred Reason Patient unavailable for evaluation  (Pt lethargic and resting on multiple attemtps. RN reports limited PO intake today. SLP to follow up 3/10.)

## 2018-03-09 NOTE — SIGNIFICANT NOTE
BP continues to stay low. Changed sotalol order and added holding parameter. Pt was given 120 mg of sotalol around 12 pm on 3/8/18. Given additional 500 mL bolus. And ordered chest xray to address volume status.

## 2018-03-09 NOTE — PROGRESS NOTES
"Pharmacy Consult-Vancomycin Dosing  Alexander Adan is a  77 y.o. male receiving vancomycin therapy.     Indication: Sepsis  Consulting Provider: Hospitalist  ID Consult: Yes (Letitia)    Goal Trough: 15-20 mcg/mL    Current Antimicrobial Therapy  Vancomycin (pharmacy to dose) - day 5  Aztreonam 2g IV q8h - day 5  Doxycyline 100 mg IV q12h - day 3  Metronidazole 500 mg IV q6h - day 3      Allergies  Allergies as of 03/02/2018 - Erik as Reviewed 03/02/2018   Allergen Reaction Noted   • Simvastatin Hives 06/13/2016   • Altace [ramipril] Other (See Comments) 11/02/2016   • Penicillins Hives 06/13/2016   • Rivaroxaban Other (See Comments) 06/13/2016   • Cephalexin Rash 06/13/2016   • Procaine GI Intolerance 06/13/2016   • Ropinirole Anxiety 03/02/2018       Labs  Results from last 7 days   Lab Units 03/07/18  0542 03/06/18  0441 03/05/18  0622   WBC 10*3/mm3 12.41* 8.10 10.77   HEMOGLOBIN g/dL 11.0* 11.1* 10.2*   HEMATOCRIT % 35.3* 35.8* 32.9*   PLATELETS 10*3/mm3 193 140* 179    Results from last 7 days   Lab Units 03/07/18  0542 03/06/18  0441 03/05/18  0622  03/04/18  0519   SODIUM mmol/L 130* 132 138 --  142   POTASSIUM mmol/L 4.8 5.0 5.0 < > 3.5   CHLORIDE mmol/L 110* 112* 115* --  120*   CO2 mmol/L 16.0* 16.0* 19.0* --  17.0*   BUN mg/dL 10 12 14 --  14   CREATININE mg/dL 0.80 0.70 0.80 --  0.80   CALCIUM mg/dL 7.7* 7.3* 7.7* --  8.6*   BILIRUBIN mg/dL 0.2* --  0.3 --  0.2*   ALK PHOS U/L 85 --  75 --  85   ALT (SGPT) U/L 7 --  9 --  10   AST (SGOT) U/L 15 --  18 --  19   GLUCOSE mg/dL 125* 113* 89 --  112*   < > = values in this interval not displayed.      Evaluation of Dosing     Ht - 165.1 cm (65\")  Wt - 65.8 kg (145 lb)    Estimated Creatinine Clearance: 70.8 mL/min (by C-G formula based on Cr of 0.8).    Intake & Output (last 3 days)         03/06 0701 - 03/07 0700 03/07 0701 - 03/08 0700 03/08 0701 - 03/09 0700      P.O. 240 240 600    I.V. (mL/kg)       IV Piggyback  200 410    Total Intake(mL/kg) 240 " (3.6) 440 (6.7) 1010 (15.4)    Urine (mL/kg/hr) 250 (0.2) 1350 (0.9) 750 (0.9)    Stool 0 (0) 0 (0)     Total Output 250 1350 750    Net -10 -910 +260           Unmeasured Urine Occurrence 6 x 3 x 2 x    Unmeasured Stool Occurrence 1 x 1 x             Microbiology and Radiology  Microbiology Results (last 10 days)       Procedure Component Value - Date/Time      Respiratory Culture - Sputum, Cough [[id number]]  (Abnormal)  (Susceptibility) Collected:  03/05/18 0751    Lab Status:  Final result Specimen:  Sputum from Cough Updated:  03/07/18 1210     Respiratory Culture --      Moderate growth (3+) Staphylococcus aureus (A)      This isolate is presumed to be clindamycin resistant based on detection of inducible clindamycin resistance.  Clindamycin may still be effective in some patients.         Moderate growth (3+) Normal Respiratory Tiffanie     Gram Stain Result Many (4+) WBCs per low power field      Moderate (3+) Epithelial cells per low power field      Moderate (3+) Gram positive cocci in pairs and clusters    Susceptibility        Staphylococcus aureus     TORO     Ceftriaxone <=8 ug/ml Susceptible     Clindamycin Resistant     Erythromycin >4 ug/ml Resistant     Gentamicin <=4 ug/ml Susceptible     Levofloxacin <=1 ug/ml Susceptible  [1]      Linezolid <=1 ug/ml Susceptible     Oxacillin <=0.25 ug/ml Susceptible     Penicillin G 2 ug/ml Resistant     Quinupristin + Dalfopristin <=0.5 ug/ml Susceptible     Rifampin <=1 ug/ml Susceptible     Tetracycline >8 ug/ml Resistant     Trimethoprim + Sulfamethoxazole <=0.5/9.5 ug/ml Susceptible     Vancomycin 1 ug/ml Susceptible              [1]   Staphylococcus species may develop resistance during prolonged therapy with quinolones.  Isolates that are initially susceptible may become resistant within three to four days after initiation of therapy. Testing of repeat isolates may be warranted.                   Urine Culture - Urine, Urine, Clean Catch [[id number]]  (Normal)  Collected:  03/05/18 0538    Lab Status:  Final result Specimen:  Urine from Urine, Clean Catch Updated:  03/07/18 0820     Urine Culture No growth at 2 days    Legionella Antigen, Urine - Urine, Urine, Clean Catch [[id number]] Collected:  03/05/18 0538    Lab Status:  Final result Specimen:  Urine from Urine, Clean Catch Updated:  03/07/18 1522     L. pneumophila Serogp 1 Ur Ag Negative      Presumptive negative for L. pneumophila serogroup 1 antigen in urine,  suggesting no recent or current infection. Legionnaires' disease  cannot be ruled out since other serogroups and species may also cause  disease.       Narrative:       Performed at:  [address] [place] [address]  [id number]  : Gilson Gandara MD, Phone:  [phone]    S. Pneumo Ag Urine or CSF - Urine, Urine, Clean Catch [[id number]] Collected:  03/05/18 0538    Lab Status:  Final result Specimen:  Urine from Urine, Clean Catch Updated:  03/07/18 1711     Specimen Source Urine     STREP PNEUMONIAE ANTIGEN Negative     Body Fluid Culture, Sterile Not Indicated     Organism ID Not indicated.     Please note Comment      College of [employer] Pathologists standards require a culture to be  performed on CSF specimens submitted for bacterial antigen testing.  (CAP [id number]) Urine specimens will not be cultured.       Narrative:       Performed at:  [address] [place] [address]  [id number]  : Gilson Gandara MD, Phone:  [phone]    Influenza A & B, RT PCR - Swab, Nasopharynx [[id number]]  (Normal) Collected:  03/04/18 1424    Lab Status:  Final result Specimen:  Swab from Nasopharynx Updated:  03/04/18 1542     Influenza A PCR Not Detected     Influenza B PCR Not Detected    Blood Culture - Blood, [[id number]]  (Normal) Collected:  03/02/18 1305    Lab Status:  Final result Specimen:  Blood from Arm, Left Updated:  03/07/18 1401     Blood Culture No growth at 5 days    Blood Culture -  Blood, [[id number]] Collected:  03/02/18 1235    Lab Status:  Final result Specimen:  Blood from Arm, Left Updated:  03/07/18 1401     Blood Culture No growth at 5 days      Aerobic bottle only    Blood Culture - Blood, [[id number]]  (Normal) Collected:  03/02/18 1231    Lab Status:  Final result Specimen:  Blood from Hand, Right Updated:  03/07/18 1746     Blood Culture No growth at 5 days            Evaluation of Level    Lab Results   Component Value Date    [facility]       3/8 @ 1818 vancomycin trough = 15.9 mcg/mL previous dose administered on 3/7 at 1812.- receiving vancomycin 1000 mg IV q24h. Discussed with RN, although it appears a dose was hung today (3/8) at 1708, before the trough was drawn, the dose has actually not been administered yet.    Assessment/Plan:    1. Continue vancomycin 1000 mg IV q24h last dose currently scheduled 3/10  2. Reassess a vancomycin level in 3-5 days or sooner if change in renal function.  3. Pharmacy will continue to monitor and adjust vancomycin dose as necessary based on renal function, cultures, labs, and clinical status.     Thank you for this consult,    Nohemy Watts, PharmD  3/8/2018  7:15 PM

## 2018-03-09 NOTE — SIGNIFICANT NOTE
Patient still hypotensive tonight. Will hold sotalol again tonight and give 500 cc bolus. Rounding physician should address antihypertensive meds and goals of care.

## 2018-03-09 NOTE — PROGRESS NOTES
Continued Stay Note   [facility]     Patient Name: Alexander Adan  MRN: [id number]  Today's Date: 3/9/2018    Admit Date: 3/2/2018          Discharge Plan       03/09/18 6069    Case Management/Social Work Plan    Plan update    Patient/Family In Agreement With Plan yes    Additional Comments Spoke with patient wife at bedside, patient sleeping and did not arouse, who indicates that there was no indication when he may be discharged.  No new discharge needs noted.  [facility]  CM following.  Patient plan is to discharge to [facility] via car with family to transport when medically ready.              Discharge Codes     None        Expected Discharge Date and Time     Expected Discharge Date Expected Discharge Time    Mar 9, 2018             Randee Singleton RN

## 2018-03-09 NOTE — PLAN OF CARE
Problem: Arrhythmia/Dysrhythmia (Symptomatic) (Adult)  Goal: Signs and Symptoms of Listed Potential Problems Will be Absent or Manageable (Arrhythmia/Dysrhythmia)  Outcome: Ongoing (interventions implemented as appropriate)      Problem: Nutrition, Imbalanced: Inadequate Oral Intake (Adult)  Goal: Improved Oral Intake  Outcome: Ongoing (interventions implemented as appropriate)    Goal: Prevent Further Weight Loss  Outcome: Ongoing (interventions implemented as appropriate)      Problem: Infection, Risk/Actual (Adult)  Goal: Infection Prevention/Resolution  Outcome: Ongoing (interventions implemented as appropriate)      Problem: Skin Integrity Impairment, Risk/Actual (Adult)  Goal: Skin Integrity/Wound Healing  Outcome: Ongoing (interventions implemented as appropriate)      Problem: Fall Risk (Adult)  Goal: Absence of Falls  Outcome: Ongoing (interventions implemented as appropriate)      Problem: Patient Care Overview (Adult)  Goal: Plan of Care Review  Outcome: Ongoing (interventions implemented as appropriate)    Goal: Adult Individualization and Mutuality  Outcome: Ongoing (interventions implemented as appropriate)    Goal: Discharge Needs Assessment  Outcome: Ongoing (interventions implemented as appropriate)

## 2018-03-09 NOTE — PROGRESS NOTES
[facility] Progress Note        Antibiotics:  Anti-Infectives     Ordered     Dose/Rate Route Frequency Start Stop    18 0945  aztreonam (AZACTAM) 2 g in sodium chloride 0.9 % 100 mL IVPB-MBP     Ordering Provider:  Gilson Mustafa IV RPH    2 g  200 mL/hr over 30 Minutes Intravenous Every 8 Hours 18 0200 18 0959    18 1100  Pharmacy to dose vancomycin     Comments:  Alexander Adan  6B, N-644  By Chrissy Sullivan MD   Ordering Provider:  Enoch Dong MD     Does not apply Continuous PRN 18 1100 03/15/18 1159    18 0815  doxycycline (MONODOX) capsule 100 mg     Ordering Provider:  Marcelo Head RPH    100 mg Oral Every 12 Hours Scheduled 18 0900 18 0859    18 1634  metroNIDAZOLE (FLAGYL) IVPB 500 mg     Ordering Provider:  Enoch Dong MD    500 mg  100 mL/hr over 60 Minutes Intravenous Every 6 Hours 18 1800 18 0559    18 1634  aztreonam (AZACTAM) in SWFI 2 grams/10ml IV PUSH syringe     Gilson Mustafa IV [facility] reviewed the order on 18 0945.   Ordering Provider:  Gilson Mustafa IV RPH    2 g  over 5 Minutes Intravenous Every 8 Hours 18 1800 18 1713    18 1709  vancomycin (VANCOCIN) in iso-osmotic dextrose IVPB 1 g (premix) 200 mL     Gilson Mustafa IV [facility] reviewed the order on 18 1256.   Ordering Provider:  Gilson Mustafa IV, RPH    15 mg/kg × 65.8 kg  over 60 Minutes Intravenous Every 24 Hours 18 1800 18 1759    18 1657  Pharmacy to dose vancomycin     Comments:  Alexander Adan  6B, N-644  By Chrissy Mustafa IV [facility] let the order  on 18 1101.   Ordering Provider:  Chrissy Sullivan MD     Does not apply Continuous PRN 18 1657 18 1656    18 1138  aztreonam (AZACTAM) in SWFI 2 grams/10ml IV PUSH syringe     Ordering Provider:  Galileo Woodruff MD    2 g  over 5 Minutes Intravenous Once 18 1240 18 4312     03/02/18 1138  vancomycin 1250 mg/250 mL 0.9% NS IVPB ([facility])     Ordering Provider:  Galileo Woodruff MD    20 mg/kg × 65.8 kg Intravenous Once 03/02/18 1140 03/02/18 1327          CC: parotitis, pneumonia    HPI:  Patient is a 77 y.o.  Yr old male with history of numerous comorbid conditions including past stroke/subdural hematoma and evacuation with bur hole 2015 with chronic kidney disease/debility.  Family reports chronic dysphagia symptoms such that he coughs with liquid/solids but no specific esophageal diagnosis.  He is admitted on March 2 with 3 days poor by mouth intake, generalized malaise with dry cough and right facial/parotid pain.       3/8/18 Patient more awake but still drowsy at my visit.  Reliability not entirely clear as he has had encephalopathy surrounding admission, albeit less.  However, no headache photophobia or neck stiffness.  No chest pain.  No productive cough at present although has been intermittently productive at times.  No dysuria hematuria or pyuria.  No skin rash.  No diarrhea or abdominal pain.  He is constipated per family.  No hematochezia melena or hematemesis. Family says he is improving     ROS:      3/8/18 Limited  and reliability unclear, but answers no to the following:    No f/c/s. No n/v/d. No rash. No new ADR to Abx.     Constitutional-- No Fever, chills or sweats.  Appetite good, and no malaise. No fatigue.  Heent-- No new vision, hearing or throat complaints.  No epistaxis or oral sores.  Denies odynophagia or dysphagia.  No flashers, floaters or eye pain. No odynophagia or dysphagia. No headache, photophobia or neck stiffness.  CV-- No chest pain, palpitation or syncope  Resp-- No SOB/cough/Hemoptysis  GI- No nausea, vomiting, or diarrhea.  No hematochezia, melena, or hematemesis. Denies jaundice or chronic liver disease.  -- No dysuria, hematuria, or flank pain.  Denies hesitancy, urgency or flank pain.  Lymph- no swollen lymph nodes in neck/axilla or groin.  "  Heme- No active bruising or bleeding; no Hx of DVT or PE.  MS-- no swelling or pain in the bones or joints of arms/legs.  No new back pain.  Neuro-- No acute focal weakness or numbness in the arms or legs.  No seizures.    Full 12 point review of systems reviewed and negative otherwise for acute complaints,       PE: nursing/chaperone present  BP (!) 81/42 Comment: RN aware.  Pulse 86  Temp 97.8 °F (36.6 °C) (Oral)   Resp 20  Ht 165.1 cm (65\") Comment: Ht reported by pt's wife; c/w other EHR documentation  Wt 65.8 kg (145 lb)  SpO2 98%  BMI 22.05 kg/m2    GENERAL: Thin chronically ill-appearing and elderly, sleepy but answers questions  HEENT: Normocephalic, atraumatic.  Left eyelid droop compared to right is chronic according to family. No conjunctival injection. No icterus. Oropharynx clear without evidence of thrush or exudate. No evidence of peridontal disease.  Also see below   NECK: Supple without nuchal rigidity. No mass.  LYMPH: No cervical, axillary or inguinal lymphadenopathy.  HEART: Tachycardic; No murmur, rubs, gallops.   LUNGS: Diminished at bases with rhonchi more so on the right than left. Normal respiratory effort. Nonlabored. No dullness.  ABDOMEN: Soft, nontender, nondistended. Positive bowel sounds. No rebound or guarding. NO mass or HSM.  EXT:  No cyanosis, clubbing or edema. No cord.  : Genitalia generally unremarkable.  Without Aguilar catheter.  MSK: FROM without joint effusions noted arms/legs.    SKIN: Warm and dry without cutaneous eruptions on Inspection/palpation.    NEURO: He does not cooperate with a detailed motor/sensory exam but does move all 4 extremities.     No peripheral stigmata/phenomena of endocarditis     Right face/parotid gland with vague erythema/induration and tenderness.  No fluctuance.  No crepitus or bulla.       Laboratory Data      Results from last 7 days  Lab Units 03/09/18  0503 03/07/18  0542 03/06/18  0441   WBC 10*3/mm3 14.34* 12.41* 8.10   HEMOGLOBIN " g/dL 9.7* 11.0* 11.1*   HEMATOCRIT % 31.3* 35.3* 35.8*   PLATELETS 10*3/mm3 182 193 140*       Results from last 7 days  Lab Units 03/09/18  0639   SODIUM mmol/L 137   POTASSIUM mmol/L 4.6   CHLORIDE mmol/L 111*   CO2 mmol/L 16.0*   BUN mg/dL 13   CREATININE mg/dL 0.80   GLUCOSE mg/dL 84   CALCIUM mg/dL 7.3*       Results from last 7 days  Lab Units 03/09/18  0639   ALK PHOS U/L 64   BILIRUBIN mg/dL 0.2*   ALT (SGPT) U/L 10   AST (SGOT) U/L 17               Estimated Creatinine Clearance: 70.8 mL/min (by C-G formula based on Cr of 0.8).      Microbiology:      Radiology:  Imaging Results (last 72 hours)     Procedure Component Value Units Date/Time    CT Head Without Contrast [[id number]] Collected:  03/02/18 1231     Updated:  03/02/18 1300    Narrative:       EXAMINATION: CT HEAD WO CONTRAST-      INDICATION: Encephalopathy.      TECHNIQUE: Axial CT of the head without intravenous contrast  administration.     The radiation dose reduction device was turned on for each scan per the  ALARA (As Low as Reasonably Achievable) protocol.     COMPARISON: 02/16/2018.     FINDINGS: Midline structures are symmetric without evidence of mass,  mass effect or midline shift. No intraaxial hemorrhage or extraaxial  fluid collection. Ventricles and sulci are prominent consistent with  components of atrophy. Mild to moderate attenuation changes within the  periventricular and deep white matter consistent with chronic small  vessel ischemic disease. Globes and orbits are partially visualized and  grossly unremarkable. Paranasal sinuses and mastoid air cells  demonstrate total opacification of the right maxillary sinus as well as  opacification in the right ethmoid air cells and sphenoid sinus.  Calvarium demonstrates prior [place] holes left frontal and left temporal  regions otherwise grossly intact.       Impression:       No acute intracranial abnormality.     D:  03/02/2018  E:  03/02/2018     This report was finalized on 3/2/2018  12:58 PM by Dr. Maurice Alas.       CT Abdomen Pelvis Without Contrast [[id number]] Collected:  03/02/18 1254     Updated:  03/02/18 1300    Narrative:       EXAMINATION: CT ABDOMEN AND PELVIS WO CONTRAST-      INDICATION: Abdomen pain, guarding, urinary incontinence.      TECHNIQUE: CT abdomen and pelvis without intravenous contrast  administration.     The radiation dose reduction device was turned on for each scan per the  ALARA (As Low as Reasonably Achievable) protocol.     COMPARISON: 08/05/2017.     FINDINGS: Motion degraded examination particularly within the  subdiaphragmatic soft tissues.  Lung bases demonstrate patchy  tree-in-bud opacifications involving much of the right lower lobe and  visualized right middle lobe consistent with infectious or inflammatory  etiology, however, no focal lobar consolidation. Left hemithorax  partially visualized and grossly clear. Liver without focal lesion.  Gallbladder is surgically absent. Pancreas and spleen within normal  limits. Adrenals without distinct nodule. Kidneys without hydronephrosis  or hydroureter. No obstructive uropathy or urolithiasis. Atherosclerotic  nonaneurysmal abdominal aorta. No bulky retroperitoneal adenopathy. GI  tract evaluation without focal thickening or disproportionate dilatation  of bowel. No free fluid or intraabdominal free air. Pelvic viscera  unremarkable without bulky pelvic adenopathy or significant free fluid.  Degenerative changes of the spine and pelvis without aggressive osseous  abnormality identified within the limited evaluation due to motion. No  soft tissue body wall findings of concern.       Impression:       1. Limited evaluation due to significant patient motion artifact  limiting the subdiaphragmatic soft tissues, however, the limits of the  study there is scattered tree-in-bud opacifications involving the  visualized right middle lobe and right lower lobe along with nodularity  consistent of acute infectious or  inflammatory etiology without lobar  consolidation identified. No significant pleural effusion.  2. No acute intraabdominal abnormality within the limited evaluation due  to significant motion specifically no disproportionate dilatation of  bowel to suggest mechanical obstructive process or loculated  intraabdominal fluid collection.     D:  03/02/2018  E:  03/02/2018     This report was finalized on 3/2/2018 12:58 PM by Dr. Maurice Alas.       XR Chest 1 View [[id number]] Collected:  03/02/18 1131     Updated:  03/02/18 1301    Narrative:       EXAMINATION: XR CHEST 1 VW- 03/02/2018     INDICATION: weakness, altered mental status      COMPARISON: Chest x-ray 02/16/2018.     FINDINGS: Cardiac size within normal limits. Post CABG and post median  sternotomy changes. No focal opacification or consolidation. No  pneumothorax or pleural effusion. Left chest wall pacing device with  leads intact. Degenerative changes of the thoracic spine.           Impression:       No acute cardiopulmonary process.     D:  03/02/2018  E:  03/02/2018     This report was finalized on 3/2/2018 12:58 PM by Dr. Maurice Alas.       CT Soft Tissue Neck With Contrast [[id number]] Collected:  03/02/18 1627     Updated:  03/02/18 1646    Narrative:       EXAMINATION: CT NECK SOFT TISSUES W/CONTRAST - 03/02/2018      INDICATION: K11.21-Acute sialoadenitis; J01.00-Acute maxillary  sinusitis, unspecified; R53.1-Weakness.      TECHNIQUE: CT neck soft tissues with intravenous contrast  administration.     The radiation dose reduction device was turned on for each scan per the  ALARA (As Low as Reasonably Achievable) protocol.     COMPARISON: None.     FINDINGS: For intracranial findings, please see recent CT head report  performed the same day. Total opacification right maxillary sinus  consistent with components of sinusitis. Severely asymmetric appearance  of the right parotid gland which is nearly entirely replaced by  heterogeneous enhancing soft  tissue attenuation involving the  superficial and deep lobes with overall dimensions measuring  approximately 4.6 x 4.0 cm, indicative of soft tissue mass involvement.  There is no discrete osseous erosion of the adjacent mandible or  temporomandibular joint with right mastoid air cells grossly clear.  Parapharyngeal spaces are preserved with supraglottic soft tissues,  unremarkable. Valleculae and piriform sinuses within normal limits. The  vocal cords and subglottic trachea are unremarkable. Visualized portions  of the lung apices are grossly unremarkable. Thyroid is homogeneous in  attenuation. No bulky cervical adenopathy is identified.       Impression:       Soft tissue mass involvement of the superficial and deep  lobes of the right parotid gland. 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 No adjacent osseous erosion. No bulky cervical  adenopathy otherwise identified. Opacification of the right maxillary  sinus again noted consistent with sinusitis.     DICTATED:     03/02/2018  EDITED    :     03/02/2018      This report was finalized on 3/2/2018 4:44 PM by Dr. Maurice Alas.       XR Chest 1 View [[id number]] Collected:  03/04/18 1701     Updated:  03/04/18 1857    Narrative:          EXAMINATION: XR CHEST 1 VW - 03/04/2018     INDICATION:  K11.21-Acute sialoadenitis; J01.00-Acute maxillary  sinusitis, unspecified; R53.1-Weakness; R13.10-Dysphagia, unspecified.      COMPARISON: 03/02/2018.     FINDINGS: Portable chest reveals PICC line placement with tip in the  SVC. There are chronic changes identified within the lung fields with  mild increased markings seen within the right middle lower lung field.  Development of a small right pleural effusion. Heart is borderline  enlarged with slight prominence of the pulmonary vascularity.           Impression:        PICC line catheter on the right with tip in the SVC.  Interval development of mild increased markings within the right mid to  lower lung field as well as development of a small right pleural  effusion.     DICTATED:     03/04/2018  EDITED    :     03/04/2018      This report was finalized on 3/4/2018 6:55 PM by Dr. Donna Matthews MD.               Impression:   --Acute sepsis with leukocytosis/fever and tachycardia, associated with encephalopathy and likely severe sepsis.  Primary focal symptoms/findings include right parotitis and acute right lower lobe pneumonia with broad spectrum antimicrobials.  No preceding symptoms of central nervous system focus and no meningismus at present.  Likely metabolic encephalopathy associated with sepsis.  Abdomen benign by exam with no intra-abdominal pathology per radiology on CT scan, although motion artifact and if symptoms persist/recur, then you should give consideration to further GI workup by either repeat imaging or GI evaluation.  No diarrhea at present but if it develops you could consider stool studies.  Urinalysis not consistent with UTI.  No obvious soft tissue source other than above.     --Acute right parotitis;  gram-positive organisms are a primary concern.  In addition there is description of a masslike quality by radiology and he will likely need to give consideration to ENT input.  I discussed with Dr. Yu;  no focal abscess so far per radiology.     --Acute cough with right lower lobe pneumonia by imaging and risk for community-acquired pathogens/aspiration.  Broad-spectrum coverage ongoing. Culture with S Aureus and usual pankaj so far.  Ordered urine Legionella and strep antigens and influenza PCR.     --Dysphagia.  Family reports difficulty with solids/liquids and cough.  Speech therapy has seen.  You'll likely need to give consideration to GI consultation for EGD as well.     --Acute encephalopathy.  Family reports this is common with acute  illness for him.  Further neurologic workup per internal medicine at their discretion.  Preceding symptoms and current exam not consistent with meningismus.  Improving in general per family back towards baseline     --Chronic kidney disease stage III per records.  Monitor to help guide further adjustments with antimicrobials     --QTc > 500 ms EKG    --TCP;  Monitor    --hypotension;  Medicine workup/intervention on going;  Afebrile, no other focal symptoms;  Repeat blood cultures neagtive so far and no new focal symptoms but monitor for evolving focus       PLAN:     --IV vancomycin/Azactam/Flagyl and oral doxycycline     --Monitor IV and IV antibiotic with risk for systemic complication and potential drug interaction     --Check/review labs cultures and scans     --Highly complex set of problems with high risk for further serious morbidity and other serious sequela     --You will likely need to give consideration to ENT consultation and also GI consultation/EGD; d/w Dr. Yu     --Discussed with Dr Yu.  d/w micro     --Partial history per nursing staff as well    --partners to cover over weekend      Enoch Dong MD  3/9/2018

## 2018-03-09 NOTE — PLAN OF CARE
Problem: Arrhythmia/Dysrhythmia (Symptomatic) (Adult)  Goal: Signs and Symptoms of Listed Potential Problems Will be Absent or Manageable (Arrhythmia/Dysrhythmia)  Outcome: Ongoing (interventions implemented as appropriate)      Problem: Infection, Risk/Actual (Adult)  Goal: Infection Prevention/Resolution  Outcome: Ongoing (interventions implemented as appropriate)      Problem: Skin Integrity Impairment, Risk/Actual (Adult)  Goal: Skin Integrity/Wound Healing  Outcome: Ongoing (interventions implemented as appropriate)      Problem: Fall Risk (Adult)  Goal: Absence of Falls  Outcome: Ongoing (interventions implemented as appropriate)      Problem: Patient Care Overview (Adult)  Goal: Plan of Care Review  Outcome: Ongoing (interventions implemented as appropriate)    Goal: Discharge Needs Assessment  Outcome: Ongoing (interventions implemented as appropriate)

## 2018-03-10 ENCOUNTER — APPOINTMENT (OUTPATIENT)
Dept: CARDIOLOGY | Facility: HOSPITAL | Age: 78
End: 2018-03-10
Attending: INTERNAL MEDICINE

## 2018-03-10 LAB
ALBUMIN SERPL-MCNC: 2 G/DL (ref 3.2–4.8)
ALBUMIN/GLOB SERPL: 0.8 G/DL (ref 1.5–2.5)
ALP SERPL-CCNC: 60 U/L (ref 25–100)
ALT SERPL W P-5'-P-CCNC: 6 U/L (ref 7–40)
ANION GAP SERPL CALCULATED.3IONS-SCNC: 4 MMOL/L (ref 3–11)
AST SERPL-CCNC: 8 U/L (ref 0–33)
BASOPHILS # BLD AUTO: 0.03 10*3/MM3 (ref 0–0.2)
BASOPHILS NFR BLD AUTO: 0.3 % (ref 0–1)
BH CV ECHO MEAS - AO ROOT AREA (BSA CORRECTED): 1.8
BH CV ECHO MEAS - AO ROOT AREA: 7.8 CM^2
BH CV ECHO MEAS - AO ROOT DIAM: 3.1 CM
BH CV ECHO MEAS - BSA(HAYCOCK): 1.7 M^2
BH CV ECHO MEAS - BSA: 1.7 M^2
BH CV ECHO MEAS - BZI_BMI: 24.1 KILOGRAMS/M^2
BH CV ECHO MEAS - BZI_METRIC_HEIGHT: 165.1 CM
BH CV ECHO MEAS - BZI_METRIC_WEIGHT: 65.8 KG
BH CV ECHO MEAS - CONTRAST EF (2CH): 57.6 ML/M^2
BH CV ECHO MEAS - CONTRAST EF 4CH: 47.5 ML/M^2
BH CV ECHO MEAS - EDV(CUBED): 98.3 ML
BH CV ECHO MEAS - EDV(MOD-SP2): 59 ML
BH CV ECHO MEAS - EDV(MOD-SP4): 59 ML
BH CV ECHO MEAS - EDV(TEICH): 98.1 ML
BH CV ECHO MEAS - EF(CUBED): 26 %
BH CV ECHO MEAS - EF(MOD-SP2): 57.6 %
BH CV ECHO MEAS - EF(MOD-SP4): 47.5 %
BH CV ECHO MEAS - EF(TEICH): 21.1 %
BH CV ECHO MEAS - ESV(CUBED): 72.7 ML
BH CV ECHO MEAS - ESV(MOD-SP2): 25 ML
BH CV ECHO MEAS - ESV(MOD-SP4): 31 ML
BH CV ECHO MEAS - ESV(TEICH): 77.4 ML
BH CV ECHO MEAS - FS: 9.6 %
BH CV ECHO MEAS - IVS/LVPW: 0.99
BH CV ECHO MEAS - IVSD: 1.1 CM
BH CV ECHO MEAS - LAT PEAK E' VEL: 14.4 CM/SEC
BH CV ECHO MEAS - LV DIASTOLIC VOL/BSA (35-75): 34.2 ML/M^2
BH CV ECHO MEAS - LV MASS(C)D: 194.7 GRAMS
BH CV ECHO MEAS - LV MASS(C)DI: 112.9 GRAMS/M^2
BH CV ECHO MEAS - LV SYSTOLIC VOL/BSA (12-30): 18 ML/M^2
BH CV ECHO MEAS - LVIDD: 4.6 CM
BH CV ECHO MEAS - LVIDS: 4.2 CM
BH CV ECHO MEAS - LVLD AP2: 6.4 CM
BH CV ECHO MEAS - LVLD AP4: 6 CM
BH CV ECHO MEAS - LVLS AP2: 5.7 CM
BH CV ECHO MEAS - LVLS AP4: 5.6 CM
BH CV ECHO MEAS - LVOT AREA (M): 3.8 CM^2
BH CV ECHO MEAS - LVOT AREA: 3.7 CM^2
BH CV ECHO MEAS - LVOT DIAM: 2.2 CM
BH CV ECHO MEAS - LVPWD: 1.2 CM
BH CV ECHO MEAS - MED PEAK E' VEL: 10.4 CM/SEC
BH CV ECHO MEAS - PA ACC SLOPE: 553 CM/SEC^2
BH CV ECHO MEAS - PA ACC TIME: 0.12 SEC
BH CV ECHO MEAS - PA MAX PG: 4.1 MMHG
BH CV ECHO MEAS - PA PR(ACCEL): 22.8 MMHG
BH CV ECHO MEAS - PA V2 MAX: 101.6 CM/SEC
BH CV ECHO MEAS - SI(CUBED): 14.8 ML/M^2
BH CV ECHO MEAS - SI(MOD-SP2): 19.7 ML/M^2
BH CV ECHO MEAS - SI(MOD-SP4): 16.2 ML/M^2
BH CV ECHO MEAS - SI(TEICH): 12 ML/M^2
BH CV ECHO MEAS - SV(CUBED): 25.6 ML
BH CV ECHO MEAS - SV(MOD-SP2): 34 ML
BH CV ECHO MEAS - SV(MOD-SP4): 28 ML
BH CV ECHO MEAS - SV(TEICH): 20.7 ML
BH CV ECHO MEAS - TAPSE (>1.6): 1.1 CM2
BH CV ECHO MEAS - TR MAX VEL: 231.7 CM/SEC
BH CV XLRA - RV BASE: 2.2 CM
BH CV XLRA - RV LENGTH: 4.6 CM
BH CV XLRA - RV MID: 1.5 CM
BH CV XLRA - TDI S': 12.2 CM/SEC
BILIRUB SERPL-MCNC: 0.2 MG/DL (ref 0.3–1.2)
BUN BLD-MCNC: 12 MG/DL (ref 9–23)
BUN/CREAT SERPL: 15 (ref 7–25)
CALCIUM SPEC-SCNC: 7.9 MG/DL (ref 8.7–10.4)
CHLORIDE SERPL-SCNC: 115 MMOL/L (ref 99–109)
CO2 SERPL-SCNC: 18 MMOL/L (ref 20–31)
CORTIS SERPL-MCNC: 19 MCG/DL
CORTIS SERPL-MCNC: 27.3 MCG/DL
CORTIS SERPL-MCNC: 38.4 MCG/DL
CREAT BLD-MCNC: 0.8 MG/DL (ref 0.6–1.3)
DEPRECATED RDW RBC AUTO: 64.9 FL (ref 37–54)
EOSINOPHIL # BLD AUTO: 0.11 10*3/MM3 (ref 0–0.3)
EOSINOPHIL NFR BLD AUTO: 0.9 % (ref 0–3)
ERYTHROCYTE [DISTWIDTH] IN BLOOD BY AUTOMATED COUNT: 18.1 % (ref 11.3–14.5)
GFR SERPL CREATININE-BSD FRML MDRD: 93 ML/MIN/1.73
GLOBULIN UR ELPH-MCNC: 2.4 GM/DL
GLUCOSE BLD-MCNC: 83 MG/DL (ref 70–100)
HCT VFR BLD AUTO: 28.5 % (ref 38.9–50.9)
HGB BLD-MCNC: 8.8 G/DL (ref 13.1–17.5)
IMM GRANULOCYTES # BLD: 0.09 10*3/MM3 (ref 0–0.03)
IMM GRANULOCYTES NFR BLD: 0.8 % (ref 0–0.6)
LEFT ATRIUM VOLUME INDEX: 48 ML/M2
LYMPHOCYTES # BLD AUTO: 1.19 10*3/MM3 (ref 0.6–4.8)
LYMPHOCYTES NFR BLD AUTO: 10.1 % (ref 24–44)
MAGNESIUM SERPL-MCNC: 1.5 MG/DL (ref 1.3–2.7)
MAXIMAL PREDICTED HEART RATE: 142 BPM
MCH RBC QN AUTO: 30.4 PG (ref 27–31)
MCHC RBC AUTO-ENTMCNC: 30.9 G/DL (ref 32–36)
MCV RBC AUTO: 98.6 FL (ref 80–99)
MONOCYTES # BLD AUTO: 1.25 10*3/MM3 (ref 0–1)
MONOCYTES NFR BLD AUTO: 10.6 % (ref 0–12)
NEUTROPHILS # BLD AUTO: 9.07 10*3/MM3 (ref 1.5–8.3)
NEUTROPHILS NFR BLD AUTO: 77.3 % (ref 41–71)
PHOSPHATE SERPL-MCNC: 2.4 MG/DL (ref 2.4–5.1)
PLATELET # BLD AUTO: 178 10*3/MM3 (ref 150–450)
PMV BLD AUTO: 10.9 FL (ref 6–12)
POTASSIUM BLD-SCNC: 3.8 MMOL/L (ref 3.5–5.5)
PROCALCITONIN SERPL-MCNC: 0.25 NG/ML
PROT SERPL-MCNC: 4.4 G/DL (ref 5.7–8.2)
RBC # BLD AUTO: 2.89 10*6/MM3 (ref 4.2–5.76)
SODIUM BLD-SCNC: 137 MMOL/L (ref 132–146)
STRESS TARGET HR: 121 BPM
WBC NRBC COR # BLD: 11.74 10*3/MM3 (ref 3.5–10.8)

## 2018-03-10 PROCEDURE — 92610 EVALUATE SWALLOWING FUNCTION: CPT

## 2018-03-10 PROCEDURE — 94799 UNLISTED PULMONARY SVC/PX: CPT

## 2018-03-10 PROCEDURE — 93306 TTE W/DOPPLER COMPLETE: CPT | Performed by: INTERNAL MEDICINE

## 2018-03-10 PROCEDURE — 93306 TTE W/DOPPLER COMPLETE: CPT

## 2018-03-10 PROCEDURE — 97110 THERAPEUTIC EXERCISES: CPT

## 2018-03-10 PROCEDURE — 80053 COMPREHEN METABOLIC PANEL: CPT | Performed by: INTERNAL MEDICINE

## 2018-03-10 PROCEDURE — 25010000002 COSYNTROPIN PER 0.25 MG: Performed by: INTERNAL MEDICINE

## 2018-03-10 PROCEDURE — 99233 SBSQ HOSP IP/OBS HIGH 50: CPT | Performed by: INTERNAL MEDICINE

## 2018-03-10 PROCEDURE — 25010000002 VANCOMYCIN PER 500 MG

## 2018-03-10 PROCEDURE — 82533 TOTAL CORTISOL: CPT | Performed by: INTERNAL MEDICINE

## 2018-03-10 PROCEDURE — 83735 ASSAY OF MAGNESIUM: CPT | Performed by: INTERNAL MEDICINE

## 2018-03-10 PROCEDURE — 25010000002 MAGNESIUM SULFATE 2 GM/50ML SOLUTION: Performed by: INTERNAL MEDICINE

## 2018-03-10 PROCEDURE — 97530 THERAPEUTIC ACTIVITIES: CPT

## 2018-03-10 PROCEDURE — 94640 AIRWAY INHALATION TREATMENT: CPT

## 2018-03-10 PROCEDURE — 94760 N-INVAS EAR/PLS OXIMETRY 1: CPT

## 2018-03-10 PROCEDURE — 25010000002 HEPARIN (PORCINE) PER 1000 UNITS: Performed by: INTERNAL MEDICINE

## 2018-03-10 PROCEDURE — 84145 PROCALCITONIN (PCT): CPT | Performed by: INTERNAL MEDICINE

## 2018-03-10 PROCEDURE — 85025 COMPLETE CBC W/AUTO DIFF WBC: CPT | Performed by: INTERNAL MEDICINE

## 2018-03-10 PROCEDURE — 84100 ASSAY OF PHOSPHORUS: CPT | Performed by: INTERNAL MEDICINE

## 2018-03-10 RX ORDER — COSYNTROPIN 0.25 MG/ML
0.25 INJECTION, POWDER, FOR SOLUTION INTRAMUSCULAR; INTRAVENOUS ONCE
Status: COMPLETED | OUTPATIENT
Start: 2018-03-10 | End: 2018-03-10

## 2018-03-10 RX ADMIN — CILOSTAZOL 50 MG: 50 TABLET ORAL at 21:46

## 2018-03-10 RX ADMIN — AMITRIPTYLINE HYDROCHLORIDE 10 MG: 10 TABLET, FILM COATED ORAL at 21:45

## 2018-03-10 RX ADMIN — TRAMADOL HYDROCHLORIDE 50 MG: 50 TABLET, COATED ORAL at 17:44

## 2018-03-10 RX ADMIN — PANTOPRAZOLE SODIUM 40 MG: 40 TABLET, DELAYED RELEASE ORAL at 17:44

## 2018-03-10 RX ADMIN — HEPARIN SODIUM 5000 UNITS: 5000 INJECTION, SOLUTION INTRAVENOUS; SUBCUTANEOUS at 05:59

## 2018-03-10 RX ADMIN — ACETAMINOPHEN 650 MG: 325 TABLET ORAL at 21:45

## 2018-03-10 RX ADMIN — SODIUM CHLORIDE 2 G: 9 INJECTION, SOLUTION INTRAVENOUS at 17:43

## 2018-03-10 RX ADMIN — METRONIDAZOLE 500 MG: 500 INJECTION, SOLUTION INTRAVENOUS at 06:00

## 2018-03-10 RX ADMIN — COSYNTROPIN 0.25 MG: 0.25 INJECTION, POWDER, LYOPHILIZED, FOR SOLUTION INTRAMUSCULAR; INTRAVENOUS at 12:09

## 2018-03-10 RX ADMIN — SUCRALFATE 1 G: 1 TABLET ORAL at 08:59

## 2018-03-10 RX ADMIN — SODIUM CHLORIDE 1000 ML: 9 INJECTION, SOLUTION INTRAVENOUS at 22:16

## 2018-03-10 RX ADMIN — METRONIDAZOLE 500 MG: 500 INJECTION, SOLUTION INTRAVENOUS at 00:41

## 2018-03-10 RX ADMIN — DOXYCYCLINE 100 MG: 100 CAPSULE ORAL at 08:59

## 2018-03-10 RX ADMIN — METRONIDAZOLE 500 MG: 500 INJECTION, SOLUTION INTRAVENOUS at 17:44

## 2018-03-10 RX ADMIN — TRAMADOL HYDROCHLORIDE 50 MG: 50 TABLET, COATED ORAL at 05:01

## 2018-03-10 RX ADMIN — SODIUM CHLORIDE 2 G: 9 INJECTION, SOLUTION INTRAVENOUS at 01:50

## 2018-03-10 RX ADMIN — SODIUM CHLORIDE 2 G: 9 INJECTION, SOLUTION INTRAVENOUS at 09:00

## 2018-03-10 RX ADMIN — NICOTINE 1 PATCH: 14 PATCH TRANSDERMAL at 09:00

## 2018-03-10 RX ADMIN — METRONIDAZOLE 500 MG: 500 INJECTION, SOLUTION INTRAVENOUS at 11:44

## 2018-03-10 RX ADMIN — ASPIRIN 81 MG 81 MG: 81 TABLET ORAL at 08:59

## 2018-03-10 RX ADMIN — GABAPENTIN 600 MG: 300 CAPSULE ORAL at 21:45

## 2018-03-10 RX ADMIN — IPRATROPIUM BROMIDE AND ALBUTEROL SULFATE 3 ML: 2.5; .5 SOLUTION RESPIRATORY (INHALATION) at 16:15

## 2018-03-10 RX ADMIN — IPRATROPIUM BROMIDE AND ALBUTEROL SULFATE 3 ML: 2.5; .5 SOLUTION RESPIRATORY (INHALATION) at 20:30

## 2018-03-10 RX ADMIN — HEPARIN SODIUM 5000 UNITS: 5000 INJECTION, SOLUTION INTRAVENOUS; SUBCUTANEOUS at 21:58

## 2018-03-10 RX ADMIN — MAGNESIUM SULFATE HEPTAHYDRATE 2 G: 40 INJECTION, SOLUTION INTRAVENOUS at 11:44

## 2018-03-10 RX ADMIN — CASTOR OIL AND BALSAM, PERU: 788; 87 OINTMENT TOPICAL at 09:03

## 2018-03-10 RX ADMIN — VANCOMYCIN HYDROCHLORIDE 1000 MG: 1 INJECTION, SOLUTION INTRAVENOUS at 17:43

## 2018-03-10 RX ADMIN — CILOSTAZOL 50 MG: 50 TABLET ORAL at 08:59

## 2018-03-10 RX ADMIN — GABAPENTIN 600 MG: 300 CAPSULE ORAL at 05:59

## 2018-03-10 RX ADMIN — MAGNESIUM SULFATE HEPTAHYDRATE 2 G: 40 INJECTION, SOLUTION INTRAVENOUS at 14:08

## 2018-03-10 RX ADMIN — PANTOPRAZOLE SODIUM 40 MG: 40 TABLET, DELAYED RELEASE ORAL at 08:59

## 2018-03-10 RX ADMIN — IPRATROPIUM BROMIDE AND ALBUTEROL SULFATE 3 ML: 2.5; .5 SOLUTION RESPIRATORY (INHALATION) at 07:37

## 2018-03-10 RX ADMIN — SUCRALFATE 1 G: 1 TABLET ORAL at 17:44

## 2018-03-10 RX ADMIN — CASTOR OIL AND BALSAM, PERU: 788; 87 OINTMENT TOPICAL at 21:30

## 2018-03-10 RX ADMIN — MAGNESIUM SULFATE HEPTAHYDRATE 2 G: 40 INJECTION, SOLUTION INTRAVENOUS at 08:59

## 2018-03-10 RX ADMIN — GABAPENTIN 600 MG: 300 CAPSULE ORAL at 14:09

## 2018-03-10 RX ADMIN — HEPARIN SODIUM 5000 UNITS: 5000 INJECTION, SOLUTION INTRAVENOUS; SUBCUTANEOUS at 14:09

## 2018-03-10 RX ADMIN — TRAMADOL HYDROCHLORIDE 50 MG: 50 TABLET, COATED ORAL at 11:43

## 2018-03-10 RX ADMIN — POTASSIUM PHOSPHATE, MONOBASIC AND POTASSIUM PHOSPHATE, DIBASIC 15 MMOL: 224; 236 INJECTION, SOLUTION INTRAVENOUS at 14:08

## 2018-03-10 NOTE — THERAPY RE-EVALUATION
Acute Care - Speech Language Pathology   Swallow Re-Evaluation [facility]   Clinical Swallow Evaluation     Patient Name: Alexander Adan  : 1940  MRN: [id number]  Today's Date: 3/10/2018               Admit Date: 3/2/2018    Visit Dx:     ICD-10-CM ICD-9-CM   1. Oropharyngeal dysphagia R13.12 787.22   2. Parotitis, acute K11.21 527.2   3. Subacute maxillary sinusitis J01.00 461.0   4. Generalized weakness R53.1 780.79   5. Impaired functional mobility, balance, gait, and endurance Z74.09 V49.89   6. Impaired mobility and ADLs Z74.09 799.89     Patient Active Problem List   Diagnosis   • Atrial fibrillation   • Chronic coronary artery disease   • Chronic diastolic heart failure   • Chronic obstructive pulmonary disease   • Cognitive impairment, mild, so stated   • Tobacco use   • Fall at home   • CKD (chronic kidney disease), stage III   • Leukocytosis   • Bronchitis   • Vasovagal syncope   • Dehydration   • Hypotension due to dehydration   • PVD (peripheral vascular disease)   • SIRS (systemic inflammatory response syndrome)     Past Medical History:   Diagnosis Date   • Anemia    • Anxiety    • Chronic kidney disease     STAGE II (mild)   • Hyperlipidemia    • Hypertension    • Stroke     LACUNAR. 2013   • Subdural hematoma     Status post hemorrhagic evacuation with sherrill holes 4/3/2015 per Dr. Gilson Brooke.     Past Surgical History:   Procedure Laterality Date   • SHERRILL HOLE FOR SUBDURAL HEMATOMA     • CARDIAC PACEMAKER PLACEMENT      PERMANENT   • CAROTID STENT Left    • CHOLECYSTECTOMY     • CORONARY ARTERY BYPASS GRAFT     • PACEMAKER IMPLANTATION     • THROMBOENDARTERECTOMY      CAROTID, LEFT          SWALLOW EVALUATION (last 72 hours)      SLP Adult Swallow Evaluation     Row Name 03/10/18 1445       Rehab Evaluation    Document Type re-evaluation  -AC    Subjective Information no complaints  -AC    Patient Observations alert;cooperative  -AC    Patient Effort excellent  -AC       General  Information    Patient Profile Reviewed yes  -AC    Pertinent History Of Current Problem Pt adm w/ SIRS, R parotitis, encephalopathy. Hx CHF, COPD, CKD, reflux.   -AC    Current Method of Nutrition pureed with some mashed;thin liquids  -AC    Precautions/Limitations, Vision WFL;for purposes of eval  -AC    Precautions/Limitations, Hearing WFL;for purposes of eval  -AC    Prior Level of Function-Communication WFL  -AC    Prior Level of Function-Swallowing no diet consistency restrictions;other (see comments)   occasional difficulty w/ liquids @ baseline per pt  -AC    Plans/Goals Discussed with patient and family;agreed upon  -AC    Barriers to Rehab medically complex  -AC    Patient's Goals for Discharge patient did not state  -AC    Family Goals for Discharge family did not state  -AC       Pain Assessment    Additional Documentation Pain Scale: Word Pre/Post-Treatment (Group)  -AC       Pain Scale: Word Pre/Post-Treatment    Pain: Word Scale, Pretreatment 0 - no pain  -AC    Pain: Word Scale, Post-Treatment 0 - no pain  -AC       General Eating/Swallowing Observations    Eating/Swallowing Skills self-fed;appropriate self-feeding skills observed;requires cueing for compensatory strategies and precautions  -AC    Positioning During Eating upright 90 degree;upright in bed  -AC    Utensils Used spoon  -AC    Consistencies Trialed pureed;thin liquids  -AC       Clinical Swallow Eval    Pharyngeal Phase no overt signs/symptoms of pharyngeal impairment  -AC    Clinical Swallow Evaluation Summary MBS completed 3/7 revealed moderate oropharyngeal dysphagia. Puree w/ some mashed foods (dysphagia level III diet) and thin liquids via tsp only was rec'd. Pt was also encouraged to throat clear/cough after every couple bite/drink to clear airway. Pt able to utilize tsp-sized drinks compensatory strategy independently. Required min cues to use volitional cough/throat clear strategy. Pt sounds congested, but no overt clinical  s/sxs aspiration noted. Cognitive status appears to be improving.  -AC       Clinical Impression    SLP Swallowing Diagnosis other (see comments)   suspect cont'd oropharyngeal dysphagia  -AC    Functional Impact risk of aspiration/pneumonia;risk of malnutrition;risk of dehydration  -AC    Rehab Potential/Prognosis, Swallowing adequate, monitor progress closely  -AC    Criteria for Skilled Therapeutic Interventions Met demonstrates skilled criteria  -AC       Recommendations    Therapy Frequency (SLP) 5 days per week  -AC    Predicted Duration Therapy Intervention (Days) until discharge  -AC    SLP Diet Recommendation puree with some mashed;thin liquids  -AC    Recommended Diagnostics other (see comments)   repeat MBS w/ ltd UGI when appropriate per SLP  -AC    Recommended Precautions and Strategies upright posture during/after eating;small bites of food and sips of liquid;no straw;liquid via spoon only;volitional throat clear;other (see comments)   volitional throat clear/cough intermittently  -AC    SLP Rec. for Method of Medication Administration meds crushed;with pudding or applesauce  -AC    Monitor for Signs of Aspiration yes;notify SLP if any concerns;cough;gurgly voice;throat clearing  -AC    Demonstrates Need for Referral to Another Service clinical nutrition services/dietitian;other (see comments)   pt dislikes items on trays & has ? re: nutritional drinks  -AC       Swallow Goals (SLP)    Oral Nutrition/Hydration Goal Selection (SLP) oral nutrition/hydration, SLP goal 1  -AC    Pharyngeal Strengthening Exercise Goal Selection (SLP) pharyngeal strengthening exercise, SLP goal 1;pharyngeal strengthening exercise, SLP goal 2  -AC    Swallow Management Recall Goal Selection (SLP) swallow management recall, SLP goal (free text)  -AC    Swallow Compensatory Strategies Goal Selection (SLP) swallow compensatory strategies, SLP goal 1  -AC    Additional Documentation swallow management recall goal selection  (SLP);swallow compensatory strategies goal selection (SLP);pharyngeal strengthening exercise goal selection (SLP)  -AC       Oral Nutrition/Hydration Goal 1 (SLP)    Oral Nutrition/Hydration Goal 1, SLP LTG: Pt will tolerate mechanical soft diet & thin liquids w/o s/sxs aspiration or complication w/ 100% acc w/ min cues.  -AC    Time Frame (Oral Nutrition/Hydration Goal 1, SLP) by discharge  -AC    Progress/Outcomes (Oral Nutrition/Hydration Goal 1, SLP) goal ongoing  -AC       Pharyngeal Strengthening Exercise Goal 1 (SLP)    Activity (Pharyngeal Strengthening Goal 1, SLP) increase timing;increase closure at entrance to airway/closure of airway at glottis;increase superior movement of the hyolaryngeal complex  -AC    Increase Timing super-supraglottic swallow  -AC    Increase Superior Movement of the Hyolaryngeal Complex super-supraglottic swallow  -AC    Increase Closure at Entrance to Airway/Closure of Airway at Glottis super-supraglottic swallow  -AC    [place]/Accuracy (Pharyngeal Strengthening Goal 1, SLP) with moderate cues (50-74% accuracy)  -AC    Time Frame (Pharyngeal Strengthening Goal 1, SLP) short term goal (STG);by discharge  -AC    Progress/Outcomes (Pharyngeal Strengthening Goal 1, SLP) goal ongoing  -AC       Pharyngeal Strengthening Exercise Goal 2 (SLP)    Activity (Pharyngeal Strengthening Goal 1, SLP) increase anterior movement of the hyolaryngeal complex;increase tongue base retraction  -AC    Increase Anterior Movement of the Hyolaryngeal Complex chin tuck against resistance (CTAR)  -AC    Increase Tongue Base Retraction hard effortful swallow  -AC    [place]/Accuracy (Pharyngeal Strengthening Goal 1, SLP) with moderate cues (50-74% accuracy)  -AC    Time Frame (Pharyngeal Strengthening Goal 2, SLP) short term goal (STG);by discharge  -AC    Progress/Outcomes (Pharyngeal Strengthening Goal 2, SLP) goal ongoing  -AC       Swallow Management Recall Goal (SLP)    Swallow Management  Recall Goal (SLP) Pt will demonstrate no s/sxs aspiraiton w/ puree w/ some mashed diet & thin liquids via tsp w/ 50% acc w/ min cues.  -AC    Time Frame (Swallow Management Recall Goal, SLP) short term goal (STG);by discharge  -AC    Barriers (Swallow Management Recall Goal, SLP) Pt sounds very wheezy/congested @ baseline.  -AC    Progress/Outcomes (Swallow Management Recall Goal, SLP) goal ongoing  -AC       Swallow Compensatory Strategies Goal 1 (SLP)    Activity (Swallow Compensatory Strategies/Techniques Goal 1, SLP) compensatory strategies;other (see comments)   Liquid via tsp & cough/throat clear every 3-5 bites/sips  -AC    [place]/Accuracy (Swallow Compensatory Strategies/Techniques Goal 1, SLP) with minimal cues (75-90% accuracy)  -AC    Time Frame (Swallow Compensatory Strategies/Techniques Goal 1, SLP) short term goal (STG);by discharge  -AC    Progress/Outcomes (Swallow Compensatory Strategies/Techniques Goal 1, SLP) goal ongoing  -AC      User Key  (r) = Recorded By, (t) = Taken By, (c) = Cosigned By    Initials Name Effective Dates    AC Patience Goodrich, MS CCC-SLP 07/27/17 -         EDUCATION  The patient has been educated in the following areas:   Dysphagia (Swallowing Impairment) Oral Care/Hydration Modified Diet Instruction.    SLP Recommendation and Plan  SLP Swallowing Diagnosis: other (see comments) (suspect cont'd oropharyngeal dysphagia)  SLP Diet Recommendation: puree with some mashed, thin liquids  Recommended Precautions and Strategies: upright posture during/after eating, small bites of food and sips of liquid, no straw, liquid via spoon only, volitional throat clear, other (see comments) (volitional throat clear/cough intermittently)     Monitor for Signs of Aspiration: yes, notify SLP if any concerns, cough, gurgly voice, throat clearing  Recommended Diagnostics: other (see comments) (repeat MBS w/ ltd UGI when appropriate per SLP)  Criteria for Skilled Therapeutic Interventions Met:  demonstrates skilled criteria     Rehab Potential/Prognosis, Swallowing: adequate, monitor progress closely  Therapy Frequency (SLP): 5 days per week  Predicted Duration Therapy Intervention (Days): until discharge  Demonstrates Need for Referral to Another Service: clinical nutrition services/dietitian, other (see comments) (pt dislikes items on trays & has ? re: nutritional drinks)                  SLP GOALS     Row Name 03/10/18 1445             Oral Nutrition/Hydration Goal 1 (SLP)    Oral Nutrition/Hydration Goal 1, SLP LTG: Pt will tolerate mechanical soft diet & thin liquids w/o s/sxs aspiration or complication w/ 100% acc w/ min cues.  -AC      Time Frame (Oral Nutrition/Hydration Goal 1, SLP) by discharge  -AC      Progress/Outcomes (Oral Nutrition/Hydration Goal 1, SLP) goal ongoing  -AC         Pharyngeal Strengthening Exercise Goal 1 (SLP)    Activity (Pharyngeal Strengthening Goal 1, SLP) increase timing;increase closure at entrance to airway/closure of airway at glottis;increase superior movement of the hyolaryngeal complex  -AC      Increase Timing super-supraglottic swallow  -AC      Increase Superior Movement of the Hyolaryngeal Complex super-supraglottic swallow  -AC      Increase Closure at Entrance to Airway/Closure of Airway at Glottis super-supraglottic swallow  -AC      [place]/Accuracy (Pharyngeal Strengthening Goal 1, SLP) with moderate cues (50-74% accuracy)  -AC      Time Frame (Pharyngeal Strengthening Goal 1, SLP) short term goal (STG);by discharge  -AC      Progress/Outcomes (Pharyngeal Strengthening Goal 1, SLP) goal ongoing  -AC         Pharyngeal Strengthening Exercise Goal 2 (SLP)    Activity (Pharyngeal Strengthening Goal 1, SLP) increase anterior movement of the hyolaryngeal complex;increase tongue base retraction  -AC      Increase Anterior Movement of the Hyolaryngeal Complex chin tuck against resistance (CTAR)  -AC      Increase Tongue Base Retraction hard effortful  swallow  -AC      [place]/Accuracy (Pharyngeal Strengthening Goal 1, SLP) with moderate cues (50-74% accuracy)  -AC      Time Frame (Pharyngeal Strengthening Goal 2, SLP) short term goal (STG);by discharge  -AC      Progress/Outcomes (Pharyngeal Strengthening Goal 2, SLP) goal ongoing  -AC         Swallow Management Recall Goal (SLP)    Swallow Management Recall Goal (SLP) Pt will demonstrate no s/sxs aspiraiton w/ puree w/ some mashed diet & thin liquids via tsp w/ 50% acc w/ min cues.  -AC      Time Frame (Swallow Management Recall Goal, SLP) short term goal (STG);by discharge  -AC      Barriers (Swallow Management Recall Goal, SLP) Pt sounds very wheezy/congested @ baseline.  -AC      Progress/Outcomes (Swallow Management Recall Goal, SLP) goal ongoing  -AC         Swallow Compensatory Strategies Goal 1 (SLP)    Activity (Swallow Compensatory Strategies/Techniques Goal 1, SLP) compensatory strategies;other (see comments)   Liquid via tsp & cough/throat clear every 3-5 bites/sips  -AC      [place]/Accuracy (Swallow Compensatory Strategies/Techniques Goal 1, SLP) with minimal cues (75-90% accuracy)  -AC      Time Frame (Swallow Compensatory Strategies/Techniques Goal 1, SLP) short term goal (STG);by discharge  -AC      Progress/Outcomes (Swallow Compensatory Strategies/Techniques Goal 1, SLP) goal ongoing  -AC        User Key  (r) = Recorded By, (t) = Taken By, (c) = Cosigned By    Initials Name Provider Type    RUMA Goodrich MS CCC-SLP Speech and Language Pathologist           Time Calculation:         Time Calculation- SLP     Row Name 03/10/18 1702             Time Calculation- SLP    SLP Start Time 1445  -AC      SLP Received On 03/10/18  -        User Key  (r) = Recorded By, (t) = Taken By, (c) = Cosigned By    Initials Name Provider Type    RUMA Goodrich MS CCC-SLP Speech and Language Pathologist          Therapy Charges for Today     Code Description Service Date Service Provider  Modifiers Qty    [id number] [facility] EVAL ORAL PHARYNG SWALLOW 4 3/10/2018 Patience Goodrich, MS CCC-SLP GN 1               Patience Goodrich MS EDILBERTO-SLP  3/10/2018

## 2018-03-10 NOTE — THERAPY TREATMENT NOTE
Acute Care - Occupational Therapy Treatment Note  [facility]     Patient Name: Alexander Adan  : 1940  MRN: [id number]  Today's Date: 3/10/2018     Date of Referral to OT: 18       Admit Date: 3/2/2018       ICD-10-CM ICD-9-CM   1. Oropharyngeal dysphagia R13.12 787.22   2. Parotitis, acute K11.21 527.2   3. Subacute maxillary sinusitis J01.00 461.0   4. Generalized weakness R53.1 780.79   5. Impaired functional mobility, balance, gait, and endurance Z74.09 V49.89   6. Impaired mobility and ADLs Z74.09 799.89     Patient Active Problem List   Diagnosis   • Atrial fibrillation   • Chronic coronary artery disease   • Chronic diastolic heart failure   • Chronic obstructive pulmonary disease   • Cognitive impairment, mild, so stated   • Tobacco use   • Fall at home   • CKD (chronic kidney disease), stage III   • Leukocytosis   • Bronchitis   • Vasovagal syncope   • Dehydration   • Hypotension due to dehydration   • PVD (peripheral vascular disease)   • SIRS (systemic inflammatory response syndrome)     Past Medical History:   Diagnosis Date   • Anemia    • Anxiety    • Chronic kidney disease     STAGE II (mild)   • Hyperlipidemia    • Hypertension    • Stroke     LACUNAR. 2013   • Subdural hematoma     Status post hemorrhagic evacuation with sherrill holes 4/3/2015 per Dr. Gilson Brooke.     Past Surgical History:   Procedure Laterality Date   • SHERRILL HOLE FOR SUBDURAL HEMATOMA     • CARDIAC PACEMAKER PLACEMENT      PERMANENT   • CAROTID STENT Left    • CHOLECYSTECTOMY     • CORONARY ARTERY BYPASS GRAFT     • PACEMAKER IMPLANTATION     • THROMBOENDARTERECTOMY      CAROTID, LEFT       Therapy Treatment    Therapy Treatment / Health Promotion    Treatment Time/Intention  Discipline: occupational therapist  Document Type: therapy note (daily note)  Subjective Information: complains of, fatigue, pain  Mode of Treatment: individual therapy  Patient/Family Observations: Pt supine in bed having just  completed PT session  Care Plan Review: patient/other agree to care plan  Patient Effort: good  Patient Response to Treatment: tolerated  Plan of Care Review  Plan of Care Reviewed With: patient    Vitals/Pain/Safety  Vital Signs  Pre Systolic BP Rehab:  (VSS; RN cleared pt for tx)  O2 Delivery Pre Treatment: supplemental O2  O2 Delivery Post Treatment: supplemental O2  Pre Patient Position: Supine  Intra Patient Position: Supine  Post Patient Position: Supine  Positioning and Restraints  Pre-Treatment Position: in bed  Post Treatment Position: bed  In Bed: supine, call light within reach, encouraged to call for assist, exit alarm on, with family/caregiver, side rails up x2, RUE elevated, LUE elevated, legs elevated, waffle boots/both    Mobility,ADL,Motor, Modality  Bed Mobility Assessment/Treatment  Scooting/[place]ging [place] (Bed Mobility): maximum assist (25% patient effort), 2 person assist  Comment (Bed Mobility): Pt declined EOB/OOB     Motor Skills Assessment/Interventions  Additional Documentation: Therapeutic Exercise (Group)  Therapeutic Exercise  Therapeutic Exercise: supine, upper extremities  Upper Extremity Supine Therapeutic Exercise  Performed, Supine Upper Extremity (Therapeutic Exercise): shoulder flexion/extension, shoulder abduction/adduction, shoulder horizontal abduction/adduction, elbow flexion/extension, other (see comments) (jose shoulder circles, hand pumps, sup/pron)  Exercise Type, Supine Upper Extremity (Therapeutic Exercise): AROM (active range of motion), resistive exercise  Expected Outcomes, Supine Upper Extremity (Therapeutic Exercise): improve functional tolerance, self-care activity, improve performance, BADLs  Sets/Reps Detail, Supine Upper Extremity (Therapeutic Exercise): 10 reps, 15 reps  Transfers Skills, Training to Functional Activity, Supine Upper Extremity (Therapeutic Exercise): beginning to transfer skills to functional activity           ROM/MMT              Sensory, Edema, Orthotics          Cognition, Communication, Swallow       Outcome Summary           OT Rehab Goals     Row Name 03/10/18 1000             Bed Mobility Goal 1 (OT)    Activity/Assistive Device (Bed Mobility Goal 1, OT) sit to supine/supine to sit;bed rails  -TA      [place] Level/Cues Needed (Bed Mobility Goal 1, OT) supervision required  -TA      Time Frame (Bed Mobility Goal 1, OT) by discharge  -TA         Strength Goal 1 (OT)    Strength Goal 1 (OT) Tolerate 15 reps HEP  -TA      Time Frame (Strength Goal 1, OT) by discharge  -TA         Problem Specific Goal 1 (OT)    Problem Specific Goal 1 (OT) Follow directions 100% with VCs  -TA      Time Frame (Problem Specific Goal 1, OT) by discharge  -TA        User Key  (r) = Recorded By, (t) = Taken By, (c) = Cosigned By    Initials Name Provider Type    RONAK Smart OT Occupational Therapist        Occupational Therapy Education     Title: PT OT SLP Therapies (Active)     Topic: Occupational Therapy (Done)     Point: ADL training (Done)     Description: Instruct learner(s) on proper safety adaptation and remediation techniques during self care or transfers.   Instruct in proper use of assistive devices.   Learning Progress Summary     Learner Status Readiness Method Response Comment Documented by    Patient Done Acceptance E VU,NR Pt educated on ADL retraining for toileting, safety for functional tf, and precautions during functional mobility.  03/08/18 1503     Done Acceptance E VU,NR Pt education provided on HEP, repositioning for bed mobility, and body mechanics for bed mobility.  03/06/18 1010    Significant Other Done Acceptance E VU,NR Pt educated on ADL retraining for toileting, safety for functional tf, and precautions during functional mobility.  03/08/18 1503     Done Acceptance E VU,NR Pt education provided on HEP, repositioning for bed mobility, and body mechanics for bed mobility.  03/06/18 1010          Point:  Home exercise program (Done)     Description: Instruct learner(s) on appropriate technique for monitoring, assisting and/or progressing therapeutic exercises/activities.   Learning Progress Summary     Learner Status Readiness Method Response Comment Documented by    Patient Done Acceptance E,D VU,DU,NR BUE HEP; reinforced need for call for assist with OOB activities. TA 03/10/18 1405     Done Acceptance E VU,NR Pt educated on ADL retraining for toileting, safety for functional tf, and precautions during functional mobility. KF 03/08/18 1503     Done Acceptance E VU,NR Pt education provided on HEP, repositioning for bed mobility, and body mechanics for bed mobility. KF 03/06/18 1010    Significant Other Done Acceptance E VU,NR Pt educated on ADL retraining for toileting, safety for functional tf, and precautions during functional mobility. KF 03/08/18 1503     Done Acceptance E VU,NR Pt education provided on HEP, repositioning for bed mobility, and body mechanics for bed mobility. KF 03/06/18 1010          Point: Precautions (Done)     Description: Instruct learner(s) on prescribed precautions during self-care and functional transfers.   Learning Progress Summary     Learner Status Readiness Method Response Comment Documented by    Patient Done Acceptance E,D VU,DU,NR BUE HEP; reinforced need for call for assist with OOB activities. TA 03/10/18 1405     Done Acceptance E VU,NR Pt educated on ADL retraining for toileting, safety for functional tf, and precautions during functional mobility. KF 03/08/18 1503     Done Acceptance E VU,NR Pt education provided on HEP, repositioning for bed mobility, and body mechanics for bed mobility. KF 03/06/18 1010    Significant Other Done Acceptance E VU,NR Pt educated on ADL retraining for toileting, safety for functional tf, and precautions during functional mobility. KF 03/08/18 1503     Done Acceptance E VU,NR Pt education provided on HEP, repositioning for bed mobility, and  body mechanics for bed mobility.  03/06/18 1010          Point: Body mechanics (Done)     Description: Instruct learner(s) on proper positioning and spine alignment during self-care, functional mobility activities and/or exercises.   Learning Progress Summary     Learner Status Readiness Method Response Comment Documented by    Patient Done Acceptance E VU,NR Pt educated on ADL retraining for toileting, safety for functional tf, and precautions during functional mobility.  03/08/18 1503     Done Acceptance E VU,NR Pt education provided on HEP, repositioning for bed mobility, and body mechanics for bed mobility.  03/06/18 1010    Significant Other Done Acceptance E VU,NR Pt educated on ADL retraining for toileting, safety for functional tf, and precautions during functional mobility.  03/08/18 1503     Done Acceptance E VU,NR Pt education provided on HEP, repositioning for bed mobility, and body mechanics for bed mobility.  03/06/18 1010                      User Key     Initials Effective Dates Name Provider Type Discipline     03/14/16 -  Tim Smart, OT Occupational Therapist OT     02/14/18 -  Lyudmila Cortés, OT Occupational Therapist OT                  OT Recommendation and Plan  Therapy Frequency (OT Eval): daily  Plan of Care Review  Plan of Care Reviewed With: patient  Plan of Care Reviewed With: patient  Progress: improving  Outcome Summary: Pt declined EOB/OOB activities this date; engaged in UB strengthening ex's and tolerated 10-15 reps of all ex's. Continue per OT POC.        Outcome Measures     Row Name 03/10/18 1329 03/08/18 1345 03/08/18 0947       How much help from another person do you currently need...    Turning from your back to your side while in flat bed without using bedrails?  --  -- 3  -KR    Moving from lying on back to sitting on the side of a flat bed without bedrails?  --  -- 3  -KR    Moving to and from a bed to a chair (including a wheelchair)?  --  -- 3  -KR     Standing up from a chair using your arms (e.g., wheelchair, bedside chair)?  --  -- 3  -KR    Climbing 3-5 steps with a railing?  --  -- 2  -KR    To walk in hospital room?  --  -- 3  -KR    AM-PAC 6 Clicks Score  --  -- 17  -KR       How much help from another is currently needed...    Putting on and taking off regular lower body clothing? 2  -TA 2  -KF  --    Bathing (including washing, rinsing, and drying) 2  -TA 2  -KF  --    Toileting (which includes using toilet bed pan or urinal) 2  -TA 2  -KF  --    Putting on and taking off regular upper body clothing 3  -TA 3  -KF  --    Taking care of personal grooming (such as brushing teeth) 3  -TA 3  -KF  --    Eating meals 4  -TA 4  -KF  --    Score 16  -TA 16  -KF  --       Functional Assessment    Outcome Measure Options AM-PAC 6 Clicks Daily Activity (OT)  -TA AM-PAC 6 Clicks Daily Activity (OT)  -KF AM-PAC 6 Clicks Basic Mobility (PT)  -KR      User Key  (r) = Recorded By, (t) = Taken By, (c) = Cosigned By    Initials Name Provider Type    TA Tim Smart OT Occupational Therapist    ANDREEA Short, PT Physical Therapist    KF Lyudmila Cortés, OT Occupational Therapist           Time Calculation:         Time Calculation- OT     Row Name 03/10/18 1408             Time Calculation- OT    OT Start Time 1329   ttc 23 minutes  -TA      Total Timed Code Minutes- OT 23 minute(s)  -TA      OT Received On 03/10/18  -TA      OT Goal Re-Cert Due Date 03/16/18  -TA        User Key  (r) = Recorded By, (t) = Taken By, (c) = Cosigned By    Initials Name Provider Type    TA Tim Smart OT Occupational Therapist           Therapy Charges for Today     Code Description Service Date Service Provider Modifiers Qty    [id number]  OT THERAPEUTIC ACT EA 15 MIN 3/10/2018 Tim Smart OT GO 2               Tim Smart OT  3/10/2018

## 2018-03-10 NOTE — THERAPY TREATMENT NOTE
Acute Care - Physical Therapy Treatment Note  [facility]     Patient Name: Alexander Adan  : 1940  MRN: [id number]  Today's Date: 3/10/2018     Date of Referral to PT: 18       Admit Date: 3/2/2018    Visit Dx:    ICD-10-CM ICD-9-CM   1. Oropharyngeal dysphagia R13.12 787.22   2. Parotitis, acute K11.21 527.2   3. Subacute maxillary sinusitis J01.00 461.0   4. Generalized weakness R53.1 780.79   5. Impaired functional mobility, balance, gait, and endurance Z74.09 V49.89   6. Impaired mobility and ADLs Z74.09 799.89     Patient Active Problem List   Diagnosis   • Atrial fibrillation   • Chronic coronary artery disease   • Chronic diastolic heart failure   • Chronic obstructive pulmonary disease   • Cognitive impairment, mild, so stated   • Tobacco use   • Fall at home   • CKD (chronic kidney disease), stage III   • Leukocytosis   • Bronchitis   • Vasovagal syncope   • Dehydration   • Hypotension due to dehydration   • PVD (peripheral vascular disease)   • SIRS (systemic inflammatory response syndrome)       Therapy Treatment    Therapy Treatment / Health Promotion    Treatment Time/Intention  Discipline: physical therapist  Document Type: therapy note (daily note)  Subjective Information:  (agrees to bed exs only d/t pain w/standing)  Therapy Frequency (PT Clinical Impression): daily  Therapy Frequency (OT Eval): daily  Plan of Care Review  Plan of Care Reviewed With: patient    Vitals/Pain/Safety  Pain Assessment  Additional Documentation: Pain Scale: Numbers Pre/Post-Treatment (Group)  Pain Scale: Numbers Pre/Post-Treatment  Pain Scale: Numbers, Pretreatment: 0/10 - no pain  Pain Scale: Numbers, Post-Treatment: 0/10 - no pain  Positioning and Restraints  Pre-Treatment Position: in bed  Post Treatment Position: bed  In Bed: notified nsg, supine, call light within reach, with family/caregiver, waffle boots/both    Mobility,ADL,Motor, Modality  Bed Mobility Assessment/Treatment  Supine-Sit  [place] (Bed Mobility): not tested  Sit-Stand Transfer  Sit-Stand [place] (Transfers): not tested  Gait/Stairs Assessment/Training  [place] Level (Gait): not tested     Lower Extremity Supine Therapeutic Exercise  Performed, Supine Lower Extremity (Therapeutic Exercise): hip abduction/adduction, SAQ (short arc quad) over bolster, SLR (straight leg raise), quadriceps sets, gluteal sets, ankle pumps, heel slides (10 reps BLEs)           ROM/MMT             Sensory, Edema, Orthotics          Cognition, Communication, Swallow       Outcome Summary               PT Rehab Goals     Row Name 03/10/18 1300             Bed Mobility Goal 1 (PT)    Activity/Assistive Device (Bed Mobility Goal 1, PT) bed mobility activities, all  -LS      [place] Level/Cues Needed (Bed Mobility Goal 1, PT) independent  -LS      Time Frame (Bed Mobility Goal 1, PT) 2 weeks  -LS         Transfer Goal 1 (PT)    Activity/Assistive Device (Transfer Goal 1, PT) sit-to-stand/stand-to-sit;walker, rolling  -LS      [place] Level/Cues Needed (Transfer Goal 1, PT) conditional independence  -LS      Time Frame (Transfer Goal 1, PT) 2 weeks  -LS         Gait Training Goal 1 (PT)    Activity/Assistive Device (Gait Training Goal 1, PT) walker, rolling  -LS      [place] Level (Gait Training Goal 1, PT) conditional independence  -LS      Distance (Gait Goal 1, PT) 200  -LS      Time Frame (Gait Training Goal 1, PT) 2 weeks  -LS         Stairs Goal 1 (PT)    Activity/Assistive Device (Stairs Goal 1, PT) ascending stairs;descending stairs   2 handrails  -LS      [place] Level/Cues Needed (Stairs Goal 1, PT) contact guard assist  -LS      Number of Stairs (Stairs Goal 1, PT) 5  -LS      Time Frame (Stairs Goal 1, PT) 2 weeks  -LS        User Key  (r) = Recorded By, (t) = Taken By, (c) = Cosigned By    Initials Name Provider Type    LS Angie Ortiz, PT Physical Therapist          Physical Therapy Education     Title: PT OT  SLP Therapies (Active)     Topic: Physical Therapy (Active)     Point: Mobility training (Active)    Learning Progress Summary     Learner Status Readiness Method Response Comment Documented by    Patient Active Acceptance E NR  KR 03/08/18 1155     Active Acceptance E NR  KR 03/06/18 1036     Active Acceptance E NR  KR 03/05/18 1615          Point: Home exercise program (Done)    Learning Progress Summary     Learner Status Readiness Method Response Comment Documented by    Patient Done Acceptance E VU,NR Discussed benefits of activity LS 03/10/18 1404     Active Acceptance E NR  KR 03/08/18 1155     Active Acceptance E NR  KR 03/06/18 1036     Active Acceptance E NR  KR 03/05/18 1615          Point: Body mechanics (Active)    Learning Progress Summary     Learner Status Readiness Method Response Comment Documented by    Patient Active Acceptance E NR  KR 03/08/18 1155     Active Acceptance E NR  KR 03/06/18 1036     Active Acceptance E NR  KR 03/05/18 1615          Point: Precautions (Active)    Learning Progress Summary     Learner Status Readiness Method Response Comment Documented by    Patient Active Acceptance E NR  KR 03/08/18 1155     Active Acceptance E NR  KR 03/06/18 1036     Active Acceptance E NR  KR 03/05/18 1615                      User Key     Initials Effective Dates Name Provider Type Discipline    LS 06/19/15 -  Angie Ortiz, PT Physical Therapist PT    KR 09/25/17 - 03/06/18 Jasmin Short, PT Physical Therapist PT    KR 03/07/18 -  Jasmin Short, PT Physical Therapist PT                    PT Recommendation and Plan  Therapy Frequency (PT Clinical Impression): daily  Plan of Care Reviewed With: patient  Outcome Summary: Pt agreeable to bed exs only; he refused OOB activity saying it would make his feet hurt.          Outcome Measures     Row Name 03/10/18 1329 03/10/18 1300 03/08/18 1345       How much help from another person do you currently need...    Turning from your back to your  side while in flat bed without using bedrails?  -- 3  -LS  --    Moving from lying on back to sitting on the side of a flat bed without bedrails?  -- 3  -LS  --    Moving to and from a bed to a chair (including a wheelchair)?  -- 3  -LS  --    Standing up from a chair using your arms (e.g., wheelchair, bedside chair)?  -- 3  -LS  --    Climbing 3-5 steps with a railing?  -- 2  -LS  --    To walk in hospital room?  -- 3  -LS  --    AM-PAC 6 Clicks Score  -- 17  -LS  --       How much help from another is currently needed...    Putting on and taking off regular lower body clothing? 2  -TA  -- 2  -KF    Bathing (including washing, rinsing, and drying) 2  -TA  -- 2  -KF    Toileting (which includes using toilet bed pan or urinal) 2  -TA  -- 2  -KF    Putting on and taking off regular upper body clothing 3  -TA  -- 3  -KF    Taking care of personal grooming (such as brushing teeth) 3  -TA  -- 3  -KF    Eating meals 4  -TA  -- 4  -KF    Score 16  -TA  -- 16  -KF       Functional Assessment    Outcome Measure Options AM-PAC 6 Clicks Daily Activity (OT)  -TA  -- AM-PAC 6 Clicks Daily Activity (OT)  -KF    Row Name 03/08/18 0947             How much help from another person do you currently need...    Turning from your back to your side while in flat bed without using bedrails? 3  -KR      Moving from lying on back to sitting on the side of a flat bed without bedrails? 3  -KR      Moving to and from a bed to a chair (including a wheelchair)? 3  -KR      Standing up from a chair using your arms (e.g., wheelchair, bedside chair)? 3  -KR      Climbing 3-5 steps with a railing? 2  -KR      To walk in hospital room? 3  -KR      AM-PAC 6 Clicks Score 17  -KR         Functional Assessment    Outcome Measure Options AM-PAC 6 Clicks Basic Mobility (PT)  -KR        User Key  (r) = Recorded By, (t) = Taken By, (c) = Cosigned By    Initials Name Provider Type    LS Angie Ortiz, PT Physical Therapist    RONAK Smart, OT  Occupational Therapist    KR Jasmin Short, PT Physical Therapist    MICHELLE Cortés, OT Occupational Therapist           Time Calculation:         PT Charges     Row Name 03/10/18 1414             Time Calculation    Start Time 1300   tcc: 10min  -LS      PT Received On 03/10/18  -      PT Goal Re-Cert Due Date 03/15/18  -        User Key  (r) = Recorded By, (t) = Taken By, (c) = Cosigned By    Initials Name Provider Type     Angie Ortiz, PT Physical Therapist          Therapy Charges for Today     Code Description Service Date Service Provider Modifiers Qty    73729594570 HC PT THER PROC EA 15 MIN 3/10/2018 Angie Ortiz, PT GP 1          PT G-Codes  Outcome Measure Options: AM-PAC 6 Clicks Daily Activity (OT)    Angie Ortiz, PT  3/10/2018

## 2018-03-10 NOTE — PROGRESS NOTES
[facility] Medicine Services  PROGRESS NOTE    Patient Name: Alexander Adan  : 1940  MRN: [id number]    Date of Admission: 3/2/2018  Length of Stay: 7  Primary Care Physician: Harrison Smart MD    Subjective   Subjective     CC:Right facial swelling, weight loss    Subjective:  Last months patient's blood pressure came down and patient had to be fluid resuscitated.  Also patient was started on dobutamine drip.  Since patient several times today.  His blood pressure systolically is around 70 but he is arousable, alert, does not have any complaint and tells me that he is comfortable.  Denies any fever or chills.  No chest pain no shortness of breath address.    Review of Systems  Unable to obtain        Objective   Objective     Vital Signs:   Temp:  [97.8 °F (36.6 °C)-98 °F (36.7 °C)] 97.9 °F (36.6 °C)  Heart Rate:  [] 111  Resp:  [18-20] 18  BP: ()/(25-68) 98/68        Physical Exam:  Constitutional: No acute distress, very cachectic and frail.  More drowsy today.  Eyes: PERRLA, sclerae anicteric, no conjunctival injection  HENT: NCAT, mucous membranes moist.  Right submandibular swelling most likely she Parotid gland swelling.  Much improved.  Neck: Supple, no thyromegaly, no lymphadenopathy, trachea midline  Respiratory: Clear to auscultation bilaterally, nonlabored respirations   Cardiovascular: RRR, no murmurs, rubs, or gallops, palpable pedal pulses bilaterally  Gastrointestinal: Positive bowel sounds, soft, nontender, nondistended  Musculoskeletal: Very cachectic No bilateral ankle edema, no clubbing or cyanosis to extremities  Neurologic: Drowsy, arousable, follows commands.  Patient moves all the limbs.  Skin: No rashes    Results Reviewed:  I have personally reviewed current lab, radiology, and data and agree.      Results from last 7 days  Lab Units 18  0503 18  0542 18  0441   WBC 10*3/mm3 14.34* 12.41* 8.10   HEMOGLOBIN g/dL 9.7*  11.0* 11.1*   HEMATOCRIT % 31.3* 35.3* 35.8*   PLATELETS 10*3/mm3 182 193 140*       Results from last 7 days  Lab Units 03/09/18  0639 03/09/18  0503 03/07/18  0542 03/06/18  0441 03/05/18  0622   SODIUM mmol/L 137  --  130* 132 138   POTASSIUM mmol/L 4.6  --  4.8 5.0 5.0   CHLORIDE mmol/L 111*  --  110* 112* 115*   CO2 mmol/L 16.0*  --  16.0* 16.0* 19.0*   BUN mg/dL 13  --  10 12 14   CREATININE mg/dL 0.80  --  0.80 0.70 0.80   GLUCOSE mg/dL 84  --  125* 113* 89   CALCIUM mg/dL 7.3*  --  7.7* 7.3* 7.7*   ALT (SGPT) U/L 10  --  7  --  9   AST (SGOT) U/L 17  --  15  --  18   TROPONIN I ng/mL  --  0.033  --   --   --      Estimated Creatinine Clearance: 70.8 mL/min (by C-G formula based on Cr of 0.8).  BNP   Date Value Ref Range Status   03/09/2018 675.0 (H) 0.0 - 100.0 pg/mL Final     Comment:     Results may be falsely decreased if patient taking Biotin.     pH, Arterial   Date Value Ref Range Status   03/07/2018 7.419 7.350 - 7.450 pH units Final       Microbiology Results Abnormal     Procedure Component Value - Date/Time    Blood Culture - Blood, [[id number]]  (Normal) Collected:  03/08/18 1033    Lab Status:  Preliminary result Specimen:  Blood from Arm, Left Updated:  03/09/18 1201     Blood Culture No growth at 24 hours    Blood Culture - Blood, [[id number]]  (Normal) Collected:  03/08/18 1015    Lab Status:  Preliminary result Specimen:  Blood from Wrist, Left Updated:  03/09/18 1201     Blood Culture No growth at 24 hours    Blood Culture - Blood, [[id number]]  (Normal) Collected:  03/02/18 1231    Lab Status:  Final result Specimen:  Blood from Hand, Right Updated:  03/07/18 1746     Blood Culture No growth at 5 days    S. Pneumo Ag Urine or CSF - Urine, Urine, Clean Catch [[id number]] Collected:  03/05/18 0538    Lab Status:  Final result Specimen:  Urine from Urine, Clean Catch Updated:  03/07/18 1714     Specimen Source Urine     STREP PNEUMONIAE ANTIGEN Negative     Body Fluid Culture, Sterile Not  Indicated     Organism ID Not indicated.     Please note Comment      [employer] standards require a culture to be  performed on CSF specimens submitted for bacterial antigen testing.  (CAP [id number]) Urine specimens will not be cultured.       Narrative:       Performed at:  [address] [place] [address]  [id number]  : Gilson Gandara MD, Phone:  [phone]    Legionella Antigen, Urine - Urine, Urine, Clean Catch [[id number]] Collected:  03/05/18 0538    Lab Status:  Final result Specimen:  Urine from Urine, Clean Catch Updated:  03/07/18 1522     L. pneumophila Serogp 1 Ur Ag Negative      Presumptive negative for L. pneumophila serogroup 1 antigen in urine,  suggesting no recent or current infection. Legionnaires' disease  cannot be ruled out since other serogroups and species may also cause  disease.       Narrative:       Performed at:  [address] [place] [address]  [id number]  : Gilson Gandara MD, Phone:  [phone]    Blood Culture - Blood, [[id number]] Collected:  03/02/18 1235    Lab Status:  Final result Specimen:  Blood from Arm, Left Updated:  03/07/18 1401     Blood Culture No growth at 5 days      Aerobic bottle only    Blood Culture - Blood, [[id number]]  (Normal) Collected:  03/02/18 1305    Lab Status:  Final result Specimen:  Blood from Arm, Left Updated:  03/07/18 1401     Blood Culture No growth at 5 days    Respiratory Culture - Sputum, Cough [[id number]]  (Abnormal)  (Susceptibility) Collected:  03/05/18 0751    Lab Status:  Final result Specimen:  Sputum from Cough Updated:  03/07/18 1210     Respiratory Culture --      Moderate growth (3+) Staphylococcus aureus (A)      This isolate is presumed to be clindamycin resistant based on detection of inducible clindamycin resistance.  Clindamycin may still be effective in some patients.         Moderate growth (3+) Normal Respiratory Tiffanie     Gram  Stain Result Many (4+) WBCs per low power field      Moderate (3+) Epithelial cells per low power field      Moderate (3+) Gram positive cocci in pairs and clusters    Susceptibility      Staphylococcus aureus     TORO     Ceftriaxone <=8 ug/ml Susceptible     Clindamycin Resistant     Erythromycin >4 ug/ml Resistant     Gentamicin <=4 ug/ml Susceptible     Levofloxacin <=1 ug/ml Susceptible  [1]      Linezolid <=1 ug/ml Susceptible     Oxacillin <=0.25 ug/ml Susceptible     Penicillin G 2 ug/ml Resistant     Quinupristin + Dalfopristin <=0.5 ug/ml Susceptible     Rifampin <=1 ug/ml Susceptible     Tetracycline >8 ug/ml Resistant     Trimethoprim + Sulfamethoxazole <=0.5/9.5 ug/ml Susceptible     Vancomycin 1 ug/ml Susceptible            [1]   Staphylococcus species may develop resistance during prolonged therapy with quinolones.  Isolates that are initially susceptible may become resistant within three to four days after initiation of therapy. Testing of repeat isolates may be warranted.                 Urine Culture - Urine, Urine, Clean Catch [[id number]]  (Normal) Collected:  03/05/18 0538    Lab Status:  Final result Specimen:  Urine from Urine, Clean Catch Updated:  03/07/18 0820     Urine Culture No growth at 2 days    Influenza A & B, RT PCR - Swab, Nasopharynx [[id number]]  (Normal) Collected:  03/04/18 1424    Lab Status:  Final result Specimen:  Swab from Nasopharynx Updated:  03/04/18 1542     Influenza A PCR Not Detected     Influenza B PCR Not Detected          Imaging Results (last 24 hours)     Procedure Component Value Units Date/Time    XR Chest 1 View [[id number]] Collected:  03/09/18 0934     Updated:  03/09/18 0934    Narrative:       EXAMINATION: XR CHEST 1 VW-      INDICATION: Wheezing on left; R13.12-Dysphagia, oropharyngeal phase;  K11.21-Acute sialoadenitis; J01.00-Acute maxillary sinusitis,  unspecified; R53.1-Weakness; Z74.09-Other reduced mobility.      COMPARISON: 03/04/2018.     FINDINGS:  Sternotomy wires are noted. There is a left-sided dual lead  ICD. Right upper extremity PICC line seen with its tip at the cavoatrial  junction. The heart is normal in size. Vasculature is cephalized and  there is mild diffuse interstitial disease a little increased from the  prior study, probably early interstitial edema. No consolidated lung or  pneumothorax is seen. There may be trace right pleural effusion versus  chronic pleural thickening.           Impression:       Interval development of mild pulmonary vascular congestion  and interstitial edema.     D:  03/09/2018  E:  03/09/2018           Results for orders placed during the hospital encounter of 02/07/18   Adult Transthoracic Echo Complete W/ Cont if Necessary Per Protocol    Narrative · Left ventricular wall thickness is consistent with mild concentric   hypertrophy.  · The following left ventricular wall segments are hypokinetic: basal   inferoseptal, mid inferoseptal and basal inferoseptal.  · Moderately reduced right ventricular systolic function noted.  · Left atrial cavity size is moderately dilated.  · Mild-to-moderate mitral valve regurgitation is present  · Mild tricuspid valve regurgitation is present.  · Left ventricular systolic function is normal. Estimated EF = 60%.  · there is akinesis of base of septum and base of inferior wall          I have reviewed the medications.    Assessment/Plan   Assessment / Plan     Hospital Problem List     * (Principal)SIRS (systemic inflammatory response syndrome)    Atrial fibrillation    Overview Signed 6/20/2016  3:33 PM by DENI Gates     Description: chads vasc 7         Chronic coronary artery disease    Overview Signed 6/20/2016  3:33 PM by DENI Gates     Description: cabg 2004, data deficient         Chronic diastolic heart failure    Overview Signed 6/20/2016  3:33 PM by DENI Gates     Description: NYHA II-III  Per echo: EF 50% September 2013         Chronic obstructive  pulmonary disease    CKD (chronic kidney disease), stage III    Leukocytosis    PVD (peripheral vascular disease)             Brief Hospital Course to date:  Alexander Adan is a 77 y.o. male       Assessment & Plan:  *Right sided facial swelling most likely parotitis. Very significant improvement with IV antibiotics.    * Weakness which was worse today compared to yesterday.Patient is very frail and has had very significant weight loss recently seems to be mainly secondary to poor oral intake.    * CT evidence of pneumonia possibly even aspiration pneumonia.  ID is following.    * Sepsis currently patient is  on IV antibiotic, much improved.    * Severe weight loss and cachexia.  Etiology is unknown. The patient h appetite has improved and he has pretty good oral intake now.    * Atrial fibrillation with rapid ventricular response, rate better controlled.    * Blood pressure.  According to family and the patient himself usually his blood pressure is low.  However it seems that excessive pain medication also is contributing to low blood pressure.    PLAN:  - DC dobutamine  - 500 cc normal saline bolus  - Monitor urine output.  - De-escalated pain medication  - Labs in a.m.  - PT/OT    DVT Prophylaxis:      CODE STATUS: DNR    Disposition:TBD.      Electronically signed by Anderson Yu MD, 03/09/18, 10:16 PM.

## 2018-03-10 NOTE — PLAN OF CARE
Problem: Patient Care Overview  Goal: Plan of Care Review  Outcome: Ongoing (interventions implemented as appropriate)   03/10/18 9693   Coping/Psychosocial   Plan of Care Reviewed With patient   Plan of Care Review   Progress improving   OTHER   Outcome Summary Pt declined EOB/OOB activities this date; engaged in UB strengthening ex's and tolerated 10-15 reps of all ex's. Continue per OT POC.

## 2018-03-10 NOTE — PLAN OF CARE
Problem: Patient Care Overview  Goal: Plan of Care Review  Outcome: Ongoing (interventions implemented as appropriate)   03/10/18 1413   Coping/Psychosocial   Plan of Care Reviewed With patient   OTHER   Outcome Summary Pt agreeable to bed exs only; he refused OOB activity saying it would make his feet hurt.

## 2018-03-10 NOTE — PLAN OF CARE
Problem: Arrhythmia/Dysrhythmia (Symptomatic) (Adult)  Goal: Signs and Symptoms of Listed Potential Problems Will be Absent or Manageable (Arrhythmia/Dysrhythmia)  Outcome: Ongoing (interventions implemented as appropriate)      Problem: Nutrition, Imbalanced: Inadequate Oral Intake (Adult)  Goal: Identify Related Risk Factors and Signs and Symptoms  Outcome: Ongoing (interventions implemented as appropriate)    Goal: Improved Oral Intake  Outcome: Ongoing (interventions implemented as appropriate)    Goal: Prevent Further Weight Loss  Outcome: Ongoing (interventions implemented as appropriate)      Problem: Infection, Risk/Actual (Adult)  Goal: Identify Related Risk Factors and Signs and Symptoms  Outcome: Ongoing (interventions implemented as appropriate)    Goal: Infection Prevention/Resolution  Outcome: Ongoing (interventions implemented as appropriate)      Problem: Skin Integrity Impairment, Risk/Actual (Adult)  Goal: Identify Related Risk Factors and Signs and Symptoms  Outcome: Ongoing (interventions implemented as appropriate)    Goal: Skin Integrity/Wound Healing  Outcome: Ongoing (interventions implemented as appropriate)      Problem: Fall Risk (Adult)  Goal: Absence of Falls  Outcome: Ongoing (interventions implemented as appropriate)      Problem: Patient Care Overview (Adult)  Goal: Adult Individualization and Mutuality  Outcome: Ongoing (interventions implemented as appropriate)

## 2018-03-10 NOTE — PROGRESS NOTES
"    [facility] Medicine Services  PROGRESS NOTE    Patient Name: Alexander Adan  : 1940  MRN: [id number]    Date of Admission: 3/2/2018  Length of Stay: 8  Primary Care Physician: Harrison Smart MD    Subjective   Subjective     CC:Right facial swelling, weight loss    Subjective:  No dizziness, not eating much. No n/v/d. No abd pain, no chest pain. Feels \"normal\", says bp always runs low    Review of Systems  No headache, no fever, no chills, no chest pain, right facial swelling resolved      Objective   Objective     Vital Signs:   Temp:  [97.5 °F (36.4 °C)-98 °F (36.7 °C)] 97.7 °F (36.5 °C)  Heart Rate:  [] 95  Resp:  [16-20] 16  BP: (76-98)/(36-68) 83/51        Physical Exam:  Constitutional: alert, no distress, pleasant, chronically malnourished but nontoxic appearing  Eyes: PERRLA, sclerae anicteric, no conjunctival injection  HENT: NCAT, mucous membranes moist.  Right submandibular swelling most likely she Parotid gland swelling.  Much improved.  Neck: Supple, no thyromegaly, no lymphadenopathy, trachea midline  Respiratory: Clear to auscultation bilaterally, nonlabored respirations   Cardiovascular: RRR, no murmurs, rubs, or gallops, palpable pedal pulses bilaterally  Gastrointestinal: Positive bowel sounds, soft, nontender, nondistended  Musculoskeletal: Very cachectic No bilateral ankle edema, no clubbing or cyanosis to extremities  Neurologic: alert, nontoxic, pleasant, appropriate responses  Skin: No rashes    Results Reviewed:  I have personally reviewed current lab, radiology, and data and agree.      Results from last 7 days  Lab Units 03/10/18  0658 18  0503 18  0542   WBC 10*3/mm3 11.74* 14.34* 12.41*   HEMOGLOBIN g/dL 8.8* 9.7* 11.0*   HEMATOCRIT % 28.5* 31.3* 35.3*   PLATELETS 10*3/mm3 178 182 193       Results from last 7 days  Lab Units 03/10/18  0658 18  0639 18  0503 18  0542   SODIUM mmol/L 137 137  --  130* "   POTASSIUM mmol/L 3.8 4.6  --  4.8   CHLORIDE mmol/L 115* 111*  --  110*   CO2 mmol/L 18.0* 16.0*  --  16.0*   BUN mg/dL 12 13  --  10   CREATININE mg/dL 0.80 0.80  --  0.80   GLUCOSE mg/dL 83 84  --  125*   CALCIUM mg/dL 7.9* 7.3*  --  7.7*   ALT (SGPT) U/L 6* 10  --  7   AST (SGOT) U/L 8 17  --  15   TROPONIN I ng/mL  --   --  0.033  --      Estimated Creatinine Clearance: 70.8 mL/min (by C-G formula based on SCr of 0.8 mg/dL).  BNP   Date Value Ref Range Status   03/09/2018 675.0 (H) 0.0 - 100.0 pg/mL Final     Comment:     Results may be falsely decreased if patient taking Biotin.     pH, Arterial   Date Value Ref Range Status   03/07/2018 7.419 7.350 - 7.450 pH units Final       Microbiology Results Abnormal     Procedure Component Value - Date/Time    Blood Culture - Blood, [[id number]]  (Normal) Collected:  03/08/18 1033    Lab Status:  Preliminary result Specimen:  Blood from Arm, Left Updated:  03/09/18 1201     Blood Culture No growth at 24 hours    Blood Culture - Blood, [[id number]]  (Normal) Collected:  03/08/18 1015    Lab Status:  Preliminary result Specimen:  Blood from Wrist, Left Updated:  03/09/18 1201     Blood Culture No growth at 24 hours    Blood Culture - Blood, [[id number]]  (Normal) Collected:  03/02/18 1231    Lab Status:  Final result Specimen:  Blood from Hand, Right Updated:  03/07/18 1746     Blood Culture No growth at 5 days    S. Pneumo Ag Urine or CSF - Urine, Urine, Clean Catch [[id number]] Collected:  03/05/18 0538    Lab Status:  Final result Specimen:  Urine from Urine, Clean Catch Updated:  03/07/18 1711     Specimen Source Urine     STREP PNEUMONIAE ANTIGEN Negative     Body Fluid Culture, Sterile Not Indicated     Organism ID Not indicated.     Please note Comment     Comment: [employer] standards require a culture to be  performed on CSF specimens submitted for bacterial antigen testing.  (CAP [id number]) Urine specimens will not be cultured.        Narrative:       Performed at:  [address]  [id number]  : Gilosn Gandara MD, Phone:  [phone]    Legionella Antigen, Urine - Urine, Urine, Clean Catch [[id number]] Collected:  03/05/18 0538    Lab Status:  Final result Specimen:  Urine from Urine, Clean Catch Updated:  03/07/18 1522     L. pneumophila Serogp 1 Ur Ag Negative     Comment: Presumptive negative for L. pneumophila serogroup 1 antigen in urine,  suggesting no recent or current infection. Legionnaires' disease  cannot be ruled out since other serogroups and species may also cause  disease.       Narrative:       [facility]  [facility]  [address] [place], [place]  [id number]  : Gilson Gandara MD, Phone:  [phone]    Blood Culture - Blood, [[id number]] Collected:  03/02/18 1235    Lab Status:  Final result Specimen:  Blood from Arm, Left Updated:  03/07/18 1401     Blood Culture No growth at 5 days      Aerobic bottle only    Blood Culture - Blood, [[id number]]  (Normal) Collected:  03/02/18 1305    Lab Status:  Final result Specimen:  Blood from Arm, Left Updated:  03/07/18 1401     Blood Culture No growth at 5 days    Respiratory Culture - Sputum, Cough [[id number]]  (Abnormal)  (Susceptibility) Collected:  03/05/18 0751    Lab Status:  Final result Specimen:  Sputum from Cough Updated:  03/07/18 1210     Respiratory Culture --      Moderate growth (3+) Staphylococcus aureus (A)     Comment: This isolate is presumed to be clindamycin resistant based on detection of inducible clindamycin resistance.  Clindamycin may still be effective in some patients.         Moderate growth (3+) Normal Respiratory Tiffanie     Gram Stain Result Many (4+) WBCs per low power field      Moderate (3+) Epithelial cells per low power field      Moderate (3+) Gram positive cocci in pairs and clusters    Susceptibility      Staphylococcus aureus     TORO     Ceftriaxone <=8 ug/ml  Susceptible     Clindamycin  Resistant     Erythromycin >4 ug/ml Resistant     Gentamicin <=4 ug/ml Susceptible     Levofloxacin <=1 ug/ml Susceptible  [1]      Linezolid <=1 ug/ml Susceptible     Oxacillin <=0.25 ug/ml Susceptible     Penicillin G 2 ug/ml Resistant     Quinupristin + Dalfopristin <=0.5 ug/ml Susceptible     Rifampin <=1 ug/ml Susceptible     Tetracycline >8 ug/ml Resistant     Trimethoprim + Sulfamethoxazole <=0.5/9.5 ug/ml Susceptible     Vancomycin 1 ug/ml Susceptible            [1]   Staphylococcus species may develop resistance during prolonged therapy with quinolones.  Isolates that are initially susceptible may become resistant within three to four days after initiation of therapy. Testing of repeat isolates may be warranted.                 Urine Culture - Urine, Urine, Clean Catch [[id number]]  (Normal) Collected:  03/05/18 0538    Lab Status:  Final result Specimen:  Urine from Urine, Clean Catch Updated:  03/07/18 0820     Urine Culture No growth at 2 days    Influenza A & B, RT PCR - Swab, Nasopharynx [[id number]]  (Normal) Collected:  03/04/18 1424    Lab Status:  Final result Specimen:  Swab from Nasopharynx Updated:  03/04/18 1542     Influenza A PCR Not Detected     Influenza B PCR Not Detected          Imaging Results (last 24 hours)     Procedure Component Value Units Date/Time    XR Chest 1 View [[id number]] Collected:  03/09/18 0934     Updated:  03/09/18 2231    Narrative:       EXAMINATION: XR CHEST 1 VW-      INDICATION: Wheezing on left; R13.12-Dysphagia, oropharyngeal phase;  K11.21-Acute sialoadenitis; J01.00-Acute maxillary sinusitis,  unspecified; R53.1-Weakness; Z74.09-Other reduced mobility.      COMPARISON: 03/04/2018.     FINDINGS: Sternotomy wires are noted. There is a left-sided dual lead  ICD. Right upper extremity PICC line seen with its tip at the cavoatrial  junction. The heart is normal in size. Vasculature is cephalized and  there is mild diffuse interstitial  disease a little increased from the  prior study, probably early interstitial edema. No consolidated lung or  pneumothorax is seen. There may be trace right pleural effusion versus  chronic pleural thickening.           Impression:       Interval development of mild pulmonary vascular congestion  and interstitial edema.     D:  03/09/2018  E:  03/09/2018     This report was finalized on 3/9/2018 10:29 PM by DR. Faisal Nelson MD.           Results for orders placed during the hospital encounter of 02/07/18   Adult Transthoracic Echo Complete W/ Cont if Necessary Per Protocol    Narrative · Left ventricular wall thickness is consistent with mild concentric   hypertrophy.  · The following left ventricular wall segments are hypokinetic: basal   inferoseptal, mid inferoseptal and basal inferoseptal.  · Moderately reduced right ventricular systolic function noted.  · Left atrial cavity size is moderately dilated.  · Mild-to-moderate mitral valve regurgitation is present  · Mild tricuspid valve regurgitation is present.  · Left ventricular systolic function is normal. Estimated EF = 60%.  · there is akinesis of base of septum and base of inferior wall          I have reviewed the medications.    Assessment/Plan   Assessment / Plan     Hospital Problem List     * (Principal)SIRS (systemic inflammatory response syndrome)    Atrial fibrillation    Overview Signed 6/20/2016  3:33 PM by DENI Gates     Description: chads vasc 7         Chronic coronary artery disease    Overview Signed 6/20/2016  3:33 PM by DENI Gates     Description: cabg 2004, data deficient         Chronic diastolic heart failure    Overview Signed 6/20/2016  3:33 PM by DENI Gates     Description: NYHA II-III  Per echo: EF 50% September 2013         Chronic obstructive pulmonary disease    CKD (chronic kidney disease), stage III    Leukocytosis    PVD (peripheral vascular disease)          *Right sided parotitis (better)  *Pneumonia  "(better)  *MSSA in sputum  *Persistent Hypotension  *Afib (on sotalol)   -no anticoagulation, prior subdural bleed on anticoagulation  *Hx Mixed CHF (echo 2/7/18:> ef ~60, mild-mod mr, moderate decreased Right systolic fxn  *Hx Subdural Hemorrhage (no anticoagulation)  *P.A.D (LLE arterial revascularization being considered in future by dr. Vieira)   -preop future stress test ordered by cards outpt but yet to be done       Brief Hospital Course to date:  Alexander Adan is a 77 y.o. male       Assessment & Plan:  *Right sided facial swelling most likely parotitis. Very significant improvement with IV antibiotics.    * Weakness which was worse today compared to yesterday.Patient is very frail and has had very significant weight loss recently seems to be mainly secondary to poor oral intake.    * CT evidence of pneumonia possibly even aspiration pneumonia.  ID is following.    * Sepsis currently patient is  on IV antibiotic, much improved.    * Severe weight loss and cachexia.  Etiology is unknown. The patient h appetite has improved and he has pretty good oral intake now.    * Atrial fibrillation with rapid ventricular response, rate better controlled.    * Blood pressure.  According to family and the patient himself usually his blood pressure is low.  However it seems that excessive pain medication also is contributing to low blood pressure.  -------------------------------------  PLAN:  -agree w/ deescalation of opioids (possibly contributing to low bp)  -cosyntroponin stim test (no recent steroids but ? Chronic insufficiency since gets steroids intermittently)   -says bp always \"runs low\" in 80's systolic  -echo assess EF (due to low bp)    -replace mag    -follow up SLP recs (mbs 3/7 was ok)    -cbc, bmp, mag in a.m.  -encouraging po intake    -continue sotalol    -to [facility] once medically ready (final I.d. Recs, bp stable, etc), possibly couple days    DVT Prophylaxis:  Sq heparin bid    CODE STATUS: " conditional DNR (no mech vent, no cpr, etc)    Addendum: at 9pm. sbp 80's  -1 liter normal saline over 4 hours    Disposition:TBD.      Electronically signed by Ousmane Leiva MD, 03/10/18, 10:35 AM.

## 2018-03-10 NOTE — PROGRESS NOTES
"[facility] Progress Note        Antibiotics:  Doxy, Flagyl, Azactam, Vanc    CC: parotitis, pneumonia    HPI:  Patient is a 77 y.o.  Yr old male with history of numerous comorbid conditions including past stroke/subdural hematoma and evacuation with bur hole 2015 with chronic kidney disease/debility.  Family reports chronic dysphagia symptoms such that he coughs with liquid/solids but no specific esophageal diagnosis.  He is admitted on March 2 with 3 days poor by mouth intake, generalized malaise with dry cough and right facial/parotid pain.       3/10/18: No fever. Still with intermittent hypotension.  Asymptomatic.  C/o foot pain.  SOB improving.  Right parotid pain/swelling improving.    ROS:      3/10: No f/c/s. No n/v/d.      Constitutional-- No Fever, chills or sweats.  Appetite good, and no malaise. No fatigue.  Heent-- No new vision, hearing or throat complaints.  No epistaxis or oral sores.  Denies odynophagia or dysphagia.  No flashers, floaters or eye pain. No odynophagia or dysphagia. No headache, photophobia or neck stiffness.  CV-- No chest pain, palpitation or syncope  Resp-- No SOB/cough/Hemoptysis  GI- No nausea, vomiting, or diarrhea.  No hematochezia, melena, or hematemesis. Denies jaundice or chronic liver disease.  -- No dysuria, hematuria, or flank pain.  Denies hesitancy, urgency or flank pain.  Lymph- no swollen lymph nodes in neck/axilla or groin.   Heme- No active bruising or bleeding; no Hx of DVT or PE.  MS-- no swelling or pain in the bones or joints of arms/legs.  No new back pain.  Neuro-- No acute focal weakness or numbness in the arms or legs.  No seizures.    Full 12 point review of systems reviewed and negative otherwise for acute complaints,       PE: nursing/chaperone present  BP 95/52   Pulse 98   Temp 97.5 °F (36.4 °C) (Oral)   Resp 18   Ht 165.1 cm (65\") Comment: Ht reported by pt's wife; c/w other EHR documentation  Wt 65.8 kg (145 lb)   SpO2 99%   BMI 22.05 kg/m² "     GENERAL: Thin chronically ill-appearing and elderly, sleepy but answers questions  HEENT: Normocephalic, atraumatic. Right face/parotid gland with resolving erythema/induration and tenderness.  No fluctuance.  No crepitus or bulla.  NECK: Supple without nuchal rigidity. No mass.  LYMPH: No cervical, axillary or inguinal lymphadenopathy.  HEART: Tachycardic; No murmur, rubs, gallops.   LUNGS: Diminished at bases with rhonchi more so on the right than left. Normal respiratory effort. Nonlabored.  ABDOMEN: Soft, nontender, nondistended. Positive bowel sounds. No rebound or guarding.  EXT:  No cyanosis, clubbing or edema. No cord.  Edema of left forearm below BP cuff  : Without Aguilar catheter.  MSK: FROM without joint effusions noted arms/legs.    SKIN: Warm and dry without cutaneous eruptions on Inspection/palpation.    NEURO: moves all 4 extremities.            Laboratory Data      Results from last 7 days  Lab Units 03/09/18  0503 03/07/18  0542 03/06/18  0441   WBC 10*3/mm3 14.34* 12.41* 8.10   HEMOGLOBIN g/dL 9.7* 11.0* 11.1*   HEMATOCRIT % 31.3* 35.3* 35.8*   PLATELETS 10*3/mm3 182 193 140*     Today labs:  WBC 11.7      Results from last 7 days  Lab Units 03/09/18  0639   SODIUM mmol/L 137   POTASSIUM mmol/L 4.6   CHLORIDE mmol/L 111*   CO2 mmol/L 16.0*   BUN mg/dL 13   CREATININE mg/dL 0.80   GLUCOSE mg/dL 84   CALCIUM mg/dL 7.3*       Results from last 7 days  Lab Units 03/09/18  0639   ALK PHOS U/L 64   BILIRUBIN mg/dL 0.2*   ALT (SGPT) U/L 10   AST (SGOT) U/L 17               Estimated Creatinine Clearance: 70.8 mL/min (by C-G formula based on SCr of 0.8 mg/dL).      Microbiology:  Sputum culture With MSSA on 3/5/18  Blood culture NGSF x 2  Urine legionella negative  Strep pneumo negative       Radiology:  Imaging Results (last 72 hours)     Procedure Component Value Units Date/Time    XR Chest 1 View [[id number]] Collected:  03/09/18 0934     Updated:  03/09/18 2231    Narrative:       EXAMINATION: XR  CHEST 1 VW-      INDICATION: Wheezing on left; R13.12-Dysphagia, oropharyngeal phase;  K11.21-Acute sialoadenitis; J01.00-Acute maxillary sinusitis,  unspecified; R53.1-Weakness; Z74.09-Other reduced mobility.      COMPARISON: 03/04/2018.     FINDINGS: Sternotomy wires are noted. There is a left-sided dual lead  ICD. Right upper extremity PICC line seen with its tip at the cavoatrial  junction. The heart is normal in size. Vasculature is cephalized and  there is mild diffuse interstitial disease a little increased from the  prior study, probably early interstitial edema. No consolidated lung or  pneumothorax is seen. There may be trace right pleural effusion versus  chronic pleural thickening.           Impression:       Interval development of mild pulmonary vascular congestion  and interstitial edema.     D:  03/09/2018  E:  03/09/2018     This report was finalized on 3/9/2018 10:29 PM by DR. Faisal Nelson MD.       FL Video Swallow With Speech [[id number]] Collected:  03/07/18 1525     Updated:  03/07/18 1540    Narrative:       EXAMINATION: FL VIDEO SWALLOW W SPEECH-     INDICATION: dysphagia; K11.21-Acute sialoadenitis; J01.00-Acute  maxillary sinusitis, unspecified; R53.1-Weakness; R13.10-Dysphagia,  unspecified; Z74.09-Other reduced mobility; Z74.09-Other reduced  mobility     TECHNIQUE: 1 minute and 18 seconds of fluoroscopic time was used for  this exam. 1 associated image was saved. The patient was evaluated in  the seated lateral position while taking a variety of consistencies of  barium by mouth under the direction of speech pathology.     COMPARISON: NONE     FINDINGS: There was no penetration and no aspiration with small teaspoon  sized sips of thin barium. There was aspiration with large sized sips of  thin, nectar consistency barium. There was no penetration and no  aspiration with honey, or pudding consistency barium, however there was  a considerable amount of residue in the vallecula, and piriform  sinuses  after swallows with honey, and pudding consistency barium.          Impression:       Fluoroscopy provided for a modified barium swallow. Please  see speech therapy report for full details and recommendations.         This report was finalized on 3/7/2018 3:38 PM by Dr. Tim Murillo MD.                 Impression:   --Acute sepsis with leukocytosis/fever and tachycardia, associated with encephalopathy and likely severe sepsis.  Primary focal symptoms/findings include right parotitis and acute right lower lobe pneumonia with broad spectrum antimicrobials.  No preceding symptoms of central nervous system focus and no meningismus at present.  Likely metabolic encephalopathy associated with sepsis.  Abdomen benign by exam with no intra-abdominal pathology per radiology on CT scan, although motion artifact and if symptoms persist/recur, then you should give consideration to further GI workup by either repeat imaging or GI evaluation.  No diarrhea at present but if it develops you could consider stool studies.  Urinalysis not consistent with UTI.  No obvious soft tissue source other than above.     --Acute right parotitis;  gram-positive organisms are a primary concern.  In addition there is description of a masslike quality by radiology and he will likely need to give consideration to ENT input.  I discussed with Dr. Yu;  no focal abscess so far per radiology.     --Acute cough with right lower lobe pneumonia by imaging and risk for community-acquired pathogens/aspiration.  Broad-spectrum coverage ongoing. Culture with MSSA.  Legionella and Strep pneumo UAg negative today      --Dysphagia.  Family reports difficulty with solids/liquids and cough.  Speech therapy has seen.  You'll likely need to give consideration to GI consultation for EGD as well.     --Acute encephalopathy.  Family reports this is common with acute illness for him.  Further neurologic workup per internal medicine at their discretion.   Preceding symptoms and current exam not consistent with meningismus.  Improving in general per family back towards baseline     --Chronic kidney disease stage III per records.  Monitor to help guide further adjustments with antimicrobials     --QTc > 500 ms EKG    --TCP;  Improving.    --hypotension;  Medicine workup/intervention on going;  Afebrile, no other focal symptoms;  Repeat blood cultures neagtive so far and no new focal symptoms but monitor for evolving focus    --PCN and cephalexin intolerances       PLAN:     --Continue IV vancomycin/Azactam/Flagyl and stop doxycycline (hives/rash to PCN and cephalosporin)     --Monitor IV and IV antibiotic with risk for systemic complication and potential drug interaction     --Check/review labs cultures and scans     --Highly complex set of problems with high risk for further serious morbidity and other serious sequela     --Consider ENT and GI consultation; Dr. Dong has d/w Dr. Yu.     Dr. Brian Villegas saw the patient, performed the physical exam, reviewed the laboratory data and guided with the formulation of the above problem list, assessment and treatment plan.         AMBIKA Maldonado  3/10/2018

## 2018-03-10 NOTE — PLAN OF CARE
Problem: Patient Care Overview (Adult)  Goal: Plan of Care Review   03/10/18 1701   Coping/Psychosocial Response Interventions   Plan Of Care Reviewed With patient;family   Patient Care Overview   Progress progress toward functional goals as expected     SLP re-evaluation completed. Will continue to address oropharyngeal dysphagia. Please see note for further details and recommendations.

## 2018-03-10 NOTE — PLAN OF CARE
Problem: Arrhythmia/Dysrhythmia (Symptomatic) (Adult)  Goal: Signs and Symptoms of Listed Potential Problems Will be Absent or Manageable (Arrhythmia/Dysrhythmia)  Outcome: Ongoing (interventions implemented as appropriate)      Problem: Nutrition, Imbalanced: Inadequate Oral Intake (Adult)  Goal: Identify Related Risk Factors and Signs and Symptoms  Outcome: Ongoing (interventions implemented as appropriate)    Goal: Improved Oral Intake  Outcome: Ongoing (interventions implemented as appropriate)    Goal: Prevent Further Weight Loss  Outcome: Ongoing (interventions implemented as appropriate)      Problem: Infection, Risk/Actual (Adult)  Goal: Identify Related Risk Factors and Signs and Symptoms  Outcome: Outcome(s) achieved Date Met: 03/10/18    Goal: Infection Prevention/Resolution  Outcome: Ongoing (interventions implemented as appropriate)      Problem: Skin Integrity Impairment, Risk/Actual (Adult)  Goal: Identify Related Risk Factors and Signs and Symptoms  Outcome: Outcome(s) achieved Date Met: 03/10/18    Goal: Skin Integrity/Wound Healing  Outcome: Ongoing (interventions implemented as appropriate)      Problem: Fall Risk (Adult)  Goal: Absence of Falls  Outcome: Ongoing (interventions implemented as appropriate)      Problem: Patient Care Overview  Goal: Individualization and Mutuality  Outcome: Ongoing (interventions implemented as appropriate)    Goal: Discharge Needs Assessment  Outcome: Ongoing (interventions implemented as appropriate)    Goal: Interprofessional Rounds/Family Conf  Outcome: Ongoing (interventions implemented as appropriate)      Problem: Skin Injury Risk (Adult)  Goal: Skin Health and Integrity  Outcome: Ongoing (interventions implemented as appropriate)

## 2018-03-10 NOTE — PLAN OF CARE
Problem: Arrhythmia/Dysrhythmia (Symptomatic) (Adult)  Goal: Signs and Symptoms of Listed Potential Problems Will be Absent or Manageable (Arrhythmia/Dysrhythmia)  Outcome: Resolved for upgrade, new template will be applied Date Met: 03/09/18      Problem: Nutrition, Imbalanced: Inadequate Oral Intake (Adult)  Goal: Improved Oral Intake  Outcome: Resolved for upgrade, new template will be applied Date Met: 03/09/18    Goal: Prevent Further Weight Loss  Outcome: Resolved for upgrade, new template will be applied Date Met: 03/09/18      Problem: Infection, Risk/Actual (Adult)  Goal: Infection Prevention/Resolution  Outcome: Resolved for upgrade, new template will be applied Date Met: 03/09/18      Problem: Skin Integrity Impairment, Risk/Actual (Adult)  Goal: Skin Integrity/Wound Healing  Outcome: Resolved for upgrade, new template will be applied Date Met: 03/09/18      Problem: Fall Risk (Adult)  Goal: Absence of Falls  Outcome: Resolved for upgrade, new template will be applied Date Met: 03/09/18      Problem: Patient Care Overview (Adult)  Goal: Plan of Care Review  Outcome: Resolved for upgrade, new template will be applied Date Met: 03/09/18    Goal: Adult Individualization and Mutuality  Outcome: Resolved for upgrade, new template will be applied Date Met: 03/09/18    Goal: Discharge Needs Assessment  Outcome: Resolved for upgrade, new template will be applied Date Met: 03/09/18

## 2018-03-11 ENCOUNTER — APPOINTMENT (OUTPATIENT)
Dept: GENERAL RADIOLOGY | Facility: HOSPITAL | Age: 78
End: 2018-03-11

## 2018-03-11 LAB
ANION GAP SERPL CALCULATED.3IONS-SCNC: 6 MMOL/L (ref 3–11)
BASOPHILS # BLD AUTO: 0.04 10*3/MM3 (ref 0–0.2)
BASOPHILS NFR BLD AUTO: 0.4 % (ref 0–1)
BUN BLD-MCNC: 12 MG/DL (ref 9–23)
BUN/CREAT SERPL: 15 (ref 7–25)
BURR CELLS BLD QL SMEAR: NORMAL
CALCIUM SPEC-SCNC: 7.5 MG/DL (ref 8.7–10.4)
CHLORIDE SERPL-SCNC: 118 MMOL/L (ref 99–109)
CO2 SERPL-SCNC: 17 MMOL/L (ref 20–31)
CREAT BLD-MCNC: 0.8 MG/DL (ref 0.6–1.3)
DEPRECATED RDW RBC AUTO: 69.5 FL (ref 37–54)
EOSINOPHIL # BLD AUTO: 0.01 10*3/MM3 (ref 0–0.3)
EOSINOPHIL NFR BLD AUTO: 0.1 % (ref 0–3)
ERYTHROCYTE [DISTWIDTH] IN BLOOD BY AUTOMATED COUNT: 18.7 % (ref 11.3–14.5)
GFR SERPL CREATININE-BSD FRML MDRD: 93 ML/MIN/1.73
GLUCOSE BLD-MCNC: 122 MG/DL (ref 70–100)
HCT VFR BLD AUTO: 26.6 % (ref 38.9–50.9)
HGB BLD-MCNC: 8 G/DL (ref 13.1–17.5)
IMM GRANULOCYTES # BLD: 0.14 10*3/MM3 (ref 0–0.03)
IMM GRANULOCYTES NFR BLD: 1.5 % (ref 0–0.6)
LYMPHOCYTES # BLD AUTO: 1.1 10*3/MM3 (ref 0.6–4.8)
LYMPHOCYTES NFR BLD AUTO: 12 % (ref 24–44)
MAGNESIUM SERPL-MCNC: 2.5 MG/DL (ref 1.3–2.7)
MCH RBC QN AUTO: 30.7 PG (ref 27–31)
MCHC RBC AUTO-ENTMCNC: 30.1 G/DL (ref 32–36)
MCV RBC AUTO: 101.9 FL (ref 80–99)
MONOCYTES # BLD AUTO: 0.77 10*3/MM3 (ref 0–1)
MONOCYTES NFR BLD AUTO: 8.4 % (ref 0–12)
NEUTROPHILS # BLD AUTO: 7.07 10*3/MM3 (ref 1.5–8.3)
NEUTROPHILS NFR BLD AUTO: 77.6 % (ref 41–71)
PHOSPHATE SERPL-MCNC: 2.3 MG/DL (ref 2.4–5.1)
PLAT MORPH BLD: NORMAL
PLATELET # BLD AUTO: 180 10*3/MM3 (ref 150–450)
PMV BLD AUTO: 10.9 FL (ref 6–12)
POTASSIUM BLD-SCNC: 3.6 MMOL/L (ref 3.5–5.5)
RBC # BLD AUTO: 2.61 10*6/MM3 (ref 4.2–5.76)
SODIUM BLD-SCNC: 141 MMOL/L (ref 132–146)
WBC MORPH BLD: NORMAL
WBC NRBC COR # BLD: 9.13 10*3/MM3 (ref 3.5–10.8)

## 2018-03-11 PROCEDURE — 80048 BASIC METABOLIC PNL TOTAL CA: CPT | Performed by: INTERNAL MEDICINE

## 2018-03-11 PROCEDURE — 97164 PT RE-EVAL EST PLAN CARE: CPT

## 2018-03-11 PROCEDURE — 83735 ASSAY OF MAGNESIUM: CPT | Performed by: INTERNAL MEDICINE

## 2018-03-11 PROCEDURE — 85007 BL SMEAR W/DIFF WBC COUNT: CPT | Performed by: INTERNAL MEDICINE

## 2018-03-11 PROCEDURE — 94799 UNLISTED PULMONARY SVC/PX: CPT

## 2018-03-11 PROCEDURE — 71045 X-RAY EXAM CHEST 1 VIEW: CPT

## 2018-03-11 PROCEDURE — 94640 AIRWAY INHALATION TREATMENT: CPT

## 2018-03-11 PROCEDURE — 84100 ASSAY OF PHOSPHORUS: CPT | Performed by: INTERNAL MEDICINE

## 2018-03-11 PROCEDURE — 25010000002 MAGNESIUM SULFATE 2 GM/50ML SOLUTION: Performed by: INTERNAL MEDICINE

## 2018-03-11 PROCEDURE — 99233 SBSQ HOSP IP/OBS HIGH 50: CPT | Performed by: INTERNAL MEDICINE

## 2018-03-11 PROCEDURE — 25010000002 HEPARIN (PORCINE) PER 1000 UNITS: Performed by: INTERNAL MEDICINE

## 2018-03-11 PROCEDURE — 25010000002 VANCOMYCIN PER 500 MG

## 2018-03-11 PROCEDURE — 97597 DBRDMT OPN WND 1ST 20 CM/<: CPT

## 2018-03-11 PROCEDURE — 94760 N-INVAS EAR/PLS OXIMETRY 1: CPT

## 2018-03-11 PROCEDURE — 85025 COMPLETE CBC W/AUTO DIFF WBC: CPT | Performed by: INTERNAL MEDICINE

## 2018-03-11 RX ORDER — HEPARIN SODIUM 5000 [USP'U]/ML
5000 INJECTION, SOLUTION INTRAVENOUS; SUBCUTANEOUS EVERY 12 HOURS SCHEDULED
Status: DISCONTINUED | OUTPATIENT
Start: 2018-03-11 | End: 2018-03-13 | Stop reason: HOSPADM

## 2018-03-11 RX ADMIN — PANTOPRAZOLE SODIUM 40 MG: 40 TABLET, DELAYED RELEASE ORAL at 17:39

## 2018-03-11 RX ADMIN — IPRATROPIUM BROMIDE AND ALBUTEROL SULFATE 3 ML: 2.5; .5 SOLUTION RESPIRATORY (INHALATION) at 07:28

## 2018-03-11 RX ADMIN — GABAPENTIN 600 MG: 300 CAPSULE ORAL at 20:37

## 2018-03-11 RX ADMIN — SODIUM CHLORIDE 2 G: 9 INJECTION, SOLUTION INTRAVENOUS at 09:17

## 2018-03-11 RX ADMIN — GABAPENTIN 600 MG: 300 CAPSULE ORAL at 13:09

## 2018-03-11 RX ADMIN — ASPIRIN 81 MG 81 MG: 81 TABLET ORAL at 09:18

## 2018-03-11 RX ADMIN — IPRATROPIUM BROMIDE AND ALBUTEROL SULFATE 3 ML: 2.5; .5 SOLUTION RESPIRATORY (INHALATION) at 20:04

## 2018-03-11 RX ADMIN — NICOTINE 1 PATCH: 14 PATCH TRANSDERMAL at 09:17

## 2018-03-11 RX ADMIN — HEPARIN SODIUM 5000 UNITS: 5000 INJECTION, SOLUTION INTRAVENOUS; SUBCUTANEOUS at 20:37

## 2018-03-11 RX ADMIN — GABAPENTIN 600 MG: 300 CAPSULE ORAL at 06:33

## 2018-03-11 RX ADMIN — MAGNESIUM SULFATE HEPTAHYDRATE 2 G: 40 INJECTION, SOLUTION INTRAVENOUS at 10:17

## 2018-03-11 RX ADMIN — TRAMADOL HYDROCHLORIDE 50 MG: 50 TABLET, COATED ORAL at 09:18

## 2018-03-11 RX ADMIN — METRONIDAZOLE 500 MG: 500 INJECTION, SOLUTION INTRAVENOUS at 13:09

## 2018-03-11 RX ADMIN — METRONIDAZOLE 500 MG: 500 INJECTION, SOLUTION INTRAVENOUS at 06:33

## 2018-03-11 RX ADMIN — SUCRALFATE 1 G: 1 TABLET ORAL at 09:18

## 2018-03-11 RX ADMIN — TRAMADOL HYDROCHLORIDE 50 MG: 50 TABLET, COATED ORAL at 00:56

## 2018-03-11 RX ADMIN — CASTOR OIL AND BALSAM, PERU: 788; 87 OINTMENT TOPICAL at 09:18

## 2018-03-11 RX ADMIN — IPRATROPIUM BROMIDE AND ALBUTEROL SULFATE 3 ML: 2.5; .5 SOLUTION RESPIRATORY (INHALATION) at 12:47

## 2018-03-11 RX ADMIN — HEPARIN SODIUM 5000 UNITS: 5000 INJECTION, SOLUTION INTRAVENOUS; SUBCUTANEOUS at 13:09

## 2018-03-11 RX ADMIN — CASTOR OIL AND BALSAM, PERU: 788; 87 OINTMENT TOPICAL at 20:36

## 2018-03-11 RX ADMIN — PANTOPRAZOLE SODIUM 40 MG: 40 TABLET, DELAYED RELEASE ORAL at 09:18

## 2018-03-11 RX ADMIN — HEPARIN SODIUM 5000 UNITS: 5000 INJECTION, SOLUTION INTRAVENOUS; SUBCUTANEOUS at 06:33

## 2018-03-11 RX ADMIN — METRONIDAZOLE 500 MG: 500 INJECTION, SOLUTION INTRAVENOUS at 00:30

## 2018-03-11 RX ADMIN — TRAMADOL HYDROCHLORIDE 50 MG: 50 TABLET, COATED ORAL at 23:22

## 2018-03-11 RX ADMIN — METRONIDAZOLE 500 MG: 500 INJECTION, SOLUTION INTRAVENOUS at 17:39

## 2018-03-11 RX ADMIN — MAGNESIUM SULFATE HEPTAHYDRATE 2 G: 40 INJECTION, SOLUTION INTRAVENOUS at 13:09

## 2018-03-11 RX ADMIN — SODIUM CHLORIDE 2 G: 9 INJECTION, SOLUTION INTRAVENOUS at 04:18

## 2018-03-11 RX ADMIN — METRONIDAZOLE 500 MG: 500 INJECTION, SOLUTION INTRAVENOUS at 23:26

## 2018-03-11 RX ADMIN — VANCOMYCIN HYDROCHLORIDE 1000 MG: 1 INJECTION, SOLUTION INTRAVENOUS at 17:39

## 2018-03-11 RX ADMIN — SODIUM CHLORIDE 2 G: 9 INJECTION, SOLUTION INTRAVENOUS at 17:39

## 2018-03-11 RX ADMIN — SUCRALFATE 1 G: 1 TABLET ORAL at 17:39

## 2018-03-11 RX ADMIN — IPRATROPIUM BROMIDE AND ALBUTEROL SULFATE 3 ML: 2.5; .5 SOLUTION RESPIRATORY (INHALATION) at 16:52

## 2018-03-11 RX ADMIN — MAGNESIUM SULFATE HEPTAHYDRATE 2 G: 40 INJECTION, SOLUTION INTRAVENOUS at 09:18

## 2018-03-11 NOTE — PLAN OF CARE
Problem: Infection, Risk/Actual (Adult)  Goal: Infection Prevention/Resolution  Outcome: Ongoing (interventions implemented as appropriate)   03/11/18 0513   Infection, Risk/Actual (Adult)   Infection Prevention/Resolution making progress toward outcome       Problem: Skin Integrity Impairment, Risk/Actual (Adult)  Goal: Skin Integrity/Wound Healing  Outcome: Ongoing (interventions implemented as appropriate)      Problem: Fall Risk (Adult)  Goal: Absence of Falls  Outcome: Ongoing (interventions implemented as appropriate)   03/11/18 0513   Retired CPM F14 SGRP CPG HR FALL RISK (ADULT)   Retired CPM F14 ROW GHR ABSENCE OF FALLS.FALL RISK (ADULT) making progress toward outcome       Problem: Patient Care Overview  Goal: Plan of Care Review   03/11/18 0513   Coping/Psychosocial   Plan of Care Reviewed With patient;spouse   Plan of Care Review   Progress improving       Problem: Skin Injury Risk (Adult)  Goal: Identify Related Risk Factors and Signs and Symptoms  Outcome: Ongoing (interventions implemented as appropriate)   03/11/18 0513   Skin Injury Risk (Adult)   Related Risk Factors (Skin Injury Risk) advanced age;cognitive impairment;edema;infection;mobility impaired;moisture;nutritional deficiencies;tissue perfusion altered     Goal: Skin Health and Integrity  Outcome: Ongoing (interventions implemented as appropriate)   03/11/18 0513   Skin Injury Risk (Adult)   Skin Health and Integrity making progress toward outcome

## 2018-03-11 NOTE — PROGRESS NOTES
"    [facility] Medicine Services  PROGRESS NOTE    Patient Name: Alexander Adan  : 1940  MRN: [id number]    Date of Admission: 3/2/2018  Length of Stay: 9  Primary Care Physician: Harrison Smart MD    Subjective   Subjective     CC:Right facial swelling, weight loss    Subjective:  Feels better today. In room the current sbp 120' smiling, no pain, tolerating diet. No bm today, no melena, no brbpr. No productive sputum. \"i'm breathing ok\"    Review of Systems  No headache, no fever, no chills, no chest pain    Objective   Objective     Vital Signs:   Temp:  [97.6 °F (36.4 °C)-97.9 °F (36.6 °C)] 97.9 °F (36.6 °C)  Heart Rate:  [] 106  Resp:  [16-18] 18  BP: ()/(44-67) 100/67        Physical Exam:  Constitutional: alert, no distress, pleasant, chronically malnourished but nontoxic appearing  Eyes: PERRLA, sclerae anicteric, no conjunctival injection  HENT: NCAT, mucous membranes moist.  Right submandibular swelling most likely she Parotid gland swelling.  Much improved.  Neck: Supple, no thyromegaly, no lymphadenopathy, trachea midline  Respiratory: bilateral faint rhonchi, normal respiratory effort  Cardiovascular: irr irr, tachycardic mildly, no murmurs, rubs, or gallops, palpable pedal pulses bilaterally  Gastrointestinal: Positive bowel sounds, soft, nontender, nondistended  Musculoskeletal: Very cachectic No bilateral ankle edema, no clubbing or cyanosis to extremities  Neurologic: alert, nontoxic, pleasant, appropriate responses  Skin: No rashes    Results Reviewed:  I have personally reviewed current lab, radiology, and data and agree.      Results from last 7 days  Lab Units 18  0658 03/10/18  0658 18  0503   WBC 10*3/mm3 9.13 11.74* 14.34*   HEMOGLOBIN g/dL 8.0* 8.8* 9.7*   HEMATOCRIT % 26.6* 28.5* 31.3*   PLATELETS 10*3/mm3 180 178 182       Results from last 7 days  Lab Units 18  0826 03/10/18  0658 18  0639 18  0503 " 03/07/18  0542   SODIUM mmol/L 141 137 137  --  130*   POTASSIUM mmol/L 3.6 3.8 4.6  --  4.8   CHLORIDE mmol/L 118* 115* 111*  --  110*   CO2 mmol/L 17.0* 18.0* 16.0*  --  16.0*   BUN mg/dL 12 12 13  --  10   CREATININE mg/dL 0.80 0.80 0.80  --  0.80   GLUCOSE mg/dL 122* 83 84  --  125*   CALCIUM mg/dL 7.5* 7.9* 7.3*  --  7.7*   ALT (SGPT) U/L  --  6* 10  --  7   AST (SGOT) U/L  --  8 17  --  15   TROPONIN I ng/mL  --   --   --  0.033  --      Estimated Creatinine Clearance: 70.8 mL/min (by C-G formula based on SCr of 0.8 mg/dL).  BNP   Date Value Ref Range Status   03/09/2018 675.0 (H) 0.0 - 100.0 pg/mL Final     Comment:     Results may be falsely decreased if patient taking Biotin.     No results found for: PHART    Microbiology Results Abnormal     Procedure Component Value - Date/Time    Blood Culture - Blood, [[id number]]  (Normal) Collected:  03/08/18 1033    Lab Status:  Preliminary result Specimen:  Blood from Arm, Left Updated:  03/11/18 1301     Blood Culture No growth at 3 days    Blood Culture - Blood, [[id number]]  (Normal) Collected:  03/08/18 1015    Lab Status:  Preliminary result Specimen:  Blood from Wrist, Left Updated:  03/11/18 1301     Blood Culture No growth at 3 days    Blood Culture - Blood, [[id number]]  (Normal) Collected:  03/02/18 1231    Lab Status:  Final result Specimen:  Blood from Hand, Right Updated:  03/07/18 1746     Blood Culture No growth at 5 days    S. Pneumo Ag Urine or CSF - Urine, Urine, Clean Catch [[id number]] Collected:  03/05/18 0538    Lab Status:  Final result Specimen:  Urine from Urine, Clean Catch Updated:  03/07/18 1711     Specimen Source Urine     STREP PNEUMONIAE ANTIGEN Negative     Body Fluid Culture, Sterile Not Indicated     Organism ID Not indicated.     Please note Comment     Comment: College [employer] standards require a culture to be  performed on CSF specimens submitted for bacterial antigen testing.  (CAP [id number]) Urine specimens will  not be cultured.       Narrative:       Performed at:  [facility]  [address] [place], [place]  [id number]  : Gilson Gandara MD, Phone:  [phone]    Legionella Antigen, Urine - Urine, Urine, Clean Catch [[id number]] Collected:  03/05/18 0538    Lab Status:  Final result Specimen:  Urine from Urine, Clean Catch Updated:  03/07/18 1522     L. pneumophila Serogp 1 Ur Ag Negative     Comment: Presumptive negative for L. pneumophila serogroup 1 antigen in urine,  suggesting no recent or current infection. Legionnaires' disease  cannot be ruled out since other serogroups and species may also cause  disease.       Narrative:       Performed at:   [facility]  [address] [place], [place]  [id number]  : Gilson Gandara MD, Phone:  [phone]    Blood Culture - Blood, [[id number]] Collected:  03/02/18 1235    Lab Status:  Final result Specimen:  Blood from Arm, Left Updated:  03/07/18 1401     Blood Culture No growth at 5 days      Aerobic bottle only    Blood Culture - Blood, [[id number]]  (Normal) Collected:  03/02/18 1305    Lab Status:  Final result Specimen:  Blood from Arm, Left Updated:  03/07/18 1401     Blood Culture No growth at 5 days    Respiratory Culture - Sputum, Cough [[id number]]  (Abnormal)  (Susceptibility) Collected:  03/05/18 0751    Lab Status:  Final result Specimen:  Sputum from Cough Updated:  03/07/18 1210     Respiratory Culture --      Moderate growth (3+) Staphylococcus aureus (A)     Comment: This isolate is presumed to be clindamycin resistant based on detection of inducible clindamycin resistance.  Clindamycin may still be effective in some patients.         Moderate growth (3+) Normal Respiratory Tiffanie     Gram Stain Result Many (4+) WBCs per low power field      Moderate (3+) Epithelial cells per low power field      Moderate (3+) Gram positive cocci in pairs and clusters    Susceptibility      Staphylococcus aureus     TORO      Ceftriaxone <=8 ug/ml Susceptible     Clindamycin  Resistant     Erythromycin >4 ug/ml Resistant     Gentamicin <=4 ug/ml Susceptible     Levofloxacin <=1 ug/ml Susceptible  [1]      Linezolid <=1 ug/ml Susceptible     Oxacillin <=0.25 ug/ml Susceptible     Penicillin G 2 ug/ml Resistant     Quinupristin + Dalfopristin <=0.5 ug/ml Susceptible     Rifampin <=1 ug/ml Susceptible     Tetracycline >8 ug/ml Resistant     Trimethoprim + Sulfamethoxazole <=0.5/9.5 ug/ml Susceptible     Vancomycin 1 ug/ml Susceptible            [1]   Staphylococcus species may develop resistance during prolonged therapy with quinolones.  Isolates that are initially susceptible may become resistant within three to four days after initiation of therapy. Testing of repeat isolates may be warranted.                 Urine Culture - Urine, Urine, Clean Catch [[id number]]  (Normal) Collected:  03/05/18 0538    Lab Status:  Final result Specimen:  Urine from Urine, Clean Catch Updated:  03/07/18 0820     Urine Culture No growth at 2 days    Influenza A & B, RT PCR - Swab, Nasopharynx [[id number]]  (Normal) Collected:  03/04/18 1424    Lab Status:  Final result Specimen:  Swab from Nasopharynx Updated:  03/04/18 1542     Influenza A PCR Not Detected     Influenza B PCR Not Detected          Imaging Results (last 24 hours)     Procedure Component Value Units Date/Time    XR Chest 1 View [[id number]] Collected:  03/11/18 1533     Updated:  03/11/18 1533    Narrative:          EXAMINATION: XR CHEST 1 VW - 03/11/2018     INDICATION:  R13.12-Dysphagia, oropharyngeal phase; K11.21-Acute  sialoadenitis; J01.00-Acute maxillary sinusitis, unspecified;  R53.1-Weakness; Z74.09-Other reduced mobility.     COMPARISON: None.     FINDINGS:   1. Heart size is within normal limits. There is airspace opacity in the  right mid and lower lung with a right base effusion.     2. Compared to previous studies of 2 days ago, there is increasing  airspace opacity in the right  mid and lower lung, and the right base  effusion appears to be new. The left lung is expanded and clear with  chronic interstitial and granulomatous scarring.     The right PICC line remains well-positioned in the mid SVC.           Impression:       Increasing airspace opacity right mid and lower lung with  right pleural effusion, worse when compared to 2 days ago.     DICTATED:     03/11/2018  EDITED/ls :     03/11/2018               Results for orders placed during the hospital encounter of 03/02/18   Adult Transthoracic Echo Complete W/ Cont if Necessary Per Protocol    Narrative · Left atrial cavity size is moderately dilated.  · Left ventricular systolic function is moderately decreased. Estimated   ejection fraction 41-45%.  · Mild mitral valve regurgitation is present  · Mild tricuspid valve regurgitation is present.          I have reviewed the medications.    Assessment/Plan   Assessment / Plan     Hospital Problem List     * (Principal)SIRS (systemic inflammatory response syndrome)    Atrial fibrillation    Overview Signed 6/20/2016  3:33 PM by DENI Gates     Description: chads vasc 7         Chronic coronary artery disease    Overview Signed 6/20/2016  3:33 PM by DENI Gates     Description: cabg 2004, data deficient         Chronic diastolic heart failure    Overview Signed 6/20/2016  3:33 PM by DENI Gates     Description: NYHA II-III  Per echo: EF 50% September 2013         Chronic obstructive pulmonary disease    CKD (chronic kidney disease), stage III    Leukocytosis    PVD (peripheral vascular disease)          *Right sided parotitis (better)  *Pneumonia (better)  *MSSA in sputum  *Persistent Hypotension (chronically sbp 90's per patient)  *Afib (on sotalol)   -no anticoagulation, prior subdural bleed on anticoagulation  *Anemia (initial hgb >11-->8.0 on 3/11/18)  *Hx Mixed CHF  *Hx Subdural Hemorrhage (no anticoagulation)  *P.A.D (LLE arterial revascularization being  considered in future by dr. Carroll)   -preop future stress test ordered by cards outpt but yet to be done      Assessment & Plan:  -------------------------------------  PLAN:    -Hypotension (patient states typically 90's systolic): better 3/11 afternoon, in room sbp 120   -stopped pletal 3/11 due to occasional side effect of hypotension   -cosyntroponin stim test 3/10: normal   -opioids had been stopped    -if sbp remains stable >100 try low dose ultram prn    -hgb, iron levels, bmp in a.m.   -monitor hgb (no overt bleeding noted, no overt blood in stool)    -continue modified diet (per SLP recs)    -continue sotalol (don't hold unless severe hypotension)    -Echo 3/10/18: EF 40-45%: follow up cardiology for stress prior to future left leg revascularization procedure. To see dr. carroll once acute medical issues resolved    *to [facility] once medically ready (final I.d. Recs, bp stable, etc), possibly by Tuesday if hgb, bp stable and final abx recs    DVT Prophylaxis:  Sq heparin bid    CODE STATUS: conditional DNR (no mech vent, no cpr, etc)        Electronically signed by Ousmane Leiva MD, 03/11/18, 5:40 PM.

## 2018-03-11 NOTE — PROGRESS NOTES
[facility] Progress Note        Antibiotics:  Flagyl, Azactam, Vanc    CC: parotitis, pneumonia    HPI:  Patient is a 77 y.o.  Yr old male with history of numerous comorbid conditions including past stroke/subdural hematoma and evacuation with bur hole 2015 with chronic kidney disease/debility.  Family reports chronic dysphagia symptoms such that he coughs with liquid/solids but no specific esophageal diagnosis.  He is admitted on March 2 with 3 days poor by mouth intake, generalized malaise with dry cough and right facial/parotid pain.       3/11/18: No fever. Still with intermittent hypotension; seems at baseline.  Asymptomatic.   SOB improving.  Right parotid pain/swelling improving and nontender to touch.  Breathing well on room air.  Less left arm edema.  Anxious to leave hospital soon.    ROS:      3/11: No f/c/s.     Constitutional-- No Fever, chills or sweats.  Appetite good, and no malaise. No fatigue.  Heent-- No new vision, hearing or throat complaints.  No epistaxis or oral sores.  Denies odynophagia or dysphagia.  No flashers, floaters or eye pain. No odynophagia or dysphagia. No headache, photophobia or neck stiffness.  CV-- No chest pain, palpitation or syncope  Resp-- No SOB/cough/Hemoptysis  GI- No nausea, vomiting, or diarrhea.  No hematochezia, melena, or hematemesis. Denies jaundice or chronic liver disease.  -- No dysuria, hematuria, or flank pain.  Denies hesitancy, urgency or flank pain.  Lymph- no swollen lymph nodes in neck/axilla or groin.   Heme- No active bruising or bleeding; no Hx of DVT or PE.  MS-- no swelling or pain in the bones or joints of arms/legs.  No new back pain.  Neuro-- No acute focal weakness or numbness in the arms or legs.  No seizures.    Full 12 point review of systems reviewed and negative otherwise for acute complaints,       PE: nursing/chaperone present  BP 93/60 (BP Location: Left arm, Patient Position: Lying)   Pulse 92   Temp 97.6 °F (36.4 °C) (Oral)   Resp 18   " Ht 165.1 cm (65\")   Wt 65.8 kg (145 lb)   SpO2 97%   BMI 24.13 kg/m²     GENERAL: Thin chronically ill-appearing and elderly, sleepy but answers questions  HEENT: Normocephalic, atraumatic. Right face/parotid gland with nearly resolved erythema/induration and tenderness.  No fluctuance.  No crepitus or bulla.  NECK: Supple without nuchal rigidity. No mass.  LYMPH: No cervical, axillary or inguinal lymphadenopathy.  HEART: Tachycardic; No murmur, rubs, gallops.   LUNGS: Diminished at bases with rhonchi more so on the right than left. Normal respiratory effort. Nonlabored.  ABDOMEN: Soft, nontender, nondistended. Positive bowel sounds. No rebound or guarding.  EXT:  No cyanosis, clubbing or edema. No cord.  Edema of left forearm below BP cuff  : Without Aguilar catheter.  MSK: FROM without joint effusions noted arms/legs.    SKIN: Warm and dry without cutaneous eruptions on Inspection/palpation.    NEURO: moves all 4 extremities.            Laboratory Data      Results from last 7 days  Lab Units 03/10/18  0658 03/09/18  0503 03/07/18  0542   WBC 10*3/mm3 11.74* 14.34* 12.41*   HEMOGLOBIN g/dL 8.8* 9.7* 11.0*   HEMATOCRIT % 28.5* 31.3* 35.3*   PLATELETS 10*3/mm3 178 182 193     Today labs:  WBC 11.7      Results from last 7 days  Lab Units 03/10/18  0658   SODIUM mmol/L 137   POTASSIUM mmol/L 3.8   CHLORIDE mmol/L 115*   CO2 mmol/L 18.0*   BUN mg/dL 12   CREATININE mg/dL 0.80   GLUCOSE mg/dL 83   CALCIUM mg/dL 7.9*       Results from last 7 days  Lab Units 03/10/18  0658   ALK PHOS U/L 60   BILIRUBIN mg/dL 0.2*   ALT (SGPT) U/L 6*   AST (SGOT) U/L 8               Estimated Creatinine Clearance: 70.8 mL/min (by C-G formula based on SCr of 0.8 mg/dL).      Microbiology:  Sputum culture With MSSA on 3/5/18  Blood culture NGSF x 2  Urine legionella negative  Strep pneumo negative       Radiology:  Imaging Results (last 72 hours)     Procedure Component Value Units Date/Time    XR Chest 1 View [[id number]] Collected:  " 03/09/18 0934     Updated:  03/09/18 2231    Narrative:       EXAMINATION: XR CHEST 1 VW-      INDICATION: Wheezing on left; R13.12-Dysphagia, oropharyngeal phase;  K11.21-Acute sialoadenitis; J01.00-Acute maxillary sinusitis,  unspecified; R53.1-Weakness; Z74.09-Other reduced mobility.      COMPARISON: 03/04/2018.     FINDINGS: Sternotomy wires are noted. There is a left-sided dual lead  ICD. Right upper extremity PICC line seen with its tip at the cavoatrial  junction. The heart is normal in size. Vasculature is cephalized and  there is mild diffuse interstitial disease a little increased from the  prior study, probably early interstitial edema. No consolidated lung or  pneumothorax is seen. There may be trace right pleural effusion versus  chronic pleural thickening.           Impression:       Interval development of mild pulmonary vascular congestion  and interstitial edema.     D:  03/09/2018  E:  03/09/2018     This report was finalized on 3/9/2018 10:29 PM by DR. Faisal Nelson MD.       FL Video Swallow With Speech [[id number]] Collected:  03/07/18 1525     Updated:  03/07/18 1540    Narrative:       EXAMINATION: FL VIDEO SWALLOW W SPEECH-     INDICATION: dysphagia; K11.21-Acute sialoadenitis; J01.00-Acute  maxillary sinusitis, unspecified; R53.1-Weakness; R13.10-Dysphagia,  unspecified; Z74.09-Other reduced mobility; Z74.09-Other reduced  mobility     TECHNIQUE: 1 minute and 18 seconds of fluoroscopic time was used for  this exam. 1 associated image was saved. The patient was evaluated in  the seated lateral position while taking a variety of consistencies of  barium by mouth under the direction of speech pathology.     COMPARISON: NONE     FINDINGS: There was no penetration and no aspiration with small teaspoon  sized sips of thin barium. There was aspiration with large sized sips of  thin, nectar consistency barium. There was no penetration and no  aspiration with honey, or pudding consistency barium, however  there was  a considerable amount of residue in the vallecula, and piriform sinuses  after swallows with honey, and pudding consistency barium.          Impression:       Fluoroscopy provided for a modified barium swallow. Please  see speech therapy report for full details and recommendations.         This report was finalized on 3/7/2018 3:38 PM by Dr. Tim Murillo MD.                 Impression:   --Acute sepsis with leukocytosis/fever and tachycardia, associated with encephalopathy and likely severe sepsis.  Primary focal symptoms/findings include right parotitis and acute right lower lobe pneumonia with broad spectrum antimicrobials.  No preceding symptoms of central nervous system focus and no meningismus at present.  Likely metabolic encephalopathy associated with sepsis.  Abdomen benign by exam with no intra-abdominal pathology per radiology on CT scan, although motion artifact and if symptoms persist/recur, then you should give consideration to further GI workup by either repeat imaging or GI evaluation.  No diarrhea at present but if it develops you could consider stool studies.  Urinalysis not consistent with UTI.  No obvious soft tissue source other than above.     --Acute right parotitis;  gram-positive organisms are a primary concern.  In addition there is description of a masslike quality by radiology and he will likely need to give consideration to ENT input.  I discussed with Dr. Yu;  no focal abscess so far per radiology.     --Acute cough with right lower lobe pneumonia by imaging and risk for community-acquired pathogens/aspiration.  Broad-spectrum coverage ongoing. Culture with MSSA.  Legionella and Strep pneumo UAg negative today      --Dysphagia.  Family reports difficulty with solids/liquids and cough.  Speech therapy has seen.  You'll likely need to give consideration to GI consultation for EGD as well.     --Acute encephalopathy.  Family reports this is common with acute illness for  him.  Further neurologic workup per internal medicine at their discretion.  Preceding symptoms and current exam not consistent with meningismus.  Improving in general per family back towards baseline     --Chronic kidney disease stage III per records.  Monitor to help guide further adjustments with antimicrobials     --QTc > 500 ms EKG    --TCP;  Improving.    --hypotension;  Medicine workup/intervention on going;  Afebrile, no other focal symptoms;  Repeat blood cultures neagtive so far and no new focal symptoms but monitor for evolving focus    --PCN and cephalexin intolerances       PLAN:     --Continue IV vancomycin/Azactam/Flagyl  (hives/rash to PCN and cephalosporin)     --Monitor IV and IV antibiotic with risk for systemic complication and potential drug interaction     --Check/review labs cultures and scans     --Highly complex set of problems with high risk for further serious morbidity and other serious sequela     WBC down daily.  He feels better and appears improved on exam.  Probably will be able to go to rehab soon.    Dr. Dong to resume care tomorrow.       Dr. Brian Villegas saw the patient, performed the physical exam, reviewed the laboratory data and guided with the formulation of the above problem list, assessment and treatment plan.     AMBIKA Maldonado  3/11/2018

## 2018-03-11 NOTE — PLAN OF CARE
Problem: Arrhythmia/Dysrhythmia (Symptomatic) (Adult)  Goal: Signs and Symptoms of Listed Potential Problems Will be Absent or Manageable (Arrhythmia/Dysrhythmia)  Outcome: Ongoing (interventions implemented as appropriate)      Problem: Nutrition, Imbalanced: Inadequate Oral Intake (Adult)  Goal: Identify Related Risk Factors and Signs and Symptoms  Outcome: Ongoing (interventions implemented as appropriate)    Goal: Improved Oral Intake  Outcome: Ongoing (interventions implemented as appropriate)    Goal: Prevent Further Weight Loss  Outcome: Ongoing (interventions implemented as appropriate)      Problem: Infection, Risk/Actual (Adult)  Goal: Infection Prevention/Resolution  Outcome: Ongoing (interventions implemented as appropriate)      Problem: Skin Integrity Impairment, Risk/Actual (Adult)  Goal: Skin Integrity/Wound Healing  Outcome: Ongoing (interventions implemented as appropriate)      Problem: Fall Risk (Adult)  Goal: Absence of Falls  Outcome: Ongoing (interventions implemented as appropriate)      Problem: Patient Care Overview (Adult)  Goal: Plan of Care Review  Outcome: Ongoing (interventions implemented as appropriate)    Goal: Adult Individualization and Mutuality  Outcome: Ongoing (interventions implemented as appropriate)    Goal: Discharge Needs Assessment  Outcome: Ongoing (interventions implemented as appropriate)      Problem: Patient Care Overview  Goal: Plan of Care Review  Outcome: Ongoing (interventions implemented as appropriate)    Goal: Individualization and Mutuality  Outcome: Ongoing (interventions implemented as appropriate)    Goal: Discharge Needs Assessment  Outcome: Ongoing (interventions implemented as appropriate)    Goal: Interprofessional Rounds/Family Conf  Outcome: Ongoing (interventions implemented as appropriate)      Problem: Skin Injury Risk (Adult)  Goal: Identify Related Risk Factors and Signs and Symptoms  Outcome: Ongoing (interventions implemented as  appropriate)    Goal: Skin Health and Integrity  Outcome: Ongoing (interventions implemented as appropriate)

## 2018-03-11 NOTE — CONSULTS
"Adult Nutrition  Assessment/PES    Patient Name:  Alexander Adan  YOB: 1940  MRN: [id number]  Admit Date:  3/2/2018    Assessment Date:  3/11/2018      Time: 30 minutes    Reason for Visit: Physician consult - \"Pt is on dysphagia level III diet & thin liquids via tsp only. No straws. Pt disliking food on trays, particularly eggs. RN also reported pt wanting different nutritional drink options. Thank you.\"    Pertinent Diagnosis:  Patient Active Problem List   Diagnosis   • Atrial fibrillation   • Chronic coronary artery disease   • Chronic diastolic heart failure   • Chronic obstructive pulmonary disease   • Cognitive impairment, mild, so stated   • Tobacco use   • Fall at home   • CKD (chronic kidney disease), stage III   • Leukocytosis   • Bronchitis   • Vasovagal syncope   • Dehydration   • Hypotension due to dehydration   • PVD (peripheral vascular disease)   • SIRS (systemic inflammatory response syndrome)       Nutrition/Diet History: Patient and daughter present at time of visit. Patient reports he dislikes most foods he receives, is not enjoying the modified diet texture very much, and prefers Ensure rather than Boost supplement drinks. RD explained to patient that because his modified diet does not have a lot of variety in food options, there is 1 main meal option for each meal and catering associates do not come by room to obtain meal orders for that reason. RD printed dysphagia level III diet menu, gave to patient and daughter, and asked them to go through menu and cross out all foods he does not like or want. RD will  that menu tomorrow, as well as bring a regular texture menu to see what foods he likes that we may be able to puree for him.    Patient reports he has never been a big eater, small portions are normal for him.    Labs/Procedures: reviewed    Pertinent Medications: reviewed    Current Nutrition Rx :   Diet Dysphagia; III - Pureed With Some Mashed; Thin; Extra Sauce " / Gravy    Active Supplement Orders      Dietary Nutrition Supplements Ensure HP    Patient isn't on Tube Feeding    Intake/Delivery: 75% / 2 meals      Problem/Interventions:        Problem 1     Row Name 03/11/18 1418       Nutrition Diagnoses Problem 1    Problem 1 Limited Food Acceptance    Etiology (related to) Factors Affecting Nutrition    Food Habit/Preferences Dislike Modified Diet   and dislikes PO in hospital    Signs/Symptoms (evidenced by) Report/Observation    Other Comment Patient reports there are few foods he likes, dislikes current dysphagia diet textures, strongly dislikes the eggs                  Intervention Goal     Row Name 03/11/18 1421       Intervention Goal    General Nutrition support treatment    PO Maintain intake            Nutrition Intervention     Row Name 03/11/18 1422       Nutrition Intervention    RD/Tech Action Interview for preference;Menu provided;Adjusted supplement;Encourage intake;Recommend/ordered;Care plan reviewd;Follow Tx progress    Recommended/Ordered Supplement            Nutrition Prescription     Row Name 03/11/18 1423       Nutrition Prescription PO    PO Prescription Discontinue supplement;Begin/change supplement    Supplement Boost Plus;Ensure HP    Supplement Frequency 3 times a day            Education/Evaluation     Row Name 03/11/18 1423       Monitor/Evaluation    Monitor Per protocol;PO intake;Supplement intake        Electronically signed by:  Rachel Carrero RD  03/11/18 2:28 PM

## 2018-03-11 NOTE — PLAN OF CARE
Problem: Patient Care Overview (Adult)  Goal: Plan of Care Review  Outcome: Ongoing (interventions implemented as appropriate)   03/11/18 2220   Coping/Psychosocial Response Interventions   Plan Of Care Reviewed With patient;daughter   Outcome Evaluation   Outcome Summary/Follow up Plan Pt presents with wounds to B posterior heels. The majority of the wound area is covered with adherent black eschar, and debridement is limited by pt c/o pain. D/t the pt's evolving wound symptoms, pt will benefit from skilled PT wound care for debridement and advanced dressings management.

## 2018-03-11 NOTE — THERAPY WOUND CARE TREATMENT
Acute Care - Wound/Debridement Re-Evaluation  [facility]     Patient Name: Alexander Adan  : 1940  MRN: [id number]  Today's Date: 3/11/2018  Onset of Illness/Injury or Date of Surgery: 18  Date of Referral to PT: 18   Referring Physician: MD Gigi      Admit Date: 3/2/2018    Visit Dx:    ICD-10-CM ICD-9-CM   1. Oropharyngeal dysphagia R13.12 787.22   2. Parotitis, acute K11.21 527.2   3. Subacute maxillary sinusitis J01.00 461.0   4. Generalized weakness R53.1 780.79   5. Impaired functional mobility, balance, gait, and endurance Z74.09 V49.89   6. Impaired mobility and ADLs Z74.09 799.89       Patient Active Problem List   Diagnosis   • Atrial fibrillation   • Chronic coronary artery disease   • Chronic diastolic heart failure   • Chronic obstructive pulmonary disease   • Cognitive impairment, mild, so stated   • Tobacco use   • Fall at home   • CKD (chronic kidney disease), stage III   • Leukocytosis   • Bronchitis   • Vasovagal syncope   • Dehydration   • Hypotension due to dehydration   • PVD (peripheral vascular disease)   • SIRS (systemic inflammatory response syndrome)        Past Medical History:   Diagnosis Date   • Anemia    • Anxiety    • Chronic kidney disease     STAGE II (mild)   • Hyperlipidemia    • Hypertension    • Stroke     LACUNAR. 2013   • Subdural hematoma     Status post hemorrhagic evacuation with india holes 4/3/2015 per Dr. Gilson Brooke.        Past Surgical History:   Procedure Laterality Date   • [place] HOLE FOR SUBDURAL HEMATOMA     • CARDIAC PACEMAKER PLACEMENT      PERMANENT   • CAROTID STENT Left    • CHOLECYSTECTOMY     • CORONARY ARTERY BYPASS GRAFT     • PACEMAKER IMPLANTATION     • THROMBOENDARTERECTOMY      CAROTID, LEFT                 WOUND DEBRIDEMENT  Debridement Site 1  Location- Site 1: B heels  Selective Debridement- Site 1: Wound Surface <20cmsq  Instruments- Site 1: #15, scapel, tweezers  Excised Tissue Description- Site 1: minimum,  slough, eschar (limited by pain)  Bleeding- Site 1: none                  PT ASSESSMENT (last 72 hours)      Physical Therapy Evaluation     Row Name 03/11/18 1350          PT Evaluation Time/Intention    Subjective Information complains of;pain  -     Document Type re-evaluation;wound care  -     Row Name 03/11/18 1350          General Information    Patient Profile Reviewed? yes  -MC     Onset of Illness/Injury or Date of Surgery 03/02/18  -     Referring Physician MD Gigi  -     Patient Observations alert;cooperative;agree to therapy  -     Pertinent History of Current Functional Problem See PT mobility note for details. Pt with POA ulcerations to the B posterior heels, currently covered with black eschar.  -     Risks Reviewed patient and family:;increased discomfort  -     Benefits Reviewed patient and family:;improve skin integrity  -     Barriers to Rehab medically complex;previous functional deficit  -     Row Name 03/11/18 1350          Cognitive Assessment/Interventions    Additional Documentation Cognitive Assessment/Intervention (Group)  -     Row Name 03/11/18 1350          Cognitive Assessment/Intervention- PT/OT    Orientation Status (Cognition) oriented x 3  -MC     Follows Commands (Cognition) WNL  -     Row Name 03/11/18 1350          Pain Scale: Numbers Pre/Post-Treatment    Pain Scale: Numbers, Pretreatment 2/10  -MC     Pain Scale: Numbers, Post-Treatment 4/10  -MC     Pain Intervention(s) Repositioned  -     Row Name             Wound 03/02/18 2000 medial coccyx    Wound - Properties Group Date first assessed: 03/02/18  -CR Time first assessed: 2000  -CR Present On Admission : yes  -CR Orientation: medial  -CR Location: coccyx  -CR Stage, Pressure Injury: Stage 4  -CR    Row Name 03/11/18 1350          Wound 03/04/18 2000 Right medial heel    Wound - Properties Group Date first assessed: 03/04/18  -CR Time first assessed: 2000  -CR Present On Admission : yes  -CR  "Side: Right  -CR Orientation: medial  -CR Location: heel  -CR Stage, Pressure Injury: unstageable  -CR    Wound Image Images linked: 1  -MC     Dressing Appearance dry;intact;moist drainage  -MC     Base black eschar;yellow;slough;pink  -MC     Black (%), Wound Tissue Color 70  -MC     Red (%), Wound Tissue Color 10  -MC     Yellow (%), Wound Tissue Color 20  -MC     Periwound intact;dry;blanchable  -MC     Periwound Temperature warm  -MC     Periwound Skin Turgor soft  -MC     Edges irregular;open  -MC     Wound length (cm) 0.6 cm  -MC     Wound width (cm) 0.8 cm  -MC     Wound depth (cm) --   obscured by slough  -MC     Drainage Characteristics/Odor serosanguineous;yellow  -MC     Drainage Amount small  -MC     Care, Wound cleansed with;wound cleanser;debrided;honey applied  -     Dressing Care, Wound dressing applied;other (see comments);low-adherent;foam   HFB ready, 4\" optifoam gentle  -     Periwound Care, Wound cleansed with pH balanced cleanser;dry periwound area maintained  -     Row Name 03/11/18 1350          Wound 03/04/18 2000 Left medial heel    Wound - Properties Group Date first assessed: 03/04/18  -CR Time first assessed: 2000 -CR Present On Admission : yes  -CR Side: Left  -CR Orientation: medial  -CR Location: heel  -CR Stage, Pressure Injury: unstageable  -CR    Wound Image Images linked: 1  -MC     Dressing Appearance moist drainage;intact;dry  -MC     Base black eschar;yellow;slough;pink  -MC     Black (%), Wound Tissue Color 80  -MC     Red (%), Wound Tissue Color 5  -MC     Yellow (%), Wound Tissue Color 15  -MC     Periwound intact;dry;blanchable  -MC     Periwound Temperature warm  -MC     Periwound Skin Turgor soft  -MC     Edges irregular  -MC     Wound length (cm) 1.7 cm  -MC     Wound width (cm) 3.2 cm  -MC     Wound depth (cm) --   obscured by slough  -MC     Drainage Characteristics/Odor yellow;creamy;serosanguineous;purulent   purulent yellow/red creamy from edge  -     " "Drainage Amount small  -     Care, Wound cleansed with;wound cleanser;debrided;honey applied  -     Dressing Care, Wound dressing applied;other (see comments);low-adherent;foam   HFB ready, 4\" optifoam gentle border  -     Periwound Care, Wound cleansed with pH balanced cleanser;dry periwound area maintained  -     Row Name             Wound 03/02/18 2000 Left medial arm skin tear    Wound - Properties Group Date first assessed: 03/02/18  -CR Time first assessed: 2000  -CR Side: Left  -CR Orientation: medial  -CR Location: arm  -CR Type: skin tear  -CR    Row Name             Wound 03/02/18 2000 Right medial arm skin tear    Wound - Properties Group Date first assessed: 03/02/18  -CR Time first assessed: 2000  -CR Side: Right  -CR Orientation: medial  -CR Location: arm  -CR Type: skin tear  -CR    Row Name             Wound 03/02/18 2000 Right upper arm skin tear    Wound - Properties Group Date first assessed: 03/02/18  -CR Time first assessed: 2000  -CR Side: Right  -CR Orientation: upper  -CR Location: arm  -CR Type: skin tear  -CR    Row Name             Wound 03/05/18 0609 Right medial arm skin tear    Wound - Properties Group Date first assessed: 03/05/18  -CR Time first assessed: 0609  -CR Side: Right  -CR Orientation: medial  -CR Location: arm  -CR Type: skin tear  -CR    Row Name 03/11/18 1350          Plan of Care Review    Plan of Care Reviewed With patient;daughter  -     Row Name 03/11/18 1350          Physical Therapy Clinical Impression    Date of Referral to PT 03/08/18  -     PT Diagnosis (PT Clinical Impression) impaired skin integrity  -     Patient/Family Goals Statement (PT Clinical Impression) Heal wounds without hurting me too bad.  -     Criteria for Skilled Interventions Met (PT Clinical Impression) yes;treatment indicated  -     Rehab Potential (PT Clinical Summary) fair, will monitor progress closely  -     Care Plan Review (PT) evaluation/treatment results " reviewed;patient/other agree to care plan  -     Care Plan Review, Other Participant (PT Clinical Impression) daughter  -     Row Name 03/11/18 1350          Physical Therapy Goals    Wound Care Goal Selection (PT) wound care, PT goal 1  -     Additional Documentation Wound Care Goal Selection (PT) (Row)  -     Row Name 03/11/18 1350          Wound Care Goal 1 (PT)    Wound Care Goal 1 (PT) Pt will demonstrate <25% necrotic tissue coverage to all wound beds to indicate healing progress.  -     Time Frame (Wound Care Goal 1, PT) 10 days  -     Row Name 03/11/18 1350          Positioning and Restraints    Pre-Treatment Position in bed  -     Post Treatment Position bed  -     In Bed supine;call light within reach;encouraged to call for assist;with family/caregiver;heels elevated  -       User Key  (r) = Recorded By, (t) = Taken By, (c) = Cosigned By    Initials Name Provider Type     Jackie John, PT Physical Therapist    JENNY Conner, RN Registered Nurse        Physical Therapy Education     Title: PT OT SLP Therapies (Done)     Topic: Physical Therapy (Done)     Point: Mobility training (Done)    Learning Progress Summary     Learner Status Readiness Method Response Comment Documented by    Patient Done Acceptance E VU  KA 03/11/18 1443     Active Acceptance E NR  KR 03/08/18 1155     Active Acceptance E NR  KR 03/06/18 1036     Active Acceptance E NR  KR 03/05/18 1615          Point: Home exercise program (Done)    Learning Progress Summary     Learner Status Readiness Method Response Comment Documented by    Patient Done Acceptance E VU  KA 03/11/18 1443     Done Acceptance E STEPHANIE,NR Discussed benefits of activity LS 03/10/18 1404     Active Acceptance E NR  KR 03/08/18 1155     Active Acceptance E NR  KR 03/06/18 1036     Active Acceptance E NR  KR 03/05/18 1615          Point: Body mechanics (Done)    Learning Progress Summary     Learner Status Readiness Method Response Comment  Documented by    Patient Done Acceptance E STEPHANIE ESQUIVEL 03/11/18 1443     Active Acceptance E NR  KR 03/08/18 1155     Active Acceptance E NR  KR 03/06/18 1036     Active Acceptance E NR  KR 03/05/18 1615          Point: Precautions (Done)    Learning Progress Summary     Learner Status Readiness Method Response Comment Documented by    Patient Done Acceptance E STEPHANIE ESQUIVEL 03/11/18 1443     Active Acceptance E NR  KR 03/08/18 1155     Active Acceptance E NR  KR 03/06/18 1036     Active Acceptance E NR  KR 03/05/18 1615                      User Key     Initials Effective Dates Name Provider Type Discipline     06/19/15 -  Angie Ortiz, PT Physical Therapist PT    KR 09/25/17 - 03/06/18 Jasmin Short, PT Physical Therapist PT    KR 03/07/18 -  Jasmin Short, PT Physical Therapist PT     06/23/17 -  Jeanette Ortiz RN Registered Nurse Nurse                    Recommendation and Plan  Anticipated Discharge Disposition (PT): skilled nursing facility (SNF) (will defer to PT mobility, will need wound care at d/c.)  Therapy Frequency (PT Clinical Impression): daily    Plan of Care Reviewed With: patient, daughter       Outcome Summary/Treatment Plan (PT)  Anticipated Discharge Disposition (PT): skilled nursing facility (SNF) (will defer to PT mobility, will need wound care at d/c.)   Plan of Care Reviewed With: patient, daughter            Outcome Measures     Row Name 03/10/18 1329 03/10/18 1300          How much help from another person do you currently need...    Turning from your back to your side while in flat bed without using bedrails?  -- 3  -LS     Moving from lying on back to sitting on the side of a flat bed without bedrails?  -- 3  -LS     Moving to and from a bed to a chair (including a wheelchair)?  -- 3  -LS     Standing up from a chair using your arms (e.g., wheelchair, bedside chair)?  -- 3  -LS     Climbing 3-5 steps with a railing?  -- 2  -LS     To walk in hospital room?  -- 3  -LS     AM-PAC 6 Clicks  Score  -- 17  -LS        How much help from another is currently needed...    Putting on and taking off regular lower body clothing? 2  -TA  --     Bathing (including washing, rinsing, and drying) 2  -TA  --     Toileting (which includes using toilet bed pan or urinal) 2  -TA  --     Putting on and taking off regular upper body clothing 3  -TA  --     Taking care of personal grooming (such as brushing teeth) 3  -TA  --     Eating meals 4  -TA  --     Score 16  -TA  --        Functional Assessment    Outcome Measure Options AM-PAC 6 Clicks Daily Activity (OT)  -TA  --       User Key  (r) = Recorded By, (t) = Taken By, (c) = Cosigned By    Initials Name Provider Type     Angie Ortiz, PT Physical Therapist    RONAK Smart, OT Occupational Therapist              Time Calculation        PT Charges     Row Name 03/11/18 1350             Time Calculation    Start Time 1350  -      PT Goal Re-Cert Due Date 03/15/18  -        User Key  (r) = Recorded By, (t) = Taken By, (c) = Cosigned By    Initials Name Provider Type     Jackie John, PT Physical Therapist            Therapy Charges for Today     Code Description Service Date Service Provider Modifiers Qty    74198299733 HC PT RE-EVAL ESTABLISHED PLAN 2 3/11/2018 Jackie John, PT GP 1    70764972543 HC PEDRO DEBRIDE OPEN WOUND UP TO 20CM 3/11/2018 Jackie John, PT GP 1            PT G-Codes  Outcome Measure Options: AM-PAC 6 Clicks Daily Activity (OT)       Jackie John, PT  3/11/2018

## 2018-03-11 NOTE — SIGNIFICANT NOTE
RN called for clarification on bolus orders. Give 1L bolus at 250 ml/hr as ordered by Dr. Leiva. While has third spacing, states LCTA. Proceed with fluids. Note pt insists his baseline SBP is in the 80s - the ones in chart are all low for the most part. Hold the sotalol tonight per parameter on it to hold - hx a fib but not in RVR, rate in 80s.    I can put a f/u CXR in for AM to monitor. Hypoxic on O2 but also tx for PNA. ECHO today with some impairment.    Please f/u.

## 2018-03-12 LAB
ANION GAP SERPL CALCULATED.3IONS-SCNC: 6 MMOL/L (ref 3–11)
BUN BLD-MCNC: 11 MG/DL (ref 9–23)
BUN/CREAT SERPL: 15.7 (ref 7–25)
CALCIUM SPEC-SCNC: 7.5 MG/DL (ref 8.7–10.4)
CHLORIDE SERPL-SCNC: 116 MMOL/L (ref 99–109)
CO2 SERPL-SCNC: 17 MMOL/L (ref 20–31)
CREAT BLD-MCNC: 0.7 MG/DL (ref 0.6–1.3)
GFR SERPL CREATININE-BSD FRML MDRD: 109 ML/MIN/1.73
GLUCOSE BLD-MCNC: 81 MG/DL (ref 70–100)
HCT VFR BLD AUTO: 32.1 % (ref 38.9–50.9)
HGB BLD-MCNC: 9.9 G/DL (ref 13.1–17.5)
IRON 24H UR-MRATE: 15 MCG/DL (ref 50–175)
IRON SATN MFR SERPL: 10 % (ref 20–50)
MAGNESIUM SERPL-MCNC: 2.8 MG/DL (ref 1.3–2.7)
POTASSIUM BLD-SCNC: 3.6 MMOL/L (ref 3.5–5.5)
POTASSIUM BLD-SCNC: 4.7 MMOL/L (ref 3.5–5.5)
SODIUM BLD-SCNC: 139 MMOL/L (ref 132–146)
TIBC SERPL-MCNC: 151 MCG/DL (ref 250–450)
VANCOMYCIN TROUGH SERPL-MCNC: 22.4 MCG/ML (ref 10–20)

## 2018-03-12 PROCEDURE — 85014 HEMATOCRIT: CPT | Performed by: INTERNAL MEDICINE

## 2018-03-12 PROCEDURE — 80202 ASSAY OF VANCOMYCIN: CPT

## 2018-03-12 PROCEDURE — 84132 ASSAY OF SERUM POTASSIUM: CPT | Performed by: INTERNAL MEDICINE

## 2018-03-12 PROCEDURE — 80048 BASIC METABOLIC PNL TOTAL CA: CPT

## 2018-03-12 PROCEDURE — 83735 ASSAY OF MAGNESIUM: CPT | Performed by: INTERNAL MEDICINE

## 2018-03-12 PROCEDURE — 83550 IRON BINDING TEST: CPT | Performed by: INTERNAL MEDICINE

## 2018-03-12 PROCEDURE — 97110 THERAPEUTIC EXERCISES: CPT

## 2018-03-12 PROCEDURE — 25010000002 HEPARIN (PORCINE) PER 1000 UNITS: Performed by: INTERNAL MEDICINE

## 2018-03-12 PROCEDURE — 99233 SBSQ HOSP IP/OBS HIGH 50: CPT | Performed by: INTERNAL MEDICINE

## 2018-03-12 PROCEDURE — 97116 GAIT TRAINING THERAPY: CPT

## 2018-03-12 PROCEDURE — 94799 UNLISTED PULMONARY SVC/PX: CPT

## 2018-03-12 PROCEDURE — 83540 ASSAY OF IRON: CPT | Performed by: INTERNAL MEDICINE

## 2018-03-12 PROCEDURE — 85018 HEMOGLOBIN: CPT | Performed by: INTERNAL MEDICINE

## 2018-03-12 PROCEDURE — 97530 THERAPEUTIC ACTIVITIES: CPT

## 2018-03-12 PROCEDURE — 94640 AIRWAY INHALATION TREATMENT: CPT

## 2018-03-12 RX ORDER — METOPROLOL SUCCINATE 25 MG/1
25 TABLET, EXTENDED RELEASE ORAL
Status: DISCONTINUED | OUTPATIENT
Start: 2018-03-12 | End: 2018-03-13 | Stop reason: HOSPADM

## 2018-03-12 RX ORDER — HYDROCODONE BITARTRATE AND ACETAMINOPHEN 5; 325 MG/1; MG/1
1 TABLET ORAL EVERY 6 HOURS PRN
Status: DISCONTINUED | OUTPATIENT
Start: 2018-03-12 | End: 2018-03-13

## 2018-03-12 RX ADMIN — POTASSIUM CHLORIDE 40 MEQ: 750 CAPSULE, EXTENDED RELEASE ORAL at 10:00

## 2018-03-12 RX ADMIN — METRONIDAZOLE 500 MG: 500 INJECTION, SOLUTION INTRAVENOUS at 18:47

## 2018-03-12 RX ADMIN — ASPIRIN 81 MG 81 MG: 81 TABLET ORAL at 09:49

## 2018-03-12 RX ADMIN — GABAPENTIN 600 MG: 300 CAPSULE ORAL at 05:12

## 2018-03-12 RX ADMIN — PANTOPRAZOLE SODIUM 40 MG: 40 TABLET, DELAYED RELEASE ORAL at 18:47

## 2018-03-12 RX ADMIN — SOTALOL HYDROCHLORIDE 40 MG: 80 TABLET ORAL at 09:52

## 2018-03-12 RX ADMIN — SODIUM CHLORIDE 2 G: 9 INJECTION, SOLUTION INTRAVENOUS at 01:54

## 2018-03-12 RX ADMIN — GABAPENTIN 600 MG: 300 CAPSULE ORAL at 22:11

## 2018-03-12 RX ADMIN — POTASSIUM CHLORIDE 40 MEQ: 750 CAPSULE, EXTENDED RELEASE ORAL at 15:17

## 2018-03-12 RX ADMIN — IPRATROPIUM BROMIDE AND ALBUTEROL SULFATE 3 ML: 2.5; .5 SOLUTION RESPIRATORY (INHALATION) at 08:23

## 2018-03-12 RX ADMIN — CASTOR OIL AND BALSAM, PERU: 788; 87 OINTMENT TOPICAL at 22:13

## 2018-03-12 RX ADMIN — SUCRALFATE 1 G: 1 TABLET ORAL at 18:47

## 2018-03-12 RX ADMIN — SUCRALFATE 1 G: 1 TABLET ORAL at 09:49

## 2018-03-12 RX ADMIN — ACETAMINOPHEN 650 MG: 325 TABLET ORAL at 22:11

## 2018-03-12 RX ADMIN — IPRATROPIUM BROMIDE AND ALBUTEROL SULFATE 3 ML: 2.5; .5 SOLUTION RESPIRATORY (INHALATION) at 19:24

## 2018-03-12 RX ADMIN — HEPARIN SODIUM 5000 UNITS: 5000 INJECTION, SOLUTION INTRAVENOUS; SUBCUTANEOUS at 22:12

## 2018-03-12 RX ADMIN — GABAPENTIN 600 MG: 300 CAPSULE ORAL at 13:55

## 2018-03-12 RX ADMIN — METRONIDAZOLE 500 MG: 500 INJECTION, SOLUTION INTRAVENOUS at 12:05

## 2018-03-12 RX ADMIN — HEPARIN SODIUM 5000 UNITS: 5000 INJECTION, SOLUTION INTRAVENOUS; SUBCUTANEOUS at 09:48

## 2018-03-12 RX ADMIN — CASTOR OIL AND BALSAM, PERU: 788; 87 OINTMENT TOPICAL at 09:49

## 2018-03-12 RX ADMIN — SODIUM CHLORIDE 2 G: 9 INJECTION, SOLUTION INTRAVENOUS at 13:55

## 2018-03-12 RX ADMIN — PANTOPRAZOLE SODIUM 40 MG: 40 TABLET, DELAYED RELEASE ORAL at 09:49

## 2018-03-12 RX ADMIN — ACETAMINOPHEN 650 MG: 325 TABLET ORAL at 12:16

## 2018-03-12 RX ADMIN — IPRATROPIUM BROMIDE AND ALBUTEROL SULFATE 3 ML: 2.5; .5 SOLUTION RESPIRATORY (INHALATION) at 12:06

## 2018-03-12 RX ADMIN — METRONIDAZOLE 500 MG: 500 INJECTION, SOLUTION INTRAVENOUS at 05:12

## 2018-03-12 RX ADMIN — IPRATROPIUM BROMIDE AND ALBUTEROL SULFATE 3 ML: 2.5; .5 SOLUTION RESPIRATORY (INHALATION) at 15:55

## 2018-03-12 NOTE — PROGRESS NOTES
[facility] Progress Note        Antibiotics:  Anti-Infectives     Ordered     Dose/Rate Route Frequency Start Stop    18 1158  aztreonam (AZACTAM) 2 g in sodium chloride 0.9 % 100 mL IVPB-MBP     Ordering Provider:  Enoch Dong MD    2 g  200 mL/hr over 30 Minutes Intravenous Every 8 Hours 18 1300 18 2059    18 1100  Pharmacy to dose vancomycin     Comments:  Alexander Adan  6B, N-644  By Chrissy Sullivan MD   Ordering Provider:  Enoch Dong MD     Does not apply Continuous PRN 18 1100 03/15/18 1159    18 1634  metroNIDAZOLE (FLAGYL) IVPB 500 mg     Ousmane Leiva MD reviewed the order on 03/10/18 1029.   Ordering Provider:  Ousmane Leiva MD    500 mg  100 mL/hr over 60 Minutes Intravenous Every 6 Hours 18 1800 18 2359    18 1634  aztreonam (AZACTAM) in SWFI 2 grams/10ml IV PUSH syringe     Gilson Mustafa IV, [facility] reviewed the order on 18 0945.   Ordering Provider:  Gilson Mustafa IV, RPH    2 g  over 5 Minutes Intravenous Every 8 Hours 18 1800 18 1713    18 1709  vancomycin (VANCOCIN) in iso-osmotic dextrose IVPB 1 g (premix) 200 mL     Gilson Mustafa IV, [facility] reviewed the order on 18 1256.   Ordering Provider:  Gilson AGUILAR Mustafa IV, RPH    15 mg/kg × 65.8 kg  over 60 Minutes Intravenous Every 24 Hours 18 1800 18 1759    18 1657  Pharmacy to dose vancomycin     Comments:  Alexander Adan  6B, N-644  By Chrissy Mustafa IV, [facility] let the order  on 18 1101.   Ordering Provider:  Chrissy Sullivan MD     Does not apply Continuous PRN 18 1657 18 1656    18 1138  aztreonam (AZACTAM) in SWFI 2 grams/10ml IV PUSH syringe     Ordering Provider:  Galileo Woodruff MD    2 g  over 5 Minutes Intravenous Once 18 1240 18 1325    18 1138  vancomycin 1250 mg/250 mL 0.9% NS IVPB ([facility])     Ordering Provider:  Galileo Woodruff MD    20  mg/kg × 65.8 kg Intravenous Once 03/02/18 1140 03/02/18 1327          CC: parotitis, pneumonia    HPI:  Patient is a 77 y.o.  Yr old male with history of numerous comorbid conditions including past stroke/subdural hematoma and evacuation with bur hole 2015 with chronic kidney disease/debility.  Family reports chronic dysphagia symptoms such that he coughs with liquid/solids but no specific esophageal diagnosis.  He is admitted on March 2 with 3 days poor by mouth intake, generalized malaise with dry cough and right facial/parotid pain.       3/12/18 Patient more awake with no newpain or problems.  Reliability not entirely clear as he has had encephalopathy surrounding admission, albeit less.  However, no headache photophobia or neck stiffness.  No chest pain.  No productive cough at present although has been intermittently productive at times.  No dysuria hematuria or pyuria.  No skin rash.  No diarrhea or abdominal pain.  No diarrhea.   No hematochezia melena or hematemesis. Family says he is improving     ROS:      3/12/18 Limited  and reliability unclear, but answers no to the following:    No f/c/s. No n/v/d. No rash. No new ADR to Abx.     Constitutional-- No Fever, chills or sweats.  Appetite good, and no malaise. No fatigue.  Heent-- No new vision, hearing or throat complaints.  No epistaxis or oral sores.  Denies odynophagia or dysphagia.  No flashers, floaters or eye pain. No odynophagia or dysphagia. No headache, photophobia or neck stiffness.  CV-- No chest pain, palpitation or syncope  Resp-- No SOB/cough/Hemoptysis  GI- No nausea, vomiting, or diarrhea.  No hematochezia, melena, or hematemesis. Denies jaundice or chronic liver disease.  -- No dysuria, hematuria, or flank pain.  Denies hesitancy, urgency or flank pain.  Lymph- no swollen lymph nodes in neck/axilla or groin.   Heme- No active bruising or bleeding; no Hx of DVT or PE.  MS-- no swelling or pain in the bones or joints of arms/legs.  No new  "back pain.  Neuro-- No acute focal weakness or numbness in the arms or legs.  No seizures.    Full 12 point review of systems reviewed and negative otherwise for acute complaints, other than above      PE: nursing/chaperone present  /57   Pulse (!) 121   Temp 97.6 °F (36.4 °C) (Oral)   Resp 18   Ht 165.1 cm (65\")   Wt 65.8 kg (145 lb)   SpO2 97%   BMI 24.13 kg/m²     GENERAL: Thin chronically ill-appearing and elderly, awake, NAD  HEENT: Normocephalic, atraumatic.  Left eyelid droop compared to right is chronic according to family. No conjunctival injection. No icterus. Oropharynx clear without evidence of thrush or exudate. No evidence of peridontal disease.  Also see below   NECK: Supple without nuchal rigidity. No mass.  LYMPH: No cervical, axillary or inguinal lymphadenopathy.  HEART: Tachycardic; No murmur, rubs, gallops.   LUNGS: Diminished at bases with rhonchi more so on the right than left. Normal respiratory effort. Nonlabored. No dullness.  ABDOMEN: Soft, nontender, nondistended. Positive bowel sounds. No rebound or guarding. NO mass or HSM.  EXT:  No cyanosis, clubbing or edema. No cord.  : Genitalia generally unremarkable.  Without Aguilar catheter.  MSK: FROM without joint effusions noted arms/legs.    SKIN: Warm and dry without cutaneous eruptions on Inspection/palpation.    NEURO: He does not cooperate with a detailed motor/sensory exam but does move all 4 extremities.     No peripheral stigmata/phenomena of endocarditis     Right face/parotid gland with less erythema/induration and tenderness.  No fluctuance.  No crepitus or bulla.       Laboratory Data      Results from last 7 days  Lab Units 03/12/18  0433 03/11/18  0658 03/10/18  0658 03/09/18  0503   WBC 10*3/mm3  --  9.13 11.74* 14.34*   HEMOGLOBIN g/dL 9.9* 8.0* 8.8* 9.7*   HEMATOCRIT % 32.1* 26.6* 28.5* 31.3*   PLATELETS 10*3/mm3  --  180 178 182       Results from last 7 days  Lab Units 03/12/18  0433   SODIUM mmol/L 139 "   POTASSIUM mmol/L 3.6   CHLORIDE mmol/L 116*   CO2 mmol/L 17.0*   BUN mg/dL 11   CREATININE mg/dL 0.70   GLUCOSE mg/dL 81   CALCIUM mg/dL 7.5*       Results from last 7 days  Lab Units 03/10/18  0658   ALK PHOS U/L 60   BILIRUBIN mg/dL 0.2*   ALT (SGPT) U/L 6*   AST (SGOT) U/L 8               Estimated Creatinine Clearance: 70.8 mL/min (by C-G formula based on SCr of 0.7 mg/dL).      Microbiology:      Radiology:  Imaging Results (last 72 hours)     Procedure Component Value Units Date/Time    CT Head Without Contrast [[id number]] Collected:  03/02/18 1231     Updated:  03/02/18 1300    Narrative:       EXAMINATION: CT HEAD WO CONTRAST-      INDICATION: Encephalopathy.      TECHNIQUE: Axial CT of the head without intravenous contrast  administration.     The radiation dose reduction device was turned on for each scan per the  ALARA (As Low as Reasonably Achievable) protocol.     COMPARISON: 02/16/2018.     FINDINGS: Midline structures are symmetric without evidence of mass,  mass effect or midline shift. No intraaxial hemorrhage or extraaxial  fluid collection. Ventricles and sulci are prominent consistent with  components of atrophy. Mild to moderate attenuation changes within the  periventricular and deep white matter consistent with chronic small  vessel ischemic disease. Globes and orbits are partially visualized and  grossly unremarkable. Paranasal sinuses and mastoid air cells  demonstrate total opacification of the right maxillary sinus as well as  opacification in the right ethmoid air cells and sphenoid sinus.  Calvarium demonstrates prior [place] holes left frontal and left temporal  regions otherwise grossly intact.       Impression:       No acute intracranial abnormality.     D:  03/02/2018  E:  03/02/2018     This report was finalized on 3/2/2018 12:58 PM by Dr. Maurice Alas.       CT Abdomen Pelvis Without Contrast [[id number]] Collected:  03/02/18 1254     Updated:  03/02/18 1300    Narrative:        EXAMINATION: CT ABDOMEN AND PELVIS WO CONTRAST-      INDICATION: Abdomen pain, guarding, urinary incontinence.      TECHNIQUE: CT abdomen and pelvis without intravenous contrast  administration.     The radiation dose reduction device was turned on for each scan per the  ALARA (As Low as Reasonably Achievable) protocol.     COMPARISON: 08/05/2017.     FINDINGS: Motion degraded examination particularly within the  subdiaphragmatic soft tissues.  Lung bases demonstrate patchy  tree-in-bud opacifications involving much of the right lower lobe and  visualized right middle lobe consistent with infectious or inflammatory  etiology, however, no focal lobar consolidation. Left hemithorax  partially visualized and grossly clear. Liver without focal lesion.  Gallbladder is surgically absent. Pancreas and spleen within normal  limits. Adrenals without distinct nodule. Kidneys without hydronephrosis  or hydroureter. No obstructive uropathy or urolithiasis. Atherosclerotic  nonaneurysmal abdominal aorta. No bulky retroperitoneal adenopathy. GI  tract evaluation without focal thickening or disproportionate dilatation  of bowel. No free fluid or intraabdominal free air. Pelvic viscera  unremarkable without bulky pelvic adenopathy or significant free fluid.  Degenerative changes of the spine and pelvis without aggressive osseous  abnormality identified within the limited evaluation due to motion. No  soft tissue body wall findings of concern.       Impression:       1. Limited evaluation due to significant patient motion artifact  limiting the subdiaphragmatic soft tissues, however, the limits of the  study there is scattered tree-in-bud opacifications involving the  visualized right middle lobe and right lower lobe along with nodularity  consistent of acute infectious or inflammatory etiology without lobar  consolidation identified. No significant pleural effusion.  2. No acute intraabdominal abnormality within the limited  evaluation due  to significant motion specifically no disproportionate dilatation of  bowel to suggest mechanical obstructive process or loculated  intraabdominal fluid collection.     D:  03/02/2018  E:  03/02/2018     This report was finalized on 3/2/2018 12:58 PM by Dr. Maurice Alas.       XR Chest 1 View [[id number]] Collected:  03/02/18 1131     Updated:  03/02/18 1301    Narrative:       EXAMINATION: XR CHEST 1 VW- 03/02/2018     INDICATION: weakness, altered mental status      COMPARISON: Chest x-ray 02/16/2018.     FINDINGS: Cardiac size within normal limits. Post CABG and post median  sternotomy changes. No focal opacification or consolidation. No  pneumothorax or pleural effusion. Left chest wall pacing device with  leads intact. Degenerative changes of the thoracic spine.           Impression:       No acute cardiopulmonary process.     D:  03/02/2018  E:  03/02/2018     This report was finalized on 3/2/2018 12:58 PM by Dr. Maurice Alas.       CT Soft Tissue Neck With Contrast [[id number]] Collected:  03/02/18 1627     Updated:  03/02/18 1646    Narrative:       EXAMINATION: CT NECK SOFT TISSUES W/CONTRAST - 03/02/2018      INDICATION: K11.21-Acute sialoadenitis; J01.00-Acute maxillary  sinusitis, unspecified; R53.1-Weakness.      TECHNIQUE: CT neck soft tissues with intravenous contrast  administration.     The radiation dose reduction device was turned on for each scan per the  ALARA (As Low as Reasonably Achievable) protocol.     COMPARISON: None.     FINDINGS: For intracranial findings, please see recent CT head report  performed the same day. Total opacification right maxillary sinus  consistent with components of sinusitis. Severely asymmetric appearance  of the right parotid gland which is nearly entirely replaced by  heterogeneous enhancing soft tissue attenuation involving the  superficial and deep lobes with overall dimensions measuring  approximately 4.6 x 4.0 cm, indicative of soft tissue mass  involvement.  There is no discrete osseous erosion of the adjacent mandible or  temporomandibular joint with right mastoid air cells grossly clear.  Parapharyngeal spaces are preserved with supraglottic soft tissues,  unremarkable. Valleculae and piriform sinuses within normal limits. The  vocal cords and subglottic trachea are unremarkable. Visualized portions  of the lung apices are grossly unremarkable. Thyroid is homogeneous in  attenuation. No bulky cervical adenopathy is identified.       Impression:       Soft tissue mass involvement of the superficial and deep  lobes of the right parotid gland. 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 No adjacent osseous erosion. No bulky cervical  adenopathy otherwise identified. Opacification of the right maxillary  sinus again noted consistent with sinusitis.     DICTATED:     03/02/2018  EDITED    :     03/02/2018      This report was finalized on 3/2/2018 4:44 PM by Dr. Maurice Alas.       XR Chest 1 View [[id number]] Collected:  03/04/18 1701     Updated:  03/04/18 1857    Narrative:          EXAMINATION: XR CHEST 1 VW - 03/04/2018     INDICATION:  K11.21-Acute sialoadenitis; J01.00-Acute maxillary  sinusitis, unspecified; R53.1-Weakness; R13.10-Dysphagia, unspecified.      COMPARISON: 03/02/2018.     FINDINGS: Portable chest reveals PICC line placement with tip in the  SVC. There are chronic changes identified within the lung fields with  mild increased markings seen within the right middle lower lung field.  Development of a small right pleural effusion. Heart is borderline  enlarged with slight prominence of the pulmonary vascularity.           Impression:       PICC line catheter on the right with tip in the SVC.  Interval development of mild increased markings within the right mid to  lower lung field as well as  development of a small right pleural  effusion.     DICTATED:     03/04/2018  EDITED    :     03/04/2018      This report was finalized on 3/4/2018 6:55 PM by Dr. Donna Matthews MD.               Impression:   --Acute sepsis with leukocytosis/fever and tachycardia, associated with encephalopathy and likely severe sepsis.  Primary focal symptoms/findings include right parotitis and acute right lower lobe pneumonia with broad spectrum antimicrobials.  No preceding symptoms of central nervous system focus and no meningismus at present.  Likely metabolic encephalopathy associated with sepsis.  Abdomen benign by exam with no intra-abdominal pathology per radiology on CT scan, although motion artifact and if symptoms persist/recur, then you should give consideration to further GI workup by either repeat imaging or GI evaluation.  No diarrhea at present but if it develops you could consider stool studies.  Urinalysis not consistent with UTI.  No obvious soft tissue source other than above.     --Acute right parotitis;  gram-positive organisms are a primary concern.  In addition there is description of a masslike quality by radiology and he will likely need to give consideration to ENT input.  I discussed with Dr. Yu;  no focal abscess so far per radiology.     --Acute cough with right lower lobe pneumonia by imaging and risk for community-acquired pathogens/aspiration.  Broad-spectrum coverage ongoing. Culture with S Aureus and usual pankaj so far.  Ordered urine Legionella and strep antigens and influenza PCR.     --Dysphagia.  Family reports difficulty with solids/liquids and cough.  Speech therapy has seen.  You'll likely need to give consideration to GI consultation for EGD as well.     --Acute encephalopathy.  Family reports this is common with acute illness for him.  Further neurologic workup per internal medicine at their discretion.  Preceding symptoms and current exam not consistent with meningismus.   Improving in general per family back towards baseline     --Chronic kidney disease stage III per records.  Monitor to help guide further adjustments with antimicrobials     --QTc > 500 ms EKG    --TCP;  Monitor    --hypotension;  Medicine workup/intervention on going;  Afebrile, no other focal symptoms;  Repeat blood cultures neagtive so far and no new focal symptoms but monitor for evolving focus       PLAN:     --IV vancomycin/Azactam/Flagyl and oral doxycycline     --Monitor IV and IV antibiotic with risk for systemic complication and potential drug interaction     --Check/review labs cultures and scans     --Highly complex set of problems with high risk for further serious morbidity and other serious sequela     --You will likely need to give consideration to ENT consultation and also GI consultation/EGD; d/w Dr. Yu     --Discussed with family.  d/w micro     --Partial history per nursing staff as well          Enoch Dong MD  3/12/2018

## 2018-03-12 NOTE — PROGRESS NOTES
"    [facility] Medicine Services  PROGRESS NOTE    Patient Name: Alexander Adan  : 1940  MRN: [id number]    Date of Admission: 3/2/2018  Length of Stay: 10  Primary Care Physician: Harrison Smart MD    Subjective   Subjective     CC:Right facial swelling, weight loss    Subjective:  Breathing ok, pain left foot at times \"it kills me doc\". \"only the lortab helped\". Tolerating diet, no chest pain  Review of Systems  No headache, no fever, no chills, no chest pain    Objective   Objective     Vital Signs:   Temp:  [97.2 °F (36.2 °C)-98.1 °F (36.7 °C)] 97.2 °F (36.2 °C)  Heart Rate:  [] 121  Resp:  [16-18] 18  BP: ()/(57-79) 103/73        Physical Exam:  Constitutional: alert, no distress, pleasant, chronically malnourished but nontoxic appearing  Eyes: PERRLA, sclerae anicteric, no conjunctival injection  HENT: NCAT, mucous membranes moist.  Right submandibular swelling most likely she Parotid gland swelling.  Much improved.  Neck: Supple, no thyromegaly, no lymphadenopathy, trachea midline  Respiratory: lungs grossly clear today  Cardiovascular: irr irr, tachycardic mildly, no murmurs, rubs, or gallops, palpable pedal pulses bilaterally  Gastrointestinal: Positive bowel sounds, soft, nontender, nondistended  Musculoskeletal: Very cachectic No bilateral ankle edema, no clubbing or cyanosis to extremities  Neurologic: alert, nontoxic, pleasant, appropriate responses  Skin: No rashes    Results Reviewed:  I have personally reviewed current lab, radiology, and data and agree.      Results from last 7 days  Lab Units 18  0433 18  0658 03/10/18  0658 18  0503   WBC 10*3/mm3  --  9.13 11.74* 14.34*   HEMOGLOBIN g/dL 9.9* 8.0* 8.8* 9.7*   HEMATOCRIT % 32.1* 26.6* 28.5* 31.3*   PLATELETS 10*3/mm3  --  180 178 182       Results from last 7 days  Lab Units 18  0433 18  0826 03/10/18  0658 18  0639 18  0503 18  0542   SODIUM mmol/L " 139 141 137 137  --  130*   POTASSIUM mmol/L 3.6 3.6 3.8 4.6  --  4.8   CHLORIDE mmol/L 116* 118* 115* 111*  --  110*   CO2 mmol/L 17.0* 17.0* 18.0* 16.0*  --  16.0*   BUN mg/dL 11 12 12 13  --  10   CREATININE mg/dL 0.70 0.80 0.80 0.80  --  0.80   GLUCOSE mg/dL 81 122* 83 84  --  125*   CALCIUM mg/dL 7.5* 7.5* 7.9* 7.3*  --  7.7*   ALT (SGPT) U/L  --   --  6* 10  --  7   AST (SGOT) U/L  --   --  8 17  --  15   TROPONIN I ng/mL  --   --   --   --  0.033  --      Estimated Creatinine Clearance: 70.8 mL/min (by C-G formula based on SCr of 0.7 mg/dL).  No results found for: BNP  No results found for: PHART    Microbiology Results Abnormal     Procedure Component Value - Date/Time    Blood Culture - Blood, [[id number]]  (Normal) Collected:  03/08/18 1033    Lab Status:  Preliminary result Specimen:  Blood from Arm, Left Updated:  03/12/18 1301     Blood Culture No growth at 4 days    Blood Culture - Blood, [[id number]]  (Normal) Collected:  03/08/18 1015    Lab Status:  Preliminary result Specimen:  Blood from Wrist, Left Updated:  03/12/18 1301     Blood Culture No growth at 4 days    Blood Culture - Blood, [[id number]]  (Normal) Collected:  03/02/18 1231    Lab Status:  Final result Specimen:  Blood from Hand, Right Updated:  03/07/18 1746     Blood Culture No growth at 5 days    S. Pneumo Ag Urine or CSF - Urine, Urine, Clean Catch [[id number]] Collected:  03/05/18 0538    Lab Status:  Final result Specimen:  Urine from Urine, Clean Catch Updated:  03/07/18 1711     Specimen Source Urine     STREP PNEUMONIAE ANTIGEN Negative     Body Fluid Culture, Sterile Not Indicated     Organism ID Not indicated.     Please note Comment     Comment: [employer] standards require a culture to be  performed on CSF specimens submitted for bacterial antigen testing.  (CAP [id number]) Urine specimens will not be cultured.       Narrative:       Performed at:  [address]   [id number]  : Gilson Gandara MD, Phone:  [phone]    Legionella Antigen, Urine - Urine, Urine, Clean Catch [[id number]] Collected:  03/05/18 0538    Lab Status:  Final result Specimen:  Urine from Urine, Clean Catch Updated:  03/07/18 1522     L. pneumophila Serogp 1 Ur Ag Negative     Comment: Presumptive negative for L. pneumophila serogroup 1 antigen in urine,  suggesting no recent or current infection. Legionnaires' disease  cannot be ruled out since other serogroups and species may also cause  disease.       Narrative:       Performed at:  [address] [place] [address]  [id number]  : Gilson Ganadra MD, Phone:  [phone]    Blood Culture - Blood, [[id number]] Collected:  03/02/18 1235    Lab Status:  Final result Specimen:  Blood from Arm, Left Updated:  03/07/18 1401     Blood Culture No growth at 5 days      Aerobic bottle only    Blood Culture - Blood, [[id number]]  (Normal) Collected:  03/02/18 1305    Lab Status:  Final result Specimen:  Blood from Arm, Left Updated:  03/07/18 1401     Blood Culture No growth at 5 days    Respiratory Culture - Sputum, Cough [[id number]]  (Abnormal)  (Susceptibility) Collected:  03/05/18 0751    Lab Status:  Final result Specimen:  Sputum from Cough Updated:  03/07/18 1210     Respiratory Culture --      Moderate growth (3+) Staphylococcus aureus (A)     Comment: This isolate is presumed to be clindamycin resistant based on detection of inducible clindamycin resistance.  Clindamycin may still be effective in some patients.         Moderate growth (3+) Normal Respiratory Tiffanie     Gram Stain Result Many (4+) WBCs per low power field      Moderate (3+) Epithelial cells per low power field      Moderate (3+) Gram positive cocci in pairs and clusters    Susceptibility      Staphylococcus aureus     TORO     Ceftriaxone <=8 ug/ml Susceptible     Clindamycin  Resistant     Erythromycin >4 ug/ml Resistant     Gentamicin <=4 ug/ml  Susceptible     Levofloxacin <=1 ug/ml Susceptible  [1]      Linezolid <=1 ug/ml Susceptible     Oxacillin <=0.25 ug/ml Susceptible     Penicillin G 2 ug/ml Resistant     Quinupristin + Dalfopristin <=0.5 ug/ml Susceptible     Rifampin <=1 ug/ml Susceptible     Tetracycline >8 ug/ml Resistant     Trimethoprim + Sulfamethoxazole <=0.5/9.5 ug/ml Susceptible     Vancomycin 1 ug/ml Susceptible            [1]   Staphylococcus species may develop resistance during prolonged therapy with quinolones.  Isolates that are initially susceptible may become resistant within three to four days after initiation of therapy. Testing of repeat isolates may be warranted.                 Urine Culture - Urine, Urine, Clean Catch [[id number]]  (Normal) Collected:  03/05/18 0538    Lab Status:  Final result Specimen:  Urine from Urine, Clean Catch Updated:  03/07/18 0820     Urine Culture No growth at 2 days    Influenza A & B, RT PCR - Swab, Nasopharynx [[id number]]  (Normal) Collected:  03/04/18 1424    Lab Status:  Final result Specimen:  Swab from Nasopharynx Updated:  03/04/18 1542     Influenza A PCR Not Detected     Influenza B PCR Not Detected          Imaging Results (last 24 hours)     Procedure Component Value Units Date/Time    XR Chest 1 View [[id number]] Collected:  03/11/18 1533     Updated:  03/11/18 1819    Narrative:          EXAMINATION: XR CHEST 1 VW - 03/11/2018     INDICATION:  R13.12-Dysphagia, oropharyngeal phase; K11.21-Acute  sialoadenitis; J01.00-Acute maxillary sinusitis, unspecified;  R53.1-Weakness; Z74.09-Other reduced mobility.     COMPARISON: None.     FINDINGS:   1. Heart size is within normal limits. There is airspace opacity in the  right mid and lower lung with a right base effusion.     2. Compared to previous studies of 2 days ago, there is increasing  airspace opacity in the right mid and lower lung, and the right base  effusion appears to be new. The left lung is expanded and clear with  chronic  interstitial and granulomatous scarring.     The right PICC line remains well-positioned in the mid SVC.           Impression:       Increasing airspace opacity right mid and lower lung with  right pleural effusion, worse when compared to 2 days ago.     DICTATED:     03/11/2018  EDITED/ls :     03/11/2018      This report was finalized on 3/11/2018 6:17 PM by Dr. Giovani Rodriguez MD.           Results for orders placed during the hospital encounter of 03/02/18   Adult Transthoracic Echo Complete W/ Cont if Necessary Per Protocol    Narrative · Left atrial cavity size is moderately dilated.  · Left ventricular systolic function is moderately decreased. Estimated   ejection fraction 41-45%.  · Mild mitral valve regurgitation is present  · Mild tricuspid valve regurgitation is present.          I have reviewed the medications.    Assessment/Plan   Assessment / Plan     Hospital Problem List     * (Principal)SIRS (systemic inflammatory response syndrome)    Atrial fibrillation    Overview Signed 6/20/2016  3:33 PM by DENI Gates     Description: chads vasc 7         Chronic coronary artery disease    Overview Signed 6/20/2016  3:33 PM by DENI Gates     Description: cabg 2004, data deficient         Chronic diastolic heart failure    Overview Signed 6/20/2016  3:33 PM by DENI Gates     Description: NYHA II-III  Per echo: EF 50% September 2013         Chronic obstructive pulmonary disease    CKD (chronic kidney disease), stage III    Leukocytosis    PVD (peripheral vascular disease)          *Right sided parotitis (better)  *Pneumonia (better)  *MSSA in sputum  *Persistent Hypotension (chronically sbp 90's per patient)  *Afib (on sotalol)   -no anticoagulation, prior subdural bleed on anticoagulation  *Anemia (initial hgb >11-->8.0 on 3/11/18)  *Hx Mixed CHF  *Hx Subdural Hemorrhage (no anticoagulation)  *P.A.D (LLE arterial revascularization being considered in future by dr. Vieira)   -preop  "future stress test ordered by cards outpt but yet to be done  *Chronic LLE Pain (at least in part due to chronic arterial insufficiency)      Assessment & Plan:  -------------------------------------  PLAN:  -hypotension better after discontinuation of pletal   -previously lortab had worsened hypotension   -cosyntropin stim test 3/10: normal     -left foot pain, acute on chronic worse after stopping lortab (likely in part due to chronic arterial insufficiency for which dr. carroll giving future consideration for revasculartization)   -stop ultram, restart lortab 5 q6h prn   -patient states he can't stand the pain and \"wouldn't want to go on\" with the pain as currently stands. Consistent with his chronic pain    -Parotitis & pneumonia: improved on abx (per ID)    -Afib: discussed w/ dr. Noble via phone 3/12/18:   -since in afib stop sotalol, change to toprol XL 25mg daily for pure rate control (and titrate as heart rate and bp allow)   -follow up dr. Noble as outpatient within next few weeks    -hgb, bmp, mag in a.m.  -monitor BP (starting lortab again for pain control; changed sotalol to toprol xl 25mg daily)    *if bp reasonable (>80 systolic and asymptomatic), labs stable, heart rate controlled then ok to go to rehab ([facility]) as early as tomorrow 3/13 via car. Final abx recs per ID        DVT Prophylaxis:  Sq heparin bid    CODE STATUS: conditional DNR (no mech vent, no cpr, etc)        Electronically signed by Ousmane Leiva MD, 03/12/18, 3:22 PM.    "

## 2018-03-12 NOTE — PROGRESS NOTES
Nutrition Services    Patient Name:  Alexander Adan  YOB: 1940  MRN: [id number]  Admit Date:  3/2/2018    RD visited patient and daughter to obtain additional food preferences. Patient willing to try [employer]ud[employer] and Magic Cup (RD will order). RD will enter preferred meal orders.    Electronically signed by:  Rachel Carrero RD  03/12/18 3:59 PM

## 2018-03-12 NOTE — PROGRESS NOTES
Continued Stay Note  [facility]     Patient Name: Alexander Adan  MRN: [id number]  Today's Date: 3/12/2018    Admit Date: 3/2/2018          Discharge Plan     Row Name 03/12/18 1611       Plan    Plan update    Plan Comments Patient not ready for discharge at this time.  CM attempted to see, patient asleep.  Received a call from [facility] [employer] that precert will have to be restarted.  CM following.  Patient plan is to discharge to [facility] via car with family to transport when medically ready pending precert.     Final Discharge Disposition Code 03 - skilled nursing facility (SNF)              Discharge Codes    No documentation.       Expected Discharge Date and Time     Expected Discharge Date Expected Discharge Time    Mar 13, 2018             Randee Singleton RN

## 2018-03-12 NOTE — THERAPY TREATMENT NOTE
Acute Care - Physical Therapy Treatment Note  [facility]     Patient Name: Alexander Adan  : 1940  MRN: [id number]  Today's Date: 3/12/2018  Onset of Illness/Injury or Date of Surgery: 18  Date of Referral to PT: 18  Referring Physician: MD Gigi    Admit Date: 3/2/2018    Visit Dx:    ICD-10-CM ICD-9-CM   1. Oropharyngeal dysphagia R13.12 787.22   2. Parotitis, acute K11.21 527.2   3. Subacute maxillary sinusitis J01.00 461.0   4. Generalized weakness R53.1 780.79   5. Impaired functional mobility, balance, gait, and endurance Z74.09 V49.89   6. Impaired mobility and ADLs Z74.09 799.89     Patient Active Problem List   Diagnosis   • Atrial fibrillation   • Chronic coronary artery disease   • Chronic diastolic heart failure   • Chronic obstructive pulmonary disease   • Cognitive impairment, mild, so stated   • Tobacco use   • Fall at home   • CKD (chronic kidney disease), stage III   • Leukocytosis   • Bronchitis   • Vasovagal syncope   • Dehydration   • Hypotension due to dehydration   • PVD (peripheral vascular disease)   • SIRS (systemic inflammatory response syndrome)       Therapy Treatment    Therapy Treatment / Health Promotion    Treatment Time/Intention  Document Type: therapy note (daily note)  Subjective Information: complains of, weakness, pain (Bheel ulcers hurt(upMostOfNight))  Mode of Treatment: physical therapy  Patient/Family Observations: sup,in bed;nsg.giving mult meds,shot inAbdom;tele,iv/PICC,MEPILEX Bheels,o2;heels inspected/ mepilex changed;wife stated pt wants houseslippers for gt d/t Bheel pain;nsg will ask MD/WOC for order for B heel offload shoes;used urinal (>10 min,sporadic flow);req.Depends(donned)   Care Plan Review: patient/other agree to care plan  Therapy Frequency (PT Clinical Impression): daily  Patient Effort: good  Treatment Considerations/Comments: cognitive impairment;fall risk;exit alarm;incont;heel ulcers  Patient Response to Treatment:  "tolerated  Plan of Care Review  Plan of Care Reviewed With: patient, spouse    Vitals/Pain/Safety  Vital Signs  Pre Systolic BP Rehab: 92  Pre Treatment Diastolic BP: 77  Intra Systolic BP Rehab: 102  Intra Treatment Diastolic BP: 57  Post Systolic BP Rehab: 95  Post Treatment Diastolic BP: 66  Pretreatment Heart Rate (beats/min): 115  Intratreatment Heart Rate (beats/min): 167  Posttreatment Heart Rate (beats/min): 126  Pre SpO2 (%): 95  O2 Delivery Pre Treatment: supplemental O2  Intra SpO2 (%): 89  O2 Delivery Intra Treatment: supplemental O2  Post SpO2 (%): 93  O2 Delivery Post Treatment: supplemental O2  Pre Patient Position: Supine  Intra Patient Position: Standing  Post Patient Position: Sitting  Pain Assessment  Additional Documentation: Pain Scale: FACES Pre/Post-Treatment (Group)  Pain Scale: FACES Pre/Post-Treatment  Pain: FACES Scale, Pretreatment: 4-->hurts little more  Pain: FACES Scale, Post-Treatment: 6-->hurts even more  Pre/Post Treatment Pain Comment: poor pain historian;init \"no pain\",then wife stated \"up all night w/ B heel pain, & says he hurts all over\"  Safety Issues, Functional Mobility  Safety Issues Affecting Function (Mobility): impulsivity, safety precaution awareness, awareness of need for assistance, ability to follow commands, sequencing abilities  Impairments Affecting Function (Mobility): pain, motor planning, balance, cognition, shortness of breath, strength  Positioning and Restraints  Pre-Treatment Position: in bed  Post Treatment Position: chair  In Chair: notified nsg, reclined, call light within reach, encouraged to call for assist, exit alarm on, with family/caregiver, RUE elevated, LUE elevated, waffle cushion, on mechanical lift sling, legs elevated (B heels offloaded)    Mobility,ADL,Motor, Modality  Bed Mobility Assessment/Treatment  Bed Mobility Assessment/Treatment: bed mobility (all) activities  Rolling Left [place] (Bed Mobility): verbal cues, minimum assist (75% " "patient effort) (sacral decub checked;depends donned)  Rolling Right [place] (Bed Mobility): verbal cues, minimum assist (75% patient effort)  Supine-Sit [place] (Bed Mobility): maximum assist (25% patient effort)  Bed Mobility, Safety Issues: decreased use of arms for pushing/pulling, decreased use of legs for bridging/pushing, cognitive deficits limit understanding  Assistive Device (Bed Mobility): bed rails, head of bed elevated  Comment (Bed Mobility): trying to move to L [id number] cues for R EOB  Transfer Assessment/Treatment  Transfer Assessment/Treatment: sit-stand transfer, stand-sit transfer  Comment (Transfers): min. asst of 2 for STS;Cued to push fromEOB(trying to pull up w/Rwx);not releasing grasp onAD p/t sitting in chair  Sit-Stand Transfer  Sit-Stand [place] (Transfers): minimum assist (75% patient effort), 2 person assist, verbal cues  Assistive Device (Sit-Stand Transfers): walker, front-wheeled  Stand-Sit Transfer  Stand-Sit [place] (Transfers): minimum assist (75% patient effort), 2 person assist, verbal cues  Assistive Device (Stand-Sit Transfers): walker, front-wheeled  Gait/Stairs Assessment/Training  [place] Level (Gait): minimum assist (75% patient effort), verbal cues, 1 person to manage equipment (PT pushedIV/02;5 stand rests(distracted\";tech brought chair)  Assistive Device (Gait): walker, front-wheeled  Distance in Feet (Gait): 15 (HR to 167; no further gt;rolled to  BS)  Pattern (Gait): step-to  Deviations/Abnormal Patterns (Gait): antalgic, bilateral deviations, base of support, narrow, sima decreased (cued to offloadBheels(utilizedRwx to facil.),ext.trunk,PLB)  Bilateral Gait Deviations: heel strike decreased  Comment (Gait/Stairs): diffic focusing on task;jimmyllingNadia's name toNSD;curtain then closed;cues forPLB     Motor Skills Assessment/Interventions  Additional Documentation: Therapeutic Exercise (Group)  Therapeutic Exercise  Therapeutic Exercise: " seated, lower extremities  Lower Extremity Seated Therapeutic Exercise  Performed, Seated Lower Extremity (Therapeutic Exercise): hip flexion/extension, hip abduction/adduction, hip external/internal rotation, ankle dorsiflexion/plantarflexion, LAQ (long arc quad), knee extension, SAQ (short arc quad), knee extension (QS,GS,HS)  Exercise Type, Seated Lower Extremity (Therapeutic Exercise): AROM (active range of motion), AAROM (active assistive range of motion)  Expected Outcomes, Seated Lower Extremity (Therapeutic Exercise): strengthen, facilitate independent active range of motion  Comment, Seated Lower Extremity (Therapeutic Exercise): Cues to stay on task;HR to 151;PLB to relax;no further ther.ex.           ROM/MMT             Sensory, Edema, Orthotics          Cognition, Communication, Swallow  Cognitive Assessment/Intervention- PT/OT  Orientation Status (Cognition): oriented to, person, place  Follows Commands (Cognition): follows one step commands, over 90% accuracy, increased processing time needed, physical/tactile prompts required, repetition of directions required, verbal cues/prompting required  Speaking Valve  Pretreatment Heart Rate (beats/min): 115  Pre SpO2 (%): 95  Posttreatment Heart Rate (beats/min): 126  Post SpO2 (%): 93  General Eating/Swallowing Observations  Pre SpO2 (%): 95  Post SpO2 (%): 93    Outcome Summary  Outcome Summary/Treatment Plan (PT)  Daily Summary of Progress (PT): progress toward functional goals is good  Barriers to Overall Progress (PT): decr. cognition, pain level, elev HR/Afib, fatigue  Anticipated Discharge Disposition (PT): skilled nursing facility (SNF)            PT Rehab Goals     Row Name 03/12/18 0930 03/10/18 1300          Bed Mobility Goal 1 (PT)    Activity/Assistive Device (Bed Mobility Goal 1, PT) bed mobility activities, all  -DM bed mobility activities, all  -LS     [place] Level/Cues Needed (Bed Mobility Goal 1, PT) independent  -DM independent  -LS      Time Frame (Bed Mobility Goal 1, PT) 2 weeks  -DM 2 weeks  -LS        Transfer Goal 1 (PT)    Activity/Assistive Device (Transfer Goal 1, PT) sit-to-stand/stand-to-sit;walker, rolling  -DM sit-to-stand/stand-to-sit;walker, rolling  -LS     [place] Level/Cues Needed (Transfer Goal 1, PT) conditional independence  -DM conditional independence  -LS     Time Frame (Transfer Goal 1, PT) 2 weeks  -DM 2 weeks  -LS        Gait Training Goal 1 (PT)    Activity/Assistive Device (Gait Training Goal 1, PT) walker, rolling  -DM walker, rolling  -LS     [place] Level (Gait Training Goal 1, PT) conditional independence  -DM conditional independence  -LS     Distance (Gait Goal 1, PT) 200  -  -LS     Time Frame (Gait Training Goal 1, PT) 2 weeks  -DM 2 weeks  -LS        Stairs Goal 1 (PT)    Activity/Assistive Device (Stairs Goal 1, PT) ascending stairs;descending stairs;other (see comments)   2 handrails  -DM ascending stairs;descending stairs   2 handrails  -LS     [place] Level/Cues Needed (Stairs Goal 1, PT) contact guard assist  -DM contact guard assist  -LS     Number of Stairs (Stairs Goal 1, PT) 5  -DM 5  -LS     Time Frame (Stairs Goal 1, PT) 2 weeks  -DM 2 weeks  -LS       User Key  (r) = Recorded By, (t) = Taken By, (c) = Cosigned By    Initials Name Provider Type    DM Briana Dawn, PT Physical Therapist    LS Angie Ortiz, PT Physical Therapist          Physical Therapy Education     Title: PT OT SLP Therapies (Active)     Topic: Physical Therapy (Active)     Point: Mobility training (Active)    Learning Progress Summary     Learner Status Readiness Method Response Comment Documented by    Patient Active CHIQUITA Escalante NR  WERO 03/12/18 1122     Done Acceptance E VU  KA 03/11/18 1443     Active Acceptance E NR  KR 03/08/18 1155     Active Acceptance E NR  KR 03/06/18 1036     Active Acceptance E NR  KR 03/05/18 1615    Significant Other CHIQUITA Pradhan NR  DM 03/12/18 1122          Point:  Home exercise program (Active)    Learning Progress Summary     Learner Status Readiness Method Response Comment Documented by    Patient Active Eager E,D NR  DM 03/12/18 1122     Done Acceptance E VU  KA 03/11/18 1443     Done Acceptance E VU,NR Discussed benefits of activity LS 03/10/18 1404     Active Acceptance E NR  KR 03/08/18 1155     Active Acceptance E NR  KR 03/06/18 1036     Active Acceptance E NR  KR 03/05/18 1615    Significant Other Active Eager E,D NR  DM 03/12/18 1122          Point: Body mechanics (Active)    Learning Progress Summary     Learner Status Readiness Method Response Comment Documented by    Patient Active Eager E,D NR  DM 03/12/18 1122     Done Acceptance E VU  KA 03/11/18 1443     Active Acceptance E NR  KR 03/08/18 1155     Active Acceptance E NR  KR 03/06/18 1036     Active Acceptance E NR  KR 03/05/18 1615    Significant Other Active Eager E,D NR  DM 03/12/18 1122          Point: Precautions (Active)    Learning Progress Summary     Learner Status Readiness Method Response Comment Documented by    Patient Active Eager E,D NR  DM 03/12/18 1122     Done Acceptance E VU  KA 03/11/18 1443     Active Acceptance E NR  KR 03/08/18 1155     Active Acceptance E NR  KR 03/06/18 1036     Active Acceptance E NR  KR 03/05/18 1615    Significant Other Active Eager E,D NR  DM 03/12/18 1122                      User Key     Initials Effective Dates Name Provider Type Discipline    DM 06/19/15 -  Briana Dawn, PT Physical Therapist PT    LS 06/19/15 -  Angie Ortiz, PT Physical Therapist PT    KR 09/25/17 - 03/06/18 Jasmin Short, PT Physical Therapist PT    KR 03/07/18 -  Jasmin Short, PT Physical Therapist PT    KA 06/23/17 -  Jeanette Ortiz RN Registered Nurse Nurse                    PT Recommendation and Plan  Anticipated Discharge Disposition (PT): skilled nursing facility (SNF)  Therapy Frequency (PT Clinical Impression): daily  Daily Summary of Progress (PT): progress toward  functional goals is good  Plan of Care Reviewed With: patient, spouse          Outcome Measures     Row Name 03/12/18 0930 03/10/18 1329 03/10/18 1300       How much help from another person do you currently need...    Turning from your back to your side while in flat bed without using bedrails? 2  -DM  -- 3  -LS    Moving from lying on back to sitting on the side of a flat bed without bedrails? 2  -DM  -- 3  -LS    Moving to and from a bed to a chair (including a wheelchair)? 3  -DM  -- 3  -LS    Standing up from a chair using your arms (e.g., wheelchair, bedside chair)? 3  -DM  -- 3  -LS    Climbing 3-5 steps with a railing? 2  -DM  -- 2  -LS    To walk in hospital room? 3  -DM  -- 3  -LS    AM-PAC 6 Clicks Score 15  -DM  -- 17  -LS       How much help from another is currently needed...    Putting on and taking off regular lower body clothing?  -- 2  -TA  --    Bathing (including washing, rinsing, and drying)  -- 2  -TA  --    Toileting (which includes using toilet bed pan or urinal)  -- 2  -TA  --    Putting on and taking off regular upper body clothing  -- 3  -TA  --    Taking care of personal grooming (such as brushing teeth)  -- 3  -TA  --    Eating meals  -- 4  -TA  --    Score  -- 16  -TA  --       Functional Assessment    Outcome Measure Options AM-PAC 6 Clicks Basic Mobility (PT)  -DM AM-PAC 6 Clicks Daily Activity (OT)  -TA  --      User Key  (r) = Recorded By, (t) = Taken By, (c) = Cosigned By    Initials Name Provider Type    DM Briana Dawn, PT Physical Therapist    LS Angie Ortiz, PT Physical Therapist    TA Tim Smart, OT Occupational Therapist           Time Calculation:         PT Charges     Row Name 03/12/18 1135             Time Calculation    Start Time 0930  -DM      PT Received On 03/12/18  -DM      PT Goal Re-Cert Due Date 03/15/18  -DM         Time Calculation- PT    Total Timed Code Minutes- PT 55 minute(s)  -DM        User Key  (r) = Recorded By, (t) = Taken By, (c) =  Cosigned By    Initials Name Provider Type    DM Briana Dawn, PT Physical Therapist          Therapy Charges for Today     Code Description Service Date Service Provider Modifiers Qty    08350731253 HC PT THER PROC EA 15 MIN 3/12/2018 Briana Dawn, PT GP 3    29927017536 HC GAIT TRAINING EA 15 MIN 3/12/2018 Briana Dawn, PT GP 1    65796081245 HC PT THER SUPP EA 15 MIN 3/12/2018 Briana Dawn, PT GP 2          PT G-Codes  Outcome Measure Options: AM-PAC 6 Clicks Basic Mobility (PT)    Briana Dawn, PT  3/12/2018

## 2018-03-12 NOTE — PLAN OF CARE
Problem: Patient Care Overview (Adult)  Goal: Plan of Care Review   03/12/18 1128   Coping/Psychosocial Response Interventions   Plan Of Care Reviewed With patient;spouse   Patient Care Overview   Progress progress towards functional goals is fair   Outcome Evaluation   Outcome Summary/Follow up Plan Able to amb 15 ft w/ R wx & min asst, w/ 5 standing rests, but HR incr. to 168,desats to 89% on o2, & c/o fatigue; noted decr. HGB to 9.9; limited by signif jose. heel pain d/t pressure ulcers & would benefit from jose. heel offloading shoes (nsg will get order from MD/TEMITOPE); plan is d/c to SNF ([facility])       Problem: Inpatient Physical Therapy  Goal: Bed Mobility Goal LTG- PT   03/05/18 1615 03/08/18 1155   Bed Mobility PT LTG   Bed Mobility PT LTG, Date Established 03/05/18 --    Bed Mobility PT LTG, Time to Achieve 2 wks --    Bed Mobility PT LTG, Activity Type all bed mobility --    Bed Mobility PT LTG, [place] Level independent --    Bed Mobility PT LTG, Date Goal Reviewed 03/05/18 --    Bed Mobility PT LTG, Outcome --  goal ongoing     Goal: Transfer Training Goal 1 LTG- PT   03/05/18 1615 03/08/18 1155   Transfer Training PT LTG   Transfer Training PT LTG, Date Established 03/05/18 --    Transfer Training PT LTG, Time to Achieve 2 wks --    Transfer Training PT LTG, Activity Type sit to stand/stand to sit --    Transfer Training PT LTG, [place] Level conditional independence --    Transfer Training PT LTG, Assist Device walker, rolling --    Transfer Training PT LTG, Date Goal Reviewed 03/05/18 --    Transfer Training PT LTG, Outcome --  goal ongoing     Goal: Gait Training Goal LTG- PT   03/05/18 1615 03/08/18 1155   Gait Training PT LTG   Gait Training Goal PT LTG, Date Established 03/05/18 --    Gait Training Goal PT LTG, Time to Achieve 2 wks --    Gait Training Goal PT LTG, [place] Level conditional independence --    Gait Training Goal PT LTG, Assist Device walker, rolling --    Gait Training  Goal PT LTG, Distance to Achieve 200 feet --    Gait Training Goal PT LTG, Date Goal Reviewed 03/05/18 --    Gait Training Goal PT LTG, Outcome --  goal ongoing     Goal: Stair Training Goal LTG- PT   03/05/18 1615 03/08/18 1155   Stair Training PT LTG   Stair Training Goal PT LTG, Date Established 03/05/18 --    Stair Training Goal PT LTG, Time to Achieve 2 wks --    Stair Training Goal PT LTG, Number of Steps 5 --    Stair Training Goal PT LTG, [place] Level contact guard assist --    Stair Training Goal PT LTG, Assist Device 2 handrails --    Stair Training Goal PT LTG, Date Goal Reviewed 03/05/18 --    Stair Training Goal PT LTG, Outcome --  goal ongoing

## 2018-03-12 NOTE — PLAN OF CARE
Problem: Fall Risk (Adult)  Goal: Absence of Falls  Outcome: Ongoing (interventions implemented as appropriate)      Problem: Patient Care Overview (Adult)  Goal: Plan of Care Review  Outcome: Ongoing (interventions implemented as appropriate)

## 2018-03-12 NOTE — PLAN OF CARE
Problem: Patient Care Overview  Goal: Plan of Care Review  Outcome: Ongoing (interventions implemented as appropriate)   03/12/18 3335   Coping/Psychosocial   Plan of Care Reviewed With patient   Plan of Care Review   Progress no change   OTHER   Outcome Summary Pt session limited d/t lethargy and poor command following. Pt Mod Ax2 for bed/chair and STS t/f, Max A x2 for bed mobility. Recommend cont skilled IPOT POC.    Coping/Psychosocial   Patient Agreement with Plan of Care agrees

## 2018-03-12 NOTE — PLAN OF CARE
Problem: Pain, Acute (Adult)  Goal: Identify Related Risk Factors and Signs and Symptoms  Outcome: Ongoing (interventions implemented as appropriate)   03/12/18 0825   Pain, Acute (Adult)   Related Risk Factors (Acute Pain) disease process;infection;knowledge deficit;patient perception;persistent pain;positioning   Signs and Symptoms (Acute Pain) BADLs/IADLs reluctance/inability to perform;facial mask of pain/grimace;fatigue/weakness;guarding/abnormal posturing/positioning;moaning;sleep pattern alteration     Goal: Acceptable Pain Control/Comfort Level  Outcome: Ongoing (interventions implemented as appropriate)   03/12/18 0825   Pain, Acute (Adult)   Acceptable Pain Control/Comfort Level making progress toward outcome

## 2018-03-12 NOTE — THERAPY TREATMENT NOTE
Acute Care - Occupational Therapy Treatment Note  [facility]     Patient Name: Alexander Adan  : 1940  MRN: [id number]  Today's Date: 3/12/2018  Onset of Illness/Injury or Date of Surgery: 18  Date of Referral to OT: 18  Referring Physician: MD Gigi    Admit Date: 3/2/2018       ICD-10-CM ICD-9-CM   1. Oropharyngeal dysphagia R13.12 787.22   2. Parotitis, acute K11.21 527.2   3. Subacute maxillary sinusitis J01.00 461.0   4. Generalized weakness R53.1 780.79   5. Impaired functional mobility, balance, gait, and endurance Z74.09 V49.89   6. Impaired mobility and ADLs Z74.09 799.89     Patient Active Problem List   Diagnosis   • Atrial fibrillation   • Chronic coronary artery disease   • Chronic diastolic heart failure   • Chronic obstructive pulmonary disease   • Cognitive impairment, mild, so stated   • Tobacco use   • Fall at home   • CKD (chronic kidney disease), stage III   • Leukocytosis   • Bronchitis   • Vasovagal syncope   • Dehydration   • Hypotension due to dehydration   • PVD (peripheral vascular disease)   • SIRS (systemic inflammatory response syndrome)     Past Medical History:   Diagnosis Date   • Anemia    • Anxiety    • Chronic kidney disease     STAGE II (mild)   • Hyperlipidemia    • Hypertension    • Stroke     LACUNAR. 2013   • Subdural hematoma     Status post hemorrhagic evacuation with ind[place] holes 4/3/2015 per Dr. Gilson Brooke.     Past Surgical History:   Procedure Laterality Date   • [place] HO[place] FOR SUBDURAL HEMATOMA     • CARDIAC PACEMAKER PLACEMENT      PERMANENT   • CAROTID STENT Left    • CHOLECYSTECTOMY     • CORONARY ARTERY BYPASS GRAFT     • PACEMAKER IMPLANTATION     • THROMBOENDARTERECTOMY      CAROTID, LEFT       Therapy Treatment    Therapy Treatment / Health Promotion    Treatment Time/Intention  Discipline: occupational therapist  Document Type: therapy note (daily note)  Subjective Information: complains of, fatigue  Mode of Treatment:  occupational therapy, individual therapy  Patient Effort: adequate  Comment: Pt very lethargic this date.   Plan of Care Review  Plan of Care Reviewed With: patient    Vitals/Pain/Safety  Vital Signs  Pre Systolic BP Rehab: 80  Pre Treatment Diastolic BP: 61  Post Systolic BP Rehab: 113  Post Treatment Diastolic BP: 75  Pretreatment Heart Rate (beats/min): 134 (Noted SHAHRZAD-cesilia, RN aware)  Posttreatment Heart Rate (beats/min): 105  Pre SpO2 (%): 95  O2 Delivery Pre Treatment: supplemental O2  Post SpO2 (%): 100  O2 Delivery Post Treatment: supplemental O2  Pre Patient Position: Sitting  Intra Patient Position: Standing  Post Patient Position: Supine  Pain Scale: FACES Pre/Post-Treatment  Pain: FACES Scale, Pretreatment: 0-->no hurt  Pain: FACES Scale, Post-Treatment: 0-->no hurt  Positioning and Restraints  Pre-Treatment Position: sitting in chair/recliner  Post Treatment Position: bed  In Bed: notified nsg, fowlers, call light within reach, encouraged to call for assist, exit alarm on, with family/caregiver, legs elevated, heels elevated    Mobility,ADL,Motor, Modality  Bed Mobility Assessment/Treatment  Bed Mobility Assessment/Treatment: sit-supine  Sit-Supine [place] (Bed Mobility): maximum assist (25% patient effort), 2 person assist, verbal cues  Comment (Bed Mobility): VCs for HP/sequencing.   Transfer Assessment/Treatment  Comment (Transfers): Pt stood from recliner w/ BUE support and took 2-3 side steps to chair w/ MAX VCs for attention to task and sequencing of steps.   Bed-Chair Transfer  Bed-Chair [place] (Transfers): moderate assist (50% patient effort), 2 person assist, verbal cues  Sit-Stand Transfer  Sit-Stand [place] (Transfers): moderate assist (50% patient effort), 2 person assist, verbal cues  Assistive Device (Sit-Stand Transfers): other (see comments) (BUE support)  Stand-Sit Transfer  Stand-Sit [place] (Transfers): moderate assist (50% patient effort), 2 person assist, verbal  cues  Assistive Device (Stand-Sit Transfers): other (see comments) (BUE support)     Motor Skills Assessment/Interventions  Additional Documentation: Balance Interventions (Group)  Therapeutic Exercise  Upper Extremity (Therapeutic Exercise):  (Deferred, pt lethargic and not following commands)  Balance  Balance: static sitting balance, static standing balance, dynamic standing balance, dynamic balance activity        ROM/MMT             Sensory, Edema, Orthotics          Cognition, Communication, Swallow  Cognitive Assessment/Intervention- PT/OT  Affect/Mental Status (Cognitive): low arousal/lethargic  Orientation Status (Cognition): oriented to, person  Follows Commands (Cognition): follows one step commands, repetition of directions required, verbal cues/prompting required, visual queue, 25-49% accuracy, other (see comments) (50% )  Cognitive Function (Cognitive): safety deficit  Safety Deficit (Cognitive): mild deficit, awareness of need for assistance, ability to follow commands, safety precautions awareness  Speaking Valve  Pretreatment Heart Rate (beats/min): 134 (Noted A-fib, RN aware)  Pre SpO2 (%): 95  Posttreatment Heart Rate (beats/min): 105  Post SpO2 (%): 100  General Eating/Swallowing Observations  Pre SpO2 (%): 95  Post SpO2 (%): 100    Outcome Summary           OT Rehab Goals     Row Name 03/10/18 1000             Bed Mobility Goal 1 (OT)    Activity/Assistive Device (Bed Mobility Goal 1, OT) sit to supine/supine to sit;bed rails  -TA      [place] Level/Cues Needed (Bed Mobility Goal 1, OT) supervision required  -TA      Time Frame (Bed Mobility Goal 1, OT) by discharge  -TA         Strength Goal 1 (OT)    Strength Goal 1 (OT) Tolerate 15 reps HEP  -TA      Time Frame (Strength Goal 1, OT) by discharge  -TA         Problem Specific Goal 1 (OT)    Problem Specific Goal 1 (OT) Follow directions 100% with VCs  -TA      Time Frame (Problem Specific Goal 1, OT) by discharge  -TA        User Key  (r)  = Recorded By, (t) = Taken By, (c) = Cosigned By    Initials Name Provider Type    RONAK Smart OT Occupational Therapist        Occupational Therapy Education     Title: PT OT SLP Therapies (Active)     Topic: Occupational Therapy (Active)     Point: ADL training (Active)     Description: Instruct learner(s) on proper safety adaptation and remediation techniques during self care or transfers.   Instruct in proper use of assistive devices.   Learning Progress Summary     Learner Status Readiness Method Response Comment Documented by    Patient Active Acceptance E NR Pt educated on appropriate safety precautions, t/f techniques, and role of OT. CL 03/12/18 1604     Active Eager ED NR  DM 03/12/18 1122     Done Acceptance E VU  KA 03/11/18 1443     Done Acceptance E VU,NR Pt educated on ADL retraining for toileting, safety for functional tf, and precautions during functional mobility. KF 03/08/18 1503     Done Acceptance E VU,NR Pt education provided on HEP, repositioning for bed mobility, and body mechanics for bed mobility. KF 03/06/18 1010    Significant Other Active Eager ED NR  DM 03/12/18 1122     Done Acceptance E VU,NR Pt educated on ADL retraining for toileting, safety for functional tf, and precautions during functional mobility. KF 03/08/18 1503     Done Acceptance E VU,NR Pt education provided on HEP, repositioning for bed mobility, and body mechanics for bed mobility. KF 03/06/18 1010          Point: Home exercise program (Active)     Description: Instruct learner(s) on appropriate technique for monitoring, assisting and/or progressing therapeutic exercises/activities.   Learning Progress Summary     Learner Status Readiness Method Response Comment Documented by    Patient Active Eager ED NR  WERO 03/12/18 1122     Done Acceptance E VU  KA 03/11/18 1443     Done Acceptance ED ABNER BROWN,NR BUE HEP; reinforced need for call for assist with OOB activities. TA 03/10/18 1405     Done Acceptance E VU,NR  Pt educated on ADL retraining for toileting, safety for functional tf, and precautions during functional mobility. KF 03/08/18 1503     Done Acceptance E VU,NR Pt education provided on HEP, repositioning for bed mobility, and body mechanics for bed mobility. KF 03/06/18 1010    Significant Other Active Eager ED NR  DM 03/12/18 1122     Done Acceptance E VU,NR Pt educated on ADL retraining for toileting, safety for functional tf, and precautions during functional mobility. KF 03/08/18 1503     Done Acceptance E VU,NR Pt education provided on HEP, repositioning for bed mobility, and body mechanics for bed mobility. KF 03/06/18 1010          Point: Precautions (Active)     Description: Instruct learner(s) on prescribed precautions during self-care and functional transfers.   Learning Progress Summary     Learner Status Readiness Method Response Comment Documented by    Patient Active Acceptance E NR Pt educated on appropriate safety precautions, t/f techniques, and role of OT. CL 03/12/18 1604     Active EagCHIQUITA Harvey NR  WERO 03/12/18 1122     Done Acceptance E VU  KA 03/11/18 1443     Done Acceptance E,D VU,DU,NR BUE HEP; reinforced need for call for assist with OOB activities. TA 03/10/18 1405     Done Acceptance E VU,NR Pt educated on ADL retraining for toileting, safety for functional tf, and precautions during functional mobility. KF 03/08/18 1503     Done Acceptance E VU,NR Pt education provided on HEP, repositioning for bed mobility, and body mechanics for bed mobility. KF 03/06/18 1010    Significant Other Active CHIQUITA Escalante  DM 03/12/18 1122     Done Acceptance E VU,NR Pt educated on ADL retraining for toileting, safety for functional tf, and precautions during functional mobility. KF 03/08/18 1503     Done Acceptance E VU,NR Pt education provided on HEP, repositioning for bed mobility, and body mechanics for bed mobility. KF 03/06/18 1010          Point: Body mechanics (Active)     Description: Instruct  learner(s) on proper positioning and spine alignment during self-care, functional mobility activities and/or exercises.   Learning Progress Summary     Learner Status Readiness Method Response Comment Documented by    Patient Active Acceptance E NR Pt educated on appropriate safety precautions, t/f techniques, and role of OT. CL 03/12/18 1604     Active Eager E,D NR  DM 03/12/18 1122     Done Acceptance E VU  KA 03/11/18 1443     Done Acceptance E VU,NR Pt educated on ADL retraining for toileting, safety for functional tf, and precautions during functional mobility. KF 03/08/18 1503     Done Acceptance E VU,NR Pt education provided on HEP, repositioning for bed mobility, and body mechanics for bed mobility. KF 03/06/18 1010    Significant Other Active Eager E,D NR   03/12/18 1122     Done Acceptance E VU,NR Pt educated on ADL retraining for toileting, safety for functional tf, and precautions during functional mobility. KF 03/08/18 1503     Done Acceptance E VU,NR Pt education provided on HEP, repositioning for bed mobility, and body mechanics for bed mobility.  03/06/18 1010                      User Key     Initials Effective Dates Name Provider Type Discipline    DM 06/19/15 -  Briana Dawn, PT Physical Therapist PT    TA 03/14/16 -  Tim Smart, OT Occupational Therapist OT    CL 03/07/18 -  Simin Ascencio, OT Occupational Therapist OT     06/23/17 -  Jeanette Ortiz, RN Registered Nurse Nurse     02/14/18 -  Lyudmila Cortés, OT Occupational Therapist OT                  OT Recommendation and Plan  Therapy Frequency (OT Eval): daily  Plan of Care Review  Plan of Care Reviewed With: patient  Plan of Care Reviewed With: patient  Progress: no change  Outcome Summary: Pt session limited d/t lethargy and poor command following. Pt Mod Ax2 for bed/chair and STS t/f, Max A x2 for bed mobility. Recommend cont skilled IPOT POC.         Outcome Measures     Row Name 03/12/18 1358 03/12/18 0930 03/10/18 8014        How much help from another person do you currently need...    Turning from your back to your side while in flat bed without using bedrails?  -- 2  -DM  --    Moving from lying on back to sitting on the side of a flat bed without bedrails?  -- 2  -DM  --    Moving to and from a bed to a chair (including a wheelchair)?  -- 3  -DM  --    Standing up from a chair using your arms (e.g., wheelchair, bedside chair)?  -- 3  -DM  --    Climbing 3-5 steps with a railing?  -- 2  -DM  --    To walk in hospital room?  -- 3  -DM  --    AM-PAC 6 Clicks Score  -- 15  -DM  --       How much help from another is currently needed...    Putting on and taking off regular lower body clothing? 1  -CL  -- 2  -TA    Bathing (including washing, rinsing, and drying) 1  -CL  -- 2  -TA    Toileting (which includes using toilet bed pan or urinal) 1  -CL  -- 2  -TA    Putting on and taking off regular upper body clothing 2  -CL  -- 3  -TA    Taking care of personal grooming (such as brushing teeth) 2  -CL  -- 3  -TA    Eating meals 3  -CL  -- 4  -TA    Score 10  -CL  -- 16  -TA       Functional Assessment    Outcome Measure Options AM-PAC 6 Clicks Daily Activity (OT)  -CL AM-PAC 6 Clicks Basic Mobility (PT)  -DM AM-PAC 6 Clicks Daily Activity (OT)  -TA    Row Name 03/10/18 1300             How much help from another person do you currently need...    Turning from your back to your side while in flat bed without using bedrails? 3  -LS      Moving from lying on back to sitting on the side of a flat bed without bedrails? 3  -LS      Moving to and from a bed to a chair (including a wheelchair)? 3  -LS      Standing up from a chair using your arms (e.g., wheelchair, bedside chair)? 3  -LS      Climbing 3-5 steps with a railing? 2  -LS      To walk in hospital room? 3  -LS      AM-PAC 6 Clicks Score 17  -LS        User Key  (r) = Recorded By, (t) = Taken By, (c) = Cosigned By    Initials Name Provider Type    WERO Dawn, PT Physical  Therapist    LS Angie Ortiz, PT Physical Therapist    TA Tim Smart, OT Occupational Therapist    CL Simin Ascencio OT Occupational Therapist           Time Calculation:         Time Calculation- OT     Row Name 03/12/18 1606             Time Calculation- OT    OT Start Time 1358  -CL      Total Timed Code Minutes- OT 16 minute(s)  -CL      OT Received On 03/12/18  -CL      OT Goal Re-Cert Due Date 03/16/18  -CL        User Key  (r) = Recorded By, (t) = Taken By, (c) = Cosigned By    Initials Name Provider Type    CL Simin Ascencio OT Occupational Therapist           Therapy Charges for Today     Code Description Service Date Service Provider Modifiers Qty    65148882454 HC OT THER SUPP EA 15 MIN 3/12/2018 Simin Ascencio OT GO 1    49897200202 HC OT THERAPEUTIC ACT EA 15 MIN 3/12/2018 Simin Ascencio OT GO 1               Simin Ascencio OT  3/12/2018

## 2018-03-13 VITALS
OXYGEN SATURATION: 94 % | DIASTOLIC BLOOD PRESSURE: 66 MMHG | HEIGHT: 65 IN | RESPIRATION RATE: 18 BRPM | TEMPERATURE: 97.7 F | SYSTOLIC BLOOD PRESSURE: 99 MMHG | HEART RATE: 96 BPM | WEIGHT: 145 LBS | BODY MASS INDEX: 24.16 KG/M2

## 2018-03-13 PROBLEM — J69.0 ASPIRATION PNEUMONIA (HCC): Status: RESOLVED | Noted: 2018-03-13 | Resolved: 2018-03-13

## 2018-03-13 PROBLEM — D72.829 LEUKOCYTOSIS: Status: RESOLVED | Noted: 2017-02-08 | Resolved: 2018-03-13

## 2018-03-13 PROBLEM — A41.9 SEPSIS (HCC): Status: ACTIVE | Noted: 2018-03-02

## 2018-03-13 PROBLEM — J69.0 ASPIRATION PNEUMONIA (HCC): Status: ACTIVE | Noted: 2018-03-13

## 2018-03-13 PROBLEM — A41.9 SEPSIS (HCC): Status: RESOLVED | Noted: 2018-03-02 | Resolved: 2018-03-13

## 2018-03-13 PROBLEM — I95.9 HYPOTENSION: Status: ACTIVE | Noted: 2018-03-13

## 2018-03-13 PROBLEM — K11.20 PAROTITIS: Status: ACTIVE | Noted: 2018-03-13

## 2018-03-13 LAB
BACTERIA SPEC AEROBE CULT: NORMAL
BACTERIA SPEC AEROBE CULT: NORMAL

## 2018-03-13 PROCEDURE — 97530 THERAPEUTIC ACTIVITIES: CPT

## 2018-03-13 PROCEDURE — 94799 UNLISTED PULMONARY SVC/PX: CPT

## 2018-03-13 PROCEDURE — 99239 HOSP IP/OBS DSCHRG MGMT >30: CPT | Performed by: FAMILY MEDICINE

## 2018-03-13 PROCEDURE — 92526 ORAL FUNCTION THERAPY: CPT

## 2018-03-13 PROCEDURE — 25010000002 HEPARIN (PORCINE) PER 1000 UNITS: Performed by: INTERNAL MEDICINE

## 2018-03-13 PROCEDURE — 94640 AIRWAY INHALATION TREATMENT: CPT

## 2018-03-13 RX ORDER — METOPROLOL SUCCINATE 25 MG/1
25 TABLET, EXTENDED RELEASE ORAL
Start: 2018-03-14

## 2018-03-13 RX ORDER — HYDROCODONE BITARTRATE AND ACETAMINOPHEN 5; 325 MG/1; MG/1
1 TABLET ORAL EVERY 6 HOURS PRN
Status: DISCONTINUED | OUTPATIENT
Start: 2018-03-13 | End: 2018-03-13

## 2018-03-13 RX ORDER — LINEZOLID 600 MG/1
600 TABLET, FILM COATED ORAL EVERY 12 HOURS SCHEDULED
Qty: 20 TABLET | Refills: 0
Start: 2018-03-13 | End: 2018-03-23

## 2018-03-13 RX ORDER — HYDROCODONE BITARTRATE AND ACETAMINOPHEN 5; 325 MG/1; MG/1
1 TABLET ORAL EVERY 6 HOURS PRN
Status: DISCONTINUED | OUTPATIENT
Start: 2018-03-13 | End: 2018-03-13 | Stop reason: HOSPADM

## 2018-03-13 RX ORDER — LORAZEPAM 2 MG/1
1 TABLET ORAL EVERY 8 HOURS PRN
Qty: 10 TABLET | Refills: 0 | Status: SHIPPED | OUTPATIENT
Start: 2018-03-13

## 2018-03-13 RX ORDER — LINEZOLID 600 MG/1
600 TABLET, FILM COATED ORAL EVERY 12 HOURS SCHEDULED
Status: DISCONTINUED | OUTPATIENT
Start: 2018-03-13 | End: 2018-03-13 | Stop reason: HOSPADM

## 2018-03-13 RX ORDER — GABAPENTIN 600 MG/1
600 TABLET ORAL 3 TIMES DAILY
Qty: 10 TABLET | Refills: 0 | Status: SHIPPED | OUTPATIENT
Start: 2018-03-13

## 2018-03-13 RX ORDER — HYDROCODONE BITARTRATE AND ACETAMINOPHEN 5; 325 MG/1; MG/1
1 TABLET ORAL EVERY 6 HOURS PRN
Qty: 10 TABLET | Refills: 0 | Status: SHIPPED | OUTPATIENT
Start: 2018-03-13

## 2018-03-13 RX ORDER — BISACODYL 5 MG/1
5 TABLET, DELAYED RELEASE ORAL DAILY PRN
Start: 2018-03-13

## 2018-03-13 RX ADMIN — IPRATROPIUM BROMIDE AND ALBUTEROL SULFATE 3 ML: 2.5; .5 SOLUTION RESPIRATORY (INHALATION) at 12:13

## 2018-03-13 RX ADMIN — LINEZOLID 600 MG: 600 TABLET, FILM COATED ORAL at 15:02

## 2018-03-13 RX ADMIN — GABAPENTIN 600 MG: 300 CAPSULE ORAL at 15:02

## 2018-03-13 RX ADMIN — PANTOPRAZOLE SODIUM 40 MG: 40 TABLET, DELAYED RELEASE ORAL at 09:05

## 2018-03-13 RX ADMIN — SUCRALFATE 1 G: 1 TABLET ORAL at 09:05

## 2018-03-13 RX ADMIN — CASTOR OIL AND BALSAM, PERU: 788; 87 OINTMENT TOPICAL at 09:06

## 2018-03-13 RX ADMIN — IPRATROPIUM BROMIDE AND ALBUTEROL SULFATE 3 ML: 2.5; .5 SOLUTION RESPIRATORY (INHALATION) at 07:35

## 2018-03-13 RX ADMIN — HEPARIN SODIUM 5000 UNITS: 5000 INJECTION, SOLUTION INTRAVENOUS; SUBCUTANEOUS at 09:05

## 2018-03-13 RX ADMIN — GABAPENTIN 600 MG: 300 CAPSULE ORAL at 05:21

## 2018-03-13 RX ADMIN — ASPIRIN 81 MG 81 MG: 81 TABLET ORAL at 09:05

## 2018-03-13 RX ADMIN — ACETAMINOPHEN 650 MG: 325 TABLET ORAL at 05:21

## 2018-03-13 RX ADMIN — HYDROCODONE BITARTRATE AND ACETAMINOPHEN 1 TABLET: 5; 325 TABLET ORAL at 10:08

## 2018-03-13 NOTE — DISCHARGE INSTR - APPOINTMENTS
Please schedule follow up appointment with Dr. Smart within one week of discharge from skilled rehab.

## 2018-03-13 NOTE — THERAPY DISCHARGE NOTE
Acute Care - Speech Language Pathology   Swallow Treatment Note/Discharge   [facility]     Patient Name: Alexander Adan  : 1940  MRN: [id number]  Today's Date: 3/13/2018  Onset of Illness/Injury or Date of Surgery: 18     Referring Physician: MD Gigi      Admit Date: 3/2/2018    Visit Dx:      ICD-10-CM ICD-9-CM   1. Oropharyngeal dysphagia R13.12 787.22   2. Parotitis, acute K11.21 527.2   3. Subacute maxillary sinusitis J01.00 461.0   4. Generalized weakness R53.1 780.79   5. Impaired functional mobility, balance, gait, and endurance Z74.09 V49.89   6. Impaired mobility and ADLs Z74.09 799.89     Patient Active Problem List   Diagnosis   • Atrial fibrillation   • Chronic coronary artery disease   • Chronic diastolic heart failure   • Chronic obstructive pulmonary disease   • Cognitive impairment, mild, so stated   • Tobacco use   • Fall at home   • CKD (chronic kidney disease), stage III   • Leukocytosis   • Bronchitis   • Vasovagal syncope   • Dehydration   • Hypotension due to dehydration   • PVD (peripheral vascular disease)   • SIRS (systemic inflammatory response syndrome)       Therapy Treatment    Therapy Treatment / Health Promotion    Treatment Time/Intention  Discipline: speech language pathologist  Document Type: therapy note (daily note)  Subjective Information: no complaints  Care Plan Review: evaluation/treatment results reviewed, care plan/treatment goals reviewed, risks/benefits reviewed, patient/other agree to care plan  Patient Effort: good  Plan of Care Review  Plan of Care Reviewed With: patient, spouse    Vitals/Pain/Safety  Pain Assessment  Additional Documentation: Pain Scale: FACES Pre/Post-Treatment (Group)  Pain Scale: FACES Pre/Post-Treatment  Pain: FACES Scale, Pretreatment: 2-->hurts little bit  Pain: FACES Scale, Post-Treatment: 2-->hurts little bit    Cognition, Communication, Swallow  Recommendations  SLP Diet Recommendation: other (see comments) (comfort  diet as pt prefers)  Recommended Precautions and Strategies: upright posture during/after eating  SLP Rec. for Method of Medication Administration: meds whole, with pudding or applesauce    Outcome Summary  Outcome Summary/Treatment Plan (SLP)  Daily Summary of Progress (SLP): other (see comments) (pt moved palliative/Hospice, no further SLP needs)  Plan for Continued Treatment (SLP): Pt previously on liqs via tsp. Pt has decided to pursue full comofrt measures with Hospice. Has been doing cup/straw sips since without signs of discomfort per pt/spouse. Thorough education and discussion of POC options with pt/spouse. Prefer to continue as is, no further SLP needs at this time.        SLP GOALS     Row Name 03/13/18 0850 03/10/18 0996          Oral Nutrition/Hydration Goal 1 (SLP)    Oral Nutrition/Hydration Goal 1, SLP Pt will tolerate mechanical soft diet & thin liquids w/o s/sxs aspiration or complication w/ 100% acc w/ min cues.  -SM LTG: Pt will tolerate mechanical soft diet & thin liquids w/o s/sxs aspiration or complication w/ 100% acc w/ min cues.  -AC     Time Frame (Oral Nutrition/Hydration Goal 1, SLP) by discharge  -SM by discharge  -AC     Progress/Outcomes (Oral Nutrition/Hydration Goal 1, SLP) goal no longer appropriate  -SM goal ongoing  -AC        Pharyngeal Strengthening Exercise Goal 1 (SLP)    Activity (Pharyngeal Strengthening Goal 1, SLP)  -- increase timing;increase closure at entrance to airway/closure of airway at glottis;increase superior movement of the hyolaryngeal complex  -AC     Increase Timing  -- super-supraglottic swallow  -AC     Increase Superior Movement of the Hyolaryngeal Complex  -- super-supraglottic swallow  -AC     Increase Closure at Entrance to Airway/Closure of Airway at Glottis  -- super-supraglottic swallow  -AC     [place]/Accuracy (Pharyngeal Strengthening Goal 1, SLP)  -- with moderate cues (50-74% accuracy)  -AC     Time Frame (Pharyngeal Strengthening Goal 1,  SLP)  -- short term goal (STG);by discharge  -AC     Progress/Outcomes (Pharyngeal Strengthening Goal 1, SLP)  -- goal ongoing  -AC        Pharyngeal Strengthening Exercise Goal 2 (SLP)    Activity (Pharyngeal Strengthening Goal 1, SLP)  -- increase anterior movement of the hyolaryngeal complex;increase tongue base retraction  -AC     Increase Anterior Movement of the Hyolaryngeal Complex  -- chin tuck against resistance (CTAR)  -AC     Increase Tongue Base Retraction  -- hard effortful swallow  -AC     [place]/Accuracy (Pharyngeal Strengthening Goal 1, SLP)  -- with moderate cues (50-74% accuracy)  -AC     Time Frame (Pharyngeal Strengthening Goal 2, SLP)  -- short term goal (STG);by discharge  -AC     Progress/Outcomes (Pharyngeal Strengthening Goal 2, SLP)  -- goal ongoing  -AC        Swallow Management Recall Goal (SLP)    Swallow Management Recall Goal (SLP)  -- Pt will demonstrate no s/sxs aspiraiton w/ puree w/ some mashed diet & thin liquids via tsp w/ 50% acc w/ min cues.  -AC     Time Frame (Swallow Management Recall Goal, SLP)  -- short term goal (STG);by discharge  -AC     Barriers (Swallow Management Recall Goal, SLP)  -- Pt sounds very wheezy/congested @ baseline.  -AC     Progress/Outcomes (Swallow Management Recall Goal, SLP)  -- goal ongoing  -AC        Swallow Compensatory Strategies Goal 1 (SLP)    Activity (Swallow Compensatory Strategies/Techniques Goal 1, SLP)  -- compensatory strategies;other (see comments)   Liquid via tsp & cough/throat clear every 3-5 bites/sips  -AC     [place]/Accuracy (Swallow Compensatory Strategies/Techniques Goal 1, SLP)  -- with minimal cues (75-90% accuracy)  -AC     Time Frame (Swallow Compensatory Strategies/Techniques Goal 1, SLP)  -- short term goal (STG);by discharge  -AC     Progress/Outcomes (Swallow Compensatory Strategies/Techniques Goal 1, SLP)  -- goal ongoing  -AC       User Key  (r) = Recorded By, (t) = Taken By, (c) = Cosigned By    Initials  Name Provider Type    JAMAR Perez MS CCC-SLP Speech and Language Pathologist    RUMA Goodrich MS CCC-SLP Speech and Language Pathologist          EDUCATION  The patient/spouse has been educated in the following areas:   Dysphagia (Swallowing Impairment) Modified Diet Instruction.    SLP Recommendation and Plan     SLP Diet Recommendation: other (see comments) (comfort diet as pt prefers)                               Daily Summary of Progress (SLP): other (see comments) (pt moved palliative/Hospice, no further SLP needs)  Plan for Continued Treatment (SLP): Pt previously on liqs via tsp. Pt has decided to pursue full comofrt measures with Hospice. Has been doing cup/straw sips since without signs of discomfort per pt/spouse. Thorough education and discussion of POC options with pt/spouse. Prefer to continue as is, no further SLP needs at this time.  Plan of Care Reviewed With: patient, spouse  Plan of Care Reviewed With: patient, spouse           Time Calculation:         Time Calculation- SLP     Row Name 03/13/18 1009             Time Calculation- SLP    SLP Start Time 0850  -      SLP Received On 03/13/18  -        User Key  (r) = Recorded By, (t) = Taken By, (c) = Cosigned By    Initials Name Provider Type     Adri Perez MS CCC-SLP Speech and Language Pathologist          Therapy Charges for Today     Code Description Service Date Service Provider Modifiers Qty    82277168045 HC ST TREATMENT SWALLOW 3 3/13/2018 Adri Perez MS CCC-SLP GN 1                    Adri Perez MS CCC-SLP  3/13/2018

## 2018-03-13 NOTE — CONSULTS
Harrison Smart MD  Consulting physician: Abby Skaggs  Reason for referral: comfort plan of care transition  Chief Complaint   Patient presents with   • Nausea     HPI:   77 y.o. male with PMH of PVD, Afib, CAD s/p CABG, dHF, HLD, HTN, CVA, CKD anemia, tobacco abuse, and remote h/o SDH s/p india holes in 2015 by Dr. Brooke who presents on 3/2/18 with complaints of abdominal pain and anorexia for the last three days.  Patient is unable to give much history due to feeling poorly today, so much of the history is obtained from his wife who is at bedside.  Pt reportedly has had unintentional weight loss of 60lbs over the last few months, however, over the last three days his PO intake has decreased to almost nothing.  Pt denies any fevers but does endorse chills currently.  No change in BMs.  He also complains of vague abdominal pain that is not worsened nor improved with anything specifically.  Pt has been complaining of his ears hurting as well as the right side of his face/jaw and painful swallowing.  Ultimately, he was found to have an enlarged parotid gland on the right side that was significantly tender to touch.   During hospital stay patient has been managed for hypotension, acute on chronic left foot pain secondary to chronic PAD, right side parotitis, atrial fibrillation, right middle and lower lobe pneumonia  Symptoms:   Left foot pain radiates upward to his left shoulder/neck with increase severity.   Currently managed, reports that hydrocodone/APAP effective and managed with gabapentin.   Advance care planning discussed: yes  Code Status: AND, comfort, reviewed with patient and wife. Informed of use of EMS/DNR form at SNF and with transport.  Advance Directive: reviewed on medical record  Surrogate decision maker: rea Paul/wife  Past Medical History:   Diagnosis Date   • Anemia    • Anxiety    • Chronic kidney disease     STAGE II (mild)   • Hyperlipidemia    • Hypertension    • Stroke      LACUNAR. SEPT 2013   • Subdural hematoma     Status post hemorrhagic evacuation with india holes 4/3/2015 per Dr. Gilson Brooke.     Past Surgical History:   Procedure Laterality Date   • INDIA HOLE FOR SUBDURAL HEMATOMA     • CARDIAC PACEMAKER PLACEMENT      PERMANENT   • CAROTID STENT Left    • CHOLECYSTECTOMY     • CORONARY ARTERY BYPASS GRAFT     • PACEMAKER IMPLANTATION     • THROMBOENDARTERECTOMY      CAROTID, LEFT       Reviewed current scheduled and prn medications for route, type, dose and frequency.    Current Facility-Administered Medications   Medication Dose Route Frequency Provider Last Rate Last Dose   • acetaminophen (TYLENOL) tablet 650 mg  650 mg Oral Q4H PRN Chrissy Sullivan MD   650 mg at 03/13/18 0521   • albuterol (PROVENTIL) nebulizer solution 0.083% 2.5 mg/3mL  2.5 mg Nebulization Q6H PRN Chrissy Sullivan MD   2.5 mg at 03/09/18 0312   • aspirin chewable tablet 81 mg  81 mg Oral Daily Chrissy Sullivan MD   81 mg at 03/13/18 0905   • bisacodyl (DULCOLAX) EC tablet 5 mg  5 mg Oral Daily PRN Chrissy Sullivan MD   5 mg at 03/05/18 1310   • bisacodyl (DULCOLAX) suppository 10 mg  10 mg Rectal Daily PRN DENI López   10 mg at 03/06/18 0606   • castor oil-balsam peru (VENELEX) ointment   Topical Q12H Anderson Yu MD       • gabapentin (NEURONTIN) capsule 600 mg  600 mg Oral Q8H Nohemy Chakraborty MD   600 mg at 03/13/18 0521   • heparin (porcine) 5000 UNIT/ML injection 5,000 Units  5,000 Units Subcutaneous Q12H Ousmane Leiva MD   5,000 Units at 03/13/18 0905   • HYDROcodone-acetaminophen (NORCO) 5-325 MG per tablet 1 tablet  1 tablet Oral Q6H PRN Ousmane Leiva MD   1 tablet at 03/13/18 1008   • ipratropium-albuterol (DUO-NEB) nebulizer solution 3 mL  3 mL Nebulization 4x Daily - RT Anderson Yu MD   3 mL at 03/13/18 0735   • linezolid (ZYVOX) tablet 600 mg  600 mg Oral Q12H Enoch Dong MD       • Magnesium Sulfate 2 gram Bolus, followed by 8 gram  infusion (total Mg dose 10 grams)- Mg less than or equal to 1mg/dL  2 g Intravenous PRN Anderson Yu MD        Or   • Magnesium Sulfate 6 gram Infusion (2 gm x 3) -Mg 1.1 -1.5 mg/dL  2 g Intravenous PRN Anderson Yu MD 25 mL/hr at 03/11/18 1309 2 g at 03/11/18 1309    Or   • magnesium sulfate 4 gram infusion- Mg 1.6-1.9 mg/dL  4 g Intravenous PRN Anderson Yu MD 25 mL/hr at 03/04/18 1704 4 g at 03/04/18 1704   • metoprolol succinate XL (TOPROL-XL) 24 hr tablet 25 mg  25 mg Oral Q24H Ousmane Leiva MD       • metoprolol tartrate (LOPRESSOR) injection 5 mg  5 mg Intravenous Q5 Min PRN Galileo Woodruff MD   5 mg at 03/02/18 1133   • nicotine (NICODERM CQ) 7 MG/24HR patch 1 patch  1 patch Transdermal Daily Ousmane Leiva MD       • pantoprazole (PROTONIX) EC tablet 40 mg  40 mg Oral BID AC Chrissy Sullivan MD   40 mg at 03/13/18 0905   • potassium chloride (KLOR-CON) packet 40 mEq  40 mEq Oral PRN Anderson Yu MD       • potassium chloride (MICRO-K) CR capsule 40 mEq  40 mEq Oral PRN Anderson Yu MD   40 mEq at 03/12/18 1517   • potassium chloride in NS 20 mEq/250 mL IVPB  20 mEq Intravenous Q2H PRN Anderson Yu MD       • potassium phosphate 45 mmol in sodium chloride 0.9 % 500 mL infusion  45 mmol Intravenous PRN Anderson Yu MD        Or   • potassium phosphate 30 mmol in sodium chloride 0.9 % 250 mL infusion  30 mmol Intravenous PRN Anderson Yu MD        Or   • potassium phosphate 15 mmol in sodium chloride 0.9 % 100 mL infusion  15 mmol Intravenous PRN Anderson Yu MD   15 mmol at 03/10/18 1408    Or   • sodium phosphates 45 mmol in sodium chloride 0.9 % 500 mL IVPB  45 mmol Intravenous PRN Anderson Yu MD        Or   • sodium phosphates 30 mmol in sodium chloride 0.9 % 250 mL IVPB  30 mmol Intravenous PRN Anderson Yu MD 31.3 mL/hr at 03/05/18 0832 30 mmol at 03/05/18 0832    Or   • sodium phosphates 15 mmol in sodium chloride 0.9 % 250 mL IVPB  15 mmol  Intravenous PRN Anderson Yu MD 62.5 mL/hr at 18 1033 15 mmol at 18 1033   • sodium chloride 0.9 % bolus 500 mL  500 mL Intravenous Once Ousmane Leiva MD       • sodium chloride 0.9 % flush 1-10 mL  1-10 mL Intravenous PRN Chrissy Sullivan MD       • sodium chloride 0.9 % flush 10 mL  10 mL Intravenous PRN Galileo Woodruff MD       • sodium chloride 0.9 % flush 10 mL  10 mL Intracatheter PRN Anderson Yu MD       • sucralfate (CARAFATE) tablet 1 g  1 g Oral BID AC Chrissy Sullivan MD   1 g at 18 0905        •  acetaminophen  •  albuterol  •  bisacodyl  •  bisacodyl  •  HYDROcodone-acetaminophen  •  magnesium sulfate **OR** magnesium sulfate **OR** magnesium sulfate  •  metoprolol tartrate  •  potassium chloride  •  potassium chloride  •  potassium chloride  •  potassium phosphate infusion greater than 15 mMoles **OR** potassium phosphate infusion greater than 15 mMoles **OR** potassium phosphate **OR** sodium phosphate IVPB **OR** sodium phosphate IVPB **OR** sodium phosphate IVPB  •  sodium chloride  •  sodium chloride  •  sodium chloride  Allergies   Allergen Reactions   • Simvastatin Hives   • Altace [Ramipril] Other (See Comments)     bleeding   • Penicillins Hives   • Rivaroxaban Other (See Comments)     Hx of head bleed   • Cephalexin Rash     unknown   • Procaine GI Intolerance   • Ropinirole Anxiety     Family History   Problem Relation Age of Onset   • Cancer Mother    • Diabetes Mother    • Heart failure Mother    • Hypertension Mother    • Stroke Mother    • Diabetes Father    • Heart failure Father    • Diabetes Sister    • Heart failure Sister    • Cancer Brother    • Diabetes Brother    • Heart disease Other    • Hypertension Other    • Other Other           Social History     Social History   • Marital status:      Spouse name: N/A   • Number of children: N/A   • Years of education: N/A     Occupational History   • retired      Social History  "Main Topics   • Smoking status: Current Every Day Smoker     Types: Cigars   • Smokeless tobacco: Never Used      Comment: 1 pack for 2 weeks   • Alcohol use No   • Drug use: No   • Sexual activity: Defer     Other Topics Concern   • Not on file     Social History Narrative    Pt consumes 0-1 cups coffee, 2 20oz Mountain Dew sodas, and sometimes sweet tea. Pt lives in his own home with wife.        Review of Systems - +/- per HPI and symptom review.    PPS: 40%  /58   Pulse 96   Temp 98 °F (36.7 °C) (Oral)   Resp 18   Ht 165.1 cm (65\")   Wt 65.8 kg (145 lb)   SpO2 94%   BMI 24.13 kg/m²   65.8 kg (145 lb) Body mass index is 24.13 kg/m².  Intake & Output (last day)       03/12 0701 - 03/13 0700 03/13 0701 - 03/14 0700    P.O. 360     IV Piggyback      Total Intake(mL/kg) 360 (5.5)     Urine (mL/kg/hr)  175 (0.7)    Total Output   175    Net +360 -175          Unmeasured Urine Occurrence 1 x     Unmeasured Stool Occurrence 2 x           Physical Exam:  General Appearance: No acute distress, resting in bed  Head: Normocephalic without obvious abnormality, atraumatic  Eyes: Conjunctivae and sclerae normal, no icterus, KENNY  Throat: No oral lesions, no thrush, oral mucosa moist  Lungs: Clear to auscultation, respirations regular, even and non-labored  Heart: Regular rhythm and normal rate, normal S1  Abdomen: Normal bowel sounds, soft, non-distended, non-tender  Extremities: limited left lower extremity movement with pain, no redness, no cyanosis, no edema  Skin: Warm and dry  Neurological: Alert, interactive, appropriate conversation,no myoclonus, equal hand  and strength    Reviewed labs and diagnostic results.  Lab Results   Component Value Date    HGBA1C 5.9 08/03/2015       Results from last 7 days  Lab Units 03/12/18  0433 03/11/18  0658   WBC 10*3/mm3  --  9.13   HEMOGLOBIN g/dL 9.9* 8.0*   HEMATOCRIT % 32.1* 26.6*   PLATELETS 10*3/mm3  --  180       Results from last 7 days  Lab Units " 03/12/18  2027 03/12/18  0433  03/10/18  0658   SODIUM mmol/L  --  139  < > 137   POTASSIUM mmol/L 4.7 3.6  < > 3.8   CHLORIDE mmol/L  --  116*  < > 115*   CO2 mmol/L  --  17.0*  < > 18.0*   BUN mg/dL  --  11  < > 12   CREATININE mg/dL  --  0.70  < > 0.80   CALCIUM mg/dL  --  7.5*  < > 7.9*   BILIRUBIN mg/dL  --   --   --  0.2*   ALK PHOS U/L  --   --   --  60   ALT (SGPT) U/L  --   --   --  6*   AST (SGOT) U/L  --   --   --  8   GLUCOSE mg/dL  --  81  < > 83   < > = values in this interval not displayed.    Results from last 7 days  Lab Units 03/12/18 2027 03/12/18 0433   SODIUM mmol/L  --  139   POTASSIUM mmol/L 4.7 3.6   CHLORIDE mmol/L  --  116*   CO2 mmol/L  --  17.0*   BUN mg/dL  --  11   CREATININE mg/dL  --  0.70   GLUCOSE mg/dL  --  81   CALCIUM mg/dL  --  7.5*     Imaging Results (last 72 hours)     Procedure Component Value Units Date/Time    XR Chest 1 View [[id number]] Collected:  03/11/18 1533     Updated:  03/11/18 1819    Narrative:          EXAMINATION: XR CHEST 1 VW - 03/11/2018     INDICATION:  R13.12-Dysphagia, oropharyngeal phase; K11.21-Acute  sialoadenitis; J01.00-Acute maxillary sinusitis, unspecified;  R53.1-Weakness; Z74.09-Other reduced mobility.     COMPARISON: None.     FINDINGS:   1. Heart size is within normal limits. There is airspace opacity in the  right mid and lower lung with a right base effusion.     2. Compared to previous studies of 2 days ago, there is increasing  airspace opacity in the right mid and lower lung, and the right base  effusion appears to be new. The left lung is expanded and clear with  chronic interstitial and granulomatous scarring.     The right PICC line remains well-positioned in the mid SVC.           Impression:       Increasing airspace opacity right mid and lower lung with  right pleural effusion, worse when compared to 2 days ago.     DICTATED:     03/11/2018  EDITED/ls :     03/11/2018      This report was finalized on 3/11/2018 6:17 PM by Dr. Matute  MD Michael.           Impression: 77 y.o. male with SIRS, PAD, A fib, RML and RLL pneumonia, bilateral feet ulcerations  Plan:   Left foot pain - continue gabapentin and prn hydrocodone/APAP 5mg    Goals of care - patient is clear that he is wanting SNF for subacute rehab. He is hopeful to be able to ambulate again as long term goal. Also wanting to consider any revascularization surgical interventions as recommended.  Reviewed resuscitation efforts with patient and wife. Also discussion about informing facilities of advance directives with transfers between facilities.   Information provided about palliative medicine consult at SNF.     Luci Trevino, APRN  [phone]  03/13/18  10:44 AM      Time:60 minutes spent reviewing medical and medication records, assessing and examining patient, discussing with patient, family and nursing staff, answering questions, formulating a plan and documentation of care. > 50% time spent face to face

## 2018-03-13 NOTE — PROGRESS NOTES
[facility] Progress Note        Antibiotics:  Anti-Infectives     Ordered     Dose/Rate Route Frequency Start Stop    18 1031  linezolid (ZYVOX) tablet 600 mg     Ordering Provider:  Enoch Dong MD    600 mg Oral Every 12 Hours Scheduled 18 1200 18 0859    18 1634  aztreonam (AZACTAM) in [facility] 2 grams/10ml IV PUSH syringe     Gilson Mustafa IV formerly [facility] reviewed the order on 18 0945.   Ordering Provider:  Gilson Mustafa IV formerly [facility]    2 g  over 5 Minutes Intravenous Every 8 Hours 18 1800 18 1713    18 1657  Pharmacy to dose vancomycin     Comments:  Alexander Adan  6B, N-644  By Chrissy Mustafa IV formerly [facility] let the order  on 18 1101.   Ordering Provider:  Chrissy Sullivan MD     Does not apply Continuous PRN 18 1657 18 1656    18 1138  aztreonam (AZACTAM) in [facility] 2 grams/10ml IV PUSH syringe     Ordering Provider:  Galileo Woodruff MD    2 g  over 5 Minutes Intravenous Once 18 1240 18 1325    18 1138  vancomycin 1250 mg/250 mL 0.9% NS IVPB ([facility])     Ordering Provider:  Galileo Woodruff MD    20 mg/kg × 65.8 kg Intravenous Once 18 1140 18 1327          CC: parotitis, pneumonia    HPI:  Patient is a 77 y.o.  Yr old male with history of numerous comorbid conditions including past stroke/subdural hematoma and evacuation with bur hole 2015 with chronic kidney disease/debility.  Family reports chronic dysphagia symptoms such that he coughs with liquid/solids but no specific esophageal diagnosis.  He is admitted on  with 3 days poor by mouth intake, generalized malaise with dry cough and right facial/parotid pain.       3/13/18 Patient  no new pain or problems.  Right parotid swelling and pain less; No headache photophobia or neck stiffness.  No chest pain.  No productive cough at present although has been intermittently productive at times.  No dysuria hematuria or pyuria.  No skin rash.   "No diarrhea or abdominal pain.  No diarrhea.   No hematochezia melena or hematemesis. Family says he is improving     ROS:      3/13/18 :    No f/c/s. No n/v/d. No rash. No new ADR to Abx.     Constitutional-- No Fever, chills or sweats.  Appetite good, and no malaise. No fatigue.  Heent-- No new vision, hearing or throat complaints.  No epistaxis or oral sores.  Denies odynophagia or dysphagia.  No flashers, floaters or eye pain. No odynophagia or dysphagia. No headache, photophobia or neck stiffness.  CV-- No chest pain, palpitation or syncope  Resp-- No SOB/cough/Hemoptysis  GI- No nausea, vomiting, or diarrhea.  No hematochezia, melena, or hematemesis. Denies jaundice or chronic liver disease.  -- No dysuria, hematuria, or flank pain.  Denies hesitancy, urgency or flank pain.  Lymph- no swollen lymph nodes in neck/axilla or groin.   Heme- No active bruising or bleeding; no Hx of DVT or PE.  MS-- no swelling or pain in the bones or joints of arms/legs.  No new back pain.  Neuro-- No acute focal weakness or numbness in the arms or legs.  No seizures.    Full 12 point review of systems reviewed and negative otherwise for acute complaints, other than above      PE: nursing/chaperone present  /58   Pulse 96   Temp 98 °F (36.7 °C) (Oral)   Resp 18   Ht 165.1 cm (65\")   Wt 65.8 kg (145 lb)   SpO2 94%   BMI 24.13 kg/m²     GENERAL: Thin chronically ill-appearing and elderly, awake, NAD  HEENT: Normocephalic, atraumatic.  Left eyelid droop compared to right is chronic according to family. No conjunctival injection. No icterus. Oropharynx clear without evidence of thrush or exudate. No evidence of peridontal disease.  Also see below   NECK: Supple without nuchal rigidity. No mass.  LYMPH: No cervical, axillary or inguinal lymphadenopathy.  HEART: Tachycardic; No murmur, rubs, gallops.   LUNGS: Diminished at bases with rhonchi more so on the right than left. Normal respiratory effort. Nonlabored. No " dullness.  ABDOMEN: Soft, nontender, nondistended. Positive bowel sounds. No rebound or guarding. NO mass or HSM.  EXT:  No cyanosis, clubbing or edema. No cord.  : Genitalia generally unremarkable.  Without Aguilar catheter.  MSK: FROM without joint effusions noted arms/legs.    SKIN: Warm and dry without cutaneous eruptions on Inspection/palpation.    NEURO: He does not cooperate with a detailed motor/sensory exam but does move all 4 extremities.     No peripheral stigmata/phenomena of endocarditis     Right face/parotid gland with less erythema/induration and tenderness.  No fluctuance.  No crepitus or bulla.       Laboratory Data      Results from last 7 days  Lab Units 03/12/18  0433 03/11/18  0658 03/10/18  0658 03/09/18  0503   WBC 10*3/mm3  --  9.13 11.74* 14.34*   HEMOGLOBIN g/dL 9.9* 8.0* 8.8* 9.7*   HEMATOCRIT % 32.1* 26.6* 28.5* 31.3*   PLATELETS 10*3/mm3  --  180 178 182       Results from last 7 days  Lab Units 03/12/18 2027 03/12/18  0433   SODIUM mmol/L  --  139   POTASSIUM mmol/L 4.7 3.6   CHLORIDE mmol/L  --  116*   CO2 mmol/L  --  17.0*   BUN mg/dL  --  11   CREATININE mg/dL  --  0.70   GLUCOSE mg/dL  --  81   CALCIUM mg/dL  --  7.5*       Results from last 7 days  Lab Units 03/10/18  0658   ALK PHOS U/L 60   BILIRUBIN mg/dL 0.2*   ALT (SGPT) U/L 6*   AST (SGOT) U/L 8               Estimated Creatinine Clearance: 70.8 mL/min (by C-G formula based on SCr of 0.7 mg/dL).      Microbiology:      Radiology:  Imaging Results (last 72 hours)     Procedure Component Value Units Date/Time    CT Head Without Contrast [[id number]] Collected:  03/02/18 1231     Updated:  03/02/18 1300    Narrative:       EXAMINATION: CT HEAD WO CONTRAST-      INDICATION: Encephalopathy.      TECHNIQUE: Axial CT of the head without intravenous contrast  administration.     The radiation dose reduction device was turned on for each scan per the  ALARA (As Low as Reasonably Achievable) protocol.     COMPARISON: 02/16/2018.      FINDINGS: Midline structures are symmetric without evidence of mass,  mass effect or midline shift. No intraaxial hemorrhage or extraaxial  fluid collection. Ventricles and sulci are prominent consistent with  components of atrophy. Mild to moderate attenuation changes within the  periventricular and deep white matter consistent with chronic small  vessel ischemic disease. Globes and orbits are partially visualized and  grossly unremarkable. Paranasal sinuses and mastoid air cells  demonstrate total opacification of the right maxillary sinus as well as  opacification in the right ethmoid air cells and sphenoid sinus.  Calvarium demonstrates prior [place] holes left frontal and left temporal  regions otherwise grossly intact.       Impression:       No acute intracranial abnormality.     D:  03/02/2018  E:  03/02/2018     This report was finalized on 3/2/2018 12:58 PM by Dr. Maurice Alas.       CT Abdomen Pelvis Without Contrast [[id number]] Collected:  03/02/18 1254     Updated:  03/02/18 1300    Narrative:       EXAMINATION: CT ABDOMEN AND PELVIS WO CONTRAST-      INDICATION: Abdomen pain, guarding, urinary incontinence.      TECHNIQUE: CT abdomen and pelvis without intravenous contrast  administration.     The radiation dose reduction device was turned on for each scan per the  ALARA (As Low as Reasonably Achievable) protocol.     COMPARISON: 08/05/2017.     FINDINGS: Motion degraded examination particularly within the  subdiaphragmatic soft tissues.  Lung bases demonstrate patchy  tree-in-bud opacifications involving much of the right lower lobe and  visualized right middle lobe consistent with infectious or inflammatory  etiology, however, no focal lobar consolidation. Left hemithorax  partially visualized and grossly clear. Liver without focal lesion.  Gallbladder is surgically absent. Pancreas and spleen within normal  limits. Adrenals without distinct nodule. Kidneys without hydronephrosis  or hydroureter. No  obstructive uropathy or urolithiasis. Atherosclerotic  nonaneurysmal abdominal aorta. No bulky retroperitoneal adenopathy. GI  tract evaluation without focal thickening or disproportionate dilatation  of bowel. No free fluid or intraabdominal free air. Pelvic viscera  unremarkable without bulky pelvic adenopathy or significant free fluid.  Degenerative changes of the spine and pelvis without aggressive osseous  abnormality identified within the limited evaluation due to motion. No  soft tissue body wall findings of concern.       Impression:       1. Limited evaluation due to significant patient motion artifact  limiting the subdiaphragmatic soft tissues, however, the limits of the  study there is scattered tree-in-bud opacifications involving the  visualized right middle lobe and right lower lobe along with nodularity  consistent of acute infectious or inflammatory etiology without lobar  consolidation identified. No significant pleural effusion.  2. No acute intraabdominal abnormality within the limited evaluation due  to significant motion specifically no disproportionate dilatation of  bowel to suggest mechanical obstructive process or loculated  intraabdominal fluid collection.     D:  03/02/2018  E:  03/02/2018     This report was finalized on 3/2/2018 12:58 PM by Dr. Maurice Alas.       XR Chest 1 View [[id number]] Collected:  03/02/18 1131     Updated:  03/02/18 1301    Narrative:       EXAMINATION: XR CHEST 1 VW- 03/02/2018     INDICATION: weakness, altered mental status      COMPARISON: Chest x-ray 02/16/2018.     FINDINGS: Cardiac size within normal limits. Post CABG and post median  sternotomy changes. No focal opacification or consolidation. No  pneumothorax or pleural effusion. Left chest wall pacing device with  leads intact. Degenerative changes of the thoracic spine.           Impression:       No acute cardiopulmonary process.     D:  03/02/2018  E:  03/02/2018     This report was finalized on  3/2/2018 12:58 PM by Dr. Maurice Alas.       CT Soft Tissue Neck With Contrast [[id number]] Collected:  03/02/18 1627     Updated:  03/02/18 1646    Narrative:       EXAMINATION: CT NECK SOFT TISSUES W/CONTRAST - 03/02/2018      INDICATION: K11.21-Acute sialoadenitis; J01.00-Acute maxillary  sinusitis, unspecified; R53.1-Weakness.      TECHNIQUE: CT neck soft tissues with intravenous contrast  administration.     The radiation dose reduction device was turned on for each scan per the  ALARA (As Low as Reasonably Achievable) protocol.     COMPARISON: None.     FINDINGS: For intracranial findings, please see recent CT head report  performed the same day. Total opacification right maxillary sinus  consistent with components of sinusitis. Severely asymmetric appearance  of the right parotid gland which is nearly entirely replaced by  heterogeneous enhancing soft tissue attenuation involving the  superficial and deep lobes with overall dimensions measuring  approximately 4.6 x 4.0 cm, indicative of soft tissue mass involvement.  There is no discrete osseous erosion of the adjacent mandible or  temporomandibular joint with right mastoid air cells grossly clear.  Parapharyngeal spaces are preserved with supraglottic soft tissues,  unremarkable. Valleculae and piriform sinuses within normal limits. The  vocal cords and subglottic trachea are unremarkable. Visualized portions  of the lung apices are grossly unremarkable. Thyroid is homogeneous in  attenuation. No bulky cervical adenopathy is identified.       Impression:       Soft tissue mass involvement of the superficial and deep  lobes of the right parotid gland. 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 No adjacent osseous erosion. No bulky cervical  adenopathy otherwise identified. Opacification of the right  maxillary  sinus again noted consistent with sinusitis.     DICTATED:     03/02/2018  EDITED    :     03/02/2018      This report was finalized on 3/2/2018 4:44 PM by Dr. Maurice Alas.       XR Chest 1 View [[id number]] Collected:  03/04/18 1701     Updated:  03/04/18 1857    Narrative:          EXAMINATION: XR CHEST 1 VW - 03/04/2018     INDICATION:  K11.21-Acute sialoadenitis; J01.00-Acute maxillary  sinusitis, unspecified; R53.1-Weakness; R13.10-Dysphagia, unspecified.      COMPARISON: 03/02/2018.     FINDINGS: Portable chest reveals PICC line placement with tip in the  SVC. There are chronic changes identified within the lung fields with  mild increased markings seen within the right middle lower lung field.  Development of a small right pleural effusion. Heart is borderline  enlarged with slight prominence of the pulmonary vascularity.           Impression:       PICC line catheter on the right with tip in the SVC.  Interval development of mild increased markings within the right mid to  lower lung field as well as development of a small right pleural  effusion.     DICTATED:     03/04/2018  EDITED    :     03/04/2018      This report was finalized on 3/4/2018 6:55 PM by Dr. Donna Matthews MD.               Impression:   --Acute sepsis with leukocytosis/fever and tachycardia, associated with encephalopathy and likely severe sepsis.  Primary focal symptoms/findings include right parotitis and acute right lower lobe pneumonia with broad spectrum antimicrobials.  No preceding symptoms of central nervous system focus and no meningismus at present.  Likely metabolic encephalopathy associated with sepsis.  Abdomen benign by exam with no intra-abdominal pathology per radiology on CT scan, although motion artifact and if symptoms persist/recur, then you should give consideration to further GI workup by either repeat imaging or GI evaluation.  No diarrhea at present but if it develops you could consider stool  studies.  Urinalysis not consistent with UTI.  No obvious soft tissue source other than above.     --Acute right parotitis;  gram-positive organisms are a primary concern.  In addition there is description of a masslike quality by radiology and he will likely need to give consideration to ENT input; He prefers his PCP make decision at followup;  I d/w Dr Owens and she will make outpatient care arranagements.  no focal abscess so far per radiology. Tapered abx to zyvox     --Acute cough with right lower lobe pneumonia by imaging and risk for community-acquired pathogens/aspiration.  Broad-spectrum coverage ongoing. Culture with S Aureus and usual pankaj so far. S/p 10 days broad abx coverage and symptoms better;  Any followup imaging per his PCP     --Dysphagia.  Family reports difficulty with solids/liquids and cough.  Speech therapy has seen.  GI consultation for EGD or other workup at discretion of medicine     --Acute encephalopathy.  Family reports this is common with acute illness for him.  Further neurologic workup per internal medicine at their discretion.  Preceding symptoms and current exam not consistent with meningismus.  Improving in general per family back towards baseline     --Chronic kidney disease stage III per records.  Monitor to help guide further adjustments with antimicrobials     --QTc > 500 ms EKG    --s/p TCP;  Monitor    --hypotension;  Medicine workup/intervention on going;  Afebrile, no other focal symptoms;  Repeat blood cultures neagtive so far and no new focal symptoms but monitor for evolving focus       PLAN:     --zyvox     --Monitor IV and IV antibiotic with risk for systemic complication and potential drug interaction     --Check/review labs cultures and scans     --Highly complex set of problems with high risk for further serious morbidity and other serious sequela     --You will likely need to give consideration to ENT consultation and also GI consultation/EGD as above;  D/w   Roman     --Discussed with family.  d/w micro     --Partial history per nursing staff as well    --d/w Family and Dr Owens;  Patient and family prefer consolidate followup to his PCP and practitioner at SNF;  Famil and patient voice understanding importance regarding followup for his parotid gland, lungs, etc..Dr Owens to arrange regarding above;  I will sign off at discharge;  Call me back if needed          Enoch Dong MD  3/13/2018

## 2018-03-13 NOTE — PROGRESS NOTES
Continued Stay Note  [facility]     Patient Name: Alexander Adan  MRN: [id number]  Today's Date: 3/13/2018    Admit Date: 3/2/2018          Discharge Plan     Row Name 03/13/18 1209       Plan    Plan update    Patient/Family in Agreement with Plan yes    Plan Comments Spoke with Jenna Ocampo to advise of patient discharge today.  Spoke with patient and wife at bedside who are in agreement with plan.  Patient plan is to discharge to [facility] today via car with family to transport.  Nursing to call report to [phone].    Final Discharge Disposition Code 03 - skilled nursing facility (SNF)    Row Name 03/13/18 1017       Plan    Plan update    Patient/Family in Agreement with Plan yes    Plan Comments Spoke with Jenna Ocampo who advises that insurance has again been approved.  Patient can discharge to [facility] when medically ready.  CM following.  Patient plan is to discharge to [facility] via car with family to transport when medically ready.     Final Discharge Disposition Code 03 - skilled nursing facility (SNF)              Discharge Codes    No documentation.       Expected Discharge Date and Time     Expected Discharge Date Expected Discharge Time    Mar 13, 2018             Randee Singleton, RN

## 2018-03-13 NOTE — NURSING NOTE
"Patient was sobbing at beginning of shift, calling family members and stating that he \"was going to die\" and was hoping he would have enough time to \"tell his loved ones goodbye\". Pt is alert and oriented and of sound mind. Pt requested  services. I was present for conversations, pt stated \"I just want to rest, I want hospice to take me home and just let me be comfortable.\" After further conversation and education on code status and hospice, decision was made to consult hospitalist regarding code status. Dr. Skaggs was called and notified and conferred with Dr. Leiva to change code status to Comfort Measures. Dr. Skaggs also stated that they could make decisions regarding their treatments. They have opted to stop all treatments and pursue comfort medications to alleviate his pain and make him comfortable. Pt agreed to this as well, and explanation was given multiple times to assure patient and family understood their decisions. Telemetry was turned off per family request.   "

## 2018-03-13 NOTE — PLAN OF CARE
Problem: Inpatient SLP  Goal: Dysphagia- Patient will safely consume diet as per recommendation with no signs/symptoms of aspiration  Outcome: Outcome(s) achieved Date Met: 03/13/18 03/07/18 1439 03/13/18 1007   Safely Consume Diet   Safely Consume Diet- SLP, Date Established 03/07/18 --    Safely Consume Diet- SLP, Time to Achieve by discharge --    Safely Consume Diet- SLP, Date Goal Reviewed 03/07/18 --    Safely Consume Diet- SLP, Outcome --  goal no longer appropriate       Problem: Patient Care Overview  Goal: Plan of Care Review  Outcome: Ongoing (interventions implemented as appropriate)   03/13/18 1007   Coping/Psychosocial   Plan of Care Reviewed With patient;spouse   SLP treatment completed. Will sign-off. Pt pursuing comfort measures/Hospice, decreased intake though no discomfort with self-advancement from tsp-sips. Pt/spouse will request for SLP if any further needs. Please see note for further details and recommendations.

## 2018-03-13 NOTE — THERAPY TREATMENT NOTE
Acute Care - Occupational Therapy Treatment Note  [facility]     Patient Name: Alexander Adan  : 1940  MRN: [id number]  Today's Date: 3/13/2018  Onset of Illness/Injury or Date of Surgery: 18  Date of Referral to OT: 18  Referring Physician: MD Gigi    Admit Date: 3/2/2018       ICD-10-CM ICD-9-CM   1. Oropharyngeal dysphagia R13.12 787.22   2. Parotitis, acute K11.21 527.2   3. Subacute maxillary sinusitis J01.00 461.0   4. Generalized weakness R53.1 780.79   5. Impaired functional mobility, balance, gait, and endurance Z74.09 V49.89   6. Impaired mobility and ADLs Z74.09 799.89     Patient Active Problem List   Diagnosis   • Atrial fibrillation   • Chronic coronary artery disease   • Chronic diastolic heart failure   • Chronic obstructive pulmonary disease   • Cognitive impairment, mild, so stated   • Tobacco use   • Fall at home   • CKD (chronic kidney disease), stage III   • Leukocytosis   • Bronchitis   • Vasovagal syncope   • Dehydration   • Hypotension due to dehydration   • PVD (peripheral vascular disease)   • SIRS (systemic inflammatory response syndrome)     Past Medical History:   Diagnosis Date   • Anemia    • Anxiety    • Chronic kidney disease     STAGE II (mild)   • Hyperlipidemia    • Hypertension    • Stroke     LACUNAR. 2013   • Subdural hematoma     Status post hemorrhagic evacuation with [place] holes 4/3/2015 per Dr. Gilson Brooke.     Past Surgical History:   Procedure Laterality Date   • [place] HO[place] FOR SUBDURAL HEMATOMA     • CARDIAC PACEMAKER PLACEMENT      PERMANENT   • CAROTID STENT Left    • CHOLECYSTECTOMY     • CORONARY ARTERY BYPASS GRAFT     • PACEMAKER IMPLANTATION     • THROMBOENDARTERECTOMY      CAROTID, LEFT       Therapy Treatment    Therapy Treatment / Health Promotion    Treatment Time/Intention  Discipline: occupational therapist  Document Type: therapy note (daily note)  Subjective Information: complains of, weakness  Mode of Treatment:  individual therapy  Patient Effort: fair  Treatment Considerations/Comments: fall risk; heel ulcers  Plan of Care Review  Plan of Care Reviewed With: patient    Vitals/Pain/Safety  Vital Signs  Pre Patient Position: Supine  Intra Patient Position: Supine  Post Patient Position: Supine  Positioning and Restraints  Pre-Treatment Position: in bed  Post Treatment Position: bed  In Bed: supine, call light within reach, encouraged to call for assist, exit alarm on, with family/caregiver, with other staff, side rails up x2, legs elevated, heels elevated    Mobility,ADL,Motor, Modality  Bed Mobility Assessment/Treatment  Comment (Bed Mobility): Pt declined EOB/OOB activities.     Motor Skills Assessment/Interventions  Additional Documentation: Therapeutic Exercise Interventions (Group)  Therapeutic Exercise  Therapeutic Exercise: supine, upper extremities  Upper Extremity Supine Therapeutic Exercise  Performed, Supine Upper Extremity (Therapeutic Exercise): shoulder flexion/extension, shoulder horizontal abduction/adduction, elbow flexion/extension  Exercise Type, Supine Upper Extremity (Therapeutic Exercise): AROM (active range of motion)  Expected Outcomes, Supine Upper Extremity (Therapeutic Exercise): improve functional tolerance, self-care activity, improve performance, BADLs, improve performance, transfer skills  Sets/Reps Detail, Supine Upper Extremity (Therapeutic Exercise): 15 reps all ex's  Transfers Skills, Training to Functional Activity, Supine Upper Extremity (Therapeutic Exercise): beginning to transfer skills to functional activity           ROM/MMT             Sensory, Edema, Orthotics          Cognition, Communication, Swallow  Cognitive Assessment/Intervention- PT/OT  Affect/Mental Status (Cognitive): sad/depressed affect, other (see comments) (tearful)  Orientation Status (Cognition): oriented to, person, situation  Follows Commands (Cognition): follows one step commands, over 90% accuracy, verbal  cues/prompting required  Cognitive Function (Cognitive): safety deficit  Safety Deficit (Cognitive): mild deficit, awareness of need for assistance, insight into deficits/self awareness    Outcome Summary           OT Rehab Goals     Row Name 03/10/18 1000             Bed Mobility Goal 1 (OT)    Activity/Assistive Device (Bed Mobility Goal 1, OT) sit to supine/supine to sit;bed rails  -TA      [place] Level/Cues Needed (Bed Mobility Goal 1, OT) supervision required  -TA      Time Frame (Bed Mobility Goal 1, OT) by discharge  -TA         Strength Goal 1 (OT)    Strength Goal 1 (OT) Tolerate 15 reps HEP  -TA      Time Frame (Strength Goal 1, OT) by discharge  -TA         Problem Specific Goal 1 (OT)    Problem Specific Goal 1 (OT) Follow directions 100% with VCs  -TA      Time Frame (Problem Specific Goal 1, OT) by discharge  -TA        User Key  (r) = Recorded By, (t) = Taken By, (c) = Cosigned By    Initials Name Provider Type    RONAK Smart OT Occupational Therapist        Occupational Therapy Education     Title: PT OT SLP Therapies (Active)     Topic: Occupational Therapy (Active)     Point: ADL training (Active)     Description: Instruct learner(s) on proper safety adaptation and remediation techniques during self care or transfers.   Instruct in proper use of assistive devices.   Learning Progress Summary     Learner Status Readiness Method Response Comment Documented by    Patient Active Acceptance E NR Pt educated on appropriate safety precautions, t/f techniques, and role of OT. CL 03/12/18 1604     Active Eager E,D NR  DM 03/12/18 1122     Done Acceptance E VU  KA 03/11/18 1443     Done Acceptance E VU,NR Pt educated on ADL retraining for toileting, safety for functional tf, and precautions during functional mobility. KF 03/08/18 1503     Done Acceptance E VU,NR Pt education provided on HEP, repositioning for bed mobility, and body mechanics for bed mobility. KF 03/06/18 1010    Significant  Other Active Eager ED NR  DM 03/12/18 1122     Done Acceptance E VU,NR Pt educated on ADL retraining for toileting, safety for functional tf, and precautions during functional mobility.  03/08/18 1503     Done Acceptance E VU,NR Pt education provided on HEP, repositioning for bed mobility, and body mechanics for bed mobility. KF 03/06/18 1010          Point: Home exercise program (Active)     Description: Instruct learner(s) on appropriate technique for monitoring, assisting and/or progressing therapeutic exercises/activities.   Learning Progress Summary     Learner Status Readiness Method Response Comment Documented by    Patient Done Acceptance E,D VU,NR BUE HEP; reinforced need for call for assist with OOB activities. TA 03/13/18 1038     Active Eaglynnette CASTLED NR  DM 03/12/18 1122     Done Acceptance E VU  KA 03/11/18 1443     Done Acceptance E,D VU,DU,NR BUE HEP; reinforced need for call for assist with OOB activities. TA 03/10/18 1405     Done Acceptance E VU,NR Pt educated on ADL retraining for toileting, safety for functional tf, and precautions during functional mobility.  03/08/18 1503     Done Acceptance E VU,NR Pt education provided on HEP, repositioning for bed mobility, and body mechanics for bed mobility.  03/06/18 1010    Significant Other Active CHIQUITA Escalante NR  DM 03/12/18 1122     Done Acceptance E VU,NR Pt educated on ADL retraining for toileting, safety for functional tf, and precautions during functional mobility.  03/08/18 1503     Done Acceptance E VU,NR Pt education provided on HEP, repositioning for bed mobility, and body mechanics for bed mobility.  03/06/18 1010          Point: Precautions (Active)     Description: Instruct learner(s) on prescribed precautions during self-care and functional transfers.   Learning Progress Summary     Learner Status Readiness Method Response Comment Documented by    Patient Done Acceptance E,D VU,NR BUE HEP; reinforced need for call for assist with OOB  activities. TA 03/13/18 1038     Active Acceptance E NR Pt educated on appropriate safety precautions, t/f techniques, and role of OT. CL 03/12/18 1604     Active Eager E,D NR  DM 03/12/18 1122     Done Acceptance E VU  KA 03/11/18 1443     Done Acceptance E,D VU,DU,NR BUE HEP; reinforced need for call for assist with OOB activities. TA 03/10/18 1405     Done Acceptance E VU,NR Pt educated on ADL retraining for toileting, safety for functional tf, and precautions during functional mobility. KF 03/08/18 1503     Done Acceptance E VU,NR Pt education provided on HEP, repositioning for bed mobility, and body mechanics for bed mobility. KF 03/06/18 1010    Significant Other Active Eager ED NR  DM 03/12/18 1122     Done Acceptance E VU,NR Pt educated on ADL retraining for toileting, safety for functional tf, and precautions during functional mobility. KF 03/08/18 1503     Done Acceptance E VU,NR Pt education provided on HEP, repositioning for bed mobility, and body mechanics for bed mobility. KF 03/06/18 1010          Point: Body mechanics (Active)     Description: Instruct learner(s) on proper positioning and spine alignment during self-care, functional mobility activities and/or exercises.   Learning Progress Summary     Learner Status Readiness Method Response Comment Documented by    Patient Active Acceptance E NR Pt educated on appropriate safety precautions, t/f techniques, and role of OT.  03/12/18 1604     Active Eager E,D NR  DM 03/12/18 1122     Done Acceptance E VU  KA 03/11/18 1443     Done Acceptance E VU,NR Pt educated on ADL retraining for toileting, safety for functional tf, and precautions during functional mobility. KF 03/08/18 1503     Done Acceptance E VU,NR Pt education provided on HEP, repositioning for bed mobility, and body mechanics for bed mobility. KF 03/06/18 1010    Significant Other Active Eager E,D NR  DM 03/12/18 1122     Done Acceptance E VU,NR Pt educated on ADL retraining for  toileting, safety for functional tf, and precautions during functional mobility.  03/08/18 1503     Done Acceptance E VU,NR Pt education provided on HEP, repositioning for bed mobility, and body mechanics for bed mobility.  03/06/18 1010                      User Key     Initials Effective Dates Name Provider Type Discipline    DM 06/19/15 -  Briana Dawn, PT Physical Therapist PT    TA 03/14/16 -  Tim Smart, OT Occupational Therapist OT    CL 03/07/18 -  Simin Ascencio, OT Occupational Therapist OT    KA 06/23/17 -  Jeanette Ortiz, RN Registered Nurse Nurse     02/14/18 -  Lyudmila Cortés, OT Occupational Therapist OT                  OT Recommendation and Plan  Therapy Frequency (OT Eval): daily  Plan of Care Review  Plan of Care Reviewed With: patient  Plan of Care Reviewed With: patient  Progress: no change  Outcome Summary: Pt VSS; declined EOB/OOB activities this date; tolerated 15 reps each of UB strengthening ex's. Pt emotional/tearful in discussing decision for hospice care. Continue per OT POC as tolerated.        Outcome Measures     Row Name 03/13/18 1007 03/12/18 1358 03/12/18 0930       How much help from another person do you currently need...    Turning from your back to your side while in flat bed without using bedrails?  --  -- 2  -DM    Moving from lying on back to sitting on the side of a flat bed without bedrails?  --  -- 2  -DM    Moving to and from a bed to a chair (including a wheelchair)?  --  -- 3  -DM    Standing up from a chair using your arms (e.g., wheelchair, bedside chair)?  --  -- 3  -DM    Climbing 3-5 steps with a railing?  --  -- 2  -DM    To walk in hospital room?  --  -- 3  -DM    AM-PAC 6 Clicks Score  --  -- 15  -DM       How much help from another is currently needed...    Putting on and taking off regular lower body clothing? 1  -TA 1  -CL  --    Bathing (including washing, rinsing, and drying) 1  -TA 1  -CL  --    Toileting (which includes using toilet bed  pan or urinal) 1  -TA 1  -CL  --    Putting on and taking off regular upper body clothing 2  -TA 2  -CL  --    Taking care of personal grooming (such as brushing teeth) 2  -TA 2  -CL  --    Eating meals 3  -TA 3  -CL  --    Score 10  -TA 10  -CL  --       Functional Assessment    Outcome Measure Options AM-PAC 6 Clicks Daily Activity (OT)  -TA AM-PAC 6 Clicks Daily Activity (OT)  -CL AM-PAC 6 Clicks Basic Mobility (PT)  -DM    Row Name 03/10/18 1329 03/10/18 1300          How much help from another person do you currently need...    Turning from your back to your side while in flat bed without using bedrails?  -- 3  -LS     Moving from lying on back to sitting on the side of a flat bed without bedrails?  -- 3  -LS     Moving to and from a bed to a chair (including a wheelchair)?  -- 3  -LS     Standing up from a chair using your arms (e.g., wheelchair, bedside chair)?  -- 3  -LS     Climbing 3-5 steps with a railing?  -- 2  -LS     To walk in hospital room?  -- 3  -LS     AM-PAC 6 Clicks Score  -- 17  -LS        How much help from another is currently needed...    Putting on and taking off regular lower body clothing? 2  -TA  --     Bathing (including washing, rinsing, and drying) 2  -TA  --     Toileting (which includes using toilet bed pan or urinal) 2  -TA  --     Putting on and taking off regular upper body clothing 3  -TA  --     Taking care of personal grooming (such as brushing teeth) 3  -TA  --     Eating meals 4  -TA  --     Score 16  -TA  --        Functional Assessment    Outcome Measure Options AM-PAC 6 Clicks Daily Activity (OT)  -TA  --       User Key  (r) = Recorded By, (t) = Taken By, (c) = Cosigned By    Initials Name Provider Type    DM Briana Dawn, PT Physical Therapist    LS Angie Ortiz, PT Physical Therapist    TA Tim Smart, OT Occupational Therapist    CL Simin Ascencio, OT Occupational Therapist           Time Calculation:         Time Calculation- OT     Row Name 03/13/18  1041             Time Calculation- OT    OT Start Time 1007   ttc 11 minutes  -TA      Total Timed Code Minutes- OT 11 minute(s)  -TA      OT Received On 03/13/18  -TA      OT Goal Re-Cert Due Date 03/16/18  -TA        User Key  (r) = Recorded By, (t) = Taken By, (c) = Cosigned By    Initials Name Provider Type    TA Tim Smart OT Occupational Therapist           Therapy Charges for Today     Code Description Service Date Service Provider Modifiers Qty    [id number]  OT THERAPEUTIC ACT EA 15 MIN 3/13/2018 Tim Smart OT GO 1               Tim Smart OT  3/13/2018

## 2018-03-13 NOTE — PLAN OF CARE
Problem: Patient Care Overview  Goal: Plan of Care Review  Outcome: Ongoing (interventions implemented as appropriate)   03/13/18 1039   Coping/Psychosocial   Plan of Care Reviewed With patient   Plan of Care Review   Progress no change   OTHER   Outcome Summary Pt VSS; declined EOB/OOB activities this date; tolerated 15 reps each of UB strengthening ex's. Pt emotional/tearful in discussing decision for hospice care. Continue per OT POC as tolerated.

## 2018-03-13 NOTE — DISCHARGE SUMMARY
[facility]  TRANSFER SUMMARY    Patient Name: Alexander Adan  : 1940  MRN: [id number]    Date of Admission: 3/2/2018  Date of Discharge:  18    Length of Stay: 11  Primary Care Physician: Harrison Smart MD    Consults     Date and Time Order Name Status Description    3/13/2018 0340 Inpatient Palliative Care MD Consult      3/4/2018 0802 Inpatient Infectious Diseases Consult Completed           Hospital Course     Presenting Problem:   SIRS (systemic inflammatory response syndrome) [R65.10]      Active Hospital Problems (** Indicates Principal Problem)    Diagnosis Date Noted   • Parotitis [K11.20] 2018   • Chronic hypotension [I95.9] 2018   • PVD (peripheral vascular disease) [I73.9] 2018   • CKD (chronic kidney disease), stage III [N18.3] 2017   • Chronic diastolic heart failure [I50.32] 2016   • Chronic coronary artery disease [I25.10] 2016   • Atrial fibrillation [I48.91] 2016   • Chronic obstructive pulmonary disease [J44.9] 2016      Resolved Hospital Problems    Diagnosis Date Noted Date Resolved   • **Sepsis [A41.9] 2018   • Aspiration pneumonia [J69.0] 2018   • Leukocytosis [D72.829] 2017          Hospital Course:  Alexander Adan is a 77 y.o. male with PMH of PVD, Afib, CAD s/p CABG, diastolic CHF, HLD, HTN, CVA, anemia, tobacco abuse, PVD with chronic claudication pain, and remote h/o SDH on anticoagulation for his Afib s/p india holes in  by Dr. Brooke (no longer chronically anticoagulated) who presented with complaints of right ear pain, abdominal pain and anorexia for three days.  His evaluation was consistent with sepsis picture with tachycardia, fever, severe leukocytosis as well as RML/RLL pneumonia presumed to have been aspiration based on his St evaluation (now on Dysphagia Level III diet). Patient was also found to have right  sided parotitis, he has been followed by Dr. Grajeda of ID and treated with antibiotics for the aspiration pneumonia as well as parotitis.  At discharge, plan is 10 additional days on PO Zyvoxx for long course to treat parotitis, NOTE: patient's amitriptyline is being HELD while he finishes Zyvoxx but can be RESTARTED after Zyvoxx course complete (held due to QTc concerns).  The family has been advised if patient were to have recurrence in future he would need outpatient ENT referral for consideration of salivary gland stones.  Currently his edema and pain is completely resolved.     Patient will transition to skilled rehab for further PT/OT/ST.            Day of Discharge     HPI:   Right sided parotitis resolved clinically, no pain or edema.  Denies CP or SOA.  Still with productive cough but improving.     Review of Systems  Otherwise complete ROS is negative except as mentioned in the HPI.    Vital Signs:   Temp:  [98 °F (36.7 °C)-98.1 °F (36.7 °C)] 98 °F (36.7 °C)  Heart Rate:  [] 96  Resp:  [18] 18  BP: ()/(49-71) 113/58     Physical Exam:  Constitutional: No acute distress, awake, alert, nontoxic, normal body habitus  HENT: NCAT, mucous membranes moist, no pain/edema/erythema of mandibular areas  Neck: Supple, no thyromegaly, no lymphadenopathy, trachea midline  Respiratory: Clear to auscultationleft with faint residual RLL rales that clear with deep inspiration, good effort, nonlabored respirations   Cardiovascular: RRR, no murmur  Musculoskeletal: No peripheral edema  Psychiatric: Appropriate affect, good insight and judgement, cooperative  Skin: No rashes, no jaundice, no petechiae, no mottling      Pertinent Results       Results from last 7 days  Lab Units 03/12/18 2027 03/12/18  0433 03/11/18  0826 03/11/18  0658 03/10/18  0658 03/09/18  0639 03/09/18  0503 03/07/18  0542   WBC 10*3/mm3  --   --   --  9.13 11.74*  --  14.34* 12.41*   HEMOGLOBIN g/dL  --  9.9*  --  8.0* 8.8*  --  9.7* 11.0*    HEMATOCRIT %  --  32.1*  --  26.6* 28.5*  --  31.3* 35.3*   PLATELETS 10*3/mm3  --   --   --  180 178  --  182 193   SODIUM mmol/L  --  139 141  --  137 137  --  130*   POTASSIUM mmol/L 4.7 3.6 3.6  --  3.8 4.6  --  4.8   CHLORIDE mmol/L  --  116* 118*  --  115* 111*  --  110*   CO2 mmol/L  --  17.0* 17.0*  --  18.0* 16.0*  --  16.0*   BUN mg/dL  --  11 12  --  12 13  --  10   CREATININE mg/dL  --  0.70 0.80  --  0.80 0.80  --  0.80   GLUCOSE mg/dL  --  81 122*  --  83 84  --  125*   CALCIUM mg/dL  --  7.5* 7.5*  --  7.9* 7.3*  --  7.7*       Results from last 7 days  Lab Units 03/10/18  0658 03/09/18  0639 03/07/18  0542   BILIRUBIN mg/dL 0.2* 0.2* 0.2*   ALK PHOS U/L 60 64 85   ALT (SGPT) U/L 6* 10 7   AST (SGOT) U/L 8 17 15           Invalid input(s): TG, LDLCALC, LDLREALC    Results from last 7 days  Lab Units 03/10/18  1334 03/10/18  1302 03/10/18  1208 03/09/18  0503   CORTISOL mcg/dL 38.40 27.30 19.00  --    BNP pg/mL  --   --   --  675.0*   TROPONIN I ng/mL  --   --   --  0.033     Brief Urine Lab Results  (Last result in the past 365 days)      Color   Clarity   Blood   Leuk Est   Nitrite   Protein   CREAT   Urine HCG        03/09/18 1723 Yellow Clear Negative Negative Negative Negative               Microbiology Results Abnormal     Procedure Component Value - Date/Time    Blood Culture - Blood, [[id number]]  (Normal) Collected:  03/08/18 1033    Lab Status:  Preliminary result Specimen:  Blood from Arm, Left Updated:  03/12/18 1301     Blood Culture No growth at 4 days    Blood Culture - Blood, [[id number]]  (Normal) Collected:  03/08/18 1015    Lab Status:  Preliminary result Specimen:  Blood from Wrist, Left Updated:  03/12/18 1301     Blood Culture No growth at 4 days    Blood Culture - Blood, [[id number]]  (Normal) Collected:  03/02/18 1231    Lab Status:  Final result Specimen:  Blood from Hand, Right Updated:  03/07/18 1746     Blood Culture No growth at 5 days    S. Pneumo Ag Urine or CSF - Urine,  Urine, Clean Catch [[id number]] Collected:  03/05/18 0538    Lab Status:  Final result Specimen:  Urine from Urine, Clean Catch Updated:  03/07/18 1711     Specimen Source Urine     STREP PNEUMONIAE ANTIGEN Negative     Body Fluid Culture, Sterile Not Indicated     Organism ID Not indicated.     Please note Comment     Comment: College [employer] standards require a culture to be  performed on CSF specimens submitted for bacterial antigen testing.  (CAP [id number]) Urine specimens will not be cultured.       Narrative:       Performed at:  [address] [place] [address]  [id number]  : Gilson Gandara MD, Phone:  [phone]    Legionella Antigen, Urine - Urine, Urine, Clean Catch [[id number]] Collected:  03/05/18 0538    Lab Status:  Final result Specimen:  Urine from Urine, Clean Catch Updated:  03/07/18 1522     L. pneumophila Serogp 1 Ur Ag Negative     Comment: Presumptive negative for L. pneumophila serogroup 1 antigen in urine,  suggesting no recent or current infection. Legionnaires' disease  cannot be ruled out since other serogroups and species may also cause  disease.       Narrative:       Performed at:  [address] [place] [address]  [id number]  : Gilson Gandara MD, Phone:  [phone]    Blood Culture - Blood, [[id number]] Collected:  03/02/18 1235    Lab Status:  Final result Specimen:  Blood from Arm, Left Updated:  03/07/18 1401     Blood Culture No growth at 5 days      Aerobic bottle only    Blood Culture - Blood, [[id number]]  (Normal) Collected:  03/02/18 1305    Lab Status:  Final result Specimen:  Blood from Arm, Left Updated:  03/07/18 1401     Blood Culture No growth at 5 days    Respiratory Culture - Sputum, Cough [[id number]]  (Abnormal)  (Susceptibility) Collected:  03/05/18 0751    Lab Status:  Final result Specimen:  Sputum from Cough Updated:  03/07/18 1210     Respiratory Culture --      Moderate  growth (3+) Staphylococcus aureus (A)     Comment: This isolate is presumed to be clindamycin resistant based on detection of inducible clindamycin resistance.  Clindamycin may still be effective in some patients.         Moderate growth (3+) Normal Respiratory Tiffanie     Gram Stain Result Many (4+) WBCs per low power field      Moderate (3+) Epithelial cells per low power field      Moderate (3+) Gram positive cocci in pairs and clusters    Susceptibility      Staphylococcus aureus     TORO     Ceftriaxone <=8 ug/ml Susceptible     Clindamycin  Resistant     Erythromycin >4 ug/ml Resistant     Gentamicin <=4 ug/ml Susceptible     Levofloxacin <=1 ug/ml Susceptible  [1]      Linezolid <=1 ug/ml Susceptible     Oxacillin <=0.25 ug/ml Susceptible     Penicillin G 2 ug/ml Resistant     Quinupristin + Dalfopristin <=0.5 ug/ml Susceptible     Rifampin <=1 ug/ml Susceptible     Tetracycline >8 ug/ml Resistant     Trimethoprim + Sulfamethoxazole <=0.5/9.5 ug/ml Susceptible     Vancomycin 1 ug/ml Susceptible            [1]   Staphylococcus species may develop resistance during prolonged therapy with quinolones.  Isolates that are initially susceptible may become resistant within three to four days after initiation of therapy. Testing of repeat isolates may be warranted.                 Urine Culture - Urine, Urine, Clean Catch [[id number]]  (Normal) Collected:  03/05/18 0538    Lab Status:  Final result Specimen:  Urine from Urine, Clean Catch Updated:  03/07/18 0820     Urine Culture No growth at 2 days    Influenza A & B, RT PCR - Swab, Nasopharynx [[id number]]  (Normal) Collected:  03/04/18 1424    Lab Status:  Final result Specimen:  Swab from Nasopharynx Updated:  03/04/18 1542     Influenza A PCR Not Detected     Influenza B PCR Not Detected          Imaging Results (all)     Procedure Component Value Units Date/Time    XR Chest 1 View [[id number]] Collected:  03/11/18 1533     Updated:  03/11/18 1819    Narrative:           EXAMINATION: XR CHEST 1 VW - 03/11/2018     INDICATION:  R13.12-Dysphagia, oropharyngeal phase; K11.21-Acute  sialoadenitis; J01.00-Acute maxillary sinusitis, unspecified;  R53.1-Weakness; Z74.09-Other reduced mobility.     COMPARISON: None.     FINDINGS:   1. Heart size is within normal limits. There is airspace opacity in the  right mid and lower lung with a right base effusion.     2. Compared to previous studies of 2 days ago, there is increasing  airspace opacity in the right mid and lower lung, and the right base  effusion appears to be new. The left lung is expanded and clear with  chronic interstitial and granulomatous scarring.     The right PICC line remains well-positioned in the mid SVC.           Impression:       Increasing airspace opacity right mid and lower lung with  right pleural effusion, worse when compared to 2 days ago.     DICTATED:     03/11/2018  EDITED/ls :     03/11/2018      This report was finalized on 3/11/2018 6:17 PM by Dr. Giovani Rodriguez MD.       XR Chest 1 View [[id number]] Collected:  03/09/18 0934     Updated:  03/09/18 2231    Narrative:       EXAMINATION: XR CHEST 1 VW-      INDICATION: Wheezing on left; R13.12-Dysphagia, oropharyngeal phase;  K11.21-Acute sialoadenitis; J01.00-Acute maxillary sinusitis,  unspecified; R53.1-Weakness; Z74.09-Other reduced mobility.      COMPARISON: 03/04/2018.     FINDINGS: Sternotomy wires are noted. There is a left-sided dual lead  ICD. Right upper extremity PICC line seen with its tip at the cavoatrial  junction. The heart is normal in size. Vasculature is cephalized and  there is mild diffuse interstitial disease a little increased from the  prior study, probably early interstitial edema. No consolidated lung or  pneumothorax is seen. There may be trace right pleural effusion versus  chronic pleural thickening.           Impression:       Interval development of mild pulmonary vascular congestion  and interstitial edema.     D:   03/09/2018  E:  03/09/2018     This report was finalized on 3/9/2018 10:29 PM by DR. Faisal Nelson MD.       FL Video Swallow With Speech [[id number]] Collected:  03/07/18 1525     Updated:  03/07/18 1540    Narrative:       EXAMINATION: FL VIDEO SWALLOW W SPEECH-     INDICATION: dysphagia; K11.21-Acute sialoadenitis; J01.00-Acute  maxillary sinusitis, unspecified; R53.1-Weakness; R13.10-Dysphagia,  unspecified; Z74.09-Other reduced mobility; Z74.09-Other reduced  mobility     TECHNIQUE: 1 minute and 18 seconds of fluoroscopic time was used for  this exam. 1 associated image was saved. The patient was evaluated in  the seated lateral position while taking a variety of consistencies of  barium by mouth under the direction of speech pathology.     COMPARISON: NONE     FINDINGS: There was no penetration and no aspiration with small teaspoon  sized sips of thin barium. There was aspiration with large sized sips of  thin, nectar consistency barium. There was no penetration and no  aspiration with honey, or pudding consistency barium, however there was  a considerable amount of residue in the vallecula, and piriform sinuses  after swallows with honey, and pudding consistency barium.          Impression:       Fluoroscopy provided for a modified barium swallow. Please  see speech therapy report for full details and recommendations.         This report was finalized on 3/7/2018 3:38 PM by Dr. Tim Murillo MD.       XR Chest 1 View [[id number]] Collected:  03/04/18 1701     Updated:  03/04/18 1857    Narrative:          EXAMINATION: XR CHEST 1 VW - 03/04/2018     INDICATION:  K11.21-Acute sialoadenitis; J01.00-Acute maxillary  sinusitis, unspecified; R53.1-Weakness; R13.10-Dysphagia, unspecified.      COMPARISON: 03/02/2018.     FINDINGS: Portable chest reveals PICC line placement with tip in the  SVC. There are chronic changes identified within the lung fields with  mild increased markings seen within the right middle lower lung  field.  Development of a small right pleural effusion. Heart is borderline  enlarged with slight prominence of the pulmonary vascularity.           Impression:       PICC line catheter on the right with tip in the SVC.  Interval development of mild increased markings within the right mid to  lower lung field as well as development of a small right pleural  effusion.     DICTATED:     03/04/2018  EDITED    :     03/04/2018      This report was finalized on 3/4/2018 6:55 PM by Dr. Donna Matthews MD.       CT Soft Tissue Neck With Contrast [[id number]] Collected:  03/02/18 1627     Updated:  03/02/18 1646    Narrative:       EXAMINATION: CT NECK SOFT TISSUES W/CONTRAST - 03/02/2018      INDICATION: K11.21-Acute sialoadenitis; J01.00-Acute maxillary  sinusitis, unspecified; R53.1-Weakness.      TECHNIQUE: CT neck soft tissues with intravenous contrast  administration.     The radiation dose reduction device was turned on for each scan per the  ALARA (As Low as Reasonably Achievable) protocol.     COMPARISON: None.     FINDINGS: For intracranial findings, please see recent CT head report  performed the same day. Total opacification right maxillary sinus  consistent with components of sinusitis. Severely asymmetric appearance  of the right parotid gland which is nearly entirely replaced by  heterogeneous enhancing soft tissue attenuation involving the  superficial and deep lobes with overall dimensions measuring  approximately 4.6 x 4.0 cm, indicative of soft tissue mass involvement.  There is no discrete osseous erosion of the adjacent mandible or  temporomandibular joint with right mastoid air cells grossly clear.  Parapharyngeal spaces are preserved with supraglottic soft tissues,  unremarkable. Valleculae and piriform sinuses within normal limits. The  vocal cords and subglottic trachea are unremarkable. Visualized portions  of the lung apices are grossly unremarkable. Thyroid is homogeneous in  attenuation. No  bulky cervical adenopathy is identified.       Impression:       Soft tissue mass involvement of the superficial and deep  lobes of the right parotid gland. 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 No adjacent osseous erosion. No bulky cervical  adenopathy otherwise identified. Opacification of the right maxillary  sinus again noted consistent with sinusitis.     DICTATED:     03/02/2018  EDITED    :     03/02/2018      This report was finalized on 3/2/2018 4:44 PM by Dr. Maurice Alas.       XR Chest 1 View [[id number]] Collected:  03/02/18 1131     Updated:  03/02/18 1301    Narrative:       EXAMINATION: XR CHEST 1 VW- 03/02/2018     INDICATION: weakness, altered mental status      COMPARISON: Chest x-ray 02/16/2018.     FINDINGS: Cardiac size within normal limits. Post CABG and post median  sternotomy changes. No focal opacification or consolidation. No  pneumothorax or pleural effusion. Left chest wall pacing device with  leads intact. Degenerative changes of the thoracic spine.           Impression:       No acute cardiopulmonary process.     D:  03/02/2018  E:  03/02/2018     This report was finalized on 3/2/2018 12:58 PM by Dr. Maurice Alas.       CT Head Without Contrast [[id number]] Collected:  03/02/18 1231     Updated:  03/02/18 1300    Narrative:       EXAMINATION: CT HEAD WO CONTRAST-      INDICATION: Encephalopathy.      TECHNIQUE: Axial CT of the head without intravenous contrast  administration.     The radiation dose reduction device was turned on for each scan per the  ALARA (As Low as Reasonably Achievable) protocol.     COMPARISON: 02/16/2018.     FINDINGS: Midline structures are symmetric without evidence of mass,  mass effect or midline shift. No intraaxial hemorrhage or extraaxial  fluid collection. Ventricles and sulci are prominent  consistent with  components of atrophy. Mild to moderate attenuation changes within the  periventricular and deep white matter consistent with chronic small  vessel ischemic disease. Globes and orbits are partially visualized and  grossly unremarkable. Paranasal sinuses and mastoid air cells  demonstrate total opacification of the right maxillary sinus as well as  opacification in the right ethmoid air cells and sphenoid sinus.  Calvarium demonstrates prior india holes left frontal and left temporal  regions otherwise grossly intact.       Impression:       No acute intracranial abnormality.     D:  03/02/2018  E:  03/02/2018     This report was finalized on 3/2/2018 12:58 PM by Dr. Maurice Alas.       CT Abdomen Pelvis Without Contrast [[id number]] Collected:  03/02/18 1254     Updated:  03/02/18 1300    Narrative:       EXAMINATION: CT ABDOMEN AND PELVIS WO CONTRAST-      INDICATION: Abdomen pain, guarding, urinary incontinence.      TECHNIQUE: CT abdomen and pelvis without intravenous contrast  administration.     The radiation dose reduction device was turned on for each scan per the  ALARA (As Low as Reasonably Achievable) protocol.     COMPARISON: 08/05/2017.     FINDINGS: Motion degraded examination particularly within the  subdiaphragmatic soft tissues.  Lung bases demonstrate patchy  tree-in-bud opacifications involving much of the right lower lobe and  visualized right middle lobe consistent with infectious or inflammatory  etiology, however, no focal lobar consolidation. Left hemithorax  partially visualized and grossly clear. Liver without focal lesion.  Gallbladder is surgically absent. Pancreas and spleen within normal  limits. Adrenals without distinct nodule. Kidneys without hydronephrosis  or hydroureter. No obstructive uropathy or urolithiasis. Atherosclerotic  nonaneurysmal abdominal aorta. No bulky retroperitoneal adenopathy. GI  tract evaluation without focal thickening or disproportionate  dilatation  of bowel. No free fluid or intraabdominal free air. Pelvic viscera  unremarkable without bulky pelvic adenopathy or significant free fluid.  Degenerative changes of the spine and pelvis without aggressive osseous  abnormality identified within the limited evaluation due to motion. No  soft tissue body wall findings of concern.       Impression:       1. Limited evaluation due to significant patient motion artifact  limiting the subdiaphragmatic soft tissues, however, the limits of the  study there is scattered tree-in-bud opacifications involving the  visualized right middle lobe and right lower lobe along with nodularity  consistent of acute infectious or inflammatory etiology without lobar  consolidation identified. No significant pleural effusion.  2. No acute intraabdominal abnormality within the limited evaluation due  to significant motion specifically no disproportionate dilatation of  bowel to suggest mechanical obstructive process or loculated  intraabdominal fluid collection.     D:  03/02/2018  E:  03/02/2018     This report was finalized on 3/2/2018 12:58 PM by Dr. Maurice Alas.             Results for orders placed during the hospital encounter of 03/02/18   Adult Transthoracic Echo Complete W/ Cont if Necessary Per Protocol    Narrative · Left atrial cavity size is moderately dilated.  · Left ventricular systolic function is moderately decreased. Estimated   ejection fraction 41-45%.  · Mild mitral valve regurgitation is present  · Mild tricuspid valve regurgitation is present.           Order Current Status    Blood Culture - Blood, Preliminary result    Blood Culture - Blood, Preliminary result          Discharge Details      Alexander Adan   Home Medication Instructions BRIAN:[id number]    Printed on:03/13/18 1211   Medication Information                      acetaminophen (TYLENOL) 650 MG 8 hr tablet  Take 650 mg by mouth Every 4 (Four) Hours As Needed for mild pain (1-3).              albuterol (PROVENTIL HFA;VENTOLIN HFA) 108 (90 BASE) MCG/ACT inhaler  Inhale 1-2 puffs Every 4 (Four) Hours As Needed for Wheezing.             albuterol (PROVENTIL) (2.5 MG/3ML) 0.083% nebulizer solution  Take 1 dose by nebulization Every 6 (Six) Hours As Needed.             aspirin (ASPIRIN LOW DOSE) 81 MG tablet  Take 81 mg by mouth Daily.             bisacodyl (DULCOLAX) 5 MG EC tablet  Take 1 tablet by mouth Daily As Needed for Constipation.             cilostazol (PLETAL) 50 MG tablet  Take 50 mg by mouth Every 12 (Twelve) Hours.             dicyclomine (BENTYL) 20 MG tablet  TAKE ONE TABLET BY MOUTH EVERY 6 HOURS AS NEEDED ABDOMINAL  PAIN             gabapentin (NEURONTIN) 600 MG tablet  Take 1 tablet by mouth 3 (Three) Times a Day.             HYDROcodone-acetaminophen (NORCO) 5-325 MG per tablet  Take 1 tablet by mouth Every 6 (Six) Hours As Needed for Moderate Pain .             linezolid (ZYVOX) 600 MG tablet  Take 1 tablet by mouth Every 12 (Twelve) Hours for 20 doses.             LORazepam (ATIVAN) 2 MG tablet  Take 1 mg by mouth Every 8 (Eight) Hours As Needed.             meclizine (ANTIVERT) 12.5 MG tablet  Take 12.5 mg by mouth Every 8 (Eight) Hours As Needed for dizziness.             melatonin 5 MG tablet tablet  Take 5 mg by mouth Every Night. 3 tablets at night             metoprolol succinate XL (TOPROL-XL) 25 MG 24 hr tablet  Take 1 tablet by mouth Daily.             nicotine (NICODERM CQ) 7 MG/24HR patch  Place 1 patch on the skin Daily.             omeprazole (priLOSEC) 40 MG capsule  Take 1 capsule by mouth 2 (Two) Times a Day.             sucralfate (CARAFATE) 1 G tablet  Take 1 g by mouth 2 (Two) Times a Day.                   Discharge Disposition:  Rehab Facility or Unit (DC - External)    Discharge Diet:  Diet Instructions     Diet: Dysphagia; Thin Liquids, No Restrictions; Pureed With Some Mashed       Discharge Diet:  Dysphagia    Fluid Consistency:  Thin Liquids, No Restrictions     Pureed Options:  Pureed With Some Mashed          Discharge Activity:  Activity Instructions     Activity as Tolerated             Future Appointments  Date Time Provider Department Center   8/29/2018 10:15 AM Huey Noble III, MD [facility] None       Additional Instructions for the Follow-ups that You Need to Schedule     Discharge Follow-up with PCP    As directed      Follow Up Details:  F/u with PCP within one week of d/c from skilled rehab                 Time Spent on Discharge:  40 min    Electronically signed by Usha Owens MD, 03/13/18, 12:03 PM.

## 2018-03-13 NOTE — PROGRESS NOTES
Continued Stay Note   [place]     Patient Name: Alexander Adan  MRN: [id number]  Today's Date: 3/13/2018    Admit Date: 3/2/2018          Discharge Plan     Row Name 03/13/18 1017       Plan    Plan update    Patient/Family in Agreement with Plan yes    Plan Comments Spoke with Jenna lewis [facility] who advises that insurance has again been approved.  Patient can discharge to [facility] when medically ready.  CM following.  Patient plan is to discharge to [facility] via car with family to transport when medically ready.     Final Discharge Disposition Code 03 - skilled nursing facility (SNF)              Discharge Codes    No documentation.       Expected Discharge Date and Time     Expected Discharge Date Expected Discharge Time    Mar 13, 2018             Randee Singleton RN

## 2018-03-13 NOTE — PLAN OF CARE
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 Continue with Povidone-Iodine to arterial ulcers daily. Coccyx PI continues with 100% slough at this time. Continue with Venelex ointment. On LEA mattress. WO nurse will continue to follow at this time. Please contact WO nurse if needs arise. Thanks

## 2018-03-13 NOTE — PLAN OF CARE
Problem: Infection, Risk/Actual (Adult)  Goal: Infection Prevention/Resolution  Outcome: Ongoing (interventions implemented as appropriate)      Problem: Skin Integrity Impairment, Risk/Actual (Adult)  Goal: Skin Integrity/Wound Healing  Outcome: Ongoing (interventions implemented as appropriate)      Problem: Fall Risk (Adult)  Goal: Absence of Falls  Outcome: Ongoing (interventions implemented as appropriate)      Problem: Patient Care Overview (Adult)  Goal: Plan of Care Review   03/13/18 0332   Coping/Psychosocial Response Interventions   Plan Of Care Reviewed With patient   Patient Care Overview   Progress declining   Outcome Evaluation   Outcome Summary/Follow up Plan Pt has decided he would like to be comfort measure and hospice which is also supported by the family. BP still very low. Telemetry discontinued per family. Pt resting comfortably. Continuing to monitor. 0334 on 3/13/18.       Problem: Pain, Acute (Adult)  Goal: Acceptable Pain Control/Comfort Level  Outcome: Ongoing (interventions implemented as appropriate)